# Patient Record
Sex: FEMALE | Race: WHITE | NOT HISPANIC OR LATINO
[De-identification: names, ages, dates, MRNs, and addresses within clinical notes are randomized per-mention and may not be internally consistent; named-entity substitution may affect disease eponyms.]

---

## 2018-01-24 ENCOUNTER — TRANSCRIPTION ENCOUNTER (OUTPATIENT)
Age: 63
End: 2018-01-24

## 2018-01-24 ENCOUNTER — APPOINTMENT (OUTPATIENT)
Dept: NEUROSURGERY | Facility: CLINIC | Age: 63
End: 2018-01-24

## 2018-01-24 ENCOUNTER — INPATIENT (INPATIENT)
Facility: HOSPITAL | Age: 63
LOS: 21 days | Discharge: HOME | DRG: 23 | End: 2018-02-15
Attending: NEUROLOGICAL SURGERY | Admitting: SURGERY
Payer: MEDICAID

## 2018-01-24 VITALS
DIASTOLIC BLOOD PRESSURE: 115 MMHG | HEART RATE: 103 BPM | SYSTOLIC BLOOD PRESSURE: 173 MMHG | OXYGEN SATURATION: 100 % | RESPIRATION RATE: 28 BRPM | TEMPERATURE: 99 F

## 2018-01-24 DIAGNOSIS — R41.82 ALTERED MENTAL STATUS, UNSPECIFIED: ICD-10-CM

## 2018-01-24 LAB
ALBUMIN SERPL ELPH-MCNC: 5.3 G/DL — HIGH (ref 3.3–5)
ALP SERPL-CCNC: 76 U/L — SIGNIFICANT CHANGE UP (ref 40–120)
ALT FLD-CCNC: 21 U/L RC — SIGNIFICANT CHANGE UP (ref 10–45)
AMPHET UR-MCNC: NEGATIVE — SIGNIFICANT CHANGE UP
AMYLASE P1 CFR SERPL: 138 U/L — HIGH (ref 25–125)
ANION GAP SERPL CALC-SCNC: 19 MMOL/L — HIGH (ref 5–17)
ANION GAP SERPL CALC-SCNC: 20 MMOL/L — HIGH (ref 5–17)
ANION GAP SERPL CALC-SCNC: 22 MMOL/L — HIGH (ref 5–17)
APPEARANCE CSF: ABNORMAL
APPEARANCE SPUN FLD: COLORLESS — SIGNIFICANT CHANGE UP
APPEARANCE UR: CLEAR — SIGNIFICANT CHANGE UP
APTT BLD: 25.8 SEC — LOW (ref 27.5–37.4)
AST SERPL-CCNC: 90 U/L — HIGH (ref 10–40)
BARBITURATES UR SCN-MCNC: NEGATIVE — SIGNIFICANT CHANGE UP
BASOPHILS # BLD AUTO: 0 K/UL — SIGNIFICANT CHANGE UP (ref 0–0.2)
BENZODIAZ UR-MCNC: NEGATIVE — SIGNIFICANT CHANGE UP
BILIRUB SERPL-MCNC: 1.2 MG/DL — SIGNIFICANT CHANGE UP (ref 0.2–1.2)
BILIRUB UR-MCNC: NEGATIVE — SIGNIFICANT CHANGE UP
BLD GP AB SCN SERPL QL: NEGATIVE — SIGNIFICANT CHANGE UP
BUN SERPL-MCNC: 17 MG/DL — SIGNIFICANT CHANGE UP (ref 7–23)
BUN SERPL-MCNC: 19 MG/DL — SIGNIFICANT CHANGE UP (ref 7–23)
BUN SERPL-MCNC: 20 MG/DL — SIGNIFICANT CHANGE UP (ref 7–23)
BUN SERPL-MCNC: 23 MG/DL — SIGNIFICANT CHANGE UP (ref 7–23)
CALCIUM SERPL-MCNC: 10.3 MG/DL — SIGNIFICANT CHANGE UP (ref 8.4–10.5)
CALCIUM SERPL-MCNC: 9 MG/DL — SIGNIFICANT CHANGE UP (ref 8.4–10.5)
CALCIUM SERPL-MCNC: 9.6 MG/DL — SIGNIFICANT CHANGE UP (ref 8.4–10.5)
CALCIUM SERPL-MCNC: 9.9 MG/DL — SIGNIFICANT CHANGE UP (ref 8.4–10.5)
CHLORIDE SERPL-SCNC: 102 MMOL/L — SIGNIFICANT CHANGE UP (ref 96–108)
CHLORIDE SERPL-SCNC: 87 MMOL/L — LOW (ref 96–108)
CHLORIDE SERPL-SCNC: 92 MMOL/L — LOW (ref 96–108)
CHLORIDE SERPL-SCNC: 95 MMOL/L — LOW (ref 96–108)
CK MB BLD-MCNC: 1.6 % — SIGNIFICANT CHANGE UP (ref 0–3.5)
CK MB CFR SERPL CALC: 16 NG/ML — HIGH (ref 0–3.8)
CK SERPL-CCNC: 1028 U/L — HIGH (ref 25–170)
CO2 SERPL-SCNC: 17 MMOL/L — LOW (ref 22–31)
CO2 SERPL-SCNC: 20 MMOL/L — LOW (ref 22–31)
CO2 SERPL-SCNC: 21 MMOL/L — LOW (ref 22–31)
CO2 SERPL-SCNC: 24 MMOL/L — SIGNIFICANT CHANGE UP (ref 22–31)
COCAINE METAB.OTHER UR-MCNC: NEGATIVE — SIGNIFICANT CHANGE UP
COLOR CSF: ABNORMAL
COLOR SPEC: SIGNIFICANT CHANGE UP
CREAT SERPL-MCNC: 0.75 MG/DL — SIGNIFICANT CHANGE UP (ref 0.5–1.3)
CREAT SERPL-MCNC: 0.86 MG/DL — SIGNIFICANT CHANGE UP (ref 0.5–1.3)
CREAT SERPL-MCNC: 0.87 MG/DL — SIGNIFICANT CHANGE UP (ref 0.5–1.3)
CREAT SERPL-MCNC: 1.05 MG/DL — SIGNIFICANT CHANGE UP (ref 0.5–1.3)
DIFF PNL FLD: ABNORMAL
EOSINOPHIL # BLD AUTO: 0 K/UL — SIGNIFICANT CHANGE UP (ref 0–0.5)
ERYTHROCYTE [SEDIMENTATION RATE] IN BLOOD: 12 MM/HR — SIGNIFICANT CHANGE UP (ref 0–20)
ETHANOL SERPL-MCNC: SIGNIFICANT CHANGE UP MG/DL (ref 0–10)
GAS PNL BLDA: SIGNIFICANT CHANGE UP
GAS PNL BLDA: SIGNIFICANT CHANGE UP
GLUCOSE CSF-MCNC: 99 MG/DL — HIGH (ref 40–70)
GLUCOSE SERPL-MCNC: 155 MG/DL — HIGH (ref 70–99)
GLUCOSE SERPL-MCNC: 185 MG/DL — HIGH (ref 70–99)
GLUCOSE SERPL-MCNC: 195 MG/DL — HIGH (ref 70–99)
GLUCOSE SERPL-MCNC: 210 MG/DL — HIGH (ref 70–99)
GLUCOSE UR QL: 150 MG/DL
GRAM STN FLD: SIGNIFICANT CHANGE UP
HBA1C BLD-MCNC: 5.4 % — SIGNIFICANT CHANGE UP (ref 4–5.6)
HCG SERPL-ACNC: 3.2 MIU/ML — LOW (ref 5–24)
HCT VFR BLD CALC: 44.8 % — SIGNIFICANT CHANGE UP (ref 34.5–45)
HCT VFR BLD CALC: 52.3 % — HIGH (ref 34.5–45)
HCT VFR BLD CALC: 56.2 % — HIGH (ref 34.5–45)
HGB BLD-MCNC: 15.8 G/DL — HIGH (ref 11.5–15.5)
HGB BLD-MCNC: 18.2 G/DL — HIGH (ref 11.5–15.5)
HGB BLD-MCNC: 19.8 G/DL — CRITICAL HIGH (ref 11.5–15.5)
INR BLD: 0.96 RATIO — SIGNIFICANT CHANGE UP (ref 0.88–1.16)
KETONES UR-MCNC: ABNORMAL
LACTATE CSF-MCNC: 3.4 MMOL/L — HIGH (ref 1.1–2.4)
LEUKOCYTE ESTERASE UR-ACNC: NEGATIVE — SIGNIFICANT CHANGE UP
LIDOCAIN IGE QN: 86 U/L — HIGH (ref 7–60)
LYMPHOCYTES # BLD AUTO: 1.2 K/UL — SIGNIFICANT CHANGE UP (ref 1–3.3)
LYMPHOCYTES # BLD AUTO: 9 % — LOW (ref 13–44)
LYMPHOCYTES # CSF: 1 % — LOW (ref 40–80)
MAGNESIUM SERPL-MCNC: 1.7 MG/DL — SIGNIFICANT CHANGE UP (ref 1.6–2.6)
MAGNESIUM SERPL-MCNC: 2 MG/DL — SIGNIFICANT CHANGE UP (ref 1.6–2.6)
MCHC RBC-ENTMCNC: 34.8 GM/DL — SIGNIFICANT CHANGE UP (ref 32–36)
MCHC RBC-ENTMCNC: 35.3 GM/DL — SIGNIFICANT CHANGE UP (ref 32–36)
MCHC RBC-ENTMCNC: 35.3 GM/DL — SIGNIFICANT CHANGE UP (ref 32–36)
MCHC RBC-ENTMCNC: 35.5 PG — HIGH (ref 27–34)
MCHC RBC-ENTMCNC: 36 PG — HIGH (ref 27–34)
MCHC RBC-ENTMCNC: 36.1 PG — HIGH (ref 27–34)
MCV RBC AUTO: 102 FL — HIGH (ref 80–100)
METHADONE UR-MCNC: NEGATIVE — SIGNIFICANT CHANGE UP
MONOCYTES # BLD AUTO: 1.3 K/UL — HIGH (ref 0–0.9)
MONOCYTES NFR BLD AUTO: 8 % — SIGNIFICANT CHANGE UP (ref 2–14)
MONOS+MACROS NFR CSF: 4 % — LOW (ref 15–45)
NEUTROPHILS # BLD AUTO: 17.4 K/UL — HIGH (ref 1.8–7.4)
NEUTROPHILS # CSF: 95 % — HIGH (ref 0–6)
NEUTROPHILS NFR BLD AUTO: 82 % — HIGH (ref 43–77)
NITRITE UR-MCNC: NEGATIVE — SIGNIFICANT CHANGE UP
NRBC NFR CSF: 3 /UL — SIGNIFICANT CHANGE UP (ref 0–5)
OPIATES UR-MCNC: NEGATIVE — SIGNIFICANT CHANGE UP
OXYCODONE UR-MCNC: NEGATIVE — SIGNIFICANT CHANGE UP
PCP SPEC-MCNC: SIGNIFICANT CHANGE UP
PCP UR-MCNC: NEGATIVE — SIGNIFICANT CHANGE UP
PH UR: 6.5 — SIGNIFICANT CHANGE UP (ref 5–8)
PHOSPHATE SERPL-MCNC: 3.4 MG/DL — SIGNIFICANT CHANGE UP (ref 2.5–4.5)
PHOSPHATE SERPL-MCNC: 4.6 MG/DL — HIGH (ref 2.5–4.5)
PLATELET # BLD AUTO: 206 K/UL — SIGNIFICANT CHANGE UP (ref 150–400)
PLATELET # BLD AUTO: 221 K/UL — SIGNIFICANT CHANGE UP (ref 150–400)
PLATELET # BLD AUTO: 224 K/UL — SIGNIFICANT CHANGE UP (ref 150–400)
POTASSIUM SERPL-MCNC: 3.2 MMOL/L — LOW (ref 3.5–5.3)
POTASSIUM SERPL-MCNC: 3.5 MMOL/L — SIGNIFICANT CHANGE UP (ref 3.5–5.3)
POTASSIUM SERPL-MCNC: 5 MMOL/L — SIGNIFICANT CHANGE UP (ref 3.5–5.3)
POTASSIUM SERPL-MCNC: >9 MMOL/L — CRITICAL HIGH (ref 3.5–5.3)
POTASSIUM SERPL-SCNC: 3.2 MMOL/L — LOW (ref 3.5–5.3)
POTASSIUM SERPL-SCNC: 3.5 MMOL/L — SIGNIFICANT CHANGE UP (ref 3.5–5.3)
POTASSIUM SERPL-SCNC: 5 MMOL/L — SIGNIFICANT CHANGE UP (ref 3.5–5.3)
POTASSIUM SERPL-SCNC: >9 MMOL/L — CRITICAL HIGH (ref 3.5–5.3)
PROT CSF-MCNC: 20 MG/DL — SIGNIFICANT CHANGE UP (ref 15–45)
PROT SERPL-MCNC: 10 G/DL — HIGH (ref 6–8.3)
PROT UR-MCNC: >600 MG/DL
PROTHROM AB SERPL-ACNC: 10.4 SEC — SIGNIFICANT CHANGE UP (ref 9.8–12.7)
RAPID RVP RESULT: SIGNIFICANT CHANGE UP
RBC # BLD: 4.39 M/UL — SIGNIFICANT CHANGE UP (ref 3.8–5.2)
RBC # BLD: 5.12 M/UL — SIGNIFICANT CHANGE UP (ref 3.8–5.2)
RBC # BLD: 5.5 M/UL — HIGH (ref 3.8–5.2)
RBC # CSF: 2333 /UL — HIGH (ref 0–0)
RBC # FLD: 12.6 % — SIGNIFICANT CHANGE UP (ref 10.3–14.5)
RBC # FLD: 12.7 % — SIGNIFICANT CHANGE UP (ref 10.3–14.5)
RBC # FLD: 12.7 % — SIGNIFICANT CHANGE UP (ref 10.3–14.5)
RH IG SCN BLD-IMP: POSITIVE — SIGNIFICANT CHANGE UP
RH IG SCN BLD-IMP: POSITIVE — SIGNIFICANT CHANGE UP
SODIUM SERPL-SCNC: 129 MMOL/L — LOW (ref 135–145)
SODIUM SERPL-SCNC: 132 MMOL/L — LOW (ref 135–145)
SODIUM SERPL-SCNC: 133 MMOL/L — LOW (ref 135–145)
SODIUM SERPL-SCNC: 136 MMOL/L — SIGNIFICANT CHANGE UP (ref 135–145)
SP GR SPEC: >1.03 — HIGH (ref 1.01–1.02)
SPECIMEN SOURCE: SIGNIFICANT CHANGE UP
THC UR QL: NEGATIVE — SIGNIFICANT CHANGE UP
TROPONIN T SERPL-MCNC: 0.05 NG/ML — SIGNIFICANT CHANGE UP (ref 0–0.06)
TUBE TYPE: SIGNIFICANT CHANGE UP
UROBILINOGEN FLD QL: NEGATIVE — SIGNIFICANT CHANGE UP
WBC # BLD: 19.9 K/UL — HIGH (ref 3.8–10.5)
WBC # BLD: 20.6 K/UL — HIGH (ref 3.8–10.5)
WBC # BLD: 23.6 K/UL — HIGH (ref 3.8–10.5)
WBC # FLD AUTO: 19.9 K/UL — HIGH (ref 3.8–10.5)
WBC # FLD AUTO: 20.6 K/UL — HIGH (ref 3.8–10.5)
WBC # FLD AUTO: 23.6 K/UL — HIGH (ref 3.8–10.5)

## 2018-01-24 PROCEDURE — 99291 CRITICAL CARE FIRST HOUR: CPT

## 2018-01-24 PROCEDURE — 70496 CT ANGIOGRAPHY HEAD: CPT | Mod: 26

## 2018-01-24 PROCEDURE — 61070 BRAIN CANAL SHUNT PROCEDURE: CPT

## 2018-01-24 PROCEDURE — 61107 TDH PNXR IMPLT VENTR CATH: CPT

## 2018-01-24 PROCEDURE — 93010 ELECTROCARDIOGRAM REPORT: CPT

## 2018-01-24 PROCEDURE — 70450 CT HEAD/BRAIN W/O DYE: CPT | Mod: 26,59

## 2018-01-24 PROCEDURE — 99285 EMERGENCY DEPT VISIT HI MDM: CPT | Mod: 25

## 2018-01-24 PROCEDURE — 36620 INSERTION CATHETER ARTERY: CPT

## 2018-01-24 PROCEDURE — 70450 CT HEAD/BRAIN W/O DYE: CPT | Mod: 26,77,59

## 2018-01-24 PROCEDURE — 74177 CT ABD & PELVIS W/CONTRAST: CPT | Mod: 26

## 2018-01-24 PROCEDURE — 71045 X-RAY EXAM CHEST 1 VIEW: CPT | Mod: 26

## 2018-01-24 PROCEDURE — 71260 CT THORAX DX C+: CPT | Mod: 26

## 2018-01-24 PROCEDURE — 70498 CT ANGIOGRAPHY NECK: CPT | Mod: 26

## 2018-01-24 PROCEDURE — 99292 CRITICAL CARE ADDL 30 MIN: CPT

## 2018-01-24 PROCEDURE — 72125 CT NECK SPINE W/O DYE: CPT | Mod: 26

## 2018-01-24 PROCEDURE — 71045 X-RAY EXAM CHEST 1 VIEW: CPT | Mod: 26,77

## 2018-01-24 PROCEDURE — 99223 1ST HOSP IP/OBS HIGH 75: CPT | Mod: 57

## 2018-01-24 RX ORDER — NICARDIPINE HYDROCHLORIDE 30 MG/1
3 CAPSULE, EXTENDED RELEASE ORAL
Qty: 40 | Refills: 0 | Status: DISCONTINUED | OUTPATIENT
Start: 2018-01-24 | End: 2018-01-25

## 2018-01-24 RX ORDER — ALBUTEROL 90 UG/1
1.25 AEROSOL, METERED ORAL EVERY 4 HOURS
Qty: 0 | Refills: 0 | Status: DISCONTINUED | OUTPATIENT
Start: 2018-01-24 | End: 2018-01-24

## 2018-01-24 RX ORDER — DEXMEDETOMIDINE HYDROCHLORIDE IN 0.9% SODIUM CHLORIDE 4 UG/ML
0.7 INJECTION INTRAVENOUS
Qty: 200 | Refills: 0 | Status: DISCONTINUED | OUTPATIENT
Start: 2018-01-24 | End: 2018-01-26

## 2018-01-24 RX ORDER — NICARDIPINE HYDROCHLORIDE 30 MG/1
3 CAPSULE, EXTENDED RELEASE ORAL
Qty: 40 | Refills: 0 | Status: DISCONTINUED | OUTPATIENT
Start: 2018-01-24 | End: 2018-01-24

## 2018-01-24 RX ORDER — SODIUM CHLORIDE 5 G/100ML
1000 INJECTION, SOLUTION INTRAVENOUS
Qty: 0 | Refills: 0 | Status: DISCONTINUED | OUTPATIENT
Start: 2018-01-24 | End: 2018-01-25

## 2018-01-24 RX ORDER — MAGNESIUM SULFATE 500 MG/ML
1 VIAL (ML) INJECTION ONCE
Qty: 0 | Refills: 0 | Status: COMPLETED | OUTPATIENT
Start: 2018-01-24 | End: 2018-01-24

## 2018-01-24 RX ORDER — SODIUM CHLORIDE 9 MG/ML
1000 INJECTION INTRAMUSCULAR; INTRAVENOUS; SUBCUTANEOUS
Qty: 0 | Refills: 0 | Status: DISCONTINUED | OUTPATIENT
Start: 2018-01-24 | End: 2018-01-24

## 2018-01-24 RX ORDER — PROPOFOL 10 MG/ML
5 INJECTION, EMULSION INTRAVENOUS
Qty: 500 | Refills: 0 | Status: DISCONTINUED | OUTPATIENT
Start: 2018-01-24 | End: 2018-01-24

## 2018-01-24 RX ORDER — PANTOPRAZOLE SODIUM 20 MG/1
40 TABLET, DELAYED RELEASE ORAL DAILY
Qty: 0 | Refills: 0 | Status: DISCONTINUED | OUTPATIENT
Start: 2018-01-24 | End: 2018-01-29

## 2018-01-24 RX ORDER — POTASSIUM CHLORIDE 20 MEQ
10 PACKET (EA) ORAL
Qty: 0 | Refills: 0 | Status: COMPLETED | OUTPATIENT
Start: 2018-01-24 | End: 2018-01-24

## 2018-01-24 RX ORDER — MAGNESIUM SULFATE 500 MG/ML
2 VIAL (ML) INJECTION ONCE
Qty: 0 | Refills: 0 | Status: DISCONTINUED | OUTPATIENT
Start: 2018-01-24 | End: 2018-01-24

## 2018-01-24 RX ORDER — ALBUTEROL 90 UG/1
1 AEROSOL, METERED ORAL EVERY 4 HOURS
Qty: 0 | Refills: 0 | Status: DISCONTINUED | OUTPATIENT
Start: 2018-01-24 | End: 2018-02-15

## 2018-01-24 RX ORDER — DEXMEDETOMIDINE HYDROCHLORIDE IN 0.9% SODIUM CHLORIDE 4 UG/ML
0.2 INJECTION INTRAVENOUS
Qty: 200 | Refills: 0 | Status: DISCONTINUED | OUTPATIENT
Start: 2018-01-24 | End: 2018-01-24

## 2018-01-24 RX ORDER — FENTANYL CITRATE 50 UG/ML
25 INJECTION INTRAVENOUS ONCE
Qty: 0 | Refills: 0 | Status: DISCONTINUED | OUTPATIENT
Start: 2018-01-24 | End: 2018-01-24

## 2018-01-24 RX ORDER — LABETALOL HCL 100 MG
20 TABLET ORAL ONCE
Qty: 0 | Refills: 0 | Status: COMPLETED | OUTPATIENT
Start: 2018-01-24 | End: 2018-01-24

## 2018-01-24 RX ORDER — HYDRALAZINE HCL 50 MG
10 TABLET ORAL ONCE
Qty: 0 | Refills: 0 | Status: COMPLETED | OUTPATIENT
Start: 2018-01-24 | End: 2018-01-24

## 2018-01-24 RX ORDER — SODIUM CHLORIDE 9 MG/ML
1000 INJECTION INTRAMUSCULAR; INTRAVENOUS; SUBCUTANEOUS ONCE
Qty: 0 | Refills: 0 | Status: COMPLETED | OUTPATIENT
Start: 2018-01-24 | End: 2018-01-24

## 2018-01-24 RX ORDER — ALBUTEROL 90 UG/1
2.5 AEROSOL, METERED ORAL EVERY 4 HOURS
Qty: 0 | Refills: 0 | Status: DISCONTINUED | OUTPATIENT
Start: 2018-01-24 | End: 2018-01-24

## 2018-01-24 RX ORDER — NICOTINE POLACRILEX 2 MG
1 GUM BUCCAL DAILY
Qty: 0 | Refills: 0 | Status: DISCONTINUED | OUTPATIENT
Start: 2018-01-24 | End: 2018-02-07

## 2018-01-24 RX ADMIN — Medication 100 GRAM(S): at 12:00

## 2018-01-24 RX ADMIN — NICARDIPINE HYDROCHLORIDE 15 MG/HR: 30 CAPSULE, EXTENDED RELEASE ORAL at 10:10

## 2018-01-24 RX ADMIN — SODIUM CHLORIDE 100 MILLILITER(S): 9 INJECTION INTRAMUSCULAR; INTRAVENOUS; SUBCUTANEOUS at 12:00

## 2018-01-24 RX ADMIN — Medication 100 MILLIEQUIVALENT(S): at 23:31

## 2018-01-24 RX ADMIN — FENTANYL CITRATE 25 MICROGRAM(S): 50 INJECTION INTRAVENOUS at 12:01

## 2018-01-24 RX ADMIN — Medication 20 MILLIGRAM(S): at 07:05

## 2018-01-24 RX ADMIN — SODIUM CHLORIDE 1000 MILLILITER(S): 9 INJECTION INTRAMUSCULAR; INTRAVENOUS; SUBCUTANEOUS at 04:35

## 2018-01-24 RX ADMIN — PANTOPRAZOLE SODIUM 40 MILLIGRAM(S): 20 TABLET, DELAYED RELEASE ORAL at 12:30

## 2018-01-24 RX ADMIN — DEXMEDETOMIDINE HYDROCHLORIDE IN 0.9% SODIUM CHLORIDE 8.73 MICROGRAM(S)/KG/HR: 4 INJECTION INTRAVENOUS at 23:31

## 2018-01-24 RX ADMIN — SODIUM CHLORIDE 100 MILLILITER(S): 5 INJECTION, SOLUTION INTRAVENOUS at 16:45

## 2018-01-24 RX ADMIN — Medication 100 MILLIEQUIVALENT(S): at 21:09

## 2018-01-24 RX ADMIN — NICARDIPINE HYDROCHLORIDE 15 MG/HR: 30 CAPSULE, EXTENDED RELEASE ORAL at 06:47

## 2018-01-24 RX ADMIN — Medication 100 MILLIEQUIVALENT(S): at 22:26

## 2018-01-24 RX ADMIN — Medication 10 MILLIGRAM(S): at 07:05

## 2018-01-24 RX ADMIN — FENTANYL CITRATE 25 MICROGRAM(S): 50 INJECTION INTRAVENOUS at 12:16

## 2018-01-24 RX ADMIN — PROPOFOL 1.5 MICROGRAM(S)/KG/MIN: 10 INJECTION, EMULSION INTRAVENOUS at 16:45

## 2018-01-24 RX ADMIN — DEXMEDETOMIDINE HYDROCHLORIDE IN 0.9% SODIUM CHLORIDE 2.5 MICROGRAM(S)/KG/HR: 4 INJECTION INTRAVENOUS at 07:24

## 2018-01-24 RX ADMIN — Medication 1 PATCH: at 17:07

## 2018-01-24 NOTE — PROGRESS NOTE ADULT - SUBJECTIVE AND OBJECTIVE BOX
SUMMARY:HPI:  61y/o female brought in to [facility] ED s/p fall at home in the bathroom. As per  patient has been somewhat altered for most if not all of the day. She then had a fall in the bathroom & hit the right side of her head. Level I trauma called as patient's altered mental status was initially concerning for potential need for intubation. In trauma bay primary survey significant for GCS of 11 (E=4 V =2 M=5). Secondary survey significant for Right parietal hematoma. 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       ADMISSION SCORES:   GCS: 11     Allergies    No Known Allergies    Intolerances        REVIEW OF SYSTEMS: [X ] Unable to Assess due to neurologic condition   Neuro: [ ] Headache [ ] Back pain [ ] Numbness [ ] Weakness [ ] Ataxia [ ] Dizziness [ ] Aphasia [ ] Dysarthria [ ] Visual disturbance  Resp: [ ] Shortness of breath/dyspnea, [ ] Orthopnea [ ] Cough  CV: [ ] Chest pain [ ] Palpitation [ ] Lightheadedness [ ] Syncope  Renal: [ ] Thirst [ ] Edema  GI: [ ] Nausea [ ] Emesis [ ] Abdominal pain [ ] Constipation [ ] Diarrhea  Hem: [ ] Hematemesis [ ] bright red blood per rectum  ID: [ ] Fever [ ] Chills [ ] Dysuria  ENT: [ ] Rhinorrhea    DEVICES:   [X ] Restraints [X ] ET tube [ ] central line [ X] arterial line [X ] calderon  [ X] EVD     VITALS: [ X] Reviewed  Vital Signs Last 24 Hrs  T(C): 36.7 (24 Jan 2018 08:00), Max: 37.1 (24 Jan 2018 06:00)  T(F): 98 (24 Jan 2018 08:00), Max: 98.8 (24 Jan 2018 06:00)  HR: 72 (24 Jan 2018 10:15) (61 - 103)  BP: 130/76 (24 Jan 2018 10:15) (130/76 - 253/135- placed EVD )  BP(mean): 91 (24 Jan 2018 10:15) (91 - 182)  RR: 12 (24 Jan 2018 10:15) (12 - 44)  SpO2: 100% (24 Jan 2018 10:15) (98% - 100%)  CAPILLARY BLOOD GLUCOSE      POCT Blood Glucose.: 179 mg/dL (24 Jan 2018 04:30)        LABS:  PT/INR - ( 24 Jan 2018 08:12 )   PT: 10.4 sec;   INR: 0.96 ratio         PTT - ( 24 Jan 2018 08:12 )  PTT:25.8 sec  01-24    132<L>  |  92<L>  |  20  ----------------------------<  210<H>  3.5   |  17<L>  |  0.86    Ca    9.9      24 Jan 2018 08:12  Phos  4.6     01-24  Mg     1.7     01-24    TPro  10.0<H>  /  Alb  5.3<H>  /  TBili  1.2  /  DBili  x   /  AST  90<H>  /  ALT  21  /  AlkPhos  76  01-24                          18.2   23.6  )-----------( 221      ( 24 Jan 2018 08:12 )             52.3       STROKE CORE MEASURES:       IMAGING/DATA:   IVF FLUIDS/MEDICATIONS: [ ] Reviewed  MEDICATIONS  (STANDING):  fentaNYL    Injectable 25 MICROGram(s) IV Push once  niCARdipine Infusion 3 mG/Hr (15 mL/Hr) IV Continuous <Continuous>  propofol Infusion 5 MICROgram(s)/kG/Min (1.497 mL/Hr) IV Continuous <Continuous>    MEDICATIONS  (PRN):    I&O's Summary    23 Jan 2018 07:01  -  24 Jan 2018 07:00  --------------------------------------------------------  IN: 0 mL / OUT: 300 mL / NET: -300 mL    24 Jan 2018 07:01  -  24 Jan 2018 10:39  --------------------------------------------------------  IN: 20 mL / OUT: 110 mL / NET: -90 mL        EXAMINATION:  PHYSICAL EXAM:    Constitutional: No Acute Distress     Neurological: Awake, alert oriented to person, place and time, Following Commands, PERRL, EOMI, No Gaze Preference, Face Symmetrical, Speech Fluent, No dysmetria, No ataxia, No nystagmus     Motor exam:          Upper extremity                         Delt     Bicep     Tricep    HG                                                 R         5/5 5/5 5/5 5/5                                               L          5/5 5/5 5/5 5/5          Lower extremity                        HF         KF        KE       DF         PF                                                  R        5/5 5/5 5/5 5/5 5/5                                               L         5/5 5/5 5/5 5/5 5/5                                                 Sensation: [ ] intact to light touch  [ ] decreased:     Reflexes: Deep Tendon Reflexes Intact     Pulmonary: Clear to Auscultation, No rales, No rhonchi, No wheezes     Cardiovascular: S1, S2, Regular rate and rhythm     Gastrointestinal: Soft, Non-tender, Non-distended     Extremities: No calf tenderness     Incision: SUMMARY:HPI:  61y/o female brought in to [facility] ED s/p fall at home in the bathroom. As per  patient has been somewhat altered for most if not all of the day. She then had a fall in the bathroom & hit the right side of her head. Level I trauma called as patient's altered mental status was initially concerning for potential need for intubation. In trauma bay primary survey significant for GCS of 11 (E=4 V =2 M=5). Secondary survey significant for Right parietal hematoma. 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       ADMISSION SCORES:   GCS: 11     Allergies    No Known Allergies    Intolerances        REVIEW OF SYSTEMS: [X ] Unable to Assess due to neurologic condition   Neuro: [ ] Headache [ ] Back pain [ ] Numbness [ ] Weakness [ ] Ataxia [ ] Dizziness [ ] Aphasia [ ] Dysarthria [ ] Visual disturbance  Resp: [ ] Shortness of breath/dyspnea, [ ] Orthopnea [ ] Cough  CV: [ ] Chest pain [ ] Palpitation [ ] Lightheadedness [ ] Syncope  Renal: [ ] Thirst [ ] Edema  GI: [ ] Nausea [ ] Emesis [ ] Abdominal pain [ ] Constipation [ ] Diarrhea  Hem: [ ] Hematemesis [ ] bright red blood per rectum  ID: [ ] Fever [ ] Chills [ ] Dysuria  ENT: [ ] Rhinorrhea    DEVICES:   [X ] Restraints [X ] ET tube [ ] central line [ X] arterial line [X ] calderon  [ X] EVD     VITALS: [ X] Reviewed  Vital Signs Last 24 Hrs  T(C): 36.7 (24 Jan 2018 08:00), Max: 37.1 (24 Jan 2018 06:00)  T(F): 98 (24 Jan 2018 08:00), Max: 98.8 (24 Jan 2018 06:00)  HR: 72 (24 Jan 2018 10:15) (61 - 103)  BP: 130/76 (24 Jan 2018 10:15) (130/76 - 253/135- placed EVD )  BP(mean): 91 (24 Jan 2018 10:15) (91 - 182)  RR: 12 (24 Jan 2018 10:15) (12 - 44)  SpO2: 100% (24 Jan 2018 10:15) (98% - 100%)  CAPILLARY BLOOD GLUCOSE      POCT Blood Glucose.: 179 mg/dL (24 Jan 2018 04:30)        LABS:  PT/INR - ( 24 Jan 2018 08:12 )   PT: 10.4 sec;   INR: 0.96 ratio         PTT - ( 24 Jan 2018 08:12 )  PTT:25.8 sec  01-24    132<L>  |  92<L>  |  20  ----------------------------<  210<H>  3.5   |  17<L>  |  0.86    Ca    9.9      24 Jan 2018 08:12  Phos  4.6     01-24  Mg     1.7     01-24    TPro  10.0<H>  /  Alb  5.3<H>  /  TBili  1.2  /  DBili  x   /  AST  90<H>  /  ALT  21  /  AlkPhos  76  01-24                          18.2   23.6  )-----------( 221      ( 24 Jan 2018 08:12 )             52.3       STROKE CORE MEASURES:       IMAGING/DATA:   IVF FLUIDS/MEDICATIONS: [ ] Reviewed  MEDICATIONS  (STANDING):  fentaNYL    Injectable 25 MICROGram(s) IV Push once  niCARdipine Infusion 3 mG/Hr (15 mL/Hr) IV Continuous <Continuous>  propofol Infusion 5 MICROgram(s)/kG/Min (1.497 mL/Hr) IV Continuous <Continuous>    MEDICATIONS  (PRN):    I&O's Summary    23 Jan 2018 07:01  -  24 Jan 2018 07:00  --------------------------------------------------------  IN: 0 mL / OUT: 300 mL / NET: -300 mL    24 Jan 2018 07:01  -  24 Jan 2018 10:39  --------------------------------------------------------  IN: 20 mL / OUT: 110 mL / NET: -90 mL        EXAMINATION:  PHYSICAL EXAM:    Constitutional: No Acute Distress     Neurological: Opens eyes spont, pupils 2mm and reactive;  not following commands; tracks B/L , intubated     Motor exam:          Upper extremity                         Delt     Bicep     Tricep    HG                                                 R         withdraws                                                L          localizes with  LUE           Lower extremity                        HF         KF        KE       DF         PF                                                  R          withdraws briskly                                                L         moves spont not antigravity                                                  Sensation: unable to acces      Pulmonary: Clear to Auscultation, No rales, No rhonchi, No wheezes     Cardiovascular: S1, S2, Regular rate and rhythm     Gastrointestinal: Soft, Non-tender, Non-distended     Extremities: No calf tenderness SUMMARY:HPI:  61y/o female brought in to [facility] ED s/p fall at home in the bathroom. As per  patient has been somewhat altered for most if not all of the day. She then had a fall in the bathroom & hit the right side of her head. Level I trauma called as patient's altered mental status was initially concerning for potential need for intubation. In trauma bay primary survey significant for GCS of 11 (E=4 V =2 M=5). Secondary survey significant for Right parietal hematoma. 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       ADMISSION SCORES:   GCS: 11     Allergies    No Known Allergies    Intolerances        REVIEW OF SYSTEMS: [X ] Unable to Assess due to neurologic condition   Neuro: [ ] Headache [ ] Back pain [ ] Numbness [ ] Weakness [ ] Ataxia [ ] Dizziness [ ] Aphasia [ ] Dysarthria [ ] Visual disturbance  Resp: [ ] Shortness of breath/dyspnea, [ ] Orthopnea [ ] Cough  CV: [ ] Chest pain [ ] Palpitation [ ] Lightheadedness [ ] Syncope  Renal: [ ] Thirst [ ] Edema  GI: [ ] Nausea [ ] Emesis [ ] Abdominal pain [ ] Constipation [ ] Diarrhea  Hem: [ ] Hematemesis [ ] bright red blood per rectum  ID: [ ] Fever [ ] Chills [ ] Dysuria  ENT: [ ] Rhinorrhea    DEVICES:   [X ] Restraints [X ] ET tube [ ] central line [ X] arterial line [X ] calderon  [ X] EVD     VITALS: [ X] Reviewed  Vital Signs Last 24 Hrs  T(C): 36.7 (24 Jan 2018 08:00), Max: 37.1 (24 Jan 2018 06:00)  T(F): 98 (24 Jan 2018 08:00), Max: 98.8 (24 Jan 2018 06:00)  HR: 72 (24 Jan 2018 10:15) (61 - 103)  BP: 130/76 (24 Jan 2018 10:15) (130/76 - 253/135- placed EVD )  BP(mean): 91 (24 Jan 2018 10:15) (91 - 182)  RR: 12 (24 Jan 2018 10:15) (12 - 44)  SpO2: 100% (24 Jan 2018 10:15) (98% - 100%)  CAPILLARY BLOOD GLUCOSE      POCT Blood Glucose.: 179 mg/dL (24 Jan 2018 04:30)        LABS:  PT/INR - ( 24 Jan 2018 08:12 )   PT: 10.4 sec;   INR: 0.96 ratio         PTT - ( 24 Jan 2018 08:12 )  PTT:25.8 sec  01-24    132<L>  |  92<L>  |  20  ----------------------------<  210<H> (corrected Na 134)  3.5   |  17<L>  |  0.86    Ca    9.9      24 Jan 2018 08:12  Phos  4.6     01-24  Mg     1.7     01-24    TPro  10.0<H>  /  Alb  5.3<H>  /  TBili  1.2  /  DBili  x   /  AST  90<H>  /  ALT  21  /  AlkPhos  76  01-24                          18.2   23.6  )-----------( 221      ( 24 Jan 2018 08:12 )             52.3       STROKE CORE MEASURES:       IMAGING/DATA: Indeterminant L adrenal nodule; CT of C/A/P - no vascular or visceral abnl; increased vessels around fourth vent with 4th vent blood.   IVF FLUIDS/MEDICATIONS: [X ] Reviewed  MEDICATIONS  (STANDING):  fentaNYL    Injectable 25 MICROGram(s) IV Push once  niCARdipine Infusion 3 mG/Hr (15 mL/Hr) IV Continuous <Continuous>  propofol Infusion 5 MICROgram(s)/kG/Min (1.497 mL/Hr) IV Continuous <Continuous>    MEDICATIONS  (PRN):    I&O's Summary    23 Jan 2018 07:01  -  24 Jan 2018 07:00  --------------------------------------------------------  IN: 0 mL / OUT: 300 mL / NET: -300 mL    24 Jan 2018 07:01  -  24 Jan 2018 10:39  --------------------------------------------------------  IN: 20 mL / OUT: 110 mL / NET: -90 mL        EXAMINATION:  PHYSICAL EXAM:    Constitutional: No Acute Distress     Neurological: Opens eyes spont, pupils 2mm and reactive;  not following commands; tracks B/L , intubated     Motor exam:          Upper extremity                         Delt     Bicep     Tricep    HG                                                 R         withdraws                                                L          localizes with  LUE           Lower extremity                        HF         KF        KE       DF         PF                                                  R          withdraws briskly                                                L         moves spont not antigravity                                                  Sensation: unable to acces      Pulmonary: Clear to Auscultation, No rales, No rhonchi, No wheezes     Cardiovascular: S1, S2, Regular rate and rhythm     Gastrointestinal: Soft, Non-tender, Non-distended     Extremities: No calf tenderness

## 2018-01-24 NOTE — DISCHARGE NOTE ADULT - PLAN OF CARE
Increase activity , physical and occupational therapy Follow up with Dr. Levin after rehab-Please call office to confirm appointment-Neurosurgeon   Follow up with Dr. Benton- Gastroenterologist if any questions regarding PEG tube   Follow up with Dr. Thacker Neurologist after rehab-Please call office to confirm appointment.   Follow up with PMD after rehab. No strenous activity. No heavy lifting. Do not return to work until cleared by physician. No driving until cleared by physician.  Continue Verapamil as treatment for RCVS. Continue ASA 81mg    Follow up with Dr. Thacker Stroke Neurologist after rehab-Please call office to confirm appointment.   Follow up with PMD after rehab. Follow up with Dr Levin after rehab Follow up with Dr Upton for management of Blood pressure Repeat MBS in rehab. restorative swallow therapy. Advance diet as tolerated Avoid hepatotoxic agents, Trend LFTs in rehab.  OFF STATIN and Keppra since 2/13/18 Continue Vimpat  Follow up with neurologist in 3-6 months regarding seizure meds Possible drug fever.  Fever workup negative

## 2018-01-24 NOTE — CONSULT NOTE ADULT - ASSESSMENT
62F s/p fall, found to have IVH.   - CTA head and neck  - q. 1 hour neuro checks (hydro watch)   - Repeat CTH in ~24 hours, or with change in exam

## 2018-01-24 NOTE — H&P ADULT - HISTORY OF PRESENT ILLNESS
61y/o female brought in to [facility] ED s/p fall at home in the bathroom. As per  patient has been somewhat altered for most if not all of the day. She then had a fall in the bathroom & hit the right side of her head. Level I trauma called as patient's altered mental status was initially concerning for potential need for intubation. In trauma bay primary survey significant for GCS of 11 (E=4 V =2 M=5). Secondary survey significant for Right parietal hematoma. 61y/o female brought in to [facility] ED s/p fall at home in the bathroom. Unable to obtain HPI, history, or ROS from patient due to her altered mental status. As per  patient woke up this morning feeling "unwell," vomited once with some dry heaves at 9am, then had slurred speech during the day starting at 10am. She spent most of the day in bed, and was able to have some tea and crackers. She then had a fall in the bathroom & hit the right side of her head. Level I trauma called as patient's altered mental status was initially concerning for potential need for intubation. In trauma bay primary survey significant for GCS of 11 (E=4 V =2 M=5). Secondary survey significant for Right frontal hematoma.

## 2018-01-24 NOTE — DISCHARGE NOTE ADULT - PROVIDER TOKENS
TOKEN:'[id number]' TOKEN:'[id number]'

## 2018-01-24 NOTE — H&P ADULT - NSHPREVIEWOFSYSTEMS_GEN_ALL_CORE
Unable to obtain due to patient's mental status. Patient's  notes that she has had unintentional weight loss for three months.

## 2018-01-24 NOTE — H&P ADULT - NSHPSOCIALHISTORY_GEN_ALL_CORE
, lives with  , lives with . Retired in 1990s. Smokes 3-4 cigarettes per day, for >30 years. Drinks 2 glasses of wine with dinner.

## 2018-01-24 NOTE — ED PROVIDER NOTE - MEDICAL DECISION MAKING DETAILS
Fall at home. Unclear if LOC prior to or after the event. Patient unable to contribute to history. Clear injury on arrival with AMS. Level 1 Trauma Activation called. Full investigation initiated. Admitted to Trauma Surgery.

## 2018-01-24 NOTE — CONSULT NOTE ADULT - SUBJECTIVE AND OBJECTIVE BOX
SICU Consultation Note  =====================================================  HPI: 62F with unknown PMH presented this evening as a level one trauma after a fall. Patient apparently fell at home and hit her head on tiles. Not currently on anticoagulation.  On arrival, primary survey was intact. GCS was 11. Secondary survey significant for hematoma to the forehead.  The patients vital signs were normal and she was taken immediately to the CT scanner. Labs significant for ____. Imaging showed___         Allergies:   PAST MEDICAL & SURGICAL HISTORY: unable to be obtained at this time     FAMILY HISTORY: unknown       SOCIAL HISTORY:  unknown    ADVANCE DIRECTIVES: Presumed Full Code      REVIEW OF SYSTEMS:    Unable to determine 2/2 current medical condition    HOME MEDICATIONS:  unknown     CURRENT MEDICATIONS:   --------------------------------------------------------------------------------------  Neurologic Medications    Respiratory Medications    Cardiovascular Medications    Gastrointestinal Medications  sodium chloride 0.9% Bolus 1000 milliLiter(s) IV Bolus once  sodium chloride 0.9% Bolus 1000 milliLiter(s) IV Bolus once    Genitourinary Medications    Hematologic/Oncologic Medications    Antimicrobial/Immunologic Medications    Endocrine/Metabolic Medications    Topical/Other Medications    --------------------------------------------------------------------------------------    VITAL SIGNS, INS/OUTS (last 24 hours):  --------------------------------------------------------------------------------------  ((Insert SICU Vitals / Is+Os here)) ***  --------------------------------------------------------------------------------------    EXAM  NEUROLOGY  RASS:   	GCS:    Exam: Normal, NAD, alert, oriented x 3, no focal deficits. ***    HEENT  Exam: Normocephalic, atraumatic.  EOMI ***    RESPIRATORY  Exam: Lungs clear to auscultation, Normal expansion/effort.  ***  Mechanical Ventilation:     CARDIOVASCULAR  Exam: S1, S2.  Regular rate and rhythm.  Peripheral edema  ***    GI/NUTRITION  Exam: Abdomen soft, Non-tender, Non-distended.  ***  Wound:   ***  Current Diet:  NPO ***    VASCULAR  Exam: Extremities warm, pink, well-perfused.  ***    MUSCULOSKELETAL  Exam: All extremities moving spontaneously without limitations.  ***    SKIN:  Exam: Good skin turgor, no skin breakdown.  ***    METABOLIC/FLUIDS/ELECTROLYTES  sodium chloride 0.9% Bolus 1000 milliLiter(s) IV Bolus once  sodium chloride 0.9% Bolus 1000 milliLiter(s) IV Bolus once      HEMATOLOGIC  [x] DVT Prophylaxis:   Transfusions:	[] PRBC	[] Platelets		[] FFP	[] Cryoprecipitate    INFECTIOUS DISEASE  Antimicrobials/Immunologic Medications:    Day #  	of    ***    Tubes/Lines/Drains  ***  [x] Peripheral IV  [] Central Venous Line     	[] R	[] L	[] IJ	[] Fem	[] SC	Date Placed:   [] Arterial Line		[] R	[] L	[] Fem	[] Rad	[] Ax	Date Placed:   [] PICC:         	[] Midline		[] Mediport  [] Urinary Catheter		Date Placed:     LABS  --------------------------------------------------------------------------------------  ((Insert SICU Labs here))***  --------------------------------------------------------------------------------------    OTHER LABS    IMAGING RESULTS  Echo:   CT:   Xray:     ASSESSMENT:  62F presents s/p fall as a level one trauma with AMS, found on imaging to have____    PLAN:   Neurologic: Q1 hour neuro checks for now, will follow up neurosurgery for possible intervention  Respiratory: monitor for any concern about protecting airway 2/2 AMS. For now saturating well on nasal cannula    Cardiovascular: Patient hemodynamically stable. Will attempt to obtain home medications   Gastrointestinal/Nutrition: NPO for now   Renal/Genitourinary: check creatinine, strict Is and Os, and replete electrolytes  Hematologic: No evidence of active bleeding as of now, will continue to monitor  Infectious Disease: Will follow up UA and full set of trauma labs  Tubes/Lines/Drains: PIV  Endocrine: No concerns at this time, initial finger stick 179  Disposition: SICU    --------------------------------------------------------------------------------------    Critical Care Diagnoses: SICU Consultation Note  =====================================================  HPI: 62F with unknown PMH presented this evening as a level one trauma after a fall. Patient apparently fell at home and hit her head on tiles. Not currently on anticoagulation. As per , patient has been feeling unwell for two days, not getting out of bed and being more lethargic. She vomited one time this am.   On arrival, primary survey was intact. GCS was 11. Secondary survey significant for hematoma to the forehead.  The patients vital signs were normal and she was taken immediately to the CT scanner. Labs significant for hyperkalemia, although hemolyzed. Imaging showed a large intracerebral hemorrhage.         Allergies:   PAST MEDICAL & SURGICAL HISTORY: As per , some unintentional weight loss. Smokes three cigarettes per day, last colonoscopy was three years go, saw PMD three years ago    FAMILY HISTORY: unknown       SOCIAL HISTORY:  unknown    ADVANCE DIRECTIVES: Presumed Full Code      REVIEW OF SYSTEMS:    Unable to determine 2/2 current medical condition    HOME MEDICATIONS:  unknown     CURRENT MEDICATIONS:   --------------------------------------------------------------------------------------  Neurologic Medications    Respiratory Medications    Cardiovascular Medications    Gastrointestinal Medications  sodium chloride 0.9% Bolus 1000 milliLiter(s) IV Bolus once  sodium chloride 0.9% Bolus 1000 milliLiter(s) IV Bolus once    Genitourinary Medications    Hematologic/Oncologic Medications    Antimicrobial/Immunologic Medications    Endocrine/Metabolic Medications    Topical/Other Medications    --------------------------------------------------------------------------------------    VITAL SIGNS, INS/OUTS (last 24 hours):  --------------------------------------------------------------------------------------  T(C): 37.1 (01-24-18 @ 06:00), Max: 37.1 (01-24-18 @ 06:00)  HR: 63 (01-24-18 @ 07:00) (61 - 103)  BP: 189/91 (01-24-18 @ 07:00) (173/115 - 253/135)  BP(mean): 130 (01-24-18 @ 07:00) (130 - 182)  ABP: --  ABP(mean): --  RR: 30 (01-24-18 @ 07:00) (21 - 44)  SpO2: 100% (01-24-18 @ 07:00) (98% - 100%)  Wt(kg): --  CVP(mm Hg): --  CI: --  CAPILLARY BLOOD GLUCOSE      POCT Blood Glucose.: 179 mg/dL (24 Jan 2018 04:30)   N/A      01-23 @ 07:01  -  01-24 @ 07:00  --------------------------------------------------------  IN:  Total IN: 0 mL    OUT:    Indwelling Catheter - Urethral: 300 mL  Total OUT: 300 mL    Total NET: -300 mL        --------------------------------------------------------------------------------------    EXAM  NEUROLOGY  RASS:   	GCS:  11  Exam: withdrawing to pain, non verbalizing, spontaneous eye opening  HEENT  Exam: Large hematoma right forehead, c collar in place  RESPIRATORY  Exam: Lungs clear to auscultation, Normal expansion/effort.   CARDIOVASCULAR  Exam: S1, S2.  Regular rate and rhythm.  Peripheral edema    GI/NUTRITION  Exam: Abdomen soft, Non-tender, Non-distended.    VASCULAR  Exam: Extremities warm, pink, well-perfused.    MUSCULOSKELETAL  Exam: All extremities moving spontaneously without limitations.    SKIN:  Exam: Good skin turgor, no skin breakdown.      METABOLIC/FLUIDS/ELECTROLYTES  sodium chloride 0.9% Bolus 1000 milliLiter(s) IV Bolus once  sodium chloride 0.9% Bolus 1000 milliLiter(s) IV Bolus once      HEMATOLOGIC  [x] DVT Prophylaxis:   Transfusions:	[] PRBC	[] Platelets		[] FFP	[] Cryoprecipitate    INFECTIOUS DISEASE  Antimicrobials/Immunologic Medications:    Day #  	of    ***    Tubes/Lines/Drains  ***  [x] Peripheral IV  [] Central Venous Line     	[] R	[] L	[] IJ	[] Fem	[] SC	Date Placed:   [] Arterial Line		[] R	[] L	[] Fem	[] Rad	[] Ax	Date Placed:   [] PICC:         	[] Midline		[] Mediport  [] Urinary Catheter		Date Placed:     LABS  --------------------------------------------------------------------------------------  CBC (01-24 @ 04:48)                              19.8<HH>                         19.9<H>  )----------------(  206        82.0<H>% Neutrophils, 9.0<L>% Lymphocytes, ANC: 17.4<H>                              56.2<H>                BMP (01-24 @ 07:09)             133<L>  |  102     |  17    		Ca++ --      Ca 9.6                ---------------------------------( 185<H>		Mg --                 5.0     |  --      |  0.87  			Ph --      BMP (01-24 @ 04:48)             129<L>  |  87<L>   |  19    		Ca++ --      Ca 10.3               ---------------------------------( 195<H>		Mg 1.7                >9.0<HH>  |  20<L>   |  0.75  			Ph 4.6<H>    LFTs (01-24 @ 04:48)      TPro 10.0<H> / Alb 5.3<H> / TBili 1.2 / DBili -- / AST 90<H> / ALT 21 / AlkPhos 76            --------------------------------------------------------------------------------------    OTHER LABS    IMAGING RESULTS  Echo:   CT:   Xray:     ASSESSMENT:  62F presents s/p fall as a level one trauma with AMS, found on imaging to have____    PLAN:   Neurologic: Q1 hour neuro checks for now, will follow up neurosurgery for possible intervention  Respiratory: monitor for any concern about protecting airway 2/2 AMS. For now saturating well on nasal cannula    Cardiovascular: Patient hemodynamically stable. Will attempt to obtain home medications   Gastrointestinal/Nutrition: NPO for now   Renal/Genitourinary: check creatinine, strict Is and Os, and replete electrolytes  Hematologic: No evidence of active bleeding as of now, will continue to monitor  Infectious Disease: Will follow up UA and full set of trauma labs  Tubes/Lines/Drains: PIV  Endocrine: No concerns at this time, initial finger stick 179  Disposition: SICU, possible NSICU    --------------------------------------------------------------------------------------    Critical Care Diagnoses: SICU Consultation Note  =====================================================  HPI: 62F with unknown PMH presented this evening as a level one trauma after a fall. Patient apparently fell at home and hit her head on tiles. Not currently on anticoagulation. As per , patient has been feeling unwell for two days, not getting out of bed and being more lethargic. She vomited one time this am.   On arrival, primary survey was intact. GCS was 11. Secondary survey significant for hematoma to the forehead.  The patients vital signs were normal and she was taken immediately to the CT scanner. Labs significant for hyperkalemia, although hemolyzed. Imaging showed a large intracerebral hemorrhage.         Allergies:   PAST MEDICAL & SURGICAL HISTORY: As per , some unintentional weight loss. Smokes three cigarettes per day, last colonoscopy was three years go, saw PMD three years ago    FAMILY HISTORY: unknown       SOCIAL HISTORY:  unknown    ADVANCE DIRECTIVES: Presumed Full Code      REVIEW OF SYSTEMS:    Unable to determine 2/2 current medical condition    HOME MEDICATIONS:  unknown     CURRENT MEDICATIONS:   --------------------------------------------------------------------------------------  Neurologic Medications    Respiratory Medications    Cardiovascular Medications    Gastrointestinal Medications  sodium chloride 0.9% Bolus 1000 milliLiter(s) IV Bolus once  sodium chloride 0.9% Bolus 1000 milliLiter(s) IV Bolus once    Genitourinary Medications    Hematologic/Oncologic Medications    Antimicrobial/Immunologic Medications    Endocrine/Metabolic Medications    Topical/Other Medications    --------------------------------------------------------------------------------------    VITAL SIGNS, INS/OUTS (last 24 hours):  --------------------------------------------------------------------------------------  T(C): 37.1 (01-24-18 @ 06:00), Max: 37.1 (01-24-18 @ 06:00)  HR: 63 (01-24-18 @ 07:00) (61 - 103)  BP: 189/91 (01-24-18 @ 07:00) (173/115 - 253/135)  BP(mean): 130 (01-24-18 @ 07:00) (130 - 182)  ABP: --  ABP(mean): --  RR: 30 (01-24-18 @ 07:00) (21 - 44)  SpO2: 100% (01-24-18 @ 07:00) (98% - 100%)  Wt(kg): --  CVP(mm Hg): --  CI: --  CAPILLARY BLOOD GLUCOSE      POCT Blood Glucose.: 179 mg/dL (24 Jan 2018 04:30)   N/A      01-23 @ 07:01  -  01-24 @ 07:00  --------------------------------------------------------  IN:  Total IN: 0 mL    OUT:    Indwelling Catheter - Urethral: 300 mL  Total OUT: 300 mL    Total NET: -300 mL        --------------------------------------------------------------------------------------    EXAM  NEUROLOGY  RASS:   	GCS:  11  Exam: withdrawing to pain, non verbalizing, spontaneous eye opening  HEENT  Exam: Large hematoma right forehead, c collar in place  RESPIRATORY  Exam: Lungs clear to auscultation, Normal expansion/effort.   CARDIOVASCULAR  Exam: S1, S2.  Regular rate and rhythm.  Peripheral edema    GI/NUTRITION  Exam: Abdomen soft, Non-tender, Non-distended.    VASCULAR  Exam: Extremities warm, pink, well-perfused.    MUSCULOSKELETAL  Exam: All extremities moving spontaneously without limitations.    SKIN:  Exam: Good skin turgor, no skin breakdown.      METABOLIC/FLUIDS/ELECTROLYTES  sodium chloride 0.9% Bolus 1000 milliLiter(s) IV Bolus once  sodium chloride 0.9% Bolus 1000 milliLiter(s) IV Bolus once      HEMATOLOGIC  [x] DVT Prophylaxis:   Transfusions:	[] PRBC	[] Platelets		[] FFP	[] Cryoprecipitate    INFECTIOUS DISEASE  Antimicrobials/Immunologic Medications:    Day #  	of    ***    Tubes/Lines/Drains  ***  [x] Peripheral IV  [] Central Venous Line     	[] R	[] L	[] IJ	[] Fem	[] SC	Date Placed:   [] Arterial Line		[] R	[] L	[] Fem	[] Rad	[] Ax	Date Placed:   [] PICC:         	[] Midline		[] Mediport  [] Urinary Catheter		Date Placed:     LABS  --------------------------------------------------------------------------------------  CBC (01-24 @ 04:48)                              19.8<HH>                         19.9<H>  )----------------(  206        82.0<H>% Neutrophils, 9.0<L>% Lymphocytes, ANC: 17.4<H>                              56.2<H>                BMP (01-24 @ 07:09)             133<L>  |  102     |  17    		Ca++ --      Ca 9.6                ---------------------------------( 185<H>		Mg --                 5.0     |  --      |  0.87  			Ph --      BMP (01-24 @ 04:48)             129<L>  |  87<L>   |  19    		Ca++ --      Ca 10.3               ---------------------------------( 195<H>		Mg 1.7                >9.0<HH>  |  20<L>   |  0.75  			Ph 4.6<H>    LFTs (01-24 @ 04:48)      TPro 10.0<H> / Alb 5.3<H> / TBili 1.2 / DBili -- / AST 90<H> / ALT 21 / AlkPhos 76            --------------------------------------------------------------------------------------    OTHER LABS    IMAGING RESULTS  Echo:   CT:   Xray:     ASSESSMENT:  62F presents s/p fall as a level one trauma with AMS, found on imaging to have intraventricular hemorrhage.    PLAN:   Neurologic: Q1 hour neuro checks for now, will follow up neurosurgery for possible intervention  Respiratory: monitor for any concern about protecting airway 2/2 AMS. For now saturating well on nasal cannula    Cardiovascular: Patient hemodynamically stable. Will attempt to obtain home medications   Gastrointestinal/Nutrition: NPO for now   Renal/Genitourinary: check creatinine, strict Is and Os, and replete electrolytes  Hematologic: No evidence of active bleeding as of now, will continue to monitor  Infectious Disease: Will follow up UA and full set of trauma labs  Tubes/Lines/Drains: PIV  Endocrine: No concerns at this time, initial finger stick 179  Disposition: SICU, possible NSICU    --------------------------------------------------------------------------------------    Critical Care Diagnoses:

## 2018-01-24 NOTE — DISCHARGE NOTE ADULT - CARE PROVIDER_API CALL
Isak Levin), Neurological Surgery  [address]  [address] [place] [address]  Phone: [phone]  Fax: [phone]    Boris Benton), Gastroenterology; Internal Medicine  [address]  [address]  [place], [place] [id number]  Phone: [phone]  Fax: [phone]    Clovis Thacker (PARISH), Neurology; Vascular Neurology  [address]  [address]  [place] [address]  Phone: [phone]  Fax: [phone] Isak Levin), Neurological Surgery  [address]  [address] [place] [address]  Phone: [phone]  Fax: [phone]    Boris Benton), Gastroenterology; Internal Medicine  [address]  [address]  [place], [place] [id number]  Phone: [phone]  Fax: [phone]    Clovis Thacker (PARISH), Neurology; Vascular Neurology  [address]  [address]  [place] [address]  Phone: [phone]  Fax: [phone]    Jagjit Wallace (DO), Nephrology  [address]  [place] [address]  Phone: [phone]  Fax: [phone] Isak Levin), Neurological Surgery  [address]  [address] [place] [address]  Phone: [phone]  Fax: [phone]    Boris Benton), Gastroenterology; Internal Medicine  [address]  [address]  [place], [place] [id number]  Phone: [phone]  Fax: [phone]    Clovis Thacker (PARISH), Neurology; Vascular Neurology  [address]  [address]  [place] [address]  Phone: [phone]  Fax: [phone]    Jagjit Wallace (DO), Nephrology  [address]  [place] [address]  Phone: [phone]  Fax: [phone]

## 2018-01-24 NOTE — DISCHARGE NOTE ADULT - SECONDARY DIAGNOSIS.
Intracranial hemorrhage Uncontrolled hypertension Dysphagia Transaminitis Seizure Fever, unspecified fever cause

## 2018-01-24 NOTE — DISCHARGE NOTE ADULT - REASON FOR ADMISSION
Level I trauma activation s/p fall Patient was admitted on 1/24 as a trauma. Patient had a fall as per  with altered mental status. Patient had an EVD drain in place for Inta-ventricular hemorrhage  and it was removed on 2/1 Patient was admitted on 1/24 as a trauma. Patient had a fall as per  with altered mental status.

## 2018-01-24 NOTE — CHART NOTE - NSCHARTNOTEFT_GEN_A_CORE
Interventional Neuro Radiology  Pre-Procedure Note PA-C    This is a 62 year old right hand dominant female brought in to [facility] ED s/p fall at home in the bathroom. As per  patient has been somewhat altered for most if not all of the day. She then had a fall in the bathroom & hit the right side of her head. Level I trauma called as patient's altered mental status was initially concerning for potential need for intubation. In trauma bay primary survey significant for GCS of 11,a right parietal hematoma. Patient is transported to Neuro IR for a selective cerebral angiography.      Allergies: No Known Allergies  PMHX: No pertinent past medical history  PSHX: No significant past surgical history  Social History:   FAMILY HISTORY:  Current Medications: niCARdipine Infusion 3 mG/Hr IV   CBC                        18.2   23.6  )-----------( 221                   52.3     BMP    132<L>  |  92<L>  |  20  ----------------------------<  210  3.5   |  17<L>  |  0.86    HCG Quantitative, Serum: 3.2   Blood Bank: AB positive     Assessment/Plan:   This is a 62 year old right hand dominant female with  Procedure, goals, risks, benefits and alternatives were discussed with patient and patient's family. All questions were answered. Risks include but are not limited to stroke, vessel injury, hemorrhage, and or right groin hematoma. Patient's  demonstrates understanding of all risks involved with this procedure and wishes to continue. Appropriate content was obtained from patient and consent is in the patient's chart. Interventional Neuro Radiology  Pre-Procedure Note PA-C    This is a 62 year old right hand dominant female brought in to [facility] ED s/p fall at home in the bathroom. As per  patient has been somewhat altered for most if not all of the day. She then had a fall in the bathroom & hit the right side of her head. Level I trauma called as patient's altered mental status was initially concerning for potential need for intubation. In trauma bay primary survey significant for GCS of 11,a right parietal hematoma. Patient is transported to Neuro IR for a selective cerebral angiography.    Allergies: No Known Allergies  PMHX: No pertinent past medical history  PSHX: No significant past surgical history  Social History:   FAMILY HISTORY:  Current Medications: niCARdipine Infusion 3 mG/Hr   CBC                        18.2   23.6  )-----------( 221                   52.3     BMP    132<L>  |  92<L>  |  20  ----------------------------<  210  3.5   |  17<L>  |  0.86    HCG Quantitative, Serum: 3.2   Blood Bank: AB positive     Assessment/Plan:   This is a 62 year old right hand dominant female with  Procedure, goals, risks, benefits and alternatives were discussed with patient's family. All questions were answered. Risks include but are not limited to stroke, vessel injury, hemorrhage, and or right groin hematoma. Patient's  demonstrates understanding of all risks involved with this procedure and wishes to continue. Appropriate content was obtained from patient's  and consent is in the patient's chart. Interventional Neuro Radiology  Pre-Procedure Note PA-C    This is a 62 year old right hand dominant female brought in to [facility] ED s/p fall at home in the bathroom. As per  patient has been somewhat altered for most if not all of the day. She then had a fall in the bathroom & hit the right side of her head. Level I trauma called as patient's altered mental status was initially concerning for potential need for intubation. In trauma bay primary survey significant for GCS of 11,a right parietal hematoma. Patient is transported to Neuro IR for a selective cerebral angiography.    Allergies: No Known Allergies  PMHX: No pertinent past medical history  PSHX: No significant past surgical history  Social History:      FAMILY HISTORY:  Current Medications: NiCARdipine Infusion 3 mG/Hr     CBC                        18.2   23.6  )-----------( 221                   52.3     BMP    132<L>  |  92<L>  |  20  ----------------------------<  210  3.5   |  17<L>  |  0.86    HCG Quantitative, Serum: 3.2   Blood Bank: AB positive     Assessment/Plan:   This is a 62 year old right hand dominant female with  Procedure, goals, risks, benefits and alternatives were discussed with patient's family. All questions were answered. Risks include but are not limited to stroke, vessel injury, hemorrhage, and or right groin hematoma. Patient's  demonstrates understanding of all risks involved with this procedure and wishes to continue. Appropriate content was obtained from patient's  and consent is in the patient's chart. Interventional Neuro Radiology  Pre-Procedure Note PA-C    This is a 62 year old right hand dominant female brought in to [facility] ED s/p fall at home in the bathroom. As per  patient has been somewhat altered for most if not all of the day. She then had a fall in the bathroom & hit the right side of her head. Level I trauma called as patient's altered mental status was initially concerning for potential need for intubation. In trauma bay primary survey significant for GCS of 11,a right parietal hematoma. Patient is transported to Neuro IR for a selective cerebral angiography.    Allergies: No Known Allergies  PMHX: No pertinent past medical history  PSHX: No significant past surgical history  Social History:  , tobacco    FAMILY HISTORY: non-contributory     CBC                        18.2   23.6  )-----------( 221                   52.3     BMP    132<L>  |  92<L>  |  20  ----------------------------<  210  3.5   |  17<L>  |  0.86    HCG Quantitative, Serum: 3.2   Blood Bank: AB positive available      Assessment/Plan:   This is a 62 year old right hand dominant female with a ventricular hemorrhage, transported to Neuro IR for a selective cerebral angiography to study vessels. Procedure, goals, risks, benefits and alternatives were discussed with patient's . All questions were answered. Risks include but are not limited to stroke, vessel injury, hemorrhage, and or right groin hematoma. Patient's  demonstrates understanding of all risks involved with this procedure and wishes to continue. Appropriate content was obtained from patient's  and consent is in the patient's chart. Interventional Neuro Radiology  Pre-Procedure Note PA-C    This is a 62 year old right hand dominant female brought in to [facility] ED s/p fall at home in the bathroom. As per  patient has been somewhat altered for most if not all of the day. She then had a fall in the bathroom & hit the right side of her head. Level I trauma called as patient's altered mental status was initially concerning for potential need for intubation. In trauma bay primary survey significant for GCS of 11,a right parietal hematoma. Patient is transported to Neuro IR for a selective cerebral angiography. Patient is intubated on sedation.    Allergies: No Known Allergies  PMHX: No pertinent past medical history  PSHX: No significant past surgical history  Social History:  , tobacco    FAMILY HISTORY: non-contributory   Current Medications: albuterol, Nicardipine, nicotine 21mg patch, pantoprazole 40mg, propofol infusion    CBC                        18.2   23.6  )-----------( 221                   52.3     BMP    132<L>  |  92<L>  |  20  ----------------------------<  210  3.5   |  17<L>  |  0.86    HCG Quantitative, Serum: 3.2   Blood Bank: AB positive available      Assessment/Plan:   This is a 62 year old right hand dominant female with a ventricular hemorrhage, transported to Neuro IR for a selective cerebral angiography to study vessels. Procedure, goals, risks, benefits and alternatives were discussed with patient's . All questions were answered. Risks include but are not limited to stroke, vessel injury, hemorrhage, and or right groin hematoma. Patient's  demonstrates understanding of all risks involved with this procedure and wishes to continue. Appropriate content was obtained from patient's  and consent is in the patient's chart.

## 2018-01-24 NOTE — DISCHARGE NOTE ADULT - CARE PLAN
Principal Discharge DX:	CVA (cerebral vascular accident)  Goal:	Increase activity , physical and occupational therapy  Assessment and plan of treatment:	Follow up with Dr. Levin after rehab-Please call office to confirm appointment-Neurosurgeon   Follow up with Dr. Benton- Gastroenterologist if any questions regarding PEG tube   Follow up with Dr. Thacker Neurologist after rehab-Please call office to confirm appointment.   Follow up with PMD after rehab.  Secondary Diagnosis:	Intracranial hemorrhage Principal Discharge DX:	CVA (cerebral vascular accident)  Goal:	Increase activity , physical and occupational therapy  Assessment and plan of treatment:	No strenous activity. No heavy lifting. Do not return to work until cleared by physician. No driving until cleared by physician.  Continue Verapamil as treatment for RCVS. Continue ASA 81mg    Follow up with Dr. Thacker Stroke Neurologist after rehab-Please call office to confirm appointment.   Follow up with PMD after rehab.  Secondary Diagnosis:	Intracranial hemorrhage  Assessment and plan of treatment:	Follow up with Dr Levin after rehab  Secondary Diagnosis:	Uncontrolled hypertension  Assessment and plan of treatment:	Follow up with Dr Upton for management of Blood pressure  Secondary Diagnosis:	Dysphagia  Assessment and plan of treatment:	Repeat MBS in rehab. restorative swallow therapy. Advance diet as tolerated  Secondary Diagnosis:	Transaminitis  Assessment and plan of treatment:	Avoid hepatotoxic agents, Trend LFTs in rehab.  OFF STATIN and Keppra since 2/13/18  Secondary Diagnosis:	Seizure  Assessment and plan of treatment:	Continue Vimpat  Follow up with neurologist in 3-6 months regarding seizure meds  Secondary Diagnosis:	Fever, unspecified fever cause  Assessment and plan of treatment:	Possible drug fever.  Fever workup negative

## 2018-01-24 NOTE — ED PROVIDER NOTE - OBJECTIVE STATEMENT
63 y/o female with no significant PMH as per her spouse who fell at home. It is not known how or why she fell as it was unwitnessed and the patient is incapable of contributing to her HPI due to AMS. Her spouse reports finding her unconscious which lasted < 1 minute. She has been altered ever since.

## 2018-01-24 NOTE — H&P ADULT - NSHPPHYSICALEXAM_GEN_ALL_CORE
General: Patient moaning incomprehensibly in pain. Not following command  Resp: CTA B/L  CVS: regular rate and rhythm  Abdomen: soft, nontender, nondistended  Extremities: no edema. Palp radial B/L. Gross motor & sensation intact in B/L upper & lower extremities  Skin: warm, dry, appropriate color General: Patient moaning incomprehensibly. Not following commands. Grabs at C-collar. Unable to assess tenderness in any system due to altered mental status.  HEENT: large hematoma right forehead. No laceration. No facial instability.  C-spine: no deformities or step-offs.  Resp: CTA B/L  CVS: regular rate and rhythm  Abdomen: soft, nondistended  Extremities: no edema. Palp radial B/L. Moving bilateral upper and lower extremities spontaneously, not to command.  Skin: warm, dry, appropriate color  Psych: unable to assess due to mental status

## 2018-01-24 NOTE — ED PROVIDER NOTE - CARE PLAN
Principal Discharge DX:	Altered mental state Principal Discharge DX:	Intracranial hemorrhage  Secondary Diagnosis:	Altered mental state  Secondary Diagnosis:	Scalp hematoma

## 2018-01-24 NOTE — H&P ADULT - ASSESSMENT
61y/o female with ICH s/p fall    - Questionable altered mental status prior to fall; ICH seen on pre-ann CT head  - Will f/u official reports of CT-Head, C-spine, CTA head/neck, &trauma CAP  - Will f/u NSx recs  - Patient brought to SICU  - Pt seen & examined with Dr. Horan in trauma bay      -Jacques Hernandez, PGY-4  p [id number]

## 2018-01-24 NOTE — PROGRESS NOTE ADULT - SUBJECTIVE AND OBJECTIVE BOX
O: got angiogram  showed Multifocal, multisegmental vessel irregularities along the left hemisphere (left PCA, left MCA, left SHANE, along proximal and distal trunks. Paucity of vessels along the left posterior frontal and anterior parietal territories but without clear vessel cut off. No venous occlusions. Calcified brachiocephalic extracranial vessel, mild narrowing at the origin of the left vertebral artery and moderate narrowing at the origin of the right vertebral  On exam she has new right focal weakness     T(C): 37.3 (01-24-18 @ 23:00), Max: 37.3 (01-24-18 @ 23:00)  HR: 72 (01-24-18 @ 23:00) (61 - 116)  BP: 164/85 (01-24-18 @ 23:00) (88/64 - 253/135)  RR: 18 (01-24-18 @ 23:00) (11 - 44)  SpO2: 100% (01-24-18 @ 23:00) (98% - 100%)  01-23-18 @ 07:01  -  01-24-18 @ 07:00  --------------------------------------------------------  IN: 0 mL / OUT: 300 mL / NET: -300 mL    01-24-18 @ 07:01 - 01-24-18 @ 23:30  --------------------------------------------------------  IN: 1524.4 mL / OUT: 804 mL / NET: 720.4 mL    ALBUTerol    90 MICROgram(s) HFA Inhaler 1 Puff(s) Inhalation every 4 hours PRN  dexmedetomidine Infusion 0.7 MICROgram(s)/kG/Hr IV Continuous <Continuous>  niCARdipine Infusion 3 mG/Hr IV Continuous <Continuous>  nicotine - 21 mG/24Hr(s) Patch 1 patch Transdermal daily  pantoprazole  Injectable 40 milliGRAM(s) IV Push daily  potassium chloride  10 mEq/100 mL IVPB 10 milliEquivalent(s) IV Intermittent every 1 hour  sodium chloride 2% . 1000 milliLiter(s) IV Continuous <Continuous>  Mode: AC/ CMV (Assist Control/ Continuous Mandatory Ventilation), RR (machine): 12, TV (machine): 400, FiO2: 40, PEEP: 5, ITime: 1, MAP: 6, PIP: 8    EXAMINATION:  PHYSICAL EXAM:    Constitutional:  intubated, ventilated    Neurological: Opens eyes spont, pupils 2mm and reactive;  forced left gaze preference, did not do dolls given cervical collar, localizing briskly with the left side, extensor posturing in the right UE and spastic in the right LE not following commands        Pulmonary: intubated,  Clear to Auscultation, No rales, No rhonchi, No wheezes     Cardiovascular: S1, S2, Regular rate and rhythm     Gastrointestinal: Soft, Non-tender, Non-distended     Extremities: No calf tenderness            Assessment and Plan:   · Assessment		  ASSESSMENT/PLAN:  PT S/P fall with worsening mental status - 4 th vent hemorrgae with hydrocephalus     NEURO: checks q 1 hr; placed EVD; Angio today; monitor Hydro and ICP   POst EVD CT   No need for AED   Stroke core measures     Activity:  [X] Bedrest     PULM: Likely COPD   Broncholdilators albuterol prn q 4 hrs   PRVC 350 TV ; rate 14 ; FIO2 100% b; PEEP- 5  Nicotine patch 21mg q day     CV:  SBP goal- -< 150     RENAL:  Fluids: NS at 100 cc/hr - hydrate for angio     GI:  Diet: NPO for now   GI prophylaxis  [X] PPI [] other:  Bowel regimen [] colace [X] senna [] other:    ENDO:   Goal euglycemia (-180)  Suppl Mg    HEME/ONC: Coags NL with Platelets   VTE prophylaxis: [X] SCDs [X] chemoprophylaxis- held secondary to acute hemorrhage   Patient at high risk for DVT due to immobility - yet with acute bleed will need to chemoprophylaxsis for now.     ID: afebrile    MISC: family to be informed         CODE STATUS:  X[] Full Code     DISPOSITION:  X[] ICU O: got angiogram  showed Multifocal, multisegmental vessel irregularities along the left hemisphere (left PCA, left MCA, left SHANE, along proximal and distal trunks. Paucity of vessels along the left posterior frontal and anterior parietal territories but without clear vessel cut off. No venous occlusions. Calcified brachiocephalic extracranial vessel, mild narrowing at the origin of the left vertebral artery and moderate narrowing at the origin of the right vertebral  On exam she has new right focal weakness     T(C): 37.3 (01-24-18 @ 23:00), Max: 37.3 (01-24-18 @ 23:00)  HR: 72 (01-24-18 @ 23:00) (61 - 116)  BP: 164/85 (01-24-18 @ 23:00) (88/64 - 253/135)  RR: 18 (01-24-18 @ 23:00) (11 - 44)  SpO2: 100% (01-24-18 @ 23:00) (98% - 100%)  01-23-18 @ 07:01  -  01-24-18 @ 07:00  --------------------------------------------------------  IN: 0 mL / OUT: 300 mL / NET: -300 mL    01-24-18 @ 07:01 - 01-24-18 @ 23:30  --------------------------------------------------------  IN: 1524.4 mL / OUT: 804 mL / NET: 720.4 mL    ALBUTerol    90 MICROgram(s) HFA Inhaler 1 Puff(s) Inhalation every 4 hours PRN  dexmedetomidine Infusion 0.7 MICROgram(s)/kG/Hr IV Continuous <Continuous>  niCARdipine Infusion 3 mG/Hr IV Continuous <Continuous>  nicotine - 21 mG/24Hr(s) Patch 1 patch Transdermal daily  pantoprazole  Injectable 40 milliGRAM(s) IV Push daily  potassium chloride  10 mEq/100 mL IVPB 10 milliEquivalent(s) IV Intermittent every 1 hour  sodium chloride 2% . 1000 milliLiter(s) IV Continuous <Continuous>  Mode: AC/ CMV (Assist Control/ Continuous Mandatory Ventilation), RR (machine): 12, TV (machine): 400, FiO2: 40, PEEP: 5, ITime: 1, MAP: 6, PIP: 8    EXAMINATION:  PHYSICAL EXAM:    Constitutional:  intubated, ventilated    Neurological: Opens eyes spont, pupils 2mm and reactive;  forced left gaze preference, did not do dolls given cervical collar, localizing briskly with the left side, extensor posturing in the right UE and spastic in the right LE not following commands        Pulmonary: intubated,  Clear to Auscultation, No rales, No rhonchi, No wheezes     Cardiovascular: S1, S2, Regular rate and rhythm     Gastrointestinal: Soft, Non-tender, Non-distended     Extremities: No calf tenderness, normal pulses in DP bilaterally no groin hematoma        Hemoglobin A1C, Whole Blood (01.24.18 @ 22:40)    Hemoglobin A1C, Whole Blood: 5.4: Method: Immunoassay       Reference Range                4.0-5.6%       High risk (prediabetic)        5.7-6.4%       Diabetic, diagnostic             >=6.5%       ADA diabetic treatment goal       <7.0%  The Hemoglobin A1c testing is NGSP-certified.Reference ranges are based  upon the 2010 recommendations of  the American Diabetes Association.  Interpretation may vary for children  and adolescents. %    Blood Gas Profile - Arterial (01.24.18 @ 22:18)    pH, Arterial: 7.44    pCO2, Arterial: 34 mmHg    pO2, Arterial: 151 mmHg    HCO3, Arterial: 23 mmoL/L    Base Excess, Arterial: .0 mmol/L    Oxygen Saturation, Arterial: 99 %    Total CO2, Arterial: 24 mmoL/L    FIO2, Arterial: 40    Cerebrospinal Fluid Cell Count-1 (01.24.18 @ 20:33)    Total Nucleated Cell Count, CSF: 3 /uL    RBC Count - Spinal Fluid: 2333 /uL    CSF Color: Pink    Tube Type: Sterile    CSF Appearance: Slightly Cloudy    CSF Lymphocytes: 1 %    CSF Monocytes/Macrophages: 4 %    CSF Segmented Neutrophils: 95 %    Appearance Spun: Colorless        Assessment and Plan:   · Assessment		  ASSESSMENT/PLAN:    PT S/P fall with worsening mental status - 4 th vent hemorrgae with hydrocephalus. Had angio with no signs of AVM but has  Multifocal, multisegmental vessel irregularities along the left hemisphere (left PCA, left MCA, left SHANE, along proximal and distal trunks. Paucity of vessels along the left posterior frontal and anterior parietal territories but without clear vessel cut off. No venous occlusions and mild narrowing at the origin of the left vertebral artery and moderate narrowing at the origin of the right vertebral  Her neuro exam is consistent with a Left MCA syndrome, CT head done STAT shows hypodensities in left MCA terriotry. Will need another head CT tomorrow morning to understand the cause of her weakness. Will change BP parameters o 140-180 mmhg to allow perfusion to the left MCA territory. Consider MRI brain and perfusion study if head CT does ot give us answers.  Will get Urine tox, and r/o vascultis although CSF protein is normal , so less likely.   checks q 1 hr; Has EVD at 10 cm water, monitor ICP  Intubated on AC, ABG are normal, ET tube in place, Broncholdilators albuterol prn q 4 hrs   Fluids: NS at 100 cc/hr - hydrate for angio   GI: place an NG tube, and can start TF once head of bed elevated  Diet: NPO for now   GI prophylaxis  [X] PPI [] other:  Bowel regimen [] colace [X] senna [] other:  euglycemia (-180)    CODE STATUS:  X[] Full Code     DISPOSITION:  X[] ICU   45 critical care time  Patient is at high risk for ICH, brain herniation, stroke. O: got angiogram  showed Multifocal, multisegmental vessel irregularities along the left hemisphere (left PCA, left MCA, left SHANE, along proximal and distal trunks. Paucity of vessels along the left posterior frontal and anterior parietal territories but without clear vessel cut off. No venous occlusions. Calcified brachiocephalic extracranial vessel, mild narrowing at the origin of the left vertebral artery and moderate narrowing at the origin of the right vertebral  On exam she has new right focal weakness     T(C): 37.3 (01-24-18 @ 23:00), Max: 37.3 (01-24-18 @ 23:00)  HR: 72 (01-24-18 @ 23:00) (61 - 116)  BP: 164/85 (01-24-18 @ 23:00) (88/64 - 253/135)  RR: 18 (01-24-18 @ 23:00) (11 - 44)  SpO2: 100% (01-24-18 @ 23:00) (98% - 100%)  01-23-18 @ 07:01  -  01-24-18 @ 07:00  --------------------------------------------------------  IN: 0 mL / OUT: 300 mL / NET: -300 mL    01-24-18 @ 07:01 - 01-24-18 @ 23:30  --------------------------------------------------------  IN: 1524.4 mL / OUT: 804 mL / NET: 720.4 mL    ALBUTerol    90 MICROgram(s) HFA Inhaler 1 Puff(s) Inhalation every 4 hours PRN  dexmedetomidine Infusion 0.7 MICROgram(s)/kG/Hr IV Continuous <Continuous>  niCARdipine Infusion 3 mG/Hr IV Continuous <Continuous>  nicotine - 21 mG/24Hr(s) Patch 1 patch Transdermal daily  pantoprazole  Injectable 40 milliGRAM(s) IV Push daily  potassium chloride  10 mEq/100 mL IVPB 10 milliEquivalent(s) IV Intermittent every 1 hour  sodium chloride 2% . 1000 milliLiter(s) IV Continuous <Continuous>  Mode: AC/ CMV (Assist Control/ Continuous Mandatory Ventilation), RR (machine): 12, TV (machine): 400, FiO2: 40, PEEP: 5, ITime: 1, MAP: 6, PIP: 8    EXAMINATION:  PHYSICAL EXAM:    Constitutional:  intubated, ventilated    Neurological: Opens eyes spont, pupils 2mm and reactive;  forced left gaze preference, did not do dolls given cervical collar, localizing briskly with the left side, extensor posturing in the right UE and spastic in the right LE not following commands        Pulmonary: intubated,  Clear to Auscultation, No rales, No rhonchi, No wheezes     Cardiovascular: S1, S2, Regular rate and rhythm     Gastrointestinal: Soft, Non-tender, Non-distended     Extremities: No calf tenderness, normal pulses in DP bilaterally no groin hematoma        Hemoglobin A1C, Whole Blood (01.24.18 @ 22:40)    Hemoglobin A1C, Whole Blood: 5.4: Method: Immunoassay       Reference Range                4.0-5.6%       High risk (prediabetic)        5.7-6.4%       Diabetic, diagnostic             >=6.5%       ADA diabetic treatment goal       <7.0%  The Hemoglobin A1c testing is NGSP-certified.Reference ranges are based  upon the 2010 recommendations of  the American Diabetes Association.  Interpretation may vary for children  and adolescents. %    Blood Gas Profile - Arterial (01.24.18 @ 22:18)    pH, Arterial: 7.44    pCO2, Arterial: 34 mmHg    pO2, Arterial: 151 mmHg    HCO3, Arterial: 23 mmoL/L    Base Excess, Arterial: .0 mmol/L    Oxygen Saturation, Arterial: 99 %    Total CO2, Arterial: 24 mmoL/L    FIO2, Arterial: 40    Cerebrospinal Fluid Cell Count-1 (01.24.18 @ 20:33)    Total Nucleated Cell Count, CSF: 3 /uL    RBC Count - Spinal Fluid: 2333 /uL    CSF Color: Pink    Tube Type: Sterile    CSF Appearance: Slightly Cloudy    CSF Lymphocytes: 1 %    CSF Monocytes/Macrophages: 4 %    CSF Segmented Neutrophils: 95 %    Appearance Spun: Colorless        Assessment and Plan:   · Assessment		  ASSESSMENT/PLAN:    PT S/P fall with worsening mental status - 4 th vent hemorrgae with hydrocephalus. Had angio with no signs of AVM but has  Multifocal, multisegmental vessel irregularities along the left hemisphere (left PCA, left MCA, left SHANE, along proximal and distal trunks. Paucity of vessels along the left posterior frontal and anterior parietal territories but without clear vessel cut off. No venous occlusions and mild narrowing at the origin of the left vertebral artery and moderate narrowing at the origin of the right vertebral  Her neuro exam is consistent with a Left MCA syndrome, CT head done STAT shows hypodensities in left MCA terriotry. Will need another head CT tomorrow morning to understand the cause of her weakness. Will change BP parameters o 140-180 mmhg to allow perfusion to the left MCA territory. Consider MRI brain and perfusion study if head CT does ot give us answers.  Will get Urine tox, and r/o vascultis although CSF protein is normal , so less likely.   checks q 1 hr; Has EVD at 10 cm water, monitor ICP  Increase sodium with 2% to 140 by tomorrow morning   Send CSF for syphilis   Intubated on AC, ABG are normal, ET tube in place, Broncholdilators albuterol prn q 4 hrs   Fluids: NS at 100 cc/hr - hydrate for angio   GI: place an NG tube, and can start TF once head of bed elevated  Diet: NPO for now   GI prophylaxis  [X] PPI [] other:  Bowel regimen [] colace [X] senna [] other:  euglycemia (-180)    CODE STATUS:  X[] Full Code     DISPOSITION:  X[] ICU   45 critical care time  Patient is at high risk for ICH, brain herniation, stroke.

## 2018-01-24 NOTE — CONSULT NOTE ADULT - ATTENDING COMMENTS
Patient seen and examined and agree with above. 62 year old female s/p fall with resulting IVH. Patient GCS 11 on arrival. Able to open eyes to voice but verbally answers periodically and appears agitated. Patient admitted to SICU initially for q1 neurochecks. Patient transferred to neurosurgical service shortly thereafter and will be managed by them.

## 2018-01-24 NOTE — CHART NOTE - NSCHARTNOTEFT_GEN_A_CORE
Interventional Neuro- Radiology   Procedure Note PA-C    Procedure: Selective Cerebral Angiography   Pre- Procedure Diagnosis:  Post- Procedure Diagnosis:    : Dr. Bj Louis    Physician Assistant: JUSTIN Arce PA-C  Nurse:                     Fannie Singleton   Radiologic Tech:       Jerry Manriquez LR     Anesthesia: (MAC)   (general anesthesia)    Sheath:    I/Os:  Estimated blood loss less than 10cc  IV fluids:     cc     Urine output     cc        Contrast Omnipaque 240      cc         Antibiotics:    Vitals: BP         HR      Spo2      Preliminary Report:    Using a 4 Fr short/long sheath to the right groin under MAC sedation via   left vertebral artery,  left common carotid artery, left external carotid artery, right vertebral artery,  right common carotid artery, right external carotid artery  a selective cerebral angiography was performed and  demonstrates ________. ( Official note to follow).  Patient tolerated procedure well, hemodynamically stable, no change in neurological status compared to baseline.  Results discussed with neurosurgery, patient and patient's  family.  Groin sheath was removed,  manual compression held to hemostasis  for  21 minutes, no active bleeding, no hematoma, Avitene applied,  quick clot and safeguard balloon dressing applied at _____h.  STAT labs:  CBC BMP  ____h.  Patient transferred to Recovery Room Interventional Neuro- Radiology   Procedure Note PA-C    Procedure: Selective Cerebral Angiography   Pre- Procedure Diagnosis:  hemorrhage   Post- Procedure Diagnosis:    : Dr. Bj Louis    Physician Assistant: JUSTIN Arce PA-C  Nurse:                     Fannie Singleton   Radiologic Tech:       Jerry Manriquez [facility]     Anesthesiologist:       Rashi Omer  Sheath:    I/Os:  Estimated blood loss less than 10cc  IV fluids:     cc    Urine output     cc     Contrast Omnipaque 240      cc         Antibiotics:    Vitals: BP         HR      Spo2        Preliminary Report:  Using a long sheath to the right groin under general anesthesia via left vertebral artery,  left common carotid artery, left external carotid artery, right vertebral artery,  right common carotid artery, right external carotid artery a selective cerebral angiography was performed and demonstrated                                                                                                  Official note to follow).  Patient tolerated procedure well, hemodynamically stable, no change in neurological status compared to baseline.  Results discussed with neurosurgery, patient and patient's . Groin sheath was removed,  manual compression held to hemostasis  for  21 minutes, no active bleeding, no hematoma, Avitene applied,  quick clot and safeguard balloon dressing applied at    STAT labs: CBC BMP  Patient transferred to Neuro ICU Interventional Neuro- Radiology   Procedure Note PA-C    Procedure: Selective Cerebral Angiography   Pre- Procedure Diagnosis:  hemorrhage   Post- Procedure Diagnosis:    : Dr. Bj Louis    Physician Assistant: JUSTIN Arce PA-C  Nurse:                      Fannie Singleton   Radiologic Tech:       Jerry Manriquez [facility]     Anesthesiologist:       Rashi Omer  Sheath:    I/Os:  Estimated blood loss less than 10cc  IV fluids:     cc    Urine output     cc     Contrast Omnipaque 240      cc         Antibiotics:    Vitals: BP         HR      Spo2        Preliminary Report:  Using a long sheath to the right groin under general anesthesia via left vertebral artery,  left common carotid artery, left external carotid artery, right vertebral artery,  right common carotid artery, right external carotid artery a selective cerebral angiography was performed and demonstrated                                                                                                  Official note to follow).  Patient tolerated procedure well, hemodynamically stable, no change in neurological status compared to baseline.  Results discussed with neurosurgery, patient and patient's . Groin sheath was removed,  manual compression held to hemostasis  for  21 minutes, no active bleeding, no hematoma, Avitene applied,  quick clot and safeguard balloon dressing applied at    STAT labs: CBC BMP  Patient transferred to Neuro ICU Interventional Neuro- Radiology   Procedure Note PA-C    Procedure: Selective Cerebral Angiography   Pre- Procedure Diagnosis:  hemorrhage   Post- Procedure Diagnosis:    : Dr. Bj Louis    Physician Assistant: JUSTIN Arce PA-C  Nurse:                      Fannie Singleton   Radiologic Tech:      Jerry Manriquez [facility]     Anesthesiologist:      Rashi Omer  Sheath:    I/Os:  Estimated blood loss less than 10cc  IV fluids:     cc    Urine output     cc     Contrast Omnipaque 240      cc         Antibiotics:    Vitals: BP         HR      Spo2        Preliminary Report:  Using a long sheath to the right groin under general anesthesia via left vertebral artery,  left common carotid artery, left external carotid artery, right vertebral artery,  right common carotid artery, right external carotid artery a selective cerebral angiography was performed and demonstrated                                                                                                  Official note to follow).  Patient tolerated procedure well, hemodynamically stable, no change in neurological status compared to baseline.  Results discussed with neurosurgery, patient and patient's . Groin sheath was removed,  manual compression held to hemostasis  for  21 minutes, no active bleeding, no hematoma, Avitene applied,  quick clot and safeguard balloon dressing applied at    STAT labs: CBC BMP  Patient transferred to Neuro ICU Interventional Neuro- Radiology   Procedure Note PA-C    Procedure: Selective Cerebral Angiography   Pre- Procedure Diagnosis:   ventricular hemorrhage   Post- Procedure Diagnosis:    : Dr. Bj Louis    Physician Assistant: JUSTIN Arce PA-C  Nurse:                      Fannie Singleton   Radiologic Tech:      Jerry Manriquez LRT     Anesthesiologist:      Rashi Omer  Sheath:    I/Os:  Estimated blood loss less than 10cc  IV fluids:     cc   Urine output     cc    Contrast Omnipaque 240      cc         Antibiotics:    Vitals: BP         HR      Spo2        Preliminary Report:  Using a long sheath to the right groin under general anesthesia via left vertebral artery,  left common carotid artery, left external carotid artery, right vertebral artery,  right common carotid artery, right external carotid artery a selective cerebral angiography was performed and demonstrated                                                                                                  Official note to follow).  Patient tolerated procedure well, hemodynamically stable, no change in neurological status compared to baseline.  Results discussed with neurosurgery, patient and patient's . Groin sheath was removed,  manual compression held to hemostasis  for  21 minutes, no active bleeding, no hematoma, Avitene applied,  quick clot and safeguard balloon dressing applied at    STAT labs: CBC BMP  Patient transferred to Neuro ICU Interventional Neuro- Radiology   Procedure Note PA-C    Procedure: Selective Cerebral Angiography   Pre- Procedure Diagnosis:   ventricular hemorrhage   Post- Procedure Diagnosis:    : Dr. Bj Louis    Physician Assistant: JUSTIN Arce PA-C  Nurse:                      Fannie Singleton   Radiologic Tech:      Jerry Manriquez [facility]     Anesthesiologist:      Rashi Omer  Sheath:    I/Os:  Estimated blood loss less than 10cc  IV fluids:     cc   Urine output     cc    Contrast Omnipaque 240      cc         Antibiotics:    Vitals: /70  HR  77  Spo2        Preliminary Report:  Using a long sheath to the right groin under general anesthesia via left vertebral artery,  left common carotid artery, left external carotid artery, right vertebral artery,  right common carotid artery, right external carotid artery a selective cerebral angiography was performed and demonstrated                                                                                                  Official note to follow  Patient tolerated procedure well, hemodynamically stable, no change in neurological status compared to baseline.  Results discussed with neurosurgery, patient and patient's . Groin sheath was removed,  manual compression held to hemostasis  for  21 minutes, no active bleeding, no hematoma, Avitene applied,  quick clot and safeguard balloon dressing applied at    STAT labs: CBC BMP  Patient transferred to Neuro ICU Interventional Neuro- Radiology   Procedure Note PA-C    Procedure: Selective Cerebral Angiography   Pre- Procedure Diagnosis:   ventricular hemorrhage   Post- Procedure Diagnosis:  No source for hemorrhage No aneurysm No AVM    : Dr. Bj Louis  Resident:   Dr Isak Crowell    Physician Assistant: JUSTIN Arce PA-C  Nurse:                      Fannie Singleton   Radiologic Tech:      Jerry Manriquez [facility]     Anesthesiologist:      Rashi Omer  Sheath:                     4 Pakistani long sheath     I/Os: Estimated blood loss less than 10cc  IV fluids:     cc Urine output 125cc  Contrast Omnipaque 240 132cc EVD 18cc     Vitals: BP 1116/57  HR 62  Spo2 100%       Preliminary Report:  Using a 4 Pakistani long sheath to the right groin under general anesthesia via left vertebral artery, Marie CT, left vertebral artery, left common carotid artery, left external carotid artery, right vertebral artery, right common carotid artery, right external carotid artery a selective cerebral angiography was performed and demonstrated                                                                                                                                                                                                                        Official note to follow  Patient tolerated procedure well, hemodynamically stable, no change in neurological status compared to baseline.  Results discussed with neurosurgery, patient and patient's . Groin sheath was removed,  manual compression held to hemostasis  for  21 minutes, no active bleeding, no hematoma, Avitene applied, quick clot and safeguard balloon dressing applied at 1445 hours. STAT labs: CBC BMP at 1830 hours. Patient transferred to Neuro ICU Interventional Neuro- Radiology   Procedure Note PA-C    Procedure: Selective Cerebral Angiography   Pre- Procedure Diagnosis:   ventricular hemorrhage   Post- Procedure Diagnosis:  No source for hemorrhage No aneurysm No AVM    : Dr. Bj Louis  Resident:  Dr Isak Crowell    Physician Assistant: JUSTIN Arce PA-C  Nurse:                      Fannie Singleton   Radiologic Tech:      Jerry Manriquez [facility]     Anesthesiologist:      Rashi Omer  Sheath:                    4 Dutch long sheath     I/Os: Estimated blood loss less than 10cc  IV fluids:     cc Urine output 125cc  Contrast Omnipaque 240 132cc EVD 18cc     Vitals: BP 1116/57  HR 62  Spo2 100%       Preliminary Report:  Using a 4 Dutch long sheath to the right groin under general anesthesia via left vertebral artery, Marie CT, left vertebral artery, left common carotid artery, left external carotid artery, right vertebral artery, right common carotid artery, right external carotid artery a selective cerebral angiography was performed and demonstrated                                                                                                                                                                                                                        Official note to follow  Patient tolerated procedure well, hemodynamically stable, no change in neurological status compared to baseline.  Results discussed with neurosurgery, patient and patient's . Groin sheath was removed,  manual compression held to hemostasis  for  21 minutes, no active bleeding, no hematoma, Avitene applied, quick clot and safeguard balloon dressing applied at 1445 hours. STAT labs: CBC BMP at 1830 hours. Patient transferred to Neuro ICU Interventional Neuro- Radiology   Procedure Note PA-C    Procedure: Selective Cerebral Angiography   Pre- Procedure Diagnosis:   ventricular hemorrhage   Post- Procedure Diagnosis:  No source for hemorrhage No aneurysm No AVM    : Dr. Bj Louis  Resident: Dr Isak Crowell    Physician Assistant: JUSTIN Arce PA-C  Nurse:                      Fannie Singleton   Radiologic Tech:      Jerry Manriquez [facility]     Anesthesiologist:      Rashi Omer  Sheath:                    4 Belgian long sheath     I/Os: Estimated blood loss less than 10cc  IV fluids:     cc Urine output 125cc  Contrast Omnipaque 240 132cc EVD 18cc     Vitals: BP 1116/57  HR 62  Spo2 100%       Preliminary Report:  Using a 4 Belgian long sheath to the right groin under general anesthesia via left vertebral artery, Marie CT, left vertebral artery, left common carotid artery, left external carotid artery, right vertebral artery, right common carotid artery, right external carotid artery a selective cerebral angiography was performed and demonstrated                                                                                                                                                                                                                        Official note to follow  Patient tolerated procedure well, hemodynamically stable, no change in neurological status compared to baseline.  Results discussed with neurosurgery, patient and patient's . Groin sheath was removed,  manual compression held to hemostasis  for  21 minutes, no active bleeding, no hematoma, Avitene applied, quick clot and safeguard balloon dressing applied at 1445 hours. STAT labs: CBC BMP at 1830 hours. Patient transferred to Neuro ICU Interventional Neuro- Radiology   Procedure Note PA-C    Procedure: Selective Cerebral Angiography   Pre- Procedure Diagnosis:   ventricular hemorrhage   Post- Procedure Diagnosis:  No source for hemorrhage No aneurysm No AVM    : Dr. Bj Louis  Resident: Dr Isak Crowell    Physician Assistant: JUSTIN Arce PA-C  Nurse:                      Fannie Singleton   Radiologic Tech:      Jerry Manriquez [facility]     Anesthesiologist:      Rashi Omer  Sheath:                     4 Wallisian long sheath     I/Os: Estimated blood loss less than 10cc  IV fluids:     cc Urine output 125cc  Contrast Omnipaque 240 132cc EVD 18cc     Vitals: BP 1116/57  HR 62  Spo2 100%       Preliminary Report:  Using a 4 Wallisian long sheath to the right groin under general anesthesia via left vertebral artery, Marie CT, left vertebral artery, left common carotid artery, left external carotid artery, right vertebral artery, right common carotid artery, right external carotid artery a selective cerebral angiography was performed and demonstrated                                                                                                                                                                                                                        Official note to follow  Patient tolerated procedure well, hemodynamically stable, no change in neurological status compared to baseline.  Results discussed with neurosurgery, patient and patient's . Groin sheath was removed,  manual compression held to hemostasis  for  21 minutes, no active bleeding, no hematoma, Avitene applied, quick clot and safeguard balloon dressing applied at 1445 hours. STAT labs: CBC BMP at 1830 hours. Patient transferred to Neuro ICU Interventional Neuro- Radiology   Procedure Note PA-C    Procedure: Selective Cerebral Angiography   Pre- Procedure Diagnosis:   ventricular hemorrhage   Post- Procedure Diagnosis:  No source for hemorrhage No aneurysm No AVM    : Dr. Bj Louis  Resident: Dr Isak Crowell    Physician Assistant: JUSTIN Arce PA-C  Nurse:                      Fannie Singleton   Radiologic Tech:      Jerry Manriquez [facility]     Anesthesiologist:      Rashi Omer  Sheath:                     4 Mauritanian long sheath     I/Os: Estimated blood loss less than 10cc  IV fluids:     cc Urine output 125cc  Contrast Omnipaque 240 132cc EVD 18cc     Vitals: BP 1116/57  HR 62  Spo2 100%       Preliminary Report:  Using a 4 Mauritanian long sheath to the right groin under general anesthesia via left vertebral artery, Marie CT, left vertebral artery, left common carotid artery, left external carotid artery, right vertebral artery, right common carotid artery, right external carotid artery a selective cerebral angiography was performed and demonstrated                                                                                                                                                                                                                                                                                   Official note to follow  Patient tolerated procedure well, hemodynamically stable, no change in neurological status compared to baseline.  Results discussed with neurosurgery, patient and patient's . Groin sheath was removed, manual compression held to hemostasis for 21 minutes,no active bleeding, no hematoma, Avitene applied, quick clot and safeguard balloon dressing applied at 1445 hours. STAT labs: CBC BMP at 1830 hours. Patient transferred to Neuro ICU Interventional Neuro- Radiology   Procedure Note PA-C    Procedure: Selective Cerebral Angiography   Pre- Procedure Diagnosis:   ventricular hemorrhage   Post- Procedure Diagnosis:  No source for hemorrhage No aneurysm No AVM    : Dr. Bj Louis  Resident: Dr Isak Crowell    Physician Assistant: JUSTIN Arce PA-C  Nurse:                      Fannie Singleton   Radiologic Tech:      Jerry Manriquez [facility]     Anesthesiologist:      Rashi Omer  Sheath:                     4 Macanese long sheath     I/Os: Estimated blood loss less than 10cc  IV fluids:     cc Urine output 125cc  Contrast Omnipaque 240 132cc EVD 18cc     Vitals: BP 1116/57  HR 62  Spo2 100%       Preliminary Report:  Using a 4 Macanese long sheath to the right groin under general anesthesia via left vertebral artery, Marie CT, left vertebral artery, left common carotid artery, left external carotid artery, right vertebral artery, right common carotid artery, right external carotid artery a selective cerebral angiography was performed and demonstrated No aneurysm, no AVM, no source for hemorrhage.                                                                                                                                                                                                                                                           Patient tolerated procedure well, hemodynamically stable, no change in neurological status compared to baseline.  Results discussed with neurosurgery, patient and patient's . Groin sheath was removed, manual compression held to hemostasis for 21 minutes, no active bleeding, no hematoma, Avitene applied, quick clot and safeguard balloon dressing applied at 1445 hours. STAT labs: CBC BMP at 1830 hours. Patient transferred to Neuro ICU Interventional Neuro- Radiology   Procedure Note PA-C    Procedure: Selective Cerebral Angiography   Pre- Procedure Diagnosis:   ventricular hemorrhage   Post- Procedure Diagnosis:  No source for hemorrhage No aneurysm No AVM    : Dr. Bj Louis  Resident: Dr Isak Crowell    Physician Assistant: JUSTIN Arce PA-C  Nurse:                      Fannie Singleton   Radiologic Tech:      Jerry Manriquez [facility]     Anesthesiologist:      Rashi Omer  Sheath:                     4 Georgian long sheath     I/Os: Estimated blood loss less than 10cc  IV fluids:300cc Urine output 125cc  Contrast Omnipaque 240 132cc EVD 18cc     Vitals: BP 1116/57  HR 62  Spo2 100%       Preliminary Report:  Using a 4 Georgian long sheath to the right groin under general anesthesia via left vertebral artery, Marie CT, left vertebral artery, left common carotid artery, left external carotid artery, right vertebral artery, right common carotid artery, right external carotid artery a selective cerebral angiography was performed and demonstrated no pial or dural vascular malformation. No aneurysm along anterior and posterior circulation. Normal spinal arteries along the cervical cord. Multifocal, multisegmental vessel irregularities along the left hemisphere (left PCA, left MCA, left SHANE, along proximal and distal trunks. Paucity of vessels along the left posterior frontal and anterior parietal territories but without clear vessel cut off. No venous occlusions. Calcified brachiocephalic extracranial vessel, mild narrowing at the origin of the left vertebral artery and moderate narrowing at the origin of the right vertebral.  Patient tolerated procedure no new neurological deficits. Results discussed with neurosurgery and patient's . Groin sheath was removed, manual compression held to hemostasis for 21 minutes, no active bleeding, no hematoma, Avitene applied, quick clot and safeguard balloon dressing applied at 1445 hours. STAT labs: CBC BMP at 1830 hours. Patient transferred to Neuro ICU

## 2018-01-24 NOTE — ED ADULT NURSE NOTE - OBJECTIVE STATEMENT
62 year old female patient BIBA s/p syncopal episode at home/fall in bathroom. Level 1 trauma activated at 0426. See trauma flowsheet

## 2018-01-24 NOTE — DISCHARGE NOTE ADULT - HOSPITAL COURSE
PT S/P fall with worsening mental status. imaging reveals  4 th ventricular  hemorrhage with hydrocephalus. admitted to neuro ICU. s/p External Ventricular Drain placement  s/p cerebral  angio 1/24/18 with findings significant for  diffuse hypodensity distal Left MCA/ Multifocal, multisegmental vessel narowing  along the left hemisphere (left PCA, left MCA, left SHANE, along proximal and distal trunks. Paucity of vessels along the left posterior frontal and anterior parietal territories but without clear vessel cut off. No venous occlusions and mild narrowing at the origin of the left vertebral artery and moderate narrowing at the origin of the right vertebral. TTE significant for dense posterior mitral annular clcifications/ hyperdynamic left ventricles/ mild diastolic dysfunction.  EKG changes with  elevated troponin likely due to demand ischemia,  EEG provides evidence of focal epileptogenicity in the left temporal region. Loaded with Keppra.  Patient remained intubated with poor mental exam until 1/28/18. Rheum consulted for vasculitis work up- Vasculitis work up essentailly negative. Patient with dense Right hemiparesis and global aphasia. EVD wened and discontinued 2/2/18. Dysphagia persists. Failed FEEST. s/p peg plcement 2/5/18. Mental sttus continues to improve. Patient minimally verbal/ stable Right hemiparesis RLE 3/5. RUE 0/5. Neurology consulted. Stroke work up completed. Cause likely  Reversible  Cerebral Vasoconstriction Syndrome (RCVS) Evaluated by PT/OT and discharge to rehab in stable condition PT S/P fall with worsening mental status. imaging reveals  4 th ventricular  hemorrhage with hydrocephalus. admitted to neuro ICU. s/p External Ventricular Drain placement  s/p cerebral  angio 1/24/18 with findings significant for  diffuse hypodensity distal Left MCA/ Multifocal, multisegmental vessel narowing  along the left hemisphere (left PCA, left MCA, left SHANE, along proximal and distal trunks. Paucity of vessels along the left posterior frontal and anterior parietal territories but without clear vessel cut off. No venous occlusions and mild narrowing at the origin of the left vertebral artery and moderate narrowing at the origin of the right vertebral. TTE significant for dense posterior mitral annular clcifications/ hyperdynamic left ventricles/ mild diastolic dysfunction.  EKG changes with  elevated troponin likely due to demand ischemia,  EEG provides evidence of focal epileptogenicity in the left temporal region. Loaded with Keppra.  Patient remained intubated with poor mental exam until 1/28/18. Rheum consulted for vasculitis work up- Vasculitis work up essentailly negative. Patient with dense Right hemiparesis and global aphasia. EVD wened and discontinued 2/2/18. Dysphagia persists. Failed FEEST. s/p peg plcement 2/5/18. Mental sttus continues to improve. Patient minimally verbal/ stable Right hemiparesis RLE 3/5. RUE 0/5. Neurology consulted. Stroke work up completed. Cause likely  Reversible  Cerebral Vasoconstriction Syndrome (RCVS) .Hospital stay prolonged due to uncontrolled hypertension. Renal consult called.  Evaluated by PT/OT and discharge to rehab in stable condition 62 F, active smoker, with no sig PMHx, recent URI symptoms admitted on 1/24/18 with AMS, fall with head trauma found to have large right frontal scalp hematoma, acute left MCA territory infarct, IVH, and hydrocephalus on CT head requiring intubation and EVD .  admitted to neuro ICU. s/p External Ventricular Drain placement  s/p cerebral  angio 1/24/18 with findings significant for  diffuse hypodensity distal Left MCA/ Multifocal, multisegmental vessel narowing  along the left hemisphere (left PCA, left MCA, left SHANE, along proximal and distal trunks. Paucity of vessels along the left posterior frontal and anterior parietal territories but without clear vessel cut off. No venous occlusions and mild narrowing at the origin of the left vertebral artery and moderate narrowing at the origin of the right vertebral. TTE significant for dense posterior mitral annular clcifications/ hyperdynamic left ventricles/ mild diastolic dysfunction.  EKG changes with  elevated troponin likely due to demand ischemia, EEG provides evidence of focal epileptogenicity in the left temporal region. Loaded with Keppra.  Patient remained intubated with poor mental exam until 1/28/18. Rheum consulted for vasculitis work up- Vasculitis work up essentailly negative. Patient with dense Right hemiparesis and global aphasia. EVD wened and discontinued 2/2/18. Dysphagia persists. Failed FEEST. s/p peg plcement 2/5/18. Mental sttus continues to improve. Patient minimally verbal/ stable Right hemiparesis RLE 3/5. RUE 0/5. Neurology consulted. Stroke work up completed. Cause likely  Reversible  Cerebral Vasoconstriction Syndrome (RCVS) .Hospital stay prolonged due to uncontrolled hypertension. Renal consult called. 2/12-2/13 patient had fevers 102-103F. Fever work up negative. Possible drug fever. Keppra discontinued. Found to have elevated LFTs. Abdominal sonogram without acute pathology. Off Keppra and statin.   LFTs Trending down.   Evaluated by PT/OT and discharge to rehab in stable condition. Patient remains alert oriented  with Right hemiparesis and Severe expressive aphasia 62 F, active smoker, with no sig PMHx, recent URI symptoms admitted on 1/24/18 with AMS, fall with head trauma found to have large right frontal scalp hematoma, acute left MCA territory infarct, IVH, and hydrocephalus on CT head requiring intubation and EVD .  admitted to neuro ICU. s/p External Ventricular Drain placement  s/p cerebral  angio 1/24/18 with findings significant for  diffuse hypodensity distal Left MCA/ Multifocal, multisegmental vessel narowing  along the left hemisphere (left PCA, left MCA, left SHANE, along proximal and distal trunks. Paucity of vessels along the left posterior frontal and anterior parietal territories but without clear vessel cut off. No venous occlusions and mild narrowing at the origin of the left vertebral artery and moderate narrowing at the origin of the right vertebral. TTE significant for dense posterior mitral annular clcifications/ hyperdynamic left ventricles/ mild diastolic dysfunction.  EKG changes with  elevated troponin likely due to demand ischemia, EEG provides evidence of focal epileptogenicity in the left temporal region. Loaded with Keppra.  Patient remained intubated with poor mental exam until 1/28/18. Rheum consulted for vasculitis work up- Vasculitis work up essentailly negative. Patient with dense Right hemiparesis and global aphasia. EVD wened and discontinued 2/2/18. Dysphagia persists. Failed FEEST. s/p peg plcement 2/5/18. Mental sttus continues to improve. Patient minimally verbal/ stable Right hemiparesis RLE 3/5. RUE 0/5. Neurology consulted. Stroke work up completed. Cause likely  Reversible  Cerebral Vasoconstriction Syndrome (RCVS) .Hospital stay prolonged due to uncontrolled hypertension. Renal consult called. 2/12-2/13 patient had fevers 102-103F. Fever work up negative. Possible drug fever. Keppra discontinued. Found to have elevated LFTs. Abdominal sonogram without acute pathology. Off Keppra and statin.   LFTs Trending down.   Evaluated by PT/OT and discharge to rehab in stable condition. Patient remains alert oriented  with Right hemiparesis and Severe expressive aphasia. Patient was found to have an.incidental 2.6 x 2.2 cm left adrenal nodule noted on CT of abdomen and pelvis, she  will need outpatient follow up and interval monitoring.

## 2018-01-24 NOTE — ED PROVIDER NOTE - PHYSICAL EXAMINATION
General: Awake, alert, eyes open. Occasional incomprehensible verbalization.   Head: Swelling and ecchymosis at forehead c/ tenderness s/ palpable crack, crepitus or depression.  ENT: No preauricular ecchymosis or otorrhea. No epistaxis or rhinorrhea.  EYES: PsERRL  Neck: In c-collar  CVS: Normal S1, S2 s/ M/R/G  RESP: CTA B/L s/ W/R/R  Chest Wall: No ecchymosis, edema or crepitus  GI: No ecchymosis. Soft. NT/ND.  MSK: FROM X 4  Neuro: As noted above. Altered. GCS 11.  Skin: No laceration

## 2018-01-24 NOTE — PROCEDURE NOTE - ADDITIONAL PROCEDURE DETAILS
Aspirated 4cc of CSF, observed sterile precautions. Sent for CSF glucose, lactate, protein, csf cell count and gram stain. To follow results

## 2018-01-24 NOTE — PROGRESS NOTE ADULT - ASSESSMENT
ASSESSMENT/PLAN:  PT S/P fall with worsening mental status - 4 th vent hemorrgae with hydrocephalus     NEURO: checks q 1 hr; placed EVD; Angio today; monitor Hydro and ICP   POst EVD CT   No need for AED   Stroke core measures     Activity:  [X] Bedrest     PULM: Likely COPD   Broncholdilators albuterol prn q 4 hrs   PRVC 350 TV ; rate 14 ; FIO2 100% b; PEEP- 5  Nicotine patch 21mg q day     CV:  SBP goal- -< 150     RENAL:  Fluids: NS at 100 cc/hr - hydrate for angio     GI:  Diet: NPO for now   GI prophylaxis  [X] PPI [] other:  Bowel regimen [] colace [X] senna [] other:    ENDO:   Goal euglycemia (-180)    HEME/ONC: Coags NL with Platelets   VTE prophylaxis: [X] SCDs [X] chemoprophylaxis- held secondary to acute hemorrhage   Patient at high risk for DVT due to immobility - yet with acute bleed will need to chemoprophylaxsis for now.     ID: afebrile    MISC: family to be informed         CODE STATUS:  X[] Full Code     DISPOSITION:  X[] ICU     [X] Patient is at high risk of neurologic deterioration/death due to: worsening heme and herniation      Time seen:  1030  Time spent: _40  critical care minutes ASSESSMENT/PLAN:  PT S/P fall with worsening mental status - 4 th vent hemorrgae with hydrocephalus     NEURO: checks q 1 hr; placed EVD; Angio today; monitor Hydro and ICP   POst EVD CT   No need for AED   Stroke core measures     Activity:  [X] Bedrest     PULM: Likely COPD   Broncholdilators albuterol prn q 4 hrs   PRVC 350 TV ; rate 14 ; FIO2 100% b; PEEP- 5  Nicotine patch 21mg q day     CV:  SBP goal- -< 150     RENAL:  Fluids: NS at 100 cc/hr - hydrate for angio     GI:  Diet: NPO for now   GI prophylaxis  [X] PPI [] other:  Bowel regimen [] colace [X] senna [] other:    ENDO:   Goal euglycemia (-180)  Suppl Mg    HEME/ONC: Coags NL with Platelets   VTE prophylaxis: [X] SCDs [X] chemoprophylaxis- held secondary to acute hemorrhage   Patient at high risk for DVT due to immobility - yet with acute bleed will need to chemoprophylaxsis for now.     ID: afebrile    MISC: family to be informed         CODE STATUS:  X[] Full Code     DISPOSITION:  X[] ICU     [X] Patient is at high risk of neurologic deterioration/death due to: worsening heme and herniation      Time seen:  1030  Time spent: _40  critical care minutes

## 2018-01-24 NOTE — DISCHARGE NOTE ADULT - MEDICATION SUMMARY - MEDICATIONS TO TAKE
I will START or STAY ON the medications listed below when I get home from the hospital:    acetaminophen 160 mg/5 mL oral suspension  -- 20.31 milliliter(s) by mouth every 6 hours, As needed, Mild Pain (1 - 3)  -- Indication: For Pain    aspirin 81 mg oral tablet, chewable  -- 1 tab(s) by mouth once a day  -- Indication: For CVA (cerebral vascular accident)    enalapril 10 mg oral tablet  -- 1 tab(s) by mouth   -- Indication: For Essential hypertension    cloNIDine 0.1 mg oral tablet  -- 1 tab(s) by mouth   -- Indication: For Essential hypertension    verapamil 80 mg oral tablet  -- 1 tab(s) by mouth 3 times a day  -- Indication: For Essential hypertension    enoxaparin  -- 30 milligram(s) subcutaneous once a day (at bedtime)  -- Indication: For DVT prophylaxis    lacosamide 200 mg oral tablet  -- 1 tab(s) by mouth 2 times a day  -- Indication: For Seizure prophylaxis    albuterol 90 mcg/inh inhalation aerosol  -- 1 puff(s) inhaled every 4 hours, As needed, Wheezing  -- Indication: For Bronchodilator    ipratropium CFC free 17 mcg/inh inhalation aerosol  -- 1 puff(s) inhaled every 6 hours, As needed, wheez  -- Indication: For Bronchodilator    ocular lubricant ophthalmic solution  -- 1 drop(s) to each affected eye every 4 hours, As needed, dryness  -- Indication: For Ophthalmic gtt

## 2018-01-24 NOTE — DISCHARGE NOTE ADULT - CARE PROVIDERS DIRECT ADDRESSES
,[email or web address]_Unknown

## 2018-01-24 NOTE — PROCEDURE NOTE - NSPROCDETAILS_GEN_ALL_CORE
positive blood return obtained via catheter/sutured in place/connected to a pressurized flush line/all materials/supplies accounted for at end of procedure/Seldinger technique/location identified, draped/prepped, sterile technique used, needle inserted/introduced

## 2018-01-24 NOTE — CONSULT NOTE ADULT - ATTENDING COMMENTS
Agree with resident note. Patient personally seen and examined. All current imaging studies reviewed.  EVD placed this am, angio pending, d/w

## 2018-01-24 NOTE — H&P ADULT - ATTENDING COMMENTS
Level One trauma called for 63y/o woman who fell in her bathroom and struck her head. I saw and examined patient during level one activation, 1/24 at 4am. Pt had altered mental status, with GCS 11 (E4V2M5). Primary survey otherwise normal (PERRL, moving all extremities, localizing to C-collar with hands). Secondary significant for large right frontal hematoma. CXR normal. Pt taken to CT for CT head, C-spine, C/A/P. Neurosurgery resident present during activation and CT. Initial review of CT head with focus of hemorrhage around fourth ventricle. This prompted CT-A of head and neck, and CT C/A/P done with residual contrast as low-energy mechanism with no anticoagulation per pt's . On my review of CT c/a/p, no pneumo/hemothorax, no intra-abdominal fluid, +LVH. Pt brought to SICU.    I had discussion with pt's , who stated that pt has lost significant weight unintentionally in the past few months, but has not been to see her primary physician in several years. She has a family history of colon ca (father) and sarcoma (mother, in her 80's), but had her last colonoscopy 3 years ago (normal). She is an active smoker and drinks 2 glasses of wine with dinner often. She woke up today not feeling well, vomited at 9am, then started slurring her speech at 10am, spent the rest of the day in bed, was able to drink some tea and have some crackers. She then got up to use the bathroom, fell, and hit her head. Pt's  noted that he believes she is dehydrated.    Injuries/diagnoses:    1. Intracranial hemorrhage at level of 4th ventricle: unusual site for a traumatic hemorrhage, CT-A without evidence of aneurysm, but with increased venous phase enhancement in the right cerebellopontine angle, of unclear significance.  - Pt was transferred to Neurosurgery service for further management.  2. Incidental L adrenal nodule: will need outpatient followup with PMD.  3. Possible R 5/6 rib fractures: motion artifact on CT makes this unclear, will need tertiary survey when mental status clears.  4. Hyponatremia and hypovolemia: IVF resuscitation, contact PMD regarding chronicity of hyponatremia.    CC time 45 minutes

## 2018-01-24 NOTE — CONSULT NOTE ADULT - SUBJECTIVE AND OBJECTIVE BOX
Pager: [phone]  CHIEF COMPLAINT/ REASON FOR CONSULTATION:    level 1 trauma, s/p fall confusion   HPI:  62F no PMH, recent flu like symptoms, felt dizzy and then fell. S/p fall she was confused, not FC. She was brought in as level 1 trauma. CTH demonstrated 4th ventricular IVH. She is not on any AC. GCS 11.     PAST MEDICAL HISTORY   None    PAST SURGICAL HISTORY         MEDICATIONS:  Antibiotics:    Neuro:    Anticoagulation: None    Other:  sodium chloride 0.9% Bolus 1000 milliLiter(s) IV Bolus once  sodium chloride 0.9% Bolus 1000 milliLiter(s) IV Bolus once      SOCIAL HISTORY:   . Lives with .     FAMILY HISTORY:      REVIEW OF SYSTEMS:  Check here if all are normal other than Neurological []  Unable to obtain.    PHYSICAL EXAMINATION:   T(C): --  HR: --  BP: --  RR: --  SpO2: --  Wt(kg): --    General Examination:   Awake, alert, tracks, not FC, aphasic  PERRL, EOMI, face equal, tongue m/l  Moving all extremities eqaully     LABS:                RADIOLOGY & ADDITIONAL STUDIES:    CTH: 4th ventricular IVH

## 2018-01-24 NOTE — CHART NOTE - NSCHARTNOTEFT_GEN_A_CORE
Called to patient's bedside for intubation.  Therapy initiated by primary medical team.    Patient Assessment:   Altered mental status in C-collar.  Intracranial hemorrhage preparing for bedside EVD. K - 3.5, unknown NPO status.  Level 1 trauma secondary to malaise and fall at home. Nicardipine gtt on at 2.    Baseline Vital Signs:  BP:  168/97  HR: 91  RR: 14  SpO2: 100%    Medications Administered:    Intubation:      [ ] Direct Laryngoscopy    [X] Video-Assisted Laryngoscopy   [ ] Fiberoptic Intubation    Blade: Glidescope #3  Cormack-Lehane View: [x] 1     [ ] 2A     [ ] 2B     [ ] 3     [ ]  4  ETT Size: 7.0  Marking at Teeth: 21cm    Post-Intubation Vital Signs:  BP: 213/100  HR: 95  RR: 14  SpO2: 100%    MD notified of elevated BP, increased nicardipine gtt and recommended sedation with propofol gtt.    Positive end-tidal carbon dioxide via EasyCap, positive bilateral breath sounds.  CXR to be reviewed by primary team.  Atraumatic intubation with no complications.    Lobito Hein MD  Anesthesiology Attending  [phone] Called to patient's bedside for intubation.  Therapy initiated by primary medical team.    Patient Assessment:   Altered mental status in C-collar.  Intracranial hemorrhage preparing for bedside EVD. K - 3.5, unknown NPO status.  Level 1 trauma secondary to malaise and fall at home. Nicardipine gtt on at 2.    Baseline Vital Signs:  BP:  168/97  HR: 91  RR: 14  SpO2: 100%    Medications Administered:  12mg Etomidate  120mg Succinylcholine    Intubation:      [ ] Direct Laryngoscopy    [X] Video-Assisted Laryngoscopy   [ ] Fiberoptic Intubation    Blade: Glidescope #3  Cormack-Lehane View: [x] 1     [ ] 2A     [ ] 2B     [ ] 3     [ ]  4  ETT Size: 7.0  Marking at Teeth: 21cm    Post-Intubation Vital Signs:  BP: 213/100  HR: 95  RR: 14  SpO2: 100%    MD notified of elevated BP, increased nicardipine gtt and recommended sedation with propofol gtt.    Positive end-tidal carbon dioxide via EasyCap, positive bilateral breath sounds.  CXR to be reviewed by primary team.  Atraumatic intubation with no complications.    Lobito Hein MD  Anesthesiology Attending  [phone]

## 2018-01-25 DIAGNOSIS — E27.9 DISORDER OF ADRENAL GLAND, UNSPECIFIED: ICD-10-CM

## 2018-01-25 DIAGNOSIS — I62.9 NONTRAUMATIC INTRACRANIAL HEMORRHAGE, UNSPECIFIED: ICD-10-CM

## 2018-01-25 DIAGNOSIS — Z98.890 OTHER SPECIFIED POSTPROCEDURAL STATES: Chronic | ICD-10-CM

## 2018-01-25 LAB
ANION GAP SERPL CALC-SCNC: -24 MMOL/L — LOW (ref 5–17)
ANION GAP SERPL CALC-SCNC: 12 MMOL/L — SIGNIFICANT CHANGE UP (ref 5–17)
ANION GAP SERPL CALC-SCNC: 14 MMOL/L — SIGNIFICANT CHANGE UP (ref 5–17)
ANION GAP SERPL CALC-SCNC: 15 MMOL/L — SIGNIFICANT CHANGE UP (ref 5–17)
ANION GAP SERPL CALC-SCNC: 15 MMOL/L — SIGNIFICANT CHANGE UP (ref 5–17)
APPEARANCE UR: CLEAR — SIGNIFICANT CHANGE UP
AUTO DIFF PNL BLD: NEGATIVE — SIGNIFICANT CHANGE UP
BILIRUB UR-MCNC: NEGATIVE — SIGNIFICANT CHANGE UP
BUN SERPL-MCNC: 16 MG/DL — SIGNIFICANT CHANGE UP (ref 7–23)
BUN SERPL-MCNC: 23 MG/DL — SIGNIFICANT CHANGE UP (ref 7–23)
BUN SERPL-MCNC: 23 MG/DL — SIGNIFICANT CHANGE UP (ref 7–23)
BUN SERPL-MCNC: 24 MG/DL — HIGH (ref 7–23)
BUN SERPL-MCNC: 26 MG/DL — HIGH (ref 7–23)
C-ANCA SER-ACNC: NEGATIVE — SIGNIFICANT CHANGE UP
CALCIUM SERPL-MCNC: 5.8 MG/DL — CRITICAL LOW (ref 8.4–10.5)
CALCIUM SERPL-MCNC: 8.7 MG/DL — SIGNIFICANT CHANGE UP (ref 8.4–10.5)
CALCIUM SERPL-MCNC: 8.9 MG/DL — SIGNIFICANT CHANGE UP (ref 8.4–10.5)
CALCIUM SERPL-MCNC: 8.9 MG/DL — SIGNIFICANT CHANGE UP (ref 8.4–10.5)
CALCIUM SERPL-MCNC: 9.3 MG/DL — SIGNIFICANT CHANGE UP (ref 8.4–10.5)
CHLORIDE SERPL-SCNC: 103 MMOL/L — SIGNIFICANT CHANGE UP (ref 96–108)
CHLORIDE SERPL-SCNC: 104 MMOL/L — SIGNIFICANT CHANGE UP (ref 96–108)
CHLORIDE SERPL-SCNC: 109 MMOL/L — HIGH (ref 96–108)
CHLORIDE SERPL-SCNC: 111 MMOL/L — HIGH (ref 96–108)
CHLORIDE SERPL-SCNC: 98 MMOL/L — SIGNIFICANT CHANGE UP (ref 96–108)
CHOLEST SERPL-MCNC: 256 MG/DL — HIGH (ref 10–199)
CK MB BLD-MCNC: 1.5 % — SIGNIFICANT CHANGE UP (ref 0–3.5)
CK MB CFR SERPL CALC: 15.2 NG/ML — HIGH (ref 0–3.8)
CK MB CFR SERPL CALC: 7 NG/ML — HIGH (ref 0–3.8)
CK MB CFR SERPL CALC: 9.8 NG/ML — HIGH (ref 0–3.8)
CK SERPL-CCNC: 1042 U/L — HIGH (ref 25–170)
CK SERPL-CCNC: 479 U/L — HIGH (ref 25–170)
CK SERPL-CCNC: 654 U/L — HIGH (ref 25–170)
CO2 SERPL-SCNC: 17 MMOL/L — LOW (ref 22–31)
CO2 SERPL-SCNC: 22 MMOL/L — SIGNIFICANT CHANGE UP (ref 22–31)
CO2 SERPL-SCNC: 22 MMOL/L — SIGNIFICANT CHANGE UP (ref 22–31)
CO2 SERPL-SCNC: 24 MMOL/L — SIGNIFICANT CHANGE UP (ref 22–31)
CO2 SERPL-SCNC: 25 MMOL/L — SIGNIFICANT CHANGE UP (ref 22–31)
COLOR SPEC: YELLOW — SIGNIFICANT CHANGE UP
CREAT SERPL-MCNC: 0.68 MG/DL — SIGNIFICANT CHANGE UP (ref 0.5–1.3)
CREAT SERPL-MCNC: 0.81 MG/DL — SIGNIFICANT CHANGE UP (ref 0.5–1.3)
CREAT SERPL-MCNC: 1.01 MG/DL — SIGNIFICANT CHANGE UP (ref 0.5–1.3)
CREAT SERPL-MCNC: 1.03 MG/DL — SIGNIFICANT CHANGE UP (ref 0.5–1.3)
CREAT SERPL-MCNC: 1.06 MG/DL — SIGNIFICANT CHANGE UP (ref 0.5–1.3)
CRP SERPL-MCNC: 1.1 MG/DL — HIGH (ref 0–0.4)
DIFF PNL FLD: ABNORMAL
GLUCOSE SERPL-MCNC: 134 MG/DL — HIGH (ref 70–99)
GLUCOSE SERPL-MCNC: 137 MG/DL — HIGH (ref 70–99)
GLUCOSE SERPL-MCNC: 137 MG/DL — HIGH (ref 70–99)
GLUCOSE SERPL-MCNC: 141 MG/DL — HIGH (ref 70–99)
GLUCOSE SERPL-MCNC: 95 MG/DL — SIGNIFICANT CHANGE UP (ref 70–99)
GLUCOSE UR QL: NEGATIVE — SIGNIFICANT CHANGE UP
GRAM STN FLD: SIGNIFICANT CHANGE UP
HCT VFR BLD CALC: 38.7 % — SIGNIFICANT CHANGE UP (ref 34.5–45)
HCT VFR BLD CALC: 42.7 % — SIGNIFICANT CHANGE UP (ref 34.5–45)
HDLC SERPL-MCNC: 98 MG/DL — SIGNIFICANT CHANGE UP (ref 40–125)
HGB BLD-MCNC: 13.1 G/DL — SIGNIFICANT CHANGE UP (ref 11.5–15.5)
HGB BLD-MCNC: 14.8 G/DL — SIGNIFICANT CHANGE UP (ref 11.5–15.5)
HIV 1+2 AB+HIV1 P24 AG SERPL QL IA: SIGNIFICANT CHANGE UP
KETONES UR-MCNC: ABNORMAL
LEUKOCYTE ESTERASE UR-ACNC: NEGATIVE — SIGNIFICANT CHANGE UP
LIPID PNL WITH DIRECT LDL SERPL: 122 MG/DL — SIGNIFICANT CHANGE UP
MAGNESIUM SERPL-MCNC: 2.2 MG/DL — SIGNIFICANT CHANGE UP (ref 1.6–2.6)
MAGNESIUM SERPL-MCNC: 2.2 MG/DL — SIGNIFICANT CHANGE UP (ref 1.6–2.6)
MCHC RBC-ENTMCNC: 33.9 GM/DL — SIGNIFICANT CHANGE UP (ref 32–36)
MCHC RBC-ENTMCNC: 34.7 GM/DL — SIGNIFICANT CHANGE UP (ref 32–36)
MCHC RBC-ENTMCNC: 35.8 PG — HIGH (ref 27–34)
MCHC RBC-ENTMCNC: 36.4 PG — HIGH (ref 27–34)
MCV RBC AUTO: 105 FL — HIGH (ref 80–100)
MCV RBC AUTO: 106 FL — HIGH (ref 80–100)
MYELOPEROXIDASE AB SER-ACNC: <5 UNITS — SIGNIFICANT CHANGE UP
MYELOPEROXIDASE CELLS FLD QL: NEGATIVE — SIGNIFICANT CHANGE UP
NITRITE UR-MCNC: NEGATIVE — SIGNIFICANT CHANGE UP
P-ANCA SER-ACNC: NEGATIVE — SIGNIFICANT CHANGE UP
PH UR: 6 — SIGNIFICANT CHANGE UP (ref 5–8)
PHOSPHATE SERPL-MCNC: 3.4 MG/DL — SIGNIFICANT CHANGE UP (ref 2.5–4.5)
PHOSPHATE SERPL-MCNC: 4.6 MG/DL — HIGH (ref 2.5–4.5)
PLATELET # BLD AUTO: 162 K/UL — SIGNIFICANT CHANGE UP (ref 150–400)
PLATELET # BLD AUTO: 174 K/UL — SIGNIFICANT CHANGE UP (ref 150–400)
POTASSIUM SERPL-MCNC: 4.2 MMOL/L — SIGNIFICANT CHANGE UP (ref 3.5–5.3)
POTASSIUM SERPL-MCNC: 4.3 MMOL/L — SIGNIFICANT CHANGE UP (ref 3.5–5.3)
POTASSIUM SERPL-MCNC: 4.4 MMOL/L — SIGNIFICANT CHANGE UP (ref 3.5–5.3)
POTASSIUM SERPL-MCNC: 4.4 MMOL/L — SIGNIFICANT CHANGE UP (ref 3.5–5.3)
POTASSIUM SERPL-MCNC: >9 MMOL/L — CRITICAL HIGH (ref 3.5–5.3)
POTASSIUM SERPL-SCNC: 4.2 MMOL/L — SIGNIFICANT CHANGE UP (ref 3.5–5.3)
POTASSIUM SERPL-SCNC: 4.3 MMOL/L — SIGNIFICANT CHANGE UP (ref 3.5–5.3)
POTASSIUM SERPL-SCNC: 4.4 MMOL/L — SIGNIFICANT CHANGE UP (ref 3.5–5.3)
POTASSIUM SERPL-SCNC: 4.4 MMOL/L — SIGNIFICANT CHANGE UP (ref 3.5–5.3)
POTASSIUM SERPL-SCNC: >9 MMOL/L — CRITICAL HIGH (ref 3.5–5.3)
PROT C ACT/NOR PPP: 150 % — SIGNIFICANT CHANGE UP (ref 74–150)
PROT S FREE AG PPP IA-ACNC: 181 % — HIGH (ref 61–131)
PROT UR-MCNC: 30 MG/DL
RBC # BLD: 3.67 M/UL — LOW (ref 3.8–5.2)
RBC # BLD: 4.07 M/UL — SIGNIFICANT CHANGE UP (ref 3.8–5.2)
RBC # FLD: 12.5 % — SIGNIFICANT CHANGE UP (ref 10.3–14.5)
RBC # FLD: 12.7 % — SIGNIFICANT CHANGE UP (ref 10.3–14.5)
RHEUMATOID FACT SERPL-ACNC: 8 IU/ML — SIGNIFICANT CHANGE UP (ref 0–13.9)
SODIUM SERPL-SCNC: 141 MMOL/L — SIGNIFICANT CHANGE UP (ref 135–145)
SODIUM SERPL-SCNC: 142 MMOL/L — SIGNIFICANT CHANGE UP (ref 135–145)
SODIUM SERPL-SCNC: 146 MMOL/L — HIGH (ref 135–145)
SODIUM SERPL-SCNC: 147 MMOL/L — HIGH (ref 135–145)
SODIUM SERPL-SCNC: 91 MMOL/L — CRITICAL LOW (ref 135–145)
SP GR SPEC: >1.03 — HIGH (ref 1.01–1.02)
SPECIMEN SOURCE: SIGNIFICANT CHANGE UP
T3 SERPL-MCNC: 72 NG/DL — LOW (ref 80–200)
T4 AB SER-ACNC: 8.5 UG/DL — SIGNIFICANT CHANGE UP (ref 4.6–12)
TOTAL CHOLESTEROL/HDL RATIO MEASUREMENT: 2.6 RATIO — LOW (ref 3.3–7.1)
TRIGL SERPL-MCNC: 178 MG/DL — HIGH (ref 10–149)
TROPONIN T SERPL-MCNC: 0.07 NG/ML — HIGH (ref 0–0.06)
TROPONIN T SERPL-MCNC: 0.08 NG/ML — HIGH (ref 0–0.06)
TROPONIN T SERPL-MCNC: 0.08 NG/ML — HIGH (ref 0–0.06)
TSH SERPL-MCNC: 0.59 UIU/ML — SIGNIFICANT CHANGE UP (ref 0.27–4.2)
UROBILINOGEN FLD QL: NEGATIVE — SIGNIFICANT CHANGE UP
WBC # BLD: 18.5 K/UL — HIGH (ref 3.8–10.5)
WBC # BLD: 19.1 K/UL — HIGH (ref 3.8–10.5)
WBC # FLD AUTO: 18.5 K/UL — HIGH (ref 3.8–10.5)
WBC # FLD AUTO: 19.1 K/UL — HIGH (ref 3.8–10.5)

## 2018-01-25 PROCEDURE — 93306 TTE W/DOPPLER COMPLETE: CPT | Mod: 26

## 2018-01-25 PROCEDURE — 72141 MRI NECK SPINE W/O DYE: CPT | Mod: 26

## 2018-01-25 PROCEDURE — 70551 MRI BRAIN STEM W/O DYE: CPT | Mod: 26

## 2018-01-25 PROCEDURE — 95819 EEG AWAKE AND ASLEEP: CPT | Mod: 26

## 2018-01-25 PROCEDURE — 70450 CT HEAD/BRAIN W/O DYE: CPT | Mod: 26

## 2018-01-25 PROCEDURE — 99291 CRITICAL CARE FIRST HOUR: CPT

## 2018-01-25 PROCEDURE — 70544 MR ANGIOGRAPHY HEAD W/O DYE: CPT | Mod: 26,59

## 2018-01-25 PROCEDURE — 99232 SBSQ HOSP IP/OBS MODERATE 35: CPT

## 2018-01-25 PROCEDURE — 71045 X-RAY EXAM CHEST 1 VIEW: CPT | Mod: 26

## 2018-01-25 PROCEDURE — 93888 INTRACRANIAL LIMITED STUDY: CPT | Mod: 26

## 2018-01-25 PROCEDURE — 93010 ELECTROCARDIOGRAM REPORT: CPT

## 2018-01-25 RX ORDER — SODIUM CHLORIDE 9 MG/ML
1000 INJECTION INTRAMUSCULAR; INTRAVENOUS; SUBCUTANEOUS
Qty: 0 | Refills: 0 | Status: DISCONTINUED | OUTPATIENT
Start: 2018-01-25 | End: 2018-01-26

## 2018-01-25 RX ORDER — ENOXAPARIN SODIUM 100 MG/ML
30 INJECTION SUBCUTANEOUS DAILY
Qty: 0 | Refills: 0 | Status: DISCONTINUED | OUTPATIENT
Start: 2018-01-25 | End: 2018-01-26

## 2018-01-25 RX ORDER — CHLORHEXIDINE GLUCONATE 213 G/1000ML
15 SOLUTION TOPICAL THREE TIMES A DAY
Qty: 0 | Refills: 0 | Status: DISCONTINUED | OUTPATIENT
Start: 2018-01-25 | End: 2018-01-29

## 2018-01-25 RX ORDER — LEVETIRACETAM 250 MG/1
1000 TABLET, FILM COATED ORAL EVERY 12 HOURS
Qty: 0 | Refills: 0 | Status: DISCONTINUED | OUTPATIENT
Start: 2018-01-25 | End: 2018-01-31

## 2018-01-25 RX ORDER — ACETAMINOPHEN 500 MG
750 TABLET ORAL ONCE
Qty: 0 | Refills: 0 | Status: COMPLETED | OUTPATIENT
Start: 2018-01-25 | End: 2018-01-25

## 2018-01-25 RX ORDER — NIMODIPINE 60 MG/10ML
30 SOLUTION ORAL
Qty: 0 | Refills: 0 | Status: DISCONTINUED | OUTPATIENT
Start: 2018-01-25 | End: 2018-01-28

## 2018-01-25 RX ADMIN — NIMODIPINE 30 MILLIGRAM(S): 60 SOLUTION ORAL at 13:39

## 2018-01-25 RX ADMIN — NIMODIPINE 30 MILLIGRAM(S): 60 SOLUTION ORAL at 18:11

## 2018-01-25 RX ADMIN — Medication 2 MILLIGRAM(S): at 22:05

## 2018-01-25 RX ADMIN — DEXMEDETOMIDINE HYDROCHLORIDE IN 0.9% SODIUM CHLORIDE 8.73 MICROGRAM(S)/KG/HR: 4 INJECTION INTRAVENOUS at 19:15

## 2018-01-25 RX ADMIN — NIMODIPINE 30 MILLIGRAM(S): 60 SOLUTION ORAL at 22:00

## 2018-01-25 RX ADMIN — CHLORHEXIDINE GLUCONATE 15 MILLILITER(S): 213 SOLUTION TOPICAL at 13:39

## 2018-01-25 RX ADMIN — SODIUM CHLORIDE 50 MILLILITER(S): 5 INJECTION, SOLUTION INTRAVENOUS at 10:30

## 2018-01-25 RX ADMIN — DEXMEDETOMIDINE HYDROCHLORIDE IN 0.9% SODIUM CHLORIDE 8.73 MICROGRAM(S)/KG/HR: 4 INJECTION INTRAVENOUS at 10:30

## 2018-01-25 RX ADMIN — SODIUM CHLORIDE 80 MILLILITER(S): 9 INJECTION INTRAMUSCULAR; INTRAVENOUS; SUBCUTANEOUS at 12:09

## 2018-01-25 RX ADMIN — Medication 1 PATCH: at 17:08

## 2018-01-25 RX ADMIN — NIMODIPINE 30 MILLIGRAM(S): 60 SOLUTION ORAL at 17:09

## 2018-01-25 RX ADMIN — CHLORHEXIDINE GLUCONATE 15 MILLILITER(S): 213 SOLUTION TOPICAL at 05:07

## 2018-01-25 RX ADMIN — Medication 1 PATCH: at 16:59

## 2018-01-25 RX ADMIN — Medication 300 MILLIGRAM(S): at 03:29

## 2018-01-25 RX ADMIN — PANTOPRAZOLE SODIUM 40 MILLIGRAM(S): 20 TABLET, DELAYED RELEASE ORAL at 12:09

## 2018-01-25 RX ADMIN — LEVETIRACETAM 400 MILLIGRAM(S): 250 TABLET, FILM COATED ORAL at 22:29

## 2018-01-25 RX ADMIN — CHLORHEXIDINE GLUCONATE 15 MILLILITER(S): 213 SOLUTION TOPICAL at 22:00

## 2018-01-25 RX ADMIN — ENOXAPARIN SODIUM 30 MILLIGRAM(S): 100 INJECTION SUBCUTANEOUS at 17:09

## 2018-01-25 RX ADMIN — NIMODIPINE 30 MILLIGRAM(S): 60 SOLUTION ORAL at 19:15

## 2018-01-25 NOTE — CHART NOTE - NSCHARTNOTEFT_GEN_A_CORE
Transcranial doppler exam # 1 (1/25/18) 12pm  mean velocity  cm/sec                              Left         Right  SHANE                   55            48  MCA                  59            45  PCA                   24,88       28,29   Full report to follow.  Makenna

## 2018-01-25 NOTE — DIETITIAN INITIAL EVALUATION ADULT. - NS AS NUTRI INTERV ENTERAL NUTRITION
initiate enteral feeds as medically feasible/within goals of care. recommend Jevity 1.2 goal of 70 ml/hr x 18 hours/day to provide 1512 calories, 70 g protein (30 calories/kg, 1.4 g protein/kg) of dosing weight 50 kg

## 2018-01-25 NOTE — DIETITIAN INITIAL EVALUATION ADULT. - ENERGY NEEDS
height: 5'0" weight: 110 pounds BMI: 21 IBW: 100 pounds (+/-10%)  pertinent info: patient admitted with fourth ventricular hemorrhage, hydrocephalus, + EVD in place, angio negative for vascular malformation, plan for further neurological work up/imaging  skin: no edema, no pressure injuries per documentation  fluid needs deferred to primary team as patient on hypertonic solution

## 2018-01-25 NOTE — EEG REPORT - NS EEG TEXT BOX
[facility]  Report of Routine EEG with Video    [facility]: [address], [address] [place] [address], Phone: [phone]  [facility]: [address] [place] [address], Phone: [phone]  Office: 1 [facility], [facility] [address], [place] [address], Phone: [phone]    Patient Name: Katherine Resendez    Age: 62 y  : 1955  Patient ID: -, MRN #: MR# [id number], Location: [facility], [facility]  Referring Physician: -    EEG #: [id number]  Study Date: 2018		    Technical Information:					  On Instrument: [id number]  Placement and Labeling of Electrodes:  The EEG was performed utilizing 20 channels referential EEG connections (coronal over temporal over parasagittal montage) using all standard 10-20 electrode placements with EKG.  Recording was at a sampling rate of 256 samples per second per channel.  Time synchronized digital video recording was done simultaneously with EEG recording.  A low light infrared camera was used for low light recording.  Hong and seizure detection algorithms were utilized.    History:  -concern for seizures    Medication	  No Data.	    Study Interpretation:    FINDINGS:  The background was continuous, mixed delta and theta activity, with a predominance of delta activity over the left hemisphere, maximum in the left temporal region.    Generalized slowing:   -Continuous Slowing, Generalized    Focal slowing:   -Continuous Slowing, Lateralized, Left hemisphere, maximum left temporal    Sleep Background:  Stage II sleep transients were not recorded.    Other Paroxysmal Activity:  None     Interictal Epileptiform Activity:   PLEDs, Lateralized, Left hemisphere, Maximum Left Temporal (T7/F7/P7)    Ictal Epileptiform Activity or Events:  Clinical seizures: No clinical signs  EEG seizures: Lateralized, Left hemisphere, maximum Left Temporal (2 recorded)  Description of EEG seizures: PLEDs over the left hemisphere occur in runs at a rate of 2 hz, then evolve into rhythmic delta and theta activity over the left hemisphere, maximum in the left temporal region, duration 1-2 minutes    Activation Procedures:   Hyperventilation was not performed.    Photic stimulation was not performed.    Artifacts:  Intermittent myogenic and movement artifacts were noted.    ECG:  The heart rate on single channel ECG was predominantly between 60-80 BPM.    EEG Classification / Summary:  Abnormal EEG in the lethargic state.    -Clinical seizures: No clinical signs  -EEG seizures: Lateralized, Left hemisphere, maximum Left Temporal (2 recorded)    -PLEDs, Lateralized, Left hemisphere, Maximum Left Temporal    -Continuous Slowing, Generalized, and Lateralized, Left hemisphere    Clinical Impression:  This EEG provides evidence of focal epileptogenicity in the left temporal region.  Two electrographic seizures were recorded from the left hemisphere, maximum in the left temporal region, with no associated clinical signs (duration 1-2 minutes).  There is also evidence of a severe diffuse encephalopathy, and a structural lesion in the left hemisphere.        Ti Sheehan MD  Attending Physician, [facility] [place] [facility]  Report of Routine EEG with Video    [facility]: [address], [address] [place] [address], Phone: [phone]  [facility]: [phone] [place] [address], Phone: [phone]  Office: 1 [facility], [facility] [address], [place] [address], Phone: [phone]    Patient Name: Katherine Resendez    Age: 62 y  : 1955  Patient ID: -, MRN #: MR# [id number], [facility]  Referring Physician: -    EEG #: [id number]  Study Date: 2018		    Technical Information:					  On Instrument: [id number]  Placement and Labeling of Electrodes:  The EEG was performed utilizing 20 channels referential EEG connections (coronal over temporal over parasagittal montage) using all standard 10-20 electrode placements with EKG.  Recording was at a sampling rate of 256 samples per second per channel.  Time synchronized digital video recording was done simultaneously with EEG recording.  A low light infrared camera was used for low light recording.  Hong and seizure detection algorithms were utilized.    History:  -concern for seizures    Medication	  No Data.	    Study Interpretation:    FINDINGS:  The background was continuous, mixed delta and theta activity, with a predominance of delta activity over the left hemisphere, maximum in the left temporal region.    Generalized slowing:   -Continuous Slowing, Generalized    Focal slowing:   -Continuous Slowing, Lateralized, Left hemisphere, maximum left temporal    Sleep Background:  Stage II sleep transients were not recorded.    Other Paroxysmal Activity:  None     Interictal Epileptiform Activity:   PLEDs, Lateralized, Left hemisphere, Maximum Left Temporal and Posterior Temporal (T7/F7/P7)    Ictal Epileptiform Activity or Events:  Clinical seizures: No clinical signs  EEG seizures: Lateralized, Left hemisphere, maximum Left Temporal (2 recorded)  Description of EEG seizures: PLEDs over the left hemisphere occur in runs at a rate of 2 hz, then evolve into rhythmic delta and theta activity over the left hemisphere, maximum in the left temporal region, duration 1-2 minutes.  SZ#1 continues to evolve in the left occipital region, prior to termination.    Activation Procedures:   Hyperventilation was not performed.    Photic stimulation was not performed.    Artifacts:  Intermittent myogenic and movement artifacts were noted.    ECG:  The heart rate on single channel ECG was predominantly between 60-80 BPM.    EEG Classification / Summary:  Abnormal EEG in the lethargic state.    -Clinical seizures: No clinical signs  -EEG seizures: Lateralized, Left hemisphere, maximum Left Temporal (2 recorded)    -PLEDs, Lateralized, Left hemisphere, Maximum Left Temporal    -Continuous Slowing, Generalized, and Lateralized, Left hemisphere    Clinical Impression:  This EEG provides evidence of focal epileptogenicity in the left temporal region.  Two electrographic seizures were recorded from the left hemisphere, maximum in the left temporal region, with no associated clinical signs (duration 1-2 minutes).  There is also evidence of a severe diffuse encephalopathy, and a structural lesion in the left hemisphere.  Case discussed with NSICU team.        Ti Sheehan MD  Attending Physician, [facility] [place]

## 2018-01-25 NOTE — PROGRESS NOTE ADULT - ASSESSMENT
62F s/p fall, found to have 4th vent IVH, now s/p EVD, angio negative for vascular malformation, questionable vasculitis.    F/u Vasculitis work up / HIV panel  F/u CSF VDRL / VZV  MRI Brain w/wo / Poss MRP in am  TTE - P  VEEG - P  CSF studies for malignancy-p  Consider CT C/A/P  1/25 Pancx (TMax 38.7)- P  Continue EVD @10

## 2018-01-25 NOTE — PROGRESS NOTE ADULT - SUBJECTIVE AND OBJECTIVE BOX
Patient Seen and Examined.     Overnight Events:   None    T(C): 37.3 (01-24-18 @ 23:00), Max: 37.3 (01-24-18 @ 23:00)  HR: 66 (01-25-18 @ 00:00) (61 - 116)  BP: 151/82 (01-25-18 @ 00:00) (88/64 - 253/135)  RR: 18 (01-25-18 @ 00:00) (11 - 44)  SpO2: 100% (01-25-18 @ 00:00) (98% - 100%)    Exam:   Intubated, no EO, pupils 2mm react b/l,   left gaze preference, +corneals, +cough/gag, overbreathing vent, no FC,   LUE LOC, LLE spont,   RUE extends, RLE TF    ALBUTerol    90 MICROgram(s) HFA Inhaler 1 Puff(s) Inhalation every 4 hours PRN  dexmedetomidine Infusion 0.7 MICROgram(s)/kG/Hr IV Continuous <Continuous>  niCARdipine Infusion 3 mG/Hr IV Continuous <Continuous>  nicotine - 21 mG/24Hr(s) Patch 1 patch Transdermal daily  pantoprazole  Injectable 40 milliGRAM(s) IV Push daily  sodium chloride 2% . 1000 milliLiter(s) IV Continuous <Continuous>                            15.8   20.6  )-----------( 224      ( 24 Jan 2018 18:27 )             44.8     01-25    141  |  104  |  23  ----------------------------<  137<H>  4.2   |  22  |  1.06    Ca    8.7      25 Jan 2018 01:29  Phos  3.4     01-24  Mg     2.0     01-24    TPro  10.0<H>  /  Alb  5.3<H>  /  TBili  1.2  /  DBili  x   /  AST  90<H>  /  ALT  21  /  AlkPhos  76  01-24    PT/INR - ( 24 Jan 2018 08:12 )   PT: 10.4 sec;   INR: 0.96 ratio         PTT - ( 24 Jan 2018 08:12 )  PTT:25.8 sec    Imaging:

## 2018-01-25 NOTE — DIETITIAN INITIAL EVALUATION ADULT. - PERTINENT LABORATORY DATA
(1/25) glucose 141, phosphorus 4.6 (1/24) cholesterol 256, triglycerides 178, AST 90, amylase 138, lipase 86

## 2018-01-25 NOTE — DIETITIAN INITIAL EVALUATION ADULT. - OTHER INFO
Patient seen for initial nutrition assessment. Per discussion during rounds, no contraindication to initiating enteral feeds. Limited weight and nutrition history at this time as patient unable to provide, no family present at bedside. Allergy to strawberry noted per documentation.

## 2018-01-25 NOTE — PROGRESS NOTE ADULT - ASSESSMENT
62 year old female s/p fall with 4th vent hemorrhage with hydrocephalus, worsening mental status  - no new injuries identified  - care per primary team  - reconsult as needed     C Miguelangel R4  ACS Trauma Surgery [id number]

## 2018-01-25 NOTE — PROGRESS NOTE ADULT - ASSESSMENT
ASSESSMENT/PLAN:  PT S/P fall with worsening mental status - 4 th vent hemorrhage with hydrocephalus     NEURO: checks q 1 hr; maintain EVD; Angio shows no AVM; monitor Hydro and ICP    No need for AED   Stroke core measures     Activity:  [X] Bedrest     PULM: Likely COPD   Broncholdilators albuterol prn q 4 hrs   PRVC 350 TV ; rate 14 ; FIO2 100% b; PEEP- 5  Nicotine patch 21mg q day     CV:  SBP goal- -< 150     RENAL:  Fluids: 2% at 50 cc/hr, monitor electrolytes and replete as necessary, monitor sodium while on 2%    GI:  Diet: NPO for now   GI prophylaxis  [X] PPI [] other:  Bowel regimen [] colace [X] senna [] other:    ENDO:   Goal euglycemia (-180)    HEME/ONC: Coags NL with Platelets   VTE prophylaxis: [X] SCDs [X] chemoprophylaxis- held secondary to acute hemorrhage   Patient at high risk for DVT due to immobility - yet with acute bleed will need to hold chemoprophylaxsis for now.     ID: afebrile    MISC: family to be informed         CODE STATUS:  X[] Full Code ASSESSMENT/PLAN:  PT S/P fall with worsening mental status - 4 th vent hemorrhage with hydrocephalus; L Ant circulatio vasular spasm MCA ; SHANE / Call Betty vs PCNS      NEURO: checks q 1 hr; maintain EVD; Angio shows no AVM; monitor Hydro and ICP; MRI/MRA/MRP/ MR neck to clear C spine - trial of nimodipine 30mg  q2;   Vasculitis W/U  ESR -nl;     No need for AED   Stroke core measures     Activity:  [X] Bedrest     PULM: Likely COPD   Broncholdilators albuterol prn q 4 hrs   PRVC 350 TV ; rate 14 ; FIO2 100% b; PEEP- 5   Smoker Nicotine patch 21mg q day     CV:  SBP goal- -< 180     RENAL:  Fluids: Ns at 80 cc/hr ,   GI:  Diet: NPO for now   GI prophylaxis  [X] PPI [] other:  Bowel regimen [] colace [X] senna [] other:    ENDO:   Goal euglycemia (-180)    HEME/ONC: Coags NL with Platelets   VTE prophylaxis: [X] SCDs [X] chemoprophylaxis- lovenox 30 mg qhs    Patient at high risk for DVT due to immobility - yet with acute bleed will need to hold chemoprophylaxsis for now.     ID: afebrile    MISC: family to be informed     CODE STATUS:  X[] Full Code     Time spent 40 min. ASSESSMENT/PLAN:  PT S/P fall with worsening mental status - 4 th vent hemorrhage with hydrocephalus; L Ant circulatio vasular spasm MCA ; SHANE / Call Betty vs PCNS      NEURO: checks q 1 hr; maintain EVD; Angio shows no AVM; monitor Hydro and ICP; MRI/MRA/MRP/ MR neck to clear C spine - trial of nimodipine 30mg  q2;   Vasculitis W/U  ESR -nl;     No need for AED   Stroke core measures     Activity:  [X] Bedrest     PULM: Likely COPD   Broncholdilators albuterol prn q 4 hrs   PRVC 350 TV ; rate 14 ; FIO2 100% b; PEEP- 5   Smoker Nicotine patch 21mg q day     CV:  SBP goal- -< 180  EKG - T wave inv infero l;ateral leads ; increaesed P wave   Check troponin  Check ECHO     RENAL:  Fluids: Ns at 80 cc/hr ,   GI:  Diet: NPO for now   GI prophylaxis  [X] PPI [] other:  Bowel regimen [] colace [X] senna [] other:    ENDO:   Goal euglycemia (-180)    HEME/ONC: Coags NL with Platelets   VTE prophylaxis: [X] SCDs [X] chemoprophylaxis- lovenox 30 mg qhs    Patient at high risk for DVT due to immobility - yet with acute bleed will need to hold chemoprophylaxsis for now.     ID: afebrile    MISC: family to be informed     CODE STATUS:  X[] Full Code     Time spent 40 min.

## 2018-01-25 NOTE — PROGRESS NOTE ADULT - SUBJECTIVE AND OBJECTIVE BOX
TERTIARY TRAUMA SURVEY  -------------------------------------    Date of TTS: 1/25/2018  Time: 1030  Admit Date: 1/24/2018  Trauma Activation: Level 1  Admit GCS:  11	E-  4	V- 2  	M- 5     HPI:  63y/o female brought in to [facility] ED s/p fall at home in the bathroom. As per  patient has been somewhat altered for most if not all of the day. She then had a fall in the bathroom & hit the right side of her head. Level I trauma called as patient's altered mental status was initially concerning for potential need for intubation. In trauma bay primary survey significant for GCS of 11 (E=4 V =2 M=5). Secondary survey significant for Right parietal hematoma. (24 Jan 2018 05:31)      INTERVAL EVENTS: s/p EVD, angio negative for vascular malformation, questionable vasculitis.      PAST MEDICAL & SURGICAL HISTORY:  No pertinent past medical history  No significant past surgical history    [] No significant past history as reviewed with the patient and family    FAMILY HISTORY:    [] Family history not pertinent as reviewed with the patient and family    SOCIAL HISTORY: UTO  ALLERGIES: No Known Drug Allergies  strawberry (Rash)      HOME MEDICATIONS:   unknown    CURRENT MEDICATIONS:   MEDICATIONS (STANDING): dexmedetomidine Infusion 0.7 MICROgram(s)/kG/Hr IV Continuous <Continuous>  niCARdipine Infusion 3 mG/Hr IV Continuous <Continuous>  nicotine - 21 mG/24Hr(s) Patch 1 patch Transdermal daily  pantoprazole  Injectable 40 milliGRAM(s) IV Push daily  sodium chloride 2% . 1000 milliLiter(s) IV Continuous <Continuous>    MEDICATIONS (PRN):ALBUTerol    90 MICROgram(s) HFA Inhaler 1 Puff(s) Inhalation every 4 hours PRN Wheezing    ------------------------------------------------------------------------------------------    VITAL SIGNS  T(C): 37.7 (01-25-18 @ 07:00), Max: 38.7 (01-25-18 @ 02:00)  HR: 53 (01-25-18 @ 10:00) (51 - 116)  BP: 143/80 (01-25-18 @ 10:00) (115/74 - 164/85)  BP(mean): 98 (01-25-18 @ 10:00) (86 - 109)  RR: 16 (01-25-18 @ 10:00) (11 - 21)  SpO2: 100% (01-25-18 @ 10:00) (100% - 100%)  Wt(kg): --  CAPILLARY BLOOD GLUCOSE          INS/OUTS:    01-24 @ 07:01  -  01-25 @ 07:00  --------------------------------------------------------  IN:    dexmedetomidine Infusion: 132.4 mL    IV PiggyBack: 400 mL    niCARdipine Infusion: 325 mL    propofol Infusion: 62 mL    sodium chloride 0.9%: 300 mL    sodium chloride 2% .: 900 mL  Total IN: 2119.4 mL    OUT:    External Ventricular Device: 156 mL    Indwelling Catheter - Urethral: 1185 mL  Total OUT: 1341 mL    Total NET: 778.4 mL      01-25 @ 07:01  -  01-25 @ 11:04  --------------------------------------------------------  IN:    dexmedetomidine Infusion: 21.2 mL    sodium chloride 2% .: 200 mL  Total IN: 221.2 mL    OUT:    External Ventricular Device: 20 mL    Indwelling Catheter - Urethral: 135 mL  Total OUT: 155 mL    Total NET: 66.2 mL        Drug Dosing Weight  Height (cm): 152.4 (24 Jan 2018 08:09)  Weight (kg): 49.9 (24 Jan 2018 13:15)  BMI (kg/m2): 21.5 (24 Jan 2018 13:15)  BSA (m2): 1.45 (24 Jan 2018 13:15)    PHYSICAL EXAM:   General: NAD, intubated, sedated  HEENT: NC/AT   Neck: Soft, supple  Cardio: RRR, nml S1/S2  Resp: CTAB, no respiratory distress, intubated  Thorax: No chest wall tenderness  Breast: No lesions/masses, no drainage  GI/Abd: Soft, NT/ND, no rebound/guarding, no masses palpated  Vascular: Extremities warm, brisk cap refill, B/l radial pulses palpable, b/l DP palpable, no palpable abdominal pulsatile mass  Skin: Intact, no breakdown  Musculoskeletal: No gross deformities; RUE withdraws to pain, LUE 0/5 RLE 0/5 LLE 0/5  ------------------------------------------------------------------------------------------    LABS  CBC (01-25 @ 05:34)                              14.8                           18.5<H>  )----------------(  174        --    % Neutrophils, --    % Lymphocytes, ANC: --                                  42.7    CBC (01-24 @ 18:27)                              15.8<H>                         20.6<H>  )----------------(  224        --    % Neutrophils, --    % Lymphocytes, ANC: --                                  44.8      BMP (01-25 @ 05:34)             142     |  103     |  23    		Ca++ --      Ca 9.3                ---------------------------------( 141<H>		Mg 2.2                4.4     |  24      |  1.01  			Ph 4.6<H>  BMP (01-25 @ 01:29)             141     |  104     |  23    		Ca++ --      Ca 8.7                ---------------------------------( 137<H>		Mg --                 4.2     |  22      |  1.06  			Ph --        LFTs (01-24 @ 04:48)      TPro 10.0<H> / Alb 5.3<H> / TBili 1.2 / DBili -- / AST 90<H> / ALT 21 / AlkPhos 76    Coags (01-24 @ 08:12)  aPTT 25.8<L> / INR 0.96 / PT 10.4    Cardiac Markers (01-25 @ 05:34)     Trop: 0.08 -- / CKMB: -- / CK: 1042  Cardiac Markers (01-24 @ 21:12)     Trop: 0.05 -- / CKMB: -- / CK: 1028    ABG (01-24 @ 22:18)     7.44 / 34 / 151<H> / 23 / .0 / 99<H>%     Lactate:    ABG (01-24 @ 08:10)     7.49<H> / 31<L> / 131<H> / 23 / 1.7 / 99<H>%     Lactate:          MICROBIOLOGY  Urinalysis (01-25 @ 03:05):     Color: Yellow / Appearance: Clear / SG: >1.030<H> / pH: 6.0 / Gluc: Negative / Ketones: Trace<!> / Bili: Negative / Urobili: Negative / Protein :30<!> / Nitrites: Negative / Leuk.Est: Negative / RBC: 0-2 / WBC: 6-10 / Sq Epi:  / Non Sq Epi: OCC / Bacteria        -> .CSF CSF Culture (01-24 @ 22:46)       polymorphonuclear leukocytes seen  No organisms seen  by cytocentrifuge    NG  NG    -> .Blood Blood-Peripheral Culture (01-24 @ 07:31)     NG    NG    No growth to date.      ------------------------------------------------------------------------------------------    RADIOLOGICAL FINDINGS REVIEW:   CXR: < from: Xray Chest 1 View AP -PORTABLE-Routine (01.25.18 @ 05:24) >  Impression:    The heart is normal in size. The lungs are clear. Endotracheal tube is in   good position. NG tube is in the stomach however its tip is not seen on   the current study.    < end of copied text >    Pelvis Films:   C-Spine Films:   T/L/S Spine Films:   Extremity Films:     Head CT: < from: CT Head No Cont (01.25.18 @ 08:30) >  Slight decreased size of the third and lateral ventricles. Left temporal   parietal infarction. Leftoccipital infarction, increased in conspicuity.   No hemorrhagic transformation.      < end of copied text >      CTA Head and Neck: < from: CT Angio Neck w/ IV Cont (01.24.18 @ 05:33) >  CTA Head:  Internal carotid arteries: No flow-limiting stenosis or aneurysm.  Anterior cerebral arteries: No flow-limiting stenosis or aneurysm.   Anterior communicating artery complex is normal.  Middle cerebral arteries: No flow-limiting stenosis or aneurysm.  Posterior cerebral arteries: No flow-limiting stenosis or aneurysm.   Bilateral posterior communicating arteries are identified.  Vertebral arteries and basilar artery: No stenosis or aneurysm. The   vertebral arteries are codominant.    Major intracranial venous structures are patent allowing for the phase of   enhancement. There is asymmetric increased venous phase enhancement seen   in the right posterior cranial fossa on series 2 image 280, which is   favored to be likely related to asymmetric venous drainage in this area.   There are additionally some prominent small vessels seen coursing through   the fourth ventricle to the region of the intraventricular hemorrhage.    CTA Neck:  Carotid arteries: No flow limiting stenosis or dissection.   Atherosclerotic disease with mixed plaque at both carotid artery   bifurcations.  Vertebral arteries: Moderate stenosis at the origin of the right   vertebral artery from the subclavian. Left vertebral artery is patent.   Vertebral arteries are codominant.  Aorta: Normal three vessel arch.  Soft tissues: No lymphadenopathy. Unremarkable thyroid.  Lung apices: Clear.  Spine: No suspicious lesions.    IMPRESSION:    Somewhat asymmetric increased venous phase enhancement seen in the region   of the right cerebellopontine angle, with an increased number of small   arterial vessels seen in the region of the fourth ventricle,   approximately in the region of the previously identified intraventricular   hemorrhage. The significance of this finding is uncertain. The   possibility of a small extra-axial AVM is not excluded. Catheter   angiography may be performed as clinically indicated.    No occlusion or hemodynamically significant stenosis in the neck.    < end of copied text >      Chest CT: < from: CT Chest w/ IV Cont (01.24.18 @ 05:33) >  LUNGS AND LARGE AIRWAYS: Patent central airways. Emphysema. Tiny left   lower lobe calcified granuloma.  PLEURA: No pleural effusion. No pneumothorax.  VESSELS: Atherosclerotic calcification of the thoracic aorta.  HEART: Cardiomegaly. No pericardial effusion. Coronary artery and mitral   annular calcification.  MEDIASTINUM AND AADN: No lymphadenopathy.  CHEST WALL AND LOWER NECK: Within normal limits.    < end of copied text >    ABD/Pelvis CT:     < from: CT Abdomen and Pelvis w/ IV Cont (01.24.18 @ 05:32) >  LIVER: Within normal limits.  BILE DUCTS: Normal caliber.  GALLBLADDER: Possible cholelithiasis.  SPLEEN: Within normal limits.  PANCREAS: Within normal limits.  ADRENALS: 2.6 x 2.2 cm left adrenal nodule.  KIDNEYS/URETERS: Within normal limits.    BLADDER: Opacified with contrast.  REPRODUCTIVE ORGANS: The uterus and adnexa are withinnormal limits.    BOWEL: Colonic diverticulosis without diverticulitis. No bowel   obstruction. Appendix is not visualized.  PERITONEUM: No ascites. No free air.  VESSELS:  Within normal limits.  RETROPERITONEUM: No lymphadenopathy.    ABDOMINAL WALL: Atherosclerotic calcification of the abdominal aorta and   its branches.  BONES: Suggestion of cortical irregularities of the right 5th and 6th   lateral ribs, however there is some respiratory motion artifact in this   region limiting its evaluation. Degenerative changes of the spine.    < end of copied text >    Other:     List Injuries Identified to Date:    Intracranial hemorrhage-4th ventricular hemorrhage        List Operative and Interventional Radiological Procedures:   selective cerebral angiography 1/24/2018  EVD 1/24/2018        Consults (Date):    [x] Neurosurgery 1/24/2018  [] Orthopedic Surgery  [] Spine Surgery  [] Plastic Surgery  [] ENT  [] Urology  [] PM&R  [] Social Work

## 2018-01-25 NOTE — PROGRESS NOTE ADULT - SUBJECTIVE AND OBJECTIVE BOX
HPI:  63y/o female brought in to [facility] ED s/p fall at home in the bathroom. As per  patient has been somewhat altered for most if not all of the day. She then had a fall in the bathroom & hit the right side of her head. Level I trauma called as patient's altered mental status was initially concerning for potential need for intubation. In trauma bay primary survey significant for GCS of 11 (E=4 V =2 M=5). Secondary survey significant for Right parietal hematoma. (24 Jan 2018 05:31)    OVERNIGHT EVENTS:   No acute events overnight.    VITALS:  T(C): , Max: 38.7 (01-25-18 @ 02:00)  HR:  (54 - 116)  BP:  (88/64 - 180/87)  ABP:  (95/84 - 187/81)  RR:  (11 - 21)  SpO2:  (100% - 100%)  Wt(kg): --  Device: Avea, Mode: AC/ CMV (Assist Control/ Continuous Mandatory Ventilation), RR (machine): 12, TV (machine): 400, FiO2: 40, PEEP: 5, ITime: 1, MAP: 6, PIP: 9    01-24-18 @ 07:01  -  01-25-18 @ 07:00  --------------------------------------------------------  IN: 2124.4 mL / OUT: 1341 mL / NET: 783.4 mL      LABS:  Na: 142 (01-25 @ 05:34), 141 (01-25 @ 01:29), 91 (01-25 @ 00:06), 136 (01-24 @ 18:27), 132 (01-24 @ 08:12), 133 (01-24 @ 07:09), 129 (01-24 @ 04:48)  K: 4.4 (01-25 @ 05:34), 4.2 (01-25 @ 01:29), >9.0 (01-25 @ 00:06), 3.2 (01-24 @ 18:27), 3.5 (01-24 @ 08:12), 5.0 (01-24 @ 07:09), >9.0 (01-24 @ 04:48)  Cl: 103 (01-25 @ 05:34), 104 (01-25 @ 01:29), 98 (01-25 @ 00:06), 95 (01-24 @ 18:27), 92 (01-24 @ 08:12), 102 (01-24 @ 07:09), 87 (01-24 @ 04:48)  CO2: 24 (01-25 @ 05:34), 22 (01-25 @ 01:29), 17 (01-25 @ 00:06), 21 (01-24 @ 18:27), 17 (01-24 @ 08:12), 24 (01-24 @ 07:09), 20 (01-24 @ 04:48)  BUN: 23 (01-25 @ 05:34), 23 (01-25 @ 01:29), 16 (01-25 @ 00:06), 23 (01-24 @ 18:27), 20 (01-24 @ 08:12), 17 (01-24 @ 07:09), 19 (01-24 @ 04:48)  Cr: 1.01 (01-25 @ 05:34), 1.06 (01-25 @ 01:29), 0.68 (01-25 @ 00:06), 1.05 (01-24 @ 18:27), 0.86 (01-24 @ 08:12), 0.87 (01-24 @ 07:09), 0.75 (01-24 @ 04:48)  Glu: 141(01-25 @ 05:34), 137(01-25 @ 01:29), 95(01-25 @ 00:06), 155(01-24 @ 18:27), 210(01-24 @ 08:12), 185(01-24 @ 07:09), 195(01-24 @ 04:48)    Hgb: 14.8 (01-25 @ 05:34), 15.8 (01-24 @ 18:27), 18.2 (01-24 @ 08:12), 19.8 (01-24 @ 04:48)  Hct: 42.7 (01-25 @ 05:34), 44.8 (01-24 @ 18:27), 52.3 (01-24 @ 08:12), 56.2 (01-24 @ 04:48)  WBC: 18.5 (01-25 @ 05:34), 20.6 (01-24 @ 18:27), 23.6 (01-24 @ 08:12), 19.9 (01-24 @ 04:48)  Plt: 174 (01-25 @ 05:34), 224 (01-24 @ 18:27), 221 (01-24 @ 08:12), 206 (01-24 @ 04:48)    INR: 0.96 01-24-18 @ 08:12  PTT: 25.8 01-24-18 @ 08:12    IMAGING:   Recent imaging studies were reviewed.    MEDICATIONS:  ALBUTerol    90 MICROgram(s) HFA Inhaler 1 Puff(s) Inhalation every 4 hours PRN  chlorhexidine 0.12% Liquid 15 milliLiter(s) Swish and Spit three times a day  dexmedetomidine Infusion 0.7 MICROgram(s)/kG/Hr IV Continuous <Continuous>  niCARdipine Infusion 3 mG/Hr IV Continuous <Continuous>  nicotine - 21 mG/24Hr(s) Patch 1 patch Transdermal daily  pantoprazole  Injectable 40 milliGRAM(s) IV Push daily  sodium chloride 2% . 1000 milliLiter(s) IV Continuous <Continuous>    EXAMINATION: HPI:  63y/o female brought in to [facility] ED s/p fall at home in the bathroom. As per  patient has been somewhat altered for most if not all of the day. She then had a fall in the bathroom & hit the right side of her head. Level I trauma called as patient's altered mental status was initially concerning for potential need for intubation. In trauma bay primary survey significant for GCS of 11 (E=4 V =2 M=5). Secondary survey significant for Right parietal hematoma. (24 Jan 2018 05:31)    OVERNIGHT EVENTS:   No acute events overnight.    VITALS:  T(C): , Max: 38.7 (01-25-18 @ 02:00)  HR:  (54 - 116)  BP:  (88/64 - 180/87)  ABP:  (95/84 - 187/81)  RR:  (11 - 21)  SpO2:  (100% - 100%)  Wt(kg): --  Device: Avea, Mode: AC/ CMV (Assist Control/ Continuous Mandatory Ventilation), RR (machine): 12, TV (machine): 400, FiO2: 40, PEEP: 5, ITime: 1, MAP: 6, PIP: 9    01-24-18 @ 07:01  -  01-25-18 @ 07:00  --------------------------------------------------------  IN: 2124.4 mL / OUT: 1341 mL / NET: 783.4 mL      LABS:  Na: 142 (01-25 @ 05:34), 141 (01-25 @ 01:29), 91 (01-25 @ 00:06), 136 (01-24 @ 18:27), 132 (01-24 @ 08:12), 133 (01-24 @ 07:09), 129 (01-24 @ 04:48)  K: 4.4 (01-25 @ 05:34), 4.2 (01-25 @ 01:29), >9.0 (01-25 @ 00:06), 3.2 (01-24 @ 18:27), 3.5 (01-24 @ 08:12), 5.0 (01-24 @ 07:09), >9.0 (01-24 @ 04:48)  Cl: 103 (01-25 @ 05:34), 104 (01-25 @ 01:29), 98 (01-25 @ 00:06), 95 (01-24 @ 18:27), 92 (01-24 @ 08:12), 102 (01-24 @ 07:09), 87 (01-24 @ 04:48)  CO2: 24 (01-25 @ 05:34), 22 (01-25 @ 01:29), 17 (01-25 @ 00:06), 21 (01-24 @ 18:27), 17 (01-24 @ 08:12), 24 (01-24 @ 07:09), 20 (01-24 @ 04:48)  BUN: 23 (01-25 @ 05:34), 23 (01-25 @ 01:29), 16 (01-25 @ 00:06), 23 (01-24 @ 18:27), 20 (01-24 @ 08:12), 17 (01-24 @ 07:09), 19 (01-24 @ 04:48)  Cr: 1.01 (01-25 @ 05:34), 1.06 (01-25 @ 01:29), 0.68 (01-25 @ 00:06), 1.05 (01-24 @ 18:27), 0.86 (01-24 @ 08:12), 0.87 (01-24 @ 07:09), 0.75 (01-24 @ 04:48)  Glu: 141(01-25 @ 05:34), 137(01-25 @ 01:29), 95(01-25 @ 00:06), 155(01-24 @ 18:27), 210(01-24 @ 08:12), 185(01-24 @ 07:09), 195(01-24 @ 04:48)    Hgb: 14.8 (01-25 @ 05:34), 15.8 (01-24 @ 18:27), 18.2 (01-24 @ 08:12), 19.8 (01-24 @ 04:48)  Hct: 42.7 (01-25 @ 05:34), 44.8 (01-24 @ 18:27), 52.3 (01-24 @ 08:12), 56.2 (01-24 @ 04:48)  WBC: 18.5 (01-25 @ 05:34), 20.6 (01-24 @ 18:27), 23.6 (01-24 @ 08:12), 19.9 (01-24 @ 04:48)  Plt: 174 (01-25 @ 05:34), 224 (01-24 @ 18:27), 221 (01-24 @ 08:12), 206 (01-24 @ 04:48)    INR: 0.96 01-24-18 @ 08:12  PTT: 25.8 01-24-18 @ 08:12    IMAGING:   Recent imaging studies were reviewed.    MEDICATIONS:  ALBUTerol    90 MICROgram(s) HFA Inhaler 1 Puff(s) Inhalation every 4 hours PRN  chlorhexidine 0.12% Liquid 15 milliLiter(s) Swish and Spit three times a day  dexmedetomidine Infusion 0.7 MICROgram(s)/kG/Hr IV Continuous <Continuous>  niCARdipine Infusion 3 mG/Hr IV Continuous <Continuous>  nicotine - 21 mG/24Hr(s) Patch 1 patch Transdermal daily  pantoprazole  Injectable 40 milliGRAM(s) IV Push daily  sodium chloride 2% . 1000 milliLiter(s) IV Continuous <Continuous>    EXAMINATION:  Gen: NAD, AAOx0, intubated, sedated  Head: NCAT  HEENT: 2mm and reactive, oral mucosa moist, normal conjunctiva  Lung: CTAB, no respiratory distress, intubated  CV: rrr, no murmurs, Normal perfusion  Abd: soft, NTND, no CVA tenderness  MSK: No edema, no visible deformities  Neuro: pupils 2mm and reactive, does not open eyes to noxious stimuli, not following commands, RUE withdraws to pain, LUE 0/5 RLE 0/5 LLE 0/5 HPI:  61y/o female brought in to [facility] ED s/p fall at home in the bathroom. As per  patient has been somewhat altered for most if not all of the day. She then had a fall in the bathroom & hit the right side of her head. Level I trauma called as patient's altered mental status was initially concerning for potential need for intubation. In trauma bay primary survey significant for GCS of 11 (E=4 V =2 M=5). Secondary survey significant for Right parietal hematoma. Hydrocephalus -  EVD placed 1/24/18; Angio - tapering of distal vessels ant circulation    OVERNIGHT EVENTS:   No acute events overnight.    VITALS:  T(C): , Max: 38.7 (01-25-18 @ 02:00)  HR:  (54 - 116)  BP:  (88/64 - 180/87)  ABP:  (95/84 - 187/81)  RR:  (11 - 21)  SpO2:  (100% - 100%)  Wt(kg): --  Device: Avea, Mode: AC/ CMV (Assist Control/ Continuous Mandatory Ventilation), RR (machine): 12, TV (machine): 400, FiO2: 40, PEEP: 5, ITime: 1, MAP: 6, PIP: 9    01-24-18 @ 07:01  -  01-25-18 @ 07:00  --------------------------------------------------------  IN: 2124.4 mL / OUT: 1341 mL / NET: 783.4 mL      LABS:  Na: 142 (01-25 @ 05:34), 141 (01-25 @ 01:29), 91 (01-25 @ 00:06), 136 (01-24 @ 18:27), 132 (01-24 @ 08:12), 133 (01-24 @ 07:09), 129 (01-24 @ 04:48)  K: 4.4 (01-25 @ 05:34), 4.2 (01-25 @ 01:29), >9.0 (01-25 @ 00:06), 3.2 (01-24 @ 18:27), 3.5 (01-24 @ 08:12), 5.0 (01-24 @ 07:09), >9.0 (01-24 @ 04:48)  Cl: 103 (01-25 @ 05:34), 104 (01-25 @ 01:29), 98 (01-25 @ 00:06), 95 (01-24 @ 18:27), 92 (01-24 @ 08:12), 102 (01-24 @ 07:09), 87 (01-24 @ 04:48)  CO2: 24 (01-25 @ 05:34), 22 (01-25 @ 01:29), 17 (01-25 @ 00:06), 21 (01-24 @ 18:27), 17 (01-24 @ 08:12), 24 (01-24 @ 07:09), 20 (01-24 @ 04:48)  BUN: 23 (01-25 @ 05:34), 23 (01-25 @ 01:29), 16 (01-25 @ 00:06), 23 (01-24 @ 18:27), 20 (01-24 @ 08:12), 17 (01-24 @ 07:09), 19 (01-24 @ 04:48)  Cr: 1.01 (01-25 @ 05:34), 1.06 (01-25 @ 01:29), 0.68 (01-25 @ 00:06), 1.05 (01-24 @ 18:27), 0.86 (01-24 @ 08:12), 0.87 (01-24 @ 07:09), 0.75 (01-24 @ 04:48)  Glu: 141(01-25 @ 05:34), 137(01-25 @ 01:29), 95(01-25 @ 00:06), 155(01-24 @ 18:27), 210(01-24 @ 08:12), 185(01-24 @ 07:09), 195(01-24 @ 04:48)    Hgb: 14.8 (01-25 @ 05:34), 15.8 (01-24 @ 18:27), 18.2 (01-24 @ 08:12), 19.8 (01-24 @ 04:48)  Hct: 42.7 (01-25 @ 05:34), 44.8 (01-24 @ 18:27), 52.3 (01-24 @ 08:12), 56.2 (01-24 @ 04:48)  WBC: 18.5 (01-25 @ 05:34), 20.6 (01-24 @ 18:27), 23.6 (01-24 @ 08:12), 19.9 (01-24 @ 04:48)  Plt: 174 (01-25 @ 05:34), 224 (01-24 @ 18:27), 221 (01-24 @ 08:12), 206 (01-24 @ 04:48)    INR: 0.96 01-24-18 @ 08:12  PTT: 25.8 01-24-18 @ 08:12    Troponin 0.8 #1    7.44/34/151- Fio2- 40%    CSF- R- 2333; W- 1 lymphocyte    IMAGING:   Recent   MEDICATIONS:  ALBUTerol    90 MICROgram(s) HFA Inhaler 1 Puff(s) Inhalation every 4 hours PRN  chlorhexidine 0.12% Liquid 15 milliLiter(s) Swish and Spit three times a day  dexmedetomidine Infusion 0.7 MICROgram(s)/kG/Hr IV Continuous <Continuous>  niCARdipine Infusion 3 mG/Hr IV Continuous <Continuous>  nicotine - 21 mG/24Hr(s) Patch 1 patch Transdermal daily  pantoprazole  Injectable 40 milliGRAM(s) IV Push daily  sodium chloride 2% . 1000 milliLiter(s) IV Continuous <Continuous>    EXAMINATION:  Gen: NAD, AAOx0, intubated, sedated  Head: NCAT  HEENT: 2mm and reactive, oral mucosa moist, normal conjunctiva  Lung: CTAB, no respiratory distress, intubated  CV: rrr, no murmurs, Normal perfusion  Abd: soft, NTND, no CVA tenderness  MSK: No edema, no visible deformities  Neuro: pupils 2mm and reactive, does not open eyes to noxious stimuli, not following commands, corneals B/L ; RUE extends pain, LUE 3/5 RLE TF / LLE 3/5

## 2018-01-26 LAB
ANA PAT FLD IF-IMP: ABNORMAL
ANA TITR SER: ABNORMAL
ANION GAP SERPL CALC-SCNC: 10 MMOL/L — SIGNIFICANT CHANGE UP (ref 5–17)
ANION GAP SERPL CALC-SCNC: 7 MMOL/L — SIGNIFICANT CHANGE UP (ref 5–17)
BUN SERPL-MCNC: 17 MG/DL — SIGNIFICANT CHANGE UP (ref 7–23)
BUN SERPL-MCNC: 27 MG/DL — HIGH (ref 7–23)
CALCIUM SERPL-MCNC: 7.9 MG/DL — LOW (ref 8.4–10.5)
CALCIUM SERPL-MCNC: 8.1 MG/DL — LOW (ref 8.4–10.5)
CHLORIDE SERPL-SCNC: 113 MMOL/L — HIGH (ref 96–108)
CHLORIDE SERPL-SCNC: 115 MMOL/L — HIGH (ref 96–108)
CK MB BLD-MCNC: 1.8 % — SIGNIFICANT CHANGE UP (ref 0–3.5)
CK MB CFR SERPL CALC: 5.1 NG/ML — HIGH (ref 0–3.8)
CK SERPL-CCNC: 288 U/L — HIGH (ref 25–170)
CO2 SERPL-SCNC: 21 MMOL/L — LOW (ref 22–31)
CO2 SERPL-SCNC: 24 MMOL/L — SIGNIFICANT CHANGE UP (ref 22–31)
CREAT SERPL-MCNC: 0.62 MG/DL — SIGNIFICANT CHANGE UP (ref 0.5–1.3)
CREAT SERPL-MCNC: 0.76 MG/DL — SIGNIFICANT CHANGE UP (ref 0.5–1.3)
GLUCOSE SERPL-MCNC: 140 MG/DL — HIGH (ref 70–99)
GLUCOSE SERPL-MCNC: 201 MG/DL — HIGH (ref 70–99)
MAGNESIUM SERPL-MCNC: 1.7 MG/DL — SIGNIFICANT CHANGE UP (ref 1.6–2.6)
PHOSPHATE SERPL-MCNC: 1.2 MG/DL — LOW (ref 2.5–4.5)
POTASSIUM SERPL-MCNC: 3.1 MMOL/L — LOW (ref 3.5–5.3)
POTASSIUM SERPL-MCNC: 3.7 MMOL/L — SIGNIFICANT CHANGE UP (ref 3.5–5.3)
POTASSIUM SERPL-SCNC: 3.1 MMOL/L — LOW (ref 3.5–5.3)
POTASSIUM SERPL-SCNC: 3.7 MMOL/L — SIGNIFICANT CHANGE UP (ref 3.5–5.3)
SODIUM SERPL-SCNC: 144 MMOL/L — SIGNIFICANT CHANGE UP (ref 135–145)
SODIUM SERPL-SCNC: 146 MMOL/L — HIGH (ref 135–145)
TROPONIN T SERPL-MCNC: 0.07 NG/ML — HIGH (ref 0–0.06)

## 2018-01-26 PROCEDURE — 71045 X-RAY EXAM CHEST 1 VIEW: CPT | Mod: 26

## 2018-01-26 PROCEDURE — 61070 BRAIN CANAL SHUNT PROCEDURE: CPT

## 2018-01-26 PROCEDURE — 95951: CPT | Mod: 26

## 2018-01-26 PROCEDURE — 99232 SBSQ HOSP IP/OBS MODERATE 35: CPT

## 2018-01-26 PROCEDURE — 99291 CRITICAL CARE FIRST HOUR: CPT

## 2018-01-26 RX ORDER — FENTANYL CITRATE 50 UG/ML
12.5 INJECTION INTRAVENOUS ONCE
Qty: 0 | Refills: 0 | Status: DISCONTINUED | OUTPATIENT
Start: 2018-01-26 | End: 2018-01-26

## 2018-01-26 RX ORDER — ENOXAPARIN SODIUM 100 MG/ML
30 INJECTION SUBCUTANEOUS
Qty: 0 | Refills: 0 | Status: DISCONTINUED | OUTPATIENT
Start: 2018-01-26 | End: 2018-02-01

## 2018-01-26 RX ORDER — DEXMEDETOMIDINE HYDROCHLORIDE IN 0.9% SODIUM CHLORIDE 4 UG/ML
0.2 INJECTION INTRAVENOUS
Qty: 200 | Refills: 0 | Status: DISCONTINUED | OUTPATIENT
Start: 2018-01-26 | End: 2018-01-28

## 2018-01-26 RX ORDER — HYDRALAZINE HCL 50 MG
25 TABLET ORAL THREE TIMES A DAY
Qty: 0 | Refills: 0 | Status: DISCONTINUED | OUTPATIENT
Start: 2018-01-26 | End: 2018-01-27

## 2018-01-26 RX ORDER — LABETALOL HCL 100 MG
10 TABLET ORAL ONCE
Qty: 0 | Refills: 0 | Status: COMPLETED | OUTPATIENT
Start: 2018-01-26 | End: 2018-01-27

## 2018-01-26 RX ORDER — MAGNESIUM SULFATE 500 MG/ML
2 VIAL (ML) INJECTION ONCE
Qty: 0 | Refills: 0 | Status: COMPLETED | OUTPATIENT
Start: 2018-01-26 | End: 2018-01-26

## 2018-01-26 RX ORDER — HYDRALAZINE HCL 50 MG
5 TABLET ORAL ONCE
Qty: 0 | Refills: 0 | Status: COMPLETED | OUTPATIENT
Start: 2018-01-26 | End: 2018-01-26

## 2018-01-26 RX ORDER — SODIUM CHLORIDE 9 MG/ML
1000 INJECTION INTRAMUSCULAR; INTRAVENOUS; SUBCUTANEOUS
Qty: 0 | Refills: 0 | Status: DISCONTINUED | OUTPATIENT
Start: 2018-01-26 | End: 2018-01-28

## 2018-01-26 RX ORDER — POTASSIUM CHLORIDE 20 MEQ
20 PACKET (EA) ORAL ONCE
Qty: 0 | Refills: 0 | Status: COMPLETED | OUTPATIENT
Start: 2018-01-26 | End: 2018-01-26

## 2018-01-26 RX ORDER — HYDRALAZINE HCL 50 MG
10 TABLET ORAL ONCE
Qty: 0 | Refills: 0 | Status: COMPLETED | OUTPATIENT
Start: 2018-01-26 | End: 2018-01-26

## 2018-01-26 RX ORDER — POTASSIUM PHOSPHATE, MONOBASIC POTASSIUM PHOSPHATE, DIBASIC 236; 224 MG/ML; MG/ML
30 INJECTION, SOLUTION INTRAVENOUS ONCE
Qty: 0 | Refills: 0 | Status: COMPLETED | OUTPATIENT
Start: 2018-01-26 | End: 2018-01-26

## 2018-01-26 RX ADMIN — Medication 1 PATCH: at 17:33

## 2018-01-26 RX ADMIN — NIMODIPINE 30 MILLIGRAM(S): 60 SOLUTION ORAL at 08:18

## 2018-01-26 RX ADMIN — Medication 5 MILLIGRAM(S): at 11:00

## 2018-01-26 RX ADMIN — NIMODIPINE 30 MILLIGRAM(S): 60 SOLUTION ORAL at 00:16

## 2018-01-26 RX ADMIN — Medication 10 MILLIGRAM(S): at 16:00

## 2018-01-26 RX ADMIN — CHLORHEXIDINE GLUCONATE 15 MILLILITER(S): 213 SOLUTION TOPICAL at 14:43

## 2018-01-26 RX ADMIN — CHLORHEXIDINE GLUCONATE 15 MILLILITER(S): 213 SOLUTION TOPICAL at 21:06

## 2018-01-26 RX ADMIN — ENOXAPARIN SODIUM 30 MILLIGRAM(S): 100 INJECTION SUBCUTANEOUS at 18:16

## 2018-01-26 RX ADMIN — DEXMEDETOMIDINE HYDROCHLORIDE IN 0.9% SODIUM CHLORIDE 2.5 MICROGRAM(S)/KG/HR: 4 INJECTION INTRAVENOUS at 12:06

## 2018-01-26 RX ADMIN — NIMODIPINE 30 MILLIGRAM(S): 60 SOLUTION ORAL at 19:31

## 2018-01-26 RX ADMIN — NIMODIPINE 30 MILLIGRAM(S): 60 SOLUTION ORAL at 12:07

## 2018-01-26 RX ADMIN — FENTANYL CITRATE 12.5 MICROGRAM(S): 50 INJECTION INTRAVENOUS at 21:10

## 2018-01-26 RX ADMIN — LEVETIRACETAM 400 MILLIGRAM(S): 250 TABLET, FILM COATED ORAL at 05:27

## 2018-01-26 RX ADMIN — FENTANYL CITRATE 12.5 MICROGRAM(S): 50 INJECTION INTRAVENOUS at 17:46

## 2018-01-26 RX ADMIN — SODIUM CHLORIDE 20 MILLILITER(S): 9 INJECTION INTRAMUSCULAR; INTRAVENOUS; SUBCUTANEOUS at 14:44

## 2018-01-26 RX ADMIN — FENTANYL CITRATE 12.5 MICROGRAM(S): 50 INJECTION INTRAVENOUS at 21:25

## 2018-01-26 RX ADMIN — CHLORHEXIDINE GLUCONATE 15 MILLILITER(S): 213 SOLUTION TOPICAL at 05:27

## 2018-01-26 RX ADMIN — NIMODIPINE 30 MILLIGRAM(S): 60 SOLUTION ORAL at 23:04

## 2018-01-26 RX ADMIN — FENTANYL CITRATE 12.5 MICROGRAM(S): 50 INJECTION INTRAVENOUS at 17:31

## 2018-01-26 RX ADMIN — FENTANYL CITRATE 12.5 MICROGRAM(S): 50 INJECTION INTRAVENOUS at 14:58

## 2018-01-26 RX ADMIN — Medication 20 MILLIEQUIVALENT(S): at 22:46

## 2018-01-26 RX ADMIN — NIMODIPINE 30 MILLIGRAM(S): 60 SOLUTION ORAL at 21:06

## 2018-01-26 RX ADMIN — LEVETIRACETAM 400 MILLIGRAM(S): 250 TABLET, FILM COATED ORAL at 17:30

## 2018-01-26 RX ADMIN — NIMODIPINE 30 MILLIGRAM(S): 60 SOLUTION ORAL at 14:42

## 2018-01-26 RX ADMIN — FENTANYL CITRATE 12.5 MICROGRAM(S): 50 INJECTION INTRAVENOUS at 14:43

## 2018-01-26 RX ADMIN — NIMODIPINE 30 MILLIGRAM(S): 60 SOLUTION ORAL at 16:02

## 2018-01-26 RX ADMIN — POTASSIUM PHOSPHATE, MONOBASIC POTASSIUM PHOSPHATE, DIBASIC 83.33 MILLIMOLE(S): 236; 224 INJECTION, SOLUTION INTRAVENOUS at 22:46

## 2018-01-26 RX ADMIN — NIMODIPINE 30 MILLIGRAM(S): 60 SOLUTION ORAL at 10:13

## 2018-01-26 RX ADMIN — DEXMEDETOMIDINE HYDROCHLORIDE IN 0.9% SODIUM CHLORIDE 8.73 MICROGRAM(S)/KG/HR: 4 INJECTION INTRAVENOUS at 08:21

## 2018-01-26 RX ADMIN — Medication 25 MILLIGRAM(S): at 19:31

## 2018-01-26 RX ADMIN — NIMODIPINE 30 MILLIGRAM(S): 60 SOLUTION ORAL at 18:17

## 2018-01-26 RX ADMIN — Medication 1 PATCH: at 17:32

## 2018-01-26 RX ADMIN — PANTOPRAZOLE SODIUM 40 MILLIGRAM(S): 20 TABLET, DELAYED RELEASE ORAL at 12:07

## 2018-01-26 RX ADMIN — Medication 50 GRAM(S): at 22:46

## 2018-01-26 NOTE — PROGRESS NOTE ADULT - ASSESSMENT
Pt is a 63 yo WF who initially didn't feel well associated with vomitting/dry heaves and then had slurred speech. Pt worked up as trauma pt; soft tissue swelling on Rt cranium with GCS of 11 (E=4 V =2 M=5). Head imagining showing  4 th vent hemorrhage with hydrocephalus. L MCA infarct with distal narrowing of vessel on angio on L SHANE , MCA and PCA territory. 1/25 pm: EEG provides evidence of focal epileptogenicity in the left temporal region.  Two electrographic seizures were recorded from the left hemisphere, maximum in the left temporal region, with no associated clinical signs (duration 1-2 minutes).  There is also evidence of a severe diffuse encephalopathy, and a structural lesion in the left hemisphere. ddx: RCVS, primary CNS vasculitis. Conventional Angio less suspicious for RCVS. Primary CNS vasculitis serology non contributory. CSF study with normal protein(20) and ?glucose(90). Pending serology: HIV, VZV pcr and VZV ab.     NEURO: checks q 1 hr; maintain EVD; Angio shows no AVM; monitor Hydro and ICP; f/u MR results - trial of nimodipine 30mg  q2;   Vasculitis W/U  HIV negative, f/u syphilis titer/ Check HSV titer   ESR -nl;     seizures o/n, cont keppra  f/u EEG  Stroke core measures     Activity:  [X] Bedrest     PULM: Likely COPD   Broncholdilators albuterol prn q 4 hrs   PRVC 350 TV ; rate 14 ; FIO2 100% b; PEEP- 5   Smoker Nicotine patch 21mg q day     CV:  SBP goal- -< 180  EKG - T wave inv infero l;ateral leads ; increaesed P wave   Check troponin  Check ECHO     RENAL:  Fluids: IVL - total fluids at 60 cc/hr   GI:  Diet: NPO for now   GI prophylaxis  [X] PPI [] other:- glycerna 60 cc/hr   Bowel regimen [] colace [X] senna [] other:    ENDO:   Goal euglycemia (-180)    HEME/ONC: Coags NL with Platelets   VTE prophylaxis: [X] SCDs [X] chemoprophylaxis- lovenox 30 mg qhs    Patient at high risk for DVT due to immobility - yet with acute bleed will need to hold chemoprophylaxsis for now.     ID: afebrile    MISC: family to be informed     CODE STATUS:  X[] Full Code    Time spent 40 min     Pt remains criticall ill and at risk of swelling and herniation Pt is a 61 yo WF who initially didn't feel well associated with vomitting/dry heaves, then had slurred speech and then had a fall in the bathroom and hit the R side of her head. Pt worked up as trauma pt; soft tissue swelling on R cranium with GCS of 11 (E=4 V =2 M=5). Head imagining showing  4 th vent hemorrhage with hydrocephalus. L MCA infarct with distal narrowing of vessel on angio on L SHANE , MCA and PCA territory. 1/25 pm: EEG provides evidence of focal epileptogenicity in the left temporal region.  Two electrographic seizures were recorded from the left hemisphere, maximum in the left temporal region, with no associated clinical signs (duration 1-2 minutes).  There is also evidence of a severe diffuse encephalopathy, and a structural lesion in the left hemisphere.     Resp: Cont current vent settings/ Cont Nebs and Nicotine patch  ID: F/u infectious work-up  CVS: SBP goal- - 180/ cont Hydralazine  HEME: DVT prophylaxis with Lovenox  Endo: Avoid hyperglycemia; follow FS  FEN: Cont NS + enteral feeds/ Replace K and Phos for hypokalemia/hypoposphatemia  GI: Cont PPI  Neuro: Checks q 1 hr/ Maintain EVD/ Monitor Hydro and ICP/ Trial of nimodipine 30mg  q2/ Cont Keppra and cEEG/ Wean off Precedex      CCT: 40 min

## 2018-01-26 NOTE — PROGRESS NOTE ADULT - ASSESSMENT
62F s/p fall with 4th vent IVH, angio shows abnormal vessels without clear vascular malformation, s/p EVD. Course complicated by L hemisphereic sz.     F/u CSF VDRL / VZV  TTE - P  VEEG - P  CSF studies for malignancy-p  1/25 Pancx (TMax 38.7)- P  TCDs

## 2018-01-26 NOTE — CHART NOTE - NSCHARTNOTEFT_GEN_A_CORE
4 ml of CSF was drawn from pre-existing EVD site under sterile precaution for herpes PCR CSF studies. Pt tolerated procedure well. No complications noted.

## 2018-01-26 NOTE — PROGRESS NOTE ADULT - SUBJECTIVE AND OBJECTIVE BOX
HPI:  Pt is a 61 yo WF who initially didn't feel well associated with vomitting/dry heaves and then had slurred speech. Pt worked up as trauma pt; soft tissue swelling on Rt cranium with GCS of 11 (E=4 V =2 M=5). Head imagining showing  4 th vent hemorrhage with hydrocephalus. L MCA infarct with distal narrowing of vessel on angio on L SHANE , MCA and PCA territory. 1/25 pm: EEG provides evidence of focal epileptogenicity in the left temporal region.  Two electrographic seizures were recorded from the left hemisphere, maximum in the left temporal region, with no associated clinical signs (duration 1-2 minutes).  There is also evidence of a severe diffuse encephalopathy, and a structural lesion in the left hemisphere. ddx: RCVS, primary CNS vasculitis. Conventional Angio less suspicious for RCVS. Primary CNS vasculitis serology non contributory. CSF study with normal protein(20) and ?glucose(90). Pending serology: HIV, VZV pcr and VZV ab.     ## Labs:  CBC:                        13.1   19.1  )-----------( 162      ( 25 Jan 2018 21:47 )             38.7     Chem:  01-26    146<H>  |  115<H>  |  27<H>  ----------------------------<  201<H>  3.7   |  21<L>  |  0.76    Ca    8.1<L>      26 Jan 2018 05:42  Phos  3.4     01-25  Mg     2.2     01-25      Coags:    culture blood:  --  01-25 @ 05:12            culture sputum:     No growth to date.           culture urine:  -- 01-25 @ 05:12  culture blood:  --  01-24 @ 22:46            culture sputum:     No growth to date.           culture urine:  -- 01-24 @ 22:46        ## Imaging:    ## Medications:    hydrALAZINE 25 milliGRAM(s) Oral three times a day  niMODipine Suspension 30 milliGRAM(s) Enteral Tube every 2 hours    ALBUTerol    90 MICROgram(s) HFA Inhaler 1 Puff(s) Inhalation every 4 hours PRN      enoxaparin Injectable 30 milliGRAM(s) SubCutaneous daily    pantoprazole  Injectable 40 milliGRAM(s) IV Push daily    dexmedetomidine Infusion 0.2 MICROgram(s)/kG/Hr IV Continuous <Continuous>  levETIRAcetam  IVPB 1000 milliGRAM(s) IV Intermittent every 12 hours      ## Vitals:  T(C): 37.3 (01-26-18 @ 19:00), Max: 37.5 (01-26-18 @ 15:00)  HR: 92 (01-26-18 @ 20:00) (52 - 115)  BP: 165/78 (01-26-18 @ 20:00) (136/78 - 194/91)  BP(mean): 99 (01-26-18 @ 20:00) (92 - 120)  RR: 20 (01-26-18 @ 20:00) (17 - 25)  SpO2: 98% (01-26-18 @ 20:00) (98% - 100%)  Wt(kg): --  Vent: Mode: AC/ CMV (Assist Control/ Continuous Mandatory Ventilation), RR (machine): 12, RR (patient): 28, TV (machine): 400, FiO2: 30, PEEP: 5, PIP: 27  ABG: ABG - ( 24 Jan 2018 22:18 )  pH: 7.44  /  pCO2: 34    /  pO2: 151   / HCO3: 23    / Base Excess: .0    /  SaO2: 99                    01-25 @ 07:01 - 01-26 @ 07:00  --------------------------------------------------------  IN: 1909.7 mL / OUT: 1124 mL / NET: 785.7 mL    01-26 @ 07:01 - 01-26 @ 20:22  --------------------------------------------------------  IN: 1354.7 mL / OUT: 494 mL / NET: 860.7 mL          ## P/E:  Gen: lying comfortably in bed in no apparent distress  Mouth:   Neck:  Lungs:   Heart:   Abd:  Ext:  Neuro:    CENTRAL LINE: [ ] YES [ ] NO  LOCATION:   DATE INSERTED:  REMOVE: [ ] YES [ ] NO      DUTTA: [ ] YES [ ] NO    DATE INSERTED:  REMOVE:  [ ] YES [ ] NO      A-LINE:  [ ] YES [ ] NO  LOCATION:   DATE INSERTED:  REMOVE:  [ ] YES [ ] NO  EXPLAIN:    GLOBAL ISSUE/BEST PRACTICE:  Analgesia:  Sedation:  HOB elevation: yes  Stress ulcer prophylaxis:  VTE prophylaxis:  Oral Care:  Glycemic control:  Nutrition:    CODE STATUS: [ ] full code  [ ] DNR  [ ] DNI  [ ] MOLST  Goals of care discussion: [ ] yes HPI:  Pt is a 61 yo WF who initially didn't feel well associated with vomitting/dry heaves, then had slurred speech and then had a fall in the bathroom and hit the R side of her head. Pt worked up as trauma pt; soft tissue swelling on R cranium with GCS of 11 (E=4 V =2 M=5). Head imagining showing  4 th vent hemorrhage with hydrocephalus. L MCA infarct with distal narrowing of vessel on angio on L SHANE , MCA and PCA territory. 1/25 pm: EEG provides evidence of focal epileptogenicity in the left temporal region.  Two electrographic seizures were recorded from the left hemisphere, maximum in the left temporal region, with no associated clinical signs (duration 1-2 minutes).  There is also evidence of a severe diffuse encephalopathy, and a structural lesion in the left hemisphere.     ## Labs:  CBC:                        13.1   19.1  )-----------( 162      ( 25 Jan 2018 21:47 )             38.7     Chem:  01-26    146<H>  |  115<H>  |  27<H>  ----------------------------<  201<H>  3.7   |  21<L>  |  0.76    Ca    8.1<L>      26 Jan 2018 05:42  Phos  3.4     01-25  Mg     2.2     01-25    Coags:    culture blood:  --  01-25 @ 05:12            culture sputum:     No growth to date.           culture urine:  -- 01-25 @ 05:12  culture blood:  --  01-24 @ 22:46            culture sputum:     No growth to date.           culture urine:  -- 01-24 @ 22:46    ## Imaging:    ## Medications:    hydrALAZINE 25 milliGRAM(s) Oral three times a day  niMODipine Suspension 30 milliGRAM(s) Enteral Tube every 2 hours    ALBUTerol    90 MICROgram(s) HFA Inhaler 1 Puff(s) Inhalation every 4 hours PRN      enoxaparin Injectable 30 milliGRAM(s) SubCutaneous daily    pantoprazole  Injectable 40 milliGRAM(s) IV Push daily    dexmedetomidine Infusion 0.2 MICROgram(s)/kG/Hr IV Continuous <Continuous>  levETIRAcetam  IVPB 1000 milliGRAM(s) IV Intermittent every 12 hours      ## Vitals:  T(C): 37.3 (01-26-18 @ 19:00), Max: 37.5 (01-26-18 @ 15:00)  HR: 92 (01-26-18 @ 20:00) (52 - 115)  BP: 165/78 (01-26-18 @ 20:00) (136/78 - 194/91)  BP(mean): 99 (01-26-18 @ 20:00) (92 - 120)  RR: 20 (01-26-18 @ 20:00) (17 - 25)  SpO2: 98% (01-26-18 @ 20:00) (98% - 100%)  Wt(kg): --  Vent: Mode: AC/ CMV (Assist Control/ Continuous Mandatory Ventilation), RR (machine): 12, RR (patient): 28, TV (machine): 400, FiO2: 30, PEEP: 5, PIP: 27  ABG: ABG - ( 24 Jan 2018 22:18 )  pH: 7.44  /  pCO2: 34    /  pO2: 151   / HCO3: 23    / Base Excess: .0    /  SaO2: 99          01-25 @ 07:01 - 01-26 @ 07:00  --------------------------------------------------------  IN: 1909.7 mL / OUT: 1124 mL / NET: 785.7 mL    01-26 @ 07:01 - 01-26 @ 20:22  --------------------------------------------------------  IN: 1354.7 mL / OUT: 494 mL / NET: 860.7 mL      ## P/E:  Gen: lying comfortably in bed in no apparent distress  Mouth: (+) ETT  Lungs: CTA  Heart: RRR  Abd: Soft/+BS  Ext: No edema  Neuro: Pupils reactive/ L gaze/ (+) Corneal and Gag/ Not following commands/  R UE extends     CENTRAL LINE: [ ] YES [ ] NO  LOCATION:   DATE INSERTED:  REMOVE: [ ] YES [ ] NO      DUTTA: [ ] YES [ x] NO    DATE INSERTED:  REMOVE:  [ ] YES [ ] NO      A-LINE:  [ ] YES [x ] NO  LOCATION:   DATE INSERTED:  REMOVE:  [ ] YES [ ] NO  EXPLAIN:    CODE STATUS: [ x] full code  [ ] DNR  [ ] DNI  [ ] MOLST  Goals of care discussion: [ ] yes

## 2018-01-26 NOTE — CONSULT NOTE ADULT - ASSESSMENT
Ms Algabbymichelle is a 61 y/o woman Ms Reynolds is a 61 y/o woman, denies pmhx, recently suffering from a respiratory infection p/w AMS and trauma (soft tissue swelling on Rt cranium). Initial GCS of 11 (E=4 V =2 M=5). Head imagining showing  4 th vent hemorrhage with hydrocephalus. L MCA infarct with distal narrowing of vessel on angio on L SHANE , MCA and PCA territory with ddx: RCVS, primary CNS vasculitis. Conventional Angio less suspicious for RCVS. Primary CNS vasculitis serology non contributory. CSF study with normal protein(20) and ?glucose(90). Pending serology: HIV, VZV pcr and VZV ab    ICH score 3. NIHSS 27. MRI b w/wo contrast on a 3T magnet scheduled with Dr. Ferrera with analysis of ICA and MCA to determine contrast enhancement of vessel walls to assess for vasculitis (Requested by Dr. Thacker)    Last EEG notes focal epileptogenicity in the left temporal region for which she is on Keppra with EEG at bedside. Currently intubated and on low dose Precedex. Maintain seizure precautions.

## 2018-01-26 NOTE — PROGRESS NOTE ADULT - SUBJECTIVE AND OBJECTIVE BOX
Patient Seen and Examined.     Overnight Events:   None    T(C): 36.7 (01-26-18 @ 03:00), Max: 37.7 (01-25-18 @ 07:00)  HR: 52 (01-26-18 @ 03:00) (51 - 89)  BP: 152/83 (01-26-18 @ 03:00) (136/78 - 186/111)  RR: 19 (01-26-18 @ 03:00) (16 - 25)  SpO2: 100% (01-26-18 @ 03:00) (100% - 100%)    Exam:   Intubated, no EO,  pupils 2mm react b/l, left gaze preference, +corneals, +cough/gag, overbreathing vent, no FC,   LUE LOC, LLE spont,   RUE extends, RLE TF    ALBUTerol    90 MICROgram(s) HFA Inhaler 1 Puff(s) Inhalation every 4 hours PRN  chlorhexidine 0.12% Liquid 15 milliLiter(s) Swish and Spit three times a day  dexmedetomidine Infusion 0.7 MICROgram(s)/kG/Hr IV Continuous <Continuous>  enoxaparin Injectable 30 milliGRAM(s) SubCutaneous daily  levETIRAcetam  IVPB 1000 milliGRAM(s) IV Intermittent every 12 hours  nicotine - 21 mG/24Hr(s) Patch 1 patch Transdermal daily  niMODipine Suspension 30 milliGRAM(s) Enteral Tube every 2 hours  pantoprazole  Injectable 40 milliGRAM(s) IV Push daily                            13.1   19.1  )-----------( 162      ( 25 Jan 2018 21:47 )             38.7     01-25    147<H>  |  111<H>  |  24<H>  ----------------------------<  137<H>  4.3   |  22  |  0.81    Ca    8.9      25 Jan 2018 21:47  Phos  3.4     01-25  Mg     2.2     01-25    TPro  10.0<H>  /  Alb  5.3<H>  /  TBili  1.2  /  DBili  x   /  AST  90<H>  /  ALT  21  /  AlkPhos  76  01-24    PT/INR - ( 24 Jan 2018 08:12 )   PT: 10.4 sec;   INR: 0.96 ratio         PTT - ( 24 Jan 2018 08:12 )  PTT:25.8 sec    Imaging:

## 2018-01-26 NOTE — PROGRESS NOTE ADULT - ASSESSMENT
ASSESSMENT/PLAN:  PT S/P fall with worsening mental status - 4 th vent hemorrhage with hydrocephalus; L Ant circulatio vasular spasm MCA ; SHANE / Call Betty vs PCNS      NEURO: checks q 1 hr; maintain EVD; Angio shows no AVM; monitor Hydro and ICP; MRI/MRA/MRP/ MR neck to clear C spine - trial of nimodipine 30mg  q2;   Vasculitis W/U  ESR -nl;     No need for AED   Stroke core measures     Activity:  [X] Bedrest     PULM: Likely COPD   Broncholdilators albuterol prn q 4 hrs   PRVC 350 TV ; rate 14 ; FIO2 100% b; PEEP- 5   Smoker Nicotine patch 21mg q day     CV:  SBP goal- -< 180  EKG - T wave inv infero l;ateral leads ; increaesed P wave   Check troponin  Check ECHO     RENAL:  Fluids: Ns at 80 cc/hr ,   GI:  Diet: NPO for now   GI prophylaxis  [X] PPI [] other:  Bowel regimen [] colace [X] senna [] other:    ENDO:   Goal euglycemia (-180)    HEME/ONC: Coags NL with Platelets   VTE prophylaxis: [X] SCDs [X] chemoprophylaxis- lovenox 30 mg qhs    Patient at high risk for DVT due to immobility - yet with acute bleed will need to hold chemoprophylaxsis for now.     ID: afebrile    MISC: family to be informed     CODE STATUS:  X[] Full Code ASSESSMENT/PLAN:  PT S/P fall with worsening mental status - 4 th vent hemorrhage with hydrocephalus; L Ant circulatio vasular spasm MCA ; SHANE / Call Betty vs PCNS      NEURO: checks q 1 hr; maintain EVD; Angio shows no AVM; monitor Hydro and ICP; f/u MR results - trial of nimodipine 30mg  q2;   Vasculitis W/U  HIV negative, f/u syphilis titer  ESR -nl;     seizures o/n, cont keppra  f/u EEG  Stroke core measures     Activity:  [X] Bedrest     PULM: Likely COPD   Broncholdilators albuterol prn q 4 hrs   PRVC 350 TV ; rate 14 ; FIO2 100% b; PEEP- 5   Smoker Nicotine patch 21mg q day     CV:  SBP goal- -< 180  EKG - T wave inv infero l;ateral leads ; increaesed P wave   Check troponin  Check ECHO     RENAL:  Fluids: Ns at 80 cc/hr ,   GI:  Diet: NPO for now   GI prophylaxis  [X] PPI [] other:  Bowel regimen [] colace [X] senna [] other:    ENDO:   Goal euglycemia (-180)    HEME/ONC: Coags NL with Platelets   VTE prophylaxis: [X] SCDs [X] chemoprophylaxis- lovenox 30 mg qhs    Patient at high risk for DVT due to immobility - yet with acute bleed will need to hold chemoprophylaxsis for now.     ID: afebrile    MISC: family to be informed     CODE STATUS:  X[] Full Code ASSESSMENT/PLAN:  PT S/P fall with worsening mental status - 4 th vent hemorrhage with hydrocephalus; L Ant circulatio vasular spasm MCA ; SHANE / Call Betty vs PCNS      NEURO: checks q 1 hr; maintain EVD; Angio shows no AVM; monitor Hydro and ICP; f/u MR results - trial of nimodipine 30mg  q2;   Vasculitis W/U  HIV negative, f/u syphilis titer/ Check HSV titer   ESR -nl;     seizures o/n, cont keppra  f/u EEG  Stroke core measures     Activity:  [X] Bedrest     PULM: Likely COPD   Broncholdilators albuterol prn q 4 hrs   PRVC 350 TV ; rate 14 ; FIO2 100% b; PEEP- 5   Smoker Nicotine patch 21mg q day     CV:  SBP goal- -< 180  EKG - T wave inv infero l;ateral leads ; increaesed P wave   Check troponin  Check ECHO     RENAL:  Fluids: IVL - total fluids at 60 cc/hr   GI:  Diet: NPO for now   GI prophylaxis  [X] PPI [] other:- glycerna 60 cc/hr   Bowel regimen [] colace [X] senna [] other:    ENDO:   Goal euglycemia (-180)    HEME/ONC: Coags NL with Platelets   VTE prophylaxis: [X] SCDs [X] chemoprophylaxis- lovenox 30 mg qhs    Patient at high risk for DVT due to immobility - yet with acute bleed will need to hold chemoprophylaxsis for now.     ID: afebrile    MISC: family to be informed     CODE STATUS:  X[] Full Code    Time spent 40 min     Pt remains criticall ill and at risk of swelling and herniation

## 2018-01-26 NOTE — PROGRESS NOTE ADULT - SUBJECTIVE AND OBJECTIVE BOX
HPI:  61y/o female brought in to [facility] ED s/p fall at home in the bathroom. Unable to obtain HPI, history, or ROS from patient due to her altered mental status. As per  patient woke up this morning feeling "unwell," vomited once with some dry heaves at 9am, then had slurred speech during the day starting at 10am. She spent most of the day in bed, and was able to have some tea and crackers. She then had a fall in the bathroom & hit the right side of her head. Level I trauma called as patient's altered mental status was initially concerning for potential need for intubation. In trauma bay primary survey significant for GCS of 11 (E=4 V =2 M=5). Secondary survey significant for Right frontal hematoma. (24 Jan 2018 05:31)    1/25: C-collar removed after negative MRI    OVERNIGHT EVENTS:   5-6 seizures, started on keppra    VITALS:  T(C): , Max: 37.5 (01-25-18 @ 11:00)  HR:  (51 - 89)  BP:  (136/78 - 186/111)  ABP: --  RR:  (16 - 25)  SpO2:  (99% - 100%)  Wt(kg): --  Device: Avea, Mode: AC/ CMV (Assist Control/ Continuous Mandatory Ventilation), RR (machine): 12, TV (machine): 400, FiO2: 30, PEEP: 5, ITime: 1, MAP: 6, PIP: 7    01-25-18 @ 07:01  -  01-26-18 @ 07:00  --------------------------------------------------------  IN: 1909.7 mL / OUT: 1124 mL / NET: 785.7 mL      LABS:  Na: 146 (01-26 @ 05:42), 147 (01-25 @ 21:47), 146 (01-25 @ 11:13), 142 (01-25 @ 05:34), 141 (01-25 @ 01:29), 91 (01-25 @ 00:06), 136 (01-24 @ 18:27), 132 (01-24 @ 08:12), 133 (01-24 @ 07:09), 129 (01-24 @ 04:48)  K: 3.7 (01-26 @ 05:42), 4.3 (01-25 @ 21:47), 4.4 (01-25 @ 11:13), 4.4 (01-25 @ 05:34), 4.2 (01-25 @ 01:29), >9.0 (01-25 @ 00:06), 3.2 (01-24 @ 18:27), 3.5 (01-24 @ 08:12), 5.0 (01-24 @ 07:09), >9.0 (01-24 @ 04:48)  Cl: 115 (01-26 @ 05:42), 111 (01-25 @ 21:47), 109 (01-25 @ 11:13), 103 (01-25 @ 05:34), 104 (01-25 @ 01:29), 98 (01-25 @ 00:06), 95 (01-24 @ 18:27), 92 (01-24 @ 08:12), 102 (01-24 @ 07:09), 87 (01-24 @ 04:48)  CO2: 21 (01-26 @ 05:42), 22 (01-25 @ 21:47), 25 (01-25 @ 11:13), 24 (01-25 @ 05:34), 22 (01-25 @ 01:29), 17 (01-25 @ 00:06), 21 (01-24 @ 18:27), 17 (01-24 @ 08:12), 24 (01-24 @ 07:09), 20 (01-24 @ 04:48)  BUN: 27 (01-26 @ 05:42), 24 (01-25 @ 21:47), 26 (01-25 @ 11:13), 23 (01-25 @ 05:34), 23 (01-25 @ 01:29), 16 (01-25 @ 00:06), 23 (01-24 @ 18:27), 20 (01-24 @ 08:12), 17 (01-24 @ 07:09), 19 (01-24 @ 04:48)  Cr: 0.76 (01-26 @ 05:42), 0.81 (01-25 @ 21:47), 1.03 (01-25 @ 11:13), 1.01 (01-25 @ 05:34), 1.06 (01-25 @ 01:29), 0.68 (01-25 @ 00:06), 1.05 (01-24 @ 18:27), 0.86 (01-24 @ 08:12), 0.87 (01-24 @ 07:09), 0.75 (01-24 @ 04:48)  Glu: 201(01-26 @ 05:42), 137(01-25 @ 21:47), 134(01-25 @ 11:13), 141(01-25 @ 05:34), 137(01-25 @ 01:29), 95(01-25 @ 00:06), 155(01-24 @ 18:27), 210(01-24 @ 08:12), 185(01-24 @ 07:09), 195(01-24 @ 04:48)    Hgb: 13.1 (01-25 @ 21:47), 14.8 (01-25 @ 05:34), 15.8 (01-24 @ 18:27), 18.2 (01-24 @ 08:12), 19.8 (01-24 @ 04:48)  Hct: 38.7 (01-25 @ 21:47), 42.7 (01-25 @ 05:34), 44.8 (01-24 @ 18:27), 52.3 (01-24 @ 08:12), 56.2 (01-24 @ 04:48)  WBC: 19.1 (01-25 @ 21:47), 18.5 (01-25 @ 05:34), 20.6 (01-24 @ 18:27), 23.6 (01-24 @ 08:12), 19.9 (01-24 @ 04:48)  Plt: 162 (01-25 @ 21:47), 174 (01-25 @ 05:34), 224 (01-24 @ 18:27), 221 (01-24 @ 08:12), 206 (01-24 @ 04:48)    INR: 0.96 01-24-18 @ 08:12  PTT: 25.8 01-24-18 @ 08:12    IMAGING:   Recent imaging studies were reviewed.    MEDICATIONS:  ALBUTerol    90 MICROgram(s) HFA Inhaler 1 Puff(s) Inhalation every 4 hours PRN  chlorhexidine 0.12% Liquid 15 milliLiter(s) Swish and Spit three times a day  dexmedetomidine Infusion 0.7 MICROgram(s)/kG/Hr IV Continuous <Continuous>  enoxaparin Injectable 30 milliGRAM(s) SubCutaneous daily  levETIRAcetam  IVPB 1000 milliGRAM(s) IV Intermittent every 12 hours  nicotine - 21 mG/24Hr(s) Patch 1 patch Transdermal daily  niMODipine Suspension 30 milliGRAM(s) Enteral Tube every 2 hours  pantoprazole  Injectable 40 milliGRAM(s) IV Push daily    EXAMINATION:  Gen: NAD, AAOx0, intubated, sedated  Head: NCAT  HEENT: 2mm and reactive, oral mucosa moist, normal conjunctiva  Lung: CTAB, no respiratory distress, intubated  CV: rrr, no murmurs, Normal perfusion  Abd: soft, NTND, no CVA tenderness  MSK: No edema, no visible deformities  Neuro: pupils 2mm and reactive, does not open eyes to noxious stimuli, not following commands, corneals B/L ; RUE extends pain, LUE 3/5 RLE TF / LLE 3/5 HPI:  61y/o female brought in to [facility] ED s/p fall at home in the bathroom. Unable to obtain HPI, history, or ROS from patient due to her altered mental status. As per  patient woke up this morning feeling "unwell," vomited once with some dry heaves at 9am, then had slurred speech during the day starting at 10am. She spent most of the day in bed, and was able to have some tea and crackers. She then had a fall in the bathroom & hit the right side of her head. Level I trauma called as patient's altered mental status was initially concerning for potential need for intubation. In trauma bay primary survey significant for GCS of 11 (E=4 V =2 M=5). Secondary survey significant for Right frontal hematoma. (24 Jan 2018 05:31)    1/25: C-collar removed after negative MRIx     OVERNIGHT EVENTS:   5-6 seizures, started on keppra    VITALS:  T(C): , Max: 37.5 (01-25-18 @ 11:00)  HR:  (51 - 89)  BP:  (136/78 - 186/111)  ABP: --  RR:  (16 - 25)  SpO2:  (99% - 100%)  Wt(kg): --  Device: Avea, Mode: AC/ CMV (Assist Control/ Continuous Mandatory Ventilation), RR (machine): 12, TV (machine): 400, FiO2: 30, PEEP: 5, ITime: 1, MAP: 6, PIP: 7    01-25-18 @ 07:01  -  01-26-18 @ 07:00  --------------------------------------------------------  IN: 1909.7 mL / OUT: 1124 mL / NET: 785.7 mL      LABS:  Na: 146 (01-26 @ 05:42), 147 (01-25 @ 21:47), 146 (01-25 @ 11:13), 142 (01-25 @ 05:34), 141 (01-25 @ 01:29), 91 (01-25 @ 00:06), 136 (01-24 @ 18:27), 132 (01-24 @ 08:12), 133 (01-24 @ 07:09), 129 (01-24 @ 04:48)  K: 3.7 (01-26 @ 05:42), 4.3 (01-25 @ 21:47), 4.4 (01-25 @ 11:13), 4.4 (01-25 @ 05:34), 4.2 (01-25 @ 01:29), >9.0 (01-25 @ 00:06), 3.2 (01-24 @ 18:27), 3.5 (01-24 @ 08:12), 5.0 (01-24 @ 07:09), >9.0 (01-24 @ 04:48)  Cl: 115 (01-26 @ 05:42), 111 (01-25 @ 21:47), 109 (01-25 @ 11:13), 103 (01-25 @ 05:34), 104 (01-25 @ 01:29), 98 (01-25 @ 00:06), 95 (01-24 @ 18:27), 92 (01-24 @ 08:12), 102 (01-24 @ 07:09), 87 (01-24 @ 04:48)  CO2: 21 (01-26 @ 05:42), 22 (01-25 @ 21:47), 25 (01-25 @ 11:13), 24 (01-25 @ 05:34), 22 (01-25 @ 01:29), 17 (01-25 @ 00:06), 21 (01-24 @ 18:27), 17 (01-24 @ 08:12), 24 (01-24 @ 07:09), 20 (01-24 @ 04:48)  BUN: 27 (01-26 @ 05:42), 24 (01-25 @ 21:47), 26 (01-25 @ 11:13), 23 (01-25 @ 05:34), 23 (01-25 @ 01:29), 16 (01-25 @ 00:06), 23 (01-24 @ 18:27), 20 (01-24 @ 08:12), 17 (01-24 @ 07:09), 19 (01-24 @ 04:48)  Cr: 0.76 (01-26 @ 05:42), 0.81 (01-25 @ 21:47), 1.03 (01-25 @ 11:13), 1.01 (01-25 @ 05:34), 1.06 (01-25 @ 01:29), 0.68 (01-25 @ 00:06), 1.05 (01-24 @ 18:27), 0.86 (01-24 @ 08:12), 0.87 (01-24 @ 07:09), 0.75 (01-24 @ 04:48)  Glu: 201(01-26 @ 05:42), 137(01-25 @ 21:47), 134(01-25 @ 11:13), 141(01-25 @ 05:34), 137(01-25 @ 01:29), 95(01-25 @ 00:06), 155(01-24 @ 18:27), 210(01-24 @ 08:12), 185(01-24 @ 07:09), 195(01-24 @ 04:48)    Hgb: 13.1 (01-25 @ 21:47), 14.8 (01-25 @ 05:34), 15.8 (01-24 @ 18:27), 18.2 (01-24 @ 08:12), 19.8 (01-24 @ 04:48)  Hct: 38.7 (01-25 @ 21:47), 42.7 (01-25 @ 05:34), 44.8 (01-24 @ 18:27), 52.3 (01-24 @ 08:12), 56.2 (01-24 @ 04:48)  WBC: 19.1 (01-25 @ 21:47), 18.5 (01-25 @ 05:34), 20.6 (01-24 @ 18:27), 23.6 (01-24 @ 08:12), 19.9 (01-24 @ 04:48)  Plt: 162 (01-25 @ 21:47), 174 (01-25 @ 05:34), 224 (01-24 @ 18:27), 221 (01-24 @ 08:12), 206 (01-24 @ 04:48)    INR: 0.96 01-24-18 @ 08:12  PTT: 25.8 01-24-18 @ 08:12    IMAGING:   Recent imaging studies were reviewed.    MEDICATIONS:  ALBUTerol    90 MICROgram(s) HFA Inhaler 1 Puff(s) Inhalation every 4 hours PRN  chlorhexidine 0.12% Liquid 15 milliLiter(s) Swish and Spit three times a day  dexmedetomidine Infusion 0.7 MICROgram(s)/kG/Hr IV Continuous <Continuous>  enoxaparin Injectable 30 milliGRAM(s) SubCutaneous daily  levETIRAcetam  IVPB 1000 milliGRAM(s) IV Intermittent every 12 hours  nicotine - 21 mG/24Hr(s) Patch 1 patch Transdermal daily  niMODipine Suspension 30 milliGRAM(s) Enteral Tube every 2 hours  pantoprazole  Injectable 40 milliGRAM(s) IV Push daily    EXAMINATION:  Gen: NAD, AAOx0, intubated, sedated  Head: NCAT  HEENT: 2mm and reactive, oral mucosa moist, normal conjunctiva  Lung: CTAB, no respiratory distress, intubated  CV: rrr, no murmurs, Normal perfusion  Abd: soft, NTND, no CVA tenderness  MSK: No edema, no visible deformities  Neuro: pupils 2mm and reactive, does not open eyes to noxious stimuli, not following commands, corneals B/L ; RUE extends pain, LUE 3/5 RLE TF / LLE 3/5 HPI:  61y/o female brought in to [facility] ED s/p fall at home in the bathroom. Unable to obtain HPI, history, or ROS from patient due to her altered mental status. As per  patient woke up this morning feeling "unwell," vomited once with some dry heaves at 9am, then had slurred speech during the day starting at 10am. She spent most of the day in bed, and was able to have some tea and crackers. She then had a fall in the bathroom & hit the right side of her head. Level I trauma called as patient's altered mental status was initially concerning for potential need for intubation. In trauma bay primary survey significant for GCS of 11 (E=4 V =2 M=5). Secondary survey significant for Right frontal hematoma.   L MCA infarct with distal narrowing of vessel on angio on L SHANE , MCA and PCA territory---> etilogy unclear   1/25: C-collar removed after negative MRIx     OVERNIGHT EVENTS:   5-6 seizures, started on keppra and received 2mg ativan on cont EEG     VITALS:  T(C): , Max: 37.5 (01-25-18 @ 11:00)  HR:  (51 - 89)  BP:  (136/78 - 186/111)  ABP: --  RR:  (16 - 25)  SpO2:  (99% - 100%)  Wt(kg): --  Device: Avea, Mode: AC/ CMV (Assist Control/ Continuous Mandatory Ventilation), RR (machine): 12, TV (machine): 400, FiO2: 30, PEEP: 5, ITime: 1, MAP: 6, PIP: 7    01-25-18 @ 07:01  -  01-26-18 @ 07:00  --------------------------------------------------------  IN: 1909.7 mL / OUT: 1124 mL / NET: 785.7 mL      LABS:  Na: 146 (01-26 @ 05:42), 147 (01-25 @ 21:47), 146 (01-25 @ 11:13), 142 (01-25 @ 05:34), 141 (01-25 @ 01:29), 91 (01-25 @ 00:06), 136 (01-24 @ 18:27), 132 (01-24 @ 08:12), 133 (01-24 @ 07:09), 129 (01-24 @ 04:48)  K: 3.7 (01-26 @ 05:42), 4.3 (01-25 @ 21:47), 4.4 (01-25 @ 11:13), 4.4 (01-25 @ 05:34), 4.2 (01-25 @ 01:29), >9.0 (01-25 @ 00:06), 3.2 (01-24 @ 18:27), 3.5 (01-24 @ 08:12), 5.0 (01-24 @ 07:09), >9.0 (01-24 @ 04:48)  Cl: 115 (01-26 @ 05:42), 111 (01-25 @ 21:47), 109 (01-25 @ 11:13), 103 (01-25 @ 05:34), 104 (01-25 @ 01:29), 98 (01-25 @ 00:06), 95 (01-24 @ 18:27), 92 (01-24 @ 08:12), 102 (01-24 @ 07:09), 87 (01-24 @ 04:48)  CO2: 21 (01-26 @ 05:42), 22 (01-25 @ 21:47), 25 (01-25 @ 11:13), 24 (01-25 @ 05:34), 22 (01-25 @ 01:29), 17 (01-25 @ 00:06), 21 (01-24 @ 18:27), 17 (01-24 @ 08:12), 24 (01-24 @ 07:09), 20 (01-24 @ 04:48)  BUN: 27 (01-26 @ 05:42), 24 (01-25 @ 21:47), 26 (01-25 @ 11:13), 23 (01-25 @ 05:34), 23 (01-25 @ 01:29), 16 (01-25 @ 00:06), 23 (01-24 @ 18:27), 20 (01-24 @ 08:12), 17 (01-24 @ 07:09), 19 (01-24 @ 04:48)  Cr: 0.76 (01-26 @ 05:42), 0.81 (01-25 @ 21:47), 1.03 (01-25 @ 11:13), 1.01 (01-25 @ 05:34), 1.06 (01-25 @ 01:29), 0.68 (01-25 @ 00:06), 1.05 (01-24 @ 18:27), 0.86 (01-24 @ 08:12), 0.87 (01-24 @ 07:09), 0.75 (01-24 @ 04:48)  Glu: 201(01-26 @ 05:42), 137(01-25 @ 21:47), 134(01-25 @ 11:13), 141(01-25 @ 05:34), 137(01-25 @ 01:29), 95(01-25 @ 00:06), 155(01-24 @ 18:27), 210(01-24 @ 08:12), 185(01-24 @ 07:09), 195(01-24 @ 04:48)    Hgb: 13.1 (01-25 @ 21:47), 14.8 (01-25 @ 05:34), 15.8 (01-24 @ 18:27), 18.2 (01-24 @ 08:12), 19.8 (01-24 @ 04:48)  Hct: 38.7 (01-25 @ 21:47), 42.7 (01-25 @ 05:34), 44.8 (01-24 @ 18:27), 52.3 (01-24 @ 08:12), 56.2 (01-24 @ 04:48)  WBC: 19.1 (01-25 @ 21:47), 18.5 (01-25 @ 05:34), 20.6 (01-24 @ 18:27), 23.6 (01-24 @ 08:12), 19.9 (01-24 @ 04:48)  Plt: 162 (01-25 @ 21:47), 174 (01-25 @ 05:34), 224 (01-24 @ 18:27), 221 (01-24 @ 08:12), 206 (01-24 @ 04:48)    REZA - 1 : 160   TCD -  MCA < 100     INR: 0.96 01-24-18 @ 08:12  PTT: 25.8 01-24-18 @ 08:12    IMAGING:   TTE - no thrombus ;     MEDICATIONS:  ALBUTerol    90 MICROgram(s) HFA Inhaler 1 Puff(s) Inhalation every 4 hours PRN  chlorhexidine 0.12% Liquid 15 milliLiter(s) Swish and Spit three times a day  dexmedetomidine Infusion 0.7 MICROgram(s)/kG/Hr IV Continuous <Continuous> RASS - -1   enoxaparin Injectable 30 milliGRAM(s) SubCutaneous daily  levETIRAcetam  IVPB 1000 milliGRAM(s) NGT  Intermittent every 12 hours  nicotine - 21 mG/24Hr(s) Patch 1 patch Transdermal daily  niMODipine Suspension 30 milliGRAM(s) Enteral Tube every 2 hours  pantoprazole  Injectable 40 milliGRAM(s) IV Push daily    EXAMINATION:  Gen: NAD, AAOx0, intubated, sedated  Head: NCAT  HEENT: 2mm and reactive, oral mucosa moist, normal conjunctiva  Lung: CTAB, no respiratory distress, intubated  CV: rrr, no murmurs, Normal perfusion  Abd: soft, NTND, no CVA tenderness  MSK: No edema, no visible deformities  Neuro: pupils 2mm and reactive, does not open eyes to noxious stimuli, not following commands, corneals B/L ; RUE extends pain, LUE 3/5 RLE TF / LLE 3/5

## 2018-01-26 NOTE — EEG REPORT - NS EEG TEXT BOX
Study Date: 1/25/2018	- 1/26/2018	      History:  -concern for seizures    Medication	  No Data.	    Study Interpretation:    FINDINGS:  The background was continuous, mixed delta and theta activity, with a predominance of delta activity over the left hemisphere, maximum in the left temporal region.    Generalized slowing:   -Continuous Slowing, Generalized  -Intermittent Rhythmic Slowing, Generalized    Focal slowing:   -Continuous Slowing, Lateralized, Left hemisphere, maximum left temporal    Sleep Background:  Stage II sleep transients were not recorded.    Other Paroxysmal Activity:  None     Interictal Epileptiform Activity:   PLEDs, Lateralized, Left hemisphere, Maximum Left Temporal and Posterior Temporal (T7/F7/P7)  PLEDs, Regional, Left Occipital, Maximum O1  Polyspikes, Regional, Left Occipital, Maximum O1/P7    Ictal Epileptiform Activity or Events:  Clinical seizures: No clinical signs  EEG seizures: Lateralized, Left hemisphere, maximum Left Temporal (5-6 per hour recorded, last seizure at 9:57pm)  Description of EEG seizures: PLEDs over the left hemisphere occur in runs at a rate of 2 hz, then evolve into rhythmic delta and theta activity over the left hemisphere, maximum in the left temporal region, duration 1-2 minutes.  SZ#1 continues to evolve in the left occipital region, prior to termination.    Activation Procedures:   Hyperventilation was not performed.    Photic stimulation was not performed.    Artifacts:  Intermittent myogenic and movement artifacts were noted.    ECG:  The heart rate on single channel ECG was predominantly between 60-80 BPM.    EEG Classification / Summary:  Abnormal EEG in the lethargic state.    -Clinical seizures: No clinical signs  -EEG seizures: Lateralized, Left hemisphere, maximum Left Temporal (5-6 per hour recorded, last seizure at 9:57pm)    -PLEDs, Lateralized, Left hemisphere, Maximum Left Temporal  -PLEDs, Regional, Left Occipital, Maximum O1  -Polyspikes, Regional, Left Occipital, Maximum O1/P7    -Continuous Slowing, Generalized, and Lateralized, Left hemisphere  -Intermittent Rhythmic Slowing, Generalized    Clinical Impression:  This EEG provides evidence of focal epileptogenicity in the left temporal region.  5-6 electrographic seizures per hour (until 9:57pm) were recorded from the left hemisphere, maximum in the left temporal region, with no associated clinical signs (duration 1-2 minutes).  The last recorded seizure occurred at 9:57pm.  There is also evidence of a severe diffuse encephalopathy, and a structural lesion in the left hemisphere.         Ti Sheehan MD  Attending Physician, [facility] [place]

## 2018-01-26 NOTE — CONSULT NOTE ADULT - SUBJECTIVE AND OBJECTIVE BOX
NEUROLOGY CONSULT NOTE    Patient is a 62y old  Female who presents with a chief complaint of Level I trauma activation s/p fall (24 Jan 2018 13:50)      HPI:  63y/o female brought in to [facility] ED s/p fall at home in the bathroom. Unable to obtain HPI, history, or ROS from patient due to her altered mental status. As per  patient woke up this morning feeling "unwell," vomited once with some dry heaves at 9am, then had slurred speech during the day starting at 10am. She spent most of the day in bed, and was able to have some tea and crackers. She then had a fall in the bathroom & hit the right side of her head. Level I trauma called as patient's altered mental status was initially concerning for potential need for intubation. In trauma bay primary survey significant for GCS of 11 (E=4 V =2 M=5). Secondary survey significant for Right frontal hematoma. (24 Jan 2018 05:31)         Neurological Review of Systems:  per family, yes difficulty with language.  No vision loss or double vision.  No dizziness, vertigo or new hearing loss.  yes, difficulty with speech or swallowing as pt is intubated. yes, focal weakness.  unknown focal sensory changes.         MEDICATIONS  (STANDING):  chlorhexidine 0.12% Liquid 15 milliLiter(s) Swish and Spit three times a day  dexmedetomidine Infusion 0.2 MICROgram(s)/kG/Hr (2.495 mL/Hr) IV Continuous <Continuous>  enoxaparin Injectable 30 milliGRAM(s) SubCutaneous daily  hydrALAZINE 25 milliGRAM(s) Oral three times a day  levETIRAcetam  IVPB 1000 milliGRAM(s) IV Intermittent every 12 hours  nicotine - 21 mG/24Hr(s) Patch 1 patch Transdermal daily  niMODipine Suspension 30 milliGRAM(s) Enteral Tube every 2 hours  pantoprazole  Injectable 40 milliGRAM(s) IV Push daily  sodium chloride 0.9%. 1000 milliLiter(s) (20 mL/Hr) IV Continuous <Continuous>    MEDICATIONS  (PRN):  ALBUTerol    90 MICROgram(s) HFA Inhaler 1 Puff(s) Inhalation every 4 hours PRN Wheezing    Allergies    No Known Drug Allergies  strawberry (Rash)    Intolerances      PAST MEDICAL & SURGICAL HISTORY:  No pertinent past medical history  H/O colonoscopy: 2015, normal    FAMILY HISTORY:  Family history of colon cancer in father (Father)    SOCIAL HISTORY: non smoker/ former smoker/ active smoker    Review of Systems:  Constitutional: No generalized weakness. No fevers or chills.                    Eyes, Ears, Mouth, Throat: No vision loss   Respiratory: No shortness of breath or cough.                                Cardiovascular: No chest pain or palpitations  Gastrointestinal: No nausea or vomiting.                                         Genitourinary: No urinary incontinence or burning on urination.  Musculoskeletal: No joint pain.                                                           Dermatologic: No rash.  Neurological: as per HPI                                                                      Psychiatric: No behavioral problems.  Endocrine: No known hypoglycemia.               Hematologic/Lymphatic: No easy bleeding.    O:  Vital Signs Last 24 Hrs  T(C): 37.5 (26 Jan 2018 15:00), Max: 37.5 (26 Jan 2018 15:00)  T(F): 99.5 (26 Jan 2018 15:00), Max: 99.5 (26 Jan 2018 15:00)  HR: 86 (26 Jan 2018 18:00) (52 - 115)  BP: 169/80 (26 Jan 2018 18:00) (136/78 - 194/91)  BP(mean): 107 (26 Jan 2018 18:00) (92 - 120)  RR: 20 (26 Jan 2018 18:00) (17 - 25)  SpO2: 100% (26 Jan 2018 18:00) (98% - 100%)    General Exam:   General appearance: (+)intubated, low dose Precedex present     MENTAL STATUS:  (+)intubated, does not follow commands. eyes does not open spontaneously    CRANIAL NERVES:  (+)pupillary b/l  (+)corneal b/l  (+)gag    MOTOR: no purposeful movmements  Tone: normal.  Unable to assess Strength as not able to follow comands    SENSORY: responds to noxious stimuli symmetrically        LABS:                        13.1   19.1  )-----------( 162      ( 25 Jan 2018 21:47 )             38.7     01-26    146<H>  |  115<H>  |  27<H>  ----------------------------<  201<H>  3.7   |  21<L>  |  0.76    Ca    8.1<L>      26 Jan 2018 05:42  Phos  3.4     01-25  Mg     2.2     01-25        Urinalysis Basic - ( 25 Jan 2018 03:05 )    Color: Yellow / Appearance: Clear / SG: >1.030 / pH: x  Gluc: x / Ketone: Trace  / Bili: Negative / Urobili: Negative   Blood: x / Protein: 30 mg/dL / Nitrite: Negative   Leuk Esterase: Negative / RBC: 0-2 /HPF / WBC 6-10 /HPF   Sq Epi: x / Non Sq Epi: OCC /HPF / Bacteria: x        CARDIAC MARKERS ( 26 Jan 2018 05:42 )  x     / 0.07 ng/mL / 288 U/L / x     / 5.1 ng/mL  CARDIAC MARKERS ( 25 Jan 2018 21:47 )  x     / 0.08 ng/mL / 479 U/L / x     / 7.0 ng/mL          RADIOLOGY & ADDITIONAL STUDIES:    [x] MRI b: Acute L MCA infarct with a small amount of hemorrhage w/ scattered foci of microhemorrhages and extensive white matter changes.     [x] MRA of the head demonstrates mild narrowing of the left middle and PCA which is less prominent when compared with the conventional angiogram of 1/24/2018.     [x] MRI c-spine: No acute cervical spine pathology or evidence for ligamentous injury. No antoine cervical cord compression.   -- C4-C5: Moderate degenerative changes with broad-based osteophyte drainage, contacting and minimally indenting the left ventral cervical cord.   -- Upper thoracic dorsal interfascial 3.0 x 0.5 cm hematoma.   -- Redemonstration of hemorrhage filling the 4th ventricle, as visualized on head CT of 1/24/18.     [x] CTA H/N: Somewhat asymmetric increased venous phase enhancement seen in the region of the right cerebellopontine angle, with an increased number of small arterial vessels seen in the region of the fourth ventricle, approximately in the region of the previously identified intraventricular hemorrhage. The significance of this finding is uncertain. The possibility of a small extra-axial AVM is not excluded. Catheter angiography may be performed as clinically indicated.     No occlusion or hemodynamically significant stenosis in the neck.     Thank you for the courtesy of this consult.

## 2018-01-27 LAB
ANION GAP SERPL CALC-SCNC: 11 MMOL/L — SIGNIFICANT CHANGE UP (ref 5–17)
BUN SERPL-MCNC: 12 MG/DL — SIGNIFICANT CHANGE UP (ref 7–23)
CA-I BLD-SCNC: 1.24 MMOL/L — SIGNIFICANT CHANGE UP (ref 1.12–1.3)
CALCIUM SERPL-MCNC: 6.7 MG/DL — LOW (ref 8.4–10.5)
CHLORIDE SERPL-SCNC: 115 MMOL/L — HIGH (ref 96–108)
CO2 SERPL-SCNC: 21 MMOL/L — LOW (ref 22–31)
CREAT SERPL-MCNC: 0.42 MG/DL — LOW (ref 0.5–1.3)
GLUCOSE SERPL-MCNC: 102 MG/DL — HIGH (ref 70–99)
HCT VFR BLD CALC: 31.9 % — LOW (ref 34.5–45)
HGB BLD-MCNC: 10.8 G/DL — LOW (ref 11.5–15.5)
LABORATORY COMMENT REPORT: SIGNIFICANT CHANGE UP
MAGNESIUM SERPL-MCNC: 1.5 MG/DL — LOW (ref 1.6–2.6)
MCHC RBC-ENTMCNC: 33.7 GM/DL — SIGNIFICANT CHANGE UP (ref 32–36)
MCHC RBC-ENTMCNC: 36.4 PG — HIGH (ref 27–34)
MCV RBC AUTO: 108 FL — HIGH (ref 80–100)
PHOSPHATE SERPL-MCNC: 2.2 MG/DL — LOW (ref 2.5–4.5)
PLATELET # BLD AUTO: 158 K/UL — SIGNIFICANT CHANGE UP (ref 150–400)
POTASSIUM SERPL-MCNC: 3.2 MMOL/L — LOW (ref 3.5–5.3)
POTASSIUM SERPL-SCNC: 3.2 MMOL/L — LOW (ref 3.5–5.3)
RBC # BLD: 2.95 M/UL — LOW (ref 3.8–5.2)
RBC # FLD: 12.5 % — SIGNIFICANT CHANGE UP (ref 10.3–14.5)
SODIUM SERPL-SCNC: 147 MMOL/L — HIGH (ref 135–145)
SOURCE HSV 1/2: SIGNIFICANT CHANGE UP
VDRL CSF-TITR: NEGATIVE — SIGNIFICANT CHANGE UP
WBC # BLD: 15.1 K/UL — HIGH (ref 3.8–10.5)
WBC # FLD AUTO: 15.1 K/UL — HIGH (ref 3.8–10.5)

## 2018-01-27 PROCEDURE — 99291 CRITICAL CARE FIRST HOUR: CPT

## 2018-01-27 PROCEDURE — 93886 INTRACRANIAL COMPLETE STUDY: CPT | Mod: 26

## 2018-01-27 PROCEDURE — 95951: CPT | Mod: 26

## 2018-01-27 PROCEDURE — 99292 CRITICAL CARE ADDL 30 MIN: CPT

## 2018-01-27 PROCEDURE — 71045 X-RAY EXAM CHEST 1 VIEW: CPT | Mod: 26

## 2018-01-27 RX ORDER — FENTANYL CITRATE 50 UG/ML
25 INJECTION INTRAVENOUS ONCE
Qty: 0 | Refills: 0 | Status: DISCONTINUED | OUTPATIENT
Start: 2018-01-27 | End: 2018-01-27

## 2018-01-27 RX ORDER — DOCUSATE SODIUM 100 MG
100 CAPSULE ORAL THREE TIMES A DAY
Qty: 0 | Refills: 0 | Status: DISCONTINUED | OUTPATIENT
Start: 2018-01-27 | End: 2018-01-28

## 2018-01-27 RX ORDER — POLYETHYLENE GLYCOL 3350 17 G/17G
17 POWDER, FOR SOLUTION ORAL
Qty: 0 | Refills: 0 | Status: DISCONTINUED | OUTPATIENT
Start: 2018-01-27 | End: 2018-02-02

## 2018-01-27 RX ORDER — SENNA PLUS 8.6 MG/1
10 TABLET ORAL AT BEDTIME
Qty: 0 | Refills: 0 | Status: DISCONTINUED | OUTPATIENT
Start: 2018-01-27 | End: 2018-02-02

## 2018-01-27 RX ORDER — HYDRALAZINE HCL 50 MG
50 TABLET ORAL ONCE
Qty: 0 | Refills: 0 | Status: COMPLETED | OUTPATIENT
Start: 2018-01-27 | End: 2018-01-28

## 2018-01-27 RX ORDER — HYDRALAZINE HCL 50 MG
50 TABLET ORAL THREE TIMES A DAY
Qty: 0 | Refills: 0 | Status: DISCONTINUED | OUTPATIENT
Start: 2018-01-27 | End: 2018-01-27

## 2018-01-27 RX ORDER — HYDRALAZINE HCL 50 MG
100 TABLET ORAL THREE TIMES A DAY
Qty: 0 | Refills: 0 | Status: DISCONTINUED | OUTPATIENT
Start: 2018-01-27 | End: 2018-02-03

## 2018-01-27 RX ORDER — ATORVASTATIN CALCIUM 80 MG/1
40 TABLET, FILM COATED ORAL AT BEDTIME
Qty: 0 | Refills: 0 | Status: DISCONTINUED | OUTPATIENT
Start: 2018-01-27 | End: 2018-02-13

## 2018-01-27 RX ORDER — FENTANYL CITRATE 50 UG/ML
25 INJECTION INTRAVENOUS
Qty: 0 | Refills: 0 | Status: DISCONTINUED | OUTPATIENT
Start: 2018-01-27 | End: 2018-01-28

## 2018-01-27 RX ADMIN — ATORVASTATIN CALCIUM 40 MILLIGRAM(S): 80 TABLET, FILM COATED ORAL at 22:58

## 2018-01-27 RX ADMIN — NIMODIPINE 30 MILLIGRAM(S): 60 SOLUTION ORAL at 22:58

## 2018-01-27 RX ADMIN — NIMODIPINE 30 MILLIGRAM(S): 60 SOLUTION ORAL at 20:08

## 2018-01-27 RX ADMIN — LEVETIRACETAM 400 MILLIGRAM(S): 250 TABLET, FILM COATED ORAL at 18:14

## 2018-01-27 RX ADMIN — NIMODIPINE 30 MILLIGRAM(S): 60 SOLUTION ORAL at 12:30

## 2018-01-27 RX ADMIN — Medication 25 MILLIGRAM(S): at 05:07

## 2018-01-27 RX ADMIN — PANTOPRAZOLE SODIUM 40 MILLIGRAM(S): 20 TABLET, DELAYED RELEASE ORAL at 12:30

## 2018-01-27 RX ADMIN — DEXMEDETOMIDINE HYDROCHLORIDE IN 0.9% SODIUM CHLORIDE 2.5 MICROGRAM(S)/KG/HR: 4 INJECTION INTRAVENOUS at 07:00

## 2018-01-27 RX ADMIN — NIMODIPINE 30 MILLIGRAM(S): 60 SOLUTION ORAL at 05:08

## 2018-01-27 RX ADMIN — NIMODIPINE 30 MILLIGRAM(S): 60 SOLUTION ORAL at 18:15

## 2018-01-27 RX ADMIN — CHLORHEXIDINE GLUCONATE 15 MILLILITER(S): 213 SOLUTION TOPICAL at 22:57

## 2018-01-27 RX ADMIN — NIMODIPINE 30 MILLIGRAM(S): 60 SOLUTION ORAL at 01:07

## 2018-01-27 RX ADMIN — Medication 100 MILLIGRAM(S): at 22:57

## 2018-01-27 RX ADMIN — LEVETIRACETAM 400 MILLIGRAM(S): 250 TABLET, FILM COATED ORAL at 05:07

## 2018-01-27 RX ADMIN — NIMODIPINE 30 MILLIGRAM(S): 60 SOLUTION ORAL at 03:40

## 2018-01-27 RX ADMIN — CHLORHEXIDINE GLUCONATE 15 MILLILITER(S): 213 SOLUTION TOPICAL at 14:28

## 2018-01-27 RX ADMIN — SODIUM CHLORIDE 20 MILLILITER(S): 9 INJECTION INTRAMUSCULAR; INTRAVENOUS; SUBCUTANEOUS at 07:00

## 2018-01-27 RX ADMIN — FENTANYL CITRATE 25 MICROGRAM(S): 50 INJECTION INTRAVENOUS at 08:15

## 2018-01-27 RX ADMIN — POLYETHYLENE GLYCOL 3350 17 GRAM(S): 17 POWDER, FOR SOLUTION ORAL at 18:14

## 2018-01-27 RX ADMIN — Medication 100 MILLIGRAM(S): at 14:28

## 2018-01-27 RX ADMIN — ENOXAPARIN SODIUM 30 MILLIGRAM(S): 100 INJECTION SUBCUTANEOUS at 18:28

## 2018-01-27 RX ADMIN — Medication 10 MILLIGRAM(S): at 00:05

## 2018-01-27 RX ADMIN — CHLORHEXIDINE GLUCONATE 15 MILLILITER(S): 213 SOLUTION TOPICAL at 05:08

## 2018-01-27 RX ADMIN — NIMODIPINE 30 MILLIGRAM(S): 60 SOLUTION ORAL at 16:52

## 2018-01-27 RX ADMIN — SENNA PLUS 10 MILLILITER(S): 8.6 TABLET ORAL at 22:57

## 2018-01-27 RX ADMIN — Medication 1 PATCH: at 12:30

## 2018-01-27 RX ADMIN — NIMODIPINE 30 MILLIGRAM(S): 60 SOLUTION ORAL at 08:39

## 2018-01-27 RX ADMIN — FENTANYL CITRATE 25 MICROGRAM(S): 50 INJECTION INTRAVENOUS at 08:00

## 2018-01-27 RX ADMIN — NIMODIPINE 30 MILLIGRAM(S): 60 SOLUTION ORAL at 10:02

## 2018-01-27 RX ADMIN — FENTANYL CITRATE 25 MICROGRAM(S): 50 INJECTION INTRAVENOUS at 11:00

## 2018-01-27 RX ADMIN — NIMODIPINE 30 MILLIGRAM(S): 60 SOLUTION ORAL at 16:48

## 2018-01-27 RX ADMIN — Medication 50 MILLIGRAM(S): at 14:28

## 2018-01-27 NOTE — PROGRESS NOTE ADULT - SUBJECTIVE AND OBJECTIVE BOX
HPI:  63y/o female brought in to [facility] ED s/p fall at home in the bathroom. Unable to obtain HPI, history, or ROS from patient due to her altered mental status. As per  patient woke up this morning feeling "unwell," vomited once with some dry heaves at 9am, then had slurred speech during the day starting at 10am. She spent most of the day in bed, and was able to have some tea and crackers. She then had a fall in the bathroom & hit the right side of her head. Level I trauma called as patient's altered mental status was initially concerning for potential need for intubation. In trauma bay primary survey significant for GCS of 11 (E=4 V =2 M=5). Secondary survey significant for Right frontal hematoma.   L MCA infarct with distal narrowing of vessel on angio on L SHANE , MCA and PCA territory---> etilogy unclear   1/25: C-collar removed after negative MRIx     OVERNIGHT EVENTS:   1/25: 5-6 seizures, started on keppra and received 2mg ativan on cont EEG   1/26: No further seizures as per VEEG      VITALS:  T(C): , Max: 38 (01-26-18 @ 23:00)  HR:  (55 - 115)  BP:  (138/82 - 194/91)  ABP: --  RR:  (13 - 25)  SpO2:  (98% - 100%)  Wt(kg): --  Device: Avea, Mode: AC/ CMV (Assist Control/ Continuous Mandatory Ventilation), RR (machine): 12, TV (machine): 400, FiO2: 30, PEEP: 5, ITime: 1, MAP: 6, PIP: 10    01-26-18 @ 07:01  -  01-27-18 @ 07:00  --------------------------------------------------------  IN: 2295.4 mL / OUT: 590 mL / NET: 1705.4 mL      LABS:  Na: 144 (01-26 @ 21:26), 146 (01-26 @ 05:42), 147 (01-25 @ 21:47), 146 (01-25 @ 11:13), 142 (01-25 @ 05:34), 141 (01-25 @ 01:29), 91 (01-25 @ 00:06), 136 (01-24 @ 18:27), 132 (01-24 @ 08:12), 133 (01-24 @ 07:09)  K: 3.1 (01-26 @ 21:26), 3.7 (01-26 @ 05:42), 4.3 (01-25 @ 21:47), 4.4 (01-25 @ 11:13), 4.4 (01-25 @ 05:34), 4.2 (01-25 @ 01:29), >9.0 (01-25 @ 00:06), 3.2 (01-24 @ 18:27), 3.5 (01-24 @ 08:12), 5.0 (01-24 @ 07:09)  Cl: 113 (01-26 @ 21:26), 115 (01-26 @ 05:42), 111 (01-25 @ 21:47), 109 (01-25 @ 11:13), 103 (01-25 @ 05:34), 104 (01-25 @ 01:29), 98 (01-25 @ 00:06), 95 (01-24 @ 18:27), 92 (01-24 @ 08:12), 102 (01-24 @ 07:09)  CO2: 24 (01-26 @ 21:26), 21 (01-26 @ 05:42), 22 (01-25 @ 21:47), 25 (01-25 @ 11:13), 24 (01-25 @ 05:34), 22 (01-25 @ 01:29), 17 (01-25 @ 00:06), 21 (01-24 @ 18:27), 17 (01-24 @ 08:12), 24 (01-24 @ 07:09)  BUN: 17 (01-26 @ 21:26), 27 (01-26 @ 05:42), 24 (01-25 @ 21:47), 26 (01-25 @ 11:13), 23 (01-25 @ 05:34), 23 (01-25 @ 01:29), 16 (01-25 @ 00:06), 23 (01-24 @ 18:27), 20 (01-24 @ 08:12), 17 (01-24 @ 07:09)  Cr: 0.62 (01-26 @ 21:26), 0.76 (01-26 @ 05:42), 0.81 (01-25 @ 21:47), 1.03 (01-25 @ 11:13), 1.01 (01-25 @ 05:34), 1.06 (01-25 @ 01:29), 0.68 (01-25 @ 00:06), 1.05 (01-24 @ 18:27), 0.86 (01-24 @ 08:12), 0.87 (01-24 @ 07:09)  Glu: 140(01-26 @ 21:26), 201(01-26 @ 05:42), 137(01-25 @ 21:47), 134(01-25 @ 11:13), 141(01-25 @ 05:34), 137(01-25 @ 01:29), 95(01-25 @ 00:06), 155(01-24 @ 18:27), 210(01-24 @ 08:12), 185(01-24 @ 07:09)    Hgb: 13.1 (01-25 @ 21:47), 14.8 (01-25 @ 05:34), 15.8 (01-24 @ 18:27), 18.2 (01-24 @ 08:12)  Hct: 38.7 (01-25 @ 21:47), 42.7 (01-25 @ 05:34), 44.8 (01-24 @ 18:27), 52.3 (01-24 @ 08:12)  WBC: 19.1 (01-25 @ 21:47), 18.5 (01-25 @ 05:34), 20.6 (01-24 @ 18:27), 23.6 (01-24 @ 08:12)  Plt: 162 (01-25 @ 21:47), 174 (01-25 @ 05:34), 224 (01-24 @ 18:27), 221 (01-24 @ 08:12)    INR: 0.96 01-24-18 @ 08:12  PTT: 25.8 01-24-18 @ 08:12    IMAGING:   Recent imaging studies were reviewed.    MEDICATIONS:  ALBUTerol    90 MICROgram(s) HFA Inhaler 1 Puff(s) Inhalation every 4 hours PRN  chlorhexidine 0.12% Liquid 15 milliLiter(s) Swish and Spit three times a day  dexmedetomidine Infusion 0.2 MICROgram(s)/kG/Hr IV Continuous <Continuous>  enoxaparin Injectable 30 milliGRAM(s) SubCutaneous <User Schedule>  hydrALAZINE 25 milliGRAM(s) Oral three times a day  levETIRAcetam  IVPB 1000 milliGRAM(s) IV Intermittent every 12 hours  nicotine - 21 mG/24Hr(s) Patch 1 patch Transdermal daily  niMODipine Suspension 30 milliGRAM(s) Enteral Tube every 2 hours  pantoprazole  Injectable 40 milliGRAM(s) IV Push daily  sodium chloride 0.9%. 1000 milliLiter(s) IV Continuous <Continuous>      EXAMINATION:  Gen: NAD, AAOx0, intubated, sedated  Head: NCAT  HEENT: 2mm and reactive, oral mucosa moist, normal conjunctiva  Lung: CTAB, no respiratory distress, intubated  CV: rrr, no murmurs, Normal perfusion  Abd: soft, NTND, no CVA tenderness  MSK: No edema, no visible deformities  Neuro: pupils 2mm and reactive, does not open eyes to noxious stimuli, not following commands, corneals B/L ; RUE extends pain, LUE 3/5 RLE TF / LLE 3/5 HPI:  63y/o female brought in to [facility] ED s/p fall at home in the bathroom. Unable to obtain HPI, history, or ROS from patient due to her altered mental status. As per  patient woke up this morning feeling "unwell," vomited once with some dry heaves at 9am, then had slurred speech during the day starting at 10am. She spent most of the day in bed, and was able to have some tea and crackers. She then had a fall in the bathroom & hit the right side of her head. Level I trauma called as patient's altered mental status was initially concerning for potential need for intubation. In trauma bay primary survey significant for GCS of 11 (E=4 V =2 M=5). Secondary survey significant for Right frontal hematoma.   L MCA infarct with distal narrowing of vessel on angio on L SHANE , MCA and PCA territory---> etilogy unclear   1/25: C-collar removed after negative MRIx     OVERNIGHT EVENTS:   1/25: 5-6 seizures, started on keppra and received 2mg ativan on cont EEG   1/27: No further seizures as per VEEG      VITALS:  T(C): , Max: 38 (01-26-18 @ 23:00)  HR:  (55 - 115)  BP:  (138/82 - 194/91)  ABP: --  RR:  (13 - 25)  SpO2:  (98% - 100%)  Wt(kg): --  Device: Avea, Mode: AC/ CMV (Assist Control/ Continuous Mandatory Ventilation), RR (machine): 12, TV (machine): 400, FiO2: 30, PEEP: 5, ITime: 1, MAP: 6, PIP: 10    01-26-18 @ 07:01  -  01-27-18 @ 07:00  --------------------------------------------------------  IN: 2295.4 mL / OUT: 590 mL / NET: 1705.4 mL      LABS:  Na: 144 (01-26 @ 21:26), 146 (01-26 @ 05:42), 147 (01-25 @ 21:47), 146 (01-25 @ 11:13), 142 (01-25 @ 05:34), 141 (01-25 @ 01:29), 91 (01-25 @ 00:06), 136 (01-24 @ 18:27), 132 (01-24 @ 08:12), 133 (01-24 @ 07:09)  K: 3.1 (01-26 @ 21:26), 3.7 (01-26 @ 05:42), 4.3 (01-25 @ 21:47), 4.4 (01-25 @ 11:13), 4.4 (01-25 @ 05:34), 4.2 (01-25 @ 01:29), >9.0 (01-25 @ 00:06), 3.2 (01-24 @ 18:27), 3.5 (01-24 @ 08:12), 5.0 (01-24 @ 07:09)  Cl: 113 (01-26 @ 21:26), 115 (01-26 @ 05:42), 111 (01-25 @ 21:47), 109 (01-25 @ 11:13), 103 (01-25 @ 05:34), 104 (01-25 @ 01:29), 98 (01-25 @ 00:06), 95 (01-24 @ 18:27), 92 (01-24 @ 08:12), 102 (01-24 @ 07:09)  CO2: 24 (01-26 @ 21:26), 21 (01-26 @ 05:42), 22 (01-25 @ 21:47), 25 (01-25 @ 11:13), 24 (01-25 @ 05:34), 22 (01-25 @ 01:29), 17 (01-25 @ 00:06), 21 (01-24 @ 18:27), 17 (01-24 @ 08:12), 24 (01-24 @ 07:09)  BUN: 17 (01-26 @ 21:26), 27 (01-26 @ 05:42), 24 (01-25 @ 21:47), 26 (01-25 @ 11:13), 23 (01-25 @ 05:34), 23 (01-25 @ 01:29), 16 (01-25 @ 00:06), 23 (01-24 @ 18:27), 20 (01-24 @ 08:12), 17 (01-24 @ 07:09)  Cr: 0.62 (01-26 @ 21:26), 0.76 (01-26 @ 05:42), 0.81 (01-25 @ 21:47), 1.03 (01-25 @ 11:13), 1.01 (01-25 @ 05:34), 1.06 (01-25 @ 01:29), 0.68 (01-25 @ 00:06), 1.05 (01-24 @ 18:27), 0.86 (01-24 @ 08:12), 0.87 (01-24 @ 07:09)  Glu: 140(01-26 @ 21:26), 201(01-26 @ 05:42), 137(01-25 @ 21:47), 134(01-25 @ 11:13), 141(01-25 @ 05:34), 137(01-25 @ 01:29), 95(01-25 @ 00:06), 155(01-24 @ 18:27), 210(01-24 @ 08:12), 185(01-24 @ 07:09)    Hgb: 13.1 (01-25 @ 21:47), 14.8 (01-25 @ 05:34), 15.8 (01-24 @ 18:27), 18.2 (01-24 @ 08:12)  Hct: 38.7 (01-25 @ 21:47), 42.7 (01-25 @ 05:34), 44.8 (01-24 @ 18:27), 52.3 (01-24 @ 08:12)  WBC: 19.1 (01-25 @ 21:47), 18.5 (01-25 @ 05:34), 20.6 (01-24 @ 18:27), 23.6 (01-24 @ 08:12)  Plt: 162 (01-25 @ 21:47), 174 (01-25 @ 05:34), 224 (01-24 @ 18:27), 221 (01-24 @ 08:12)    INR: 0.96 01-24-18 @ 08:12  PTT: 25.8 01-24-18 @ 08:12    IMAGING:   Recent imaging studies were reviewed.    MEDICATIONS:  ALBUTerol    90 MICROgram(s) HFA Inhaler 1 Puff(s) Inhalation every 4 hours PRN  chlorhexidine 0.12% Liquid 15 milliLiter(s) Swish and Spit three times a day  dexmedetomidine Infusion 0.2 MICROgram(s)/kG/Hr IV Continuous <Continuous>  enoxaparin Injectable 30 milliGRAM(s) SubCutaneous <User Schedule>  hydrALAZINE 25 milliGRAM(s) Oral three times a day  levETIRAcetam  IVPB 1000 milliGRAM(s) IV Intermittent every 12 hours  nicotine - 21 mG/24Hr(s) Patch 1 patch Transdermal daily  niMODipine Suspension 30 milliGRAM(s) Enteral Tube every 2 hours  pantoprazole  Injectable 40 milliGRAM(s) IV Push daily  sodium chloride 0.9%. 1000 milliLiter(s) IV Continuous <Continuous>      EXAMINATION:  Gen: NAD, AAOx0, intubated, sedated  Head: NCAT  HEENT: 2mm and reactive, oral mucosa moist, normal conjunctiva  Lung: CTAB, no respiratory distress, intubated  CV: rrr, no murmurs, Normal perfusion  Abd: soft, NTND, no CVA tenderness  MSK: No edema, no visible deformities  Neuro: pupils 2mm and reactive, does not open eyes to noxious stimuli, not following commands, corneals B/L ; RUE extends pain, LUE 3/5 RLE TF / LLE 3/5 HPI:  61y/o female brought in to [facility] ED s/p fall at home in the bathroom. Unable to obtain HPI, history, or ROS from patient due to her altered mental status. As per  patient woke up this morning feeling "unwell," vomited once with some dry heaves at 9am, then had slurred speech during the day starting at 10am. She spent most of the day in bed, and was able to have some tea and crackers. She then had a fall in the bathroom & hit the right side of her head. Level I trauma called as patient's altered mental status was initially concerning for potential need for intubation. In trauma bay primary survey significant for GCS of 11 (E=4 V =2 M=5). Secondary survey significant for Right frontal hematoma.   L MCA infarct with distal narrowing of vessel on angio on L SHANE , MCA and PCA territory---> etilogy unclear   1/25: C-collar removed after negative MRIx   1/25: 5-6 seizures, started on keppra and received 2mg ativan on cont EEG   1/27: No further seizures as per VEEG    VITALS:  T(C): , Max: 38 (01-26-18 @ 23:00)  HR:  (55 - 115)  BP:  (138/82 - 194/91)  RR:  (13 - 25)  SpO2:  (98% - 100%)  Wt(kg): --  Device: Avea, Mode: AC/ CMV (Assist Control/ Continuous Mandatory Ventilation), RR (machine): 12, TV (machine): 400, FiO2: 30, PEEP: 5, ITime: 1, MAP: 6, PIP: 10    01-26-18 @ 07:01  -  01-27-18 @ 07:00  --------------------------------------------------------  IN: 2295.4 mL / OUT: 590 mL / NET: 1705.4 mL      LABS:  Na: 144 (01-26 @ 21:26), 146 (01-26 @ 05:42), 147 (01-25 @ 21:47), 146 (01-25 @ 11:13), 142 (01-25 @ 05:34), 141 (01-25 @ 01:29), 91 (01-25 @ 00:06), 136 (01-24 @ 18:27), 132 (01-24 @ 08:12), 133 (01-24 @ 07:09)  K: 3.1 (01-26 @ 21:26), 3.7 (01-26 @ 05:42), 4.3 (01-25 @ 21:47), 4.4 (01-25 @ 11:13), 4.4 (01-25 @ 05:34), 4.2 (01-25 @ 01:29), >9.0 (01-25 @ 00:06), 3.2 (01-24 @ 18:27), 3.5 (01-24 @ 08:12), 5.0 (01-24 @ 07:09)  Cl: 113 (01-26 @ 21:26), 115 (01-26 @ 05:42), 111 (01-25 @ 21:47), 109 (01-25 @ 11:13), 103 (01-25 @ 05:34), 104 (01-25 @ 01:29), 98 (01-25 @ 00:06), 95 (01-24 @ 18:27), 92 (01-24 @ 08:12), 102 (01-24 @ 07:09)  CO2: 24 (01-26 @ 21:26), 21 (01-26 @ 05:42), 22 (01-25 @ 21:47), 25 (01-25 @ 11:13), 24 (01-25 @ 05:34), 22 (01-25 @ 01:29), 17 (01-25 @ 00:06), 21 (01-24 @ 18:27), 17 (01-24 @ 08:12), 24 (01-24 @ 07:09)  BUN: 17 (01-26 @ 21:26), 27 (01-26 @ 05:42), 24 (01-25 @ 21:47), 26 (01-25 @ 11:13), 23 (01-25 @ 05:34), 23 (01-25 @ 01:29), 16 (01-25 @ 00:06), 23 (01-24 @ 18:27), 20 (01-24 @ 08:12), 17 (01-24 @ 07:09)  Cr: 0.62 (01-26 @ 21:26), 0.76 (01-26 @ 05:42), 0.81 (01-25 @ 21:47), 1.03 (01-25 @ 11:13), 1.01 (01-25 @ 05:34), 1.06 (01-25 @ 01:29), 0.68 (01-25 @ 00:06), 1.05 (01-24 @ 18:27), 0.86 (01-24 @ 08:12), 0.87 (01-24 @ 07:09)  Glu: 140(01-26 @ 21:26), 201(01-26 @ 05:42), 137(01-25 @ 21:47), 134(01-25 @ 11:13), 141(01-25 @ 05:34), 137(01-25 @ 01:29), 95(01-25 @ 00:06), 155(01-24 @ 18:27), 210(01-24 @ 08:12), 185(01-24 @ 07:09)    Hgb: 13.1 (01-25 @ 21:47), 14.8 (01-25 @ 05:34), 15.8 (01-24 @ 18:27), 18.2 (01-24 @ 08:12)  Hct: 38.7 (01-25 @ 21:47), 42.7 (01-25 @ 05:34), 44.8 (01-24 @ 18:27), 52.3 (01-24 @ 08:12)  WBC: 19.1 (01-25 @ 21:47), 18.5 (01-25 @ 05:34), 20.6 (01-24 @ 18:27), 23.6 (01-24 @ 08:12)  Plt: 162 (01-25 @ 21:47), 174 (01-25 @ 05:34), 224 (01-24 @ 18:27), 221 (01-24 @ 08:12)    INR: 0.96 01-24-18 @ 08:12  PTT: 25.8 01-24-18 @ 08:12    CSF - W- 3; RBC- 2333; HSV - neg ;  VZV - P HIV - neg ; REZA - 1:160; PANCA and ANACA- neg ; RF - nl ; ESR - 12; CRP - 1.1       IMAGING: CXR - clear hyperexpanded lungs   .    MEDICATIONS:  ALBUTerol    90 MICROgram(s) HFA Inhaler 1 Puff(s) Inhalation every 4 hours PRN  chlorhexidine 0.12% Liquid 15 milliLiter(s) Swish and Spit three times a day  dexmedetomidine Infusion 0.2 MICROgram(s)/kG/Hr IV Continuous <Continuous> RASS 0 to -1   enoxaparin Injectable 30 milliGRAM(s) SubCutaneous <User Schedule>  hydrALAZINE 25 milliGRAM(s) Oral three times a day  levETIRAcetam  IVPB 1000 milliGRAM(s) IV Intermittent every 12 hours  nicotine - 21 mG/24Hr(s) Patch 1 patch Transdermal daily  niMODipine Suspension 30 milliGRAM(s) Enteral Tube every 2 hours  pantoprazole  Injectable 40 milliGRAM(s) IV Push daily  sodium chloride 0.9%. 1000 milliLiter(s) IV Continuous <Continuous>      EXAMINATION:  Gen: NAD, AAOx0, intubated, RASS- -1  Head:  Pupils 2mm and equal B/L ; L gaze preference unable to cross midline; spont opens eyes  Neuro: following  simple commands in LUE , corneals B/L ; RUE flexure movement to noxious , LUE 3/5 RLE TF / LLE spontaneous antigravity   Oral mucosa moist, normal conjunctiva  Lung: CTAB, no respiratory distress, intubated  CV: rrr, no murmurs, Normal perfusion  Abd: soft, NTND, no CVA tenderness  MSK: No edema, no visible deformities  Neuro: pupils 2mm and reactive, does not open eyes to noxious stimuli, not following commands, corneals B/L ; RUE extends pain, LUE 3/5 RLE TF / LLE 3/5

## 2018-01-27 NOTE — EEG REPORT - NS EEG TEXT BOX
Study Date: 1/26/2018	- 1/27/2018	      History:  -concern for seizures    Study Interpretation:    FINDINGS:  The background was continuous, mixed delta and theta activity.  Some diffuse fast activity present.  Rudimentary PDR to 8-9Hz, but more attenuated over the left.  Predominance of irregular delta activity over the left hemisphere, maximum in the left frontotemporal region.  Continuous Slowing, Generalized also present.    Sleep Background:  Stage II sleep transients were not recorded.    Interictal Epileptiform Activity:   Frequent, broad left temporal sharp waves intermittently, max T7 P7 > F7   Low amplitude PLEDs, Regional, Left Occipital, Maximum O1 near 1hz    Ictal Epileptiform Activity or Events:  Clinical seizures: No clinical signs  EEG seizures: none    Activation Procedures:   Hyperventilation was not performed.    Photic stimulation was not performed.    Artifacts:  Intermittent myogenic and movement artifacts were noted.    ECG:  The heart rate on single channel ECG was predominantly between 60-70 BPM.    EEG Classification / Summary:  Abnormal EEG in the lethargic state:  -left occipital PLEDs  -frequent left temporal sharp waves  -multifocal slowing, persistently worse in the left hemisphere with associated attenuation.    Clinical Impression:  Findings indicate moderate multifocal cerebral dysfunction, though persistently worse in the left hemisphere with associated risk of seizures from the left temporal and occipital regions.  No further seizures.     Alex Izaguirre MD  Attending Physician, [facility]

## 2018-01-27 NOTE — PROGRESS NOTE ADULT - SUBJECTIVE AND OBJECTIVE BOX
SUMMARY:    Daytime Events: No significant events from today    T(C): 37.3 (01-27-18 @ 19:00), Max: 38 (01-26-18 @ 23:00)  HR: 79 (01-27-18 @ 20:45) (67 - 97)  BP: 162/77 (01-27-18 @ 19:00) (140/74 - 254/100)  RR: 16 (01-27-18 @ 19:00) (12 - 19)  SpO2: 98% (01-27-18 @ 20:45) (96% - 100%)  Wt(kg): --    Physical Exam:  EXAMINATION:  intubated; Eyes ope; pupils=; left gaze pref  will follow simnple one step commands on left hand    IMAGING/DATA: [] Reviewed    ALBUTerol    90 MICROgram(s) HFA Inhaler 1 Puff(s) Inhalation every 4 hours PRN  atorvastatin 40 milliGRAM(s) Oral at bedtime  chlorhexidine 0.12% Liquid 15 milliLiter(s) Swish and Spit three times a day  dexmedetomidine Infusion 0.2 MICROgram(s)/kG/Hr IV Continuous <Continuous>  docusate sodium Liquid 100 milliGRAM(s) Oral three times a day  enoxaparin Injectable 30 milliGRAM(s) SubCutaneous <User Schedule>  fentaNYL    Injectable 25 MICROGram(s) IV Push every 3 hours PRN  hydrALAZINE 50 milliGRAM(s) Oral three times a day  levETIRAcetam  IVPB 1000 milliGRAM(s) IV Intermittent every 12 hours  nicotine - 21 mG/24Hr(s) Patch 1 patch Transdermal daily  niMODipine Suspension 30 milliGRAM(s) Enteral Tube every 2 hours  pantoprazole  Injectable 40 milliGRAM(s) IV Push daily  polyethylene glycol 3350 17 Gram(s) Oral two times a day  senna Syrup 10 milliLiter(s) Oral at bedtime  sodium chloride 0.9%. 1000 milliLiter(s) IV Continuous <Continuous>                            13.1   19.1  )-----------( 162      ( 25 Jan 2018 21:47 )             38.7   CARDIAC MARKERS ( 26 Jan 2018 05:42 )  x     / 0.07 ng/mL / 288 U/L / x     / 5.1 ng/mL  CARDIAC MARKERS ( 25 Jan 2018 21:47 )  x     / 0.08 ng/mL / 479 U/L / x     / 7.0 ng/mL    01-26    144  |  113<H>  |  17  ----------------------------<  140<H>  3.1<L>   |  24  |  0.62    Ca    7.9<L>      26 Jan 2018 21:26  Phos  1.2     01-26  Mg     1.7     01-26        DEVICES:   [] Restraints [] ET tube [] central line [] arterial line [] calderon [] NGT/OGT [] EVD [] LD [] LEOLA/HMV [] Trach [] PEG [] Chest Tube [] other:

## 2018-01-27 NOTE — PROGRESS NOTE ADULT - ASSESSMENT
ASSESSMENT/PLAN:  TCDs in am; f/u csf studies  cpap today-tolerated for 3hrs  wean vent as tolerated  will increase hydralzine to 100 q8    xPatient is at high risk of neurologic deterioration/death due to: brain compression, IVH, hydrocephalus      Time spent: _40__ x[] critical care minutes

## 2018-01-27 NOTE — PROGRESS NOTE ADULT - SUBJECTIVE AND OBJECTIVE BOX
Patient Seen and Examined.     Overnight Events:   None    T(C): 37.4 (01-27-18 @ 03:00), Max: 38 (01-26-18 @ 23:00)  HR: 73 (01-27-18 @ 04:44) (53 - 115)  BP: 145/68 (01-27-18 @ 04:00) (138/82 - 194/91)  RR: 15 (01-27-18 @ 04:00) (13 - 25)  SpO2: 100% (01-27-18 @ 04:44) (98% - 100%)    Exam:   Intubated, no EO, pupils 2mm react b/l, left gaze preference,   +corneals, +cough/gag, overbreathing vent, no FC,   LUE LOC, LLE spont,   RUE extends, RLE TF    ALBUTerol    90 MICROgram(s) HFA Inhaler 1 Puff(s) Inhalation every 4 hours PRN  chlorhexidine 0.12% Liquid 15 milliLiter(s) Swish and Spit three times a day  dexmedetomidine Infusion 0.2 MICROgram(s)/kG/Hr IV Continuous <Continuous>  enoxaparin Injectable 30 milliGRAM(s) SubCutaneous <User Schedule>  hydrALAZINE 25 milliGRAM(s) Oral three times a day  levETIRAcetam  IVPB 1000 milliGRAM(s) IV Intermittent every 12 hours  nicotine - 21 mG/24Hr(s) Patch 1 patch Transdermal daily  niMODipine Suspension 30 milliGRAM(s) Enteral Tube every 2 hours  pantoprazole  Injectable 40 milliGRAM(s) IV Push daily  sodium chloride 0.9%. 1000 milliLiter(s) IV Continuous <Continuous>                            13.1   19.1  )-----------( 162      ( 25 Jan 2018 21:47 )             38.7     01-26    144  |  113<H>  |  17  ----------------------------<  140<H>  3.1<L>   |  24  |  0.62    Ca    7.9<L>      26 Jan 2018 21:26  Phos  1.2     01-26  Mg     1.7     01-26          Imaging:

## 2018-01-27 NOTE — CHART NOTE - NSCHARTNOTEFT_GEN_A_CORE
Transcranial doppler exam # 1 (1/25/18) 12pm  mean velocity  cm/sec                              Left         Right  SHANE                   55            48  MCA                  59            45  PCA                   24,88       28,29   Full report to follow.  Makenna    Transcranial doppler exam # 2 (1/27/18) 1045am  mean velocity  cm/sec                              Left         Right  SHNAE                   59           63  MCA                  62           59   PCA                   P2-49       P2-41  VERT                  43            52  BA                              53  Full report to follow.  Makenna

## 2018-01-27 NOTE — PROGRESS NOTE ADULT - ASSESSMENT
ASSESSMENT/PLAN:  PT S/P fall with worsening mental status - 4 th vent hemorrhage with hydrocephalus; L Ant circulatio vasular spasm MCA ; SHANE / Call Betty vs PCNS      NEURO: checks q 1 hr; maintain EVD; Angio shows no AVM; monitor Hydro and ICP; f/u MR results - trial of nimodipine 30mg  q2;   Vasculitis W/U  Sent CSF for HSV  HIV negative, f/u syphilis titer/ Check HSV titer   ESR -nl;     seizures o/n, cont keppra  f/u EEG  Stroke core measures     Activity:  [X] Bedrest     PULM: Likely COPD   Broncholdilators albuterol prn q 4 hrs   PRVC 350 TV ; rate 14 ; FIO2 100% b; PEEP- 5   Smoker Nicotine patch 21mg q day     CV:  SBP goal- -< 180  EKG - T wave inv infero l;ateral leads ; increaesed P wave   Check troponin  Check ECHO     RENAL:  Fluids: IVL - total fluids at 60 cc/hr   GI:  Diet: NPO for now   GI prophylaxis  [X] PPI [] other:- glycerna 60 cc/hr   Bowel regimen [] colace [X] senna [] other:    ENDO:   Goal euglycemia (-180)    HEME/ONC: Coags NL with Platelets   VTE prophylaxis: [X] SCDs [X] chemoprophylaxis- lovenox 30 mg qhs    Patient at high risk for DVT due to immobility - yet with acute bleed will need to hold chemoprophylaxsis for now.     ID: afebrile    MISC: family to be informed     CODE STATUS:  X[] Full Code    Time spent 40 min     Pt remains criticall ill and at risk of swelling and herniation ASSESSMENT/PLAN:  PT S/P fall with worsening mental status - 4 th vent hemorrhage with hydrocephalus; L Ant circulatio vasular spasm MCA ; SHANE / Call Betty vs PCNS      NEURO: checks q 1 hr; maintain EVD; Angio shows no AVM; monitor Hydro and ICP; - nimodipine 30mg  q2;  EEG  further no seizures   Vasculitis W/U  Sent CSF for HSV  HIV negative, f/u syphilis titer  ESR -nl;     seizures o/n, cont keppra    Stroke core measures     Activity:  [X] Bedrest     PULM: Likely COPD   Broncholdilators albuterol prn q 4 hrs   PRVC 350 TV ; rate 14 ; FIO2 100% b; PEEP- 5   Smoker Nicotine patch 21mg q day     CV:  SBP goal- -< 180  EKG - T wave inv infero l;ateral leads ; increaesed P wave   Check troponin  Check ECHO     RENAL:  Fluids: IVL - total fluids at 60 cc/hr   GI:  Diet: NPO for now   GI prophylaxis  [X] PPI [] other:- glycerna 60 cc/hr   Bowel regimen [] colace [X] senna [] other:    ENDO:   Goal euglycemia (-180)    HEME/ONC: Coags NL with Platelets   VTE prophylaxis: [X] SCDs [X] chemoprophylaxis- lovenox 30 mg qhs    Patient at high risk for DVT due to immobility - yet with acute bleed will need to hold chemoprophylaxsis for now.     ID: afebrile    MISC: family to be informed     CODE STATUS:  X[] Full Code    Time spent 40 min     Pt remains criticall ill and at risk of swelling and herniation ASSESSMENT/PLAN:  PT S/P fall with worsening mental status - 4 th vent hemorrhage with hydrocephalus; L Ant circulatio vasular spasm MCA ; SHAEN / Call Betty vs PCNS      NEURO: checks q 1 hr; maintain EVD; Angio shows no AVM; monitor Hydro and ICP; - nimodipine 30mg  q2;  EEG  further no seizures   Vasculitis W/U  Sent CSF for HSV  HIV negative, f/u syphilis titer  ESR -nl;     seizures o/n, cont keppra    Stroke core measures     Activity:  [X] Bedrest     PULM: Likely COPD   Broncholdilators albuterol prn q 4 hrs   PRVC 350 TV ; rate 14 ; FIO2 100% b; PEEP- 5   Smoker Nicotine patch 21mg q day     CV:  SBP goal- -< 180  EKG - T wave inv infero l;ateral leads ; increaesed P wave   Check troponin  Check ECHO     RENAL:  Fluids: IVL - total fluids at 60 cc/hr   GI:  Diet: TF glycerna  GI prophylaxis  [X] PPI [] other:- glycerna 60 cc/hr   Bowel regimen [] colace [X] senna [] other:    ENDO:   Goal euglycemia (-180)    HEME/ONC: Coags NL with Platelets   VTE prophylaxis: [X] SCDs [X] chemoprophylaxis- lovenox 30 mg qhs    Patient at high risk for DVT due to immobility - yet with acute bleed will need to hold chemoprophylaxsis for now.     ID: afebrile    MISC: family to be informed     CODE STATUS:  X[] Full Code    Time spent 40 min     Pt remains criticall ill and at risk of swelling and herniation ASSESSMENT/PLAN:  PT S/P fall with worsening mental status - 4 th vent hemorrhage with hydrocephalus; L Ant circulatio vasular spasm MCA ; SHANE / Etilogy of L vasular narrowing unclear ; Subclinical seizures;  Not likely CNS vasculitis on imaging and hematologic W/U and CSF counts     NEURO: checks q 2 hr; maintain EVD 10; EEG  further no seizures d/c VEEG - Keppra 1g q 12; Vasculitis W/U negative; Sent CSF for VZV  HIV negative, f/u syphilis titer  monitor ICP; - nimodipine 30mg  q2 for VSP   TCD today    ESR -nl;     Stroke core measures - HMGA1C - 5.4/ LDL- 122  Activity:  [X] Bedrest     PULM: Likely COPD   Broncholdilators albuterol prn q 4 hrs   PRVC 350 TV ; rate 14 ; FIO2 100% b; PEEP- 5  Smoker Nicotine patch 21mg q day - need taper one week     CV: increase Hydralazine to 50 mg TID  SBP goal- -< 180  EKG - T wave inv infero l;ateral leads ; increaesed P wave    troponin- improving 0.07   ECHO - LVH with EF - 75 %     RENAL:  Fluids: IVL - total fluids at 20 cc/hr TKO   GI:  no BM start Miralax   Diet: TF glycerna- 60 cc/hr   GI prophylaxis  [X] PPI [] other:-   Bowel regimen [] colace [X] senna X[] other: miralax     ENDO:   Goal euglycemia (-180)    HEME/ONC: Coags NL with Platelets   VTE prophylaxis: [X] SCDs [X] chemoprophylaxis- lovenox 30 mg qhs    Patient at high risk for DVT due to immobility - yet with acute bleed will need to hold chemoprophylaxsis for now.     ID: afebrile    MISC: family  informed     CODE STATUS:  X[] Full Code    Time spent 40 min     Pt remains criticall ill and at risk of swelling and herniation

## 2018-01-27 NOTE — PROGRESS NOTE ADULT - ASSESSMENT
62F s/p fall with 4th vent IVH, angio shows abnormal vessels without clear vascular malformation, s/p EVD. Course complicated by L hemisphereic sz.     F/u CSF VDRL / VZV / Herpes PCR  VEEG in progress  CSF studies for malignancy-p  1/25 Pancx (TMax 38.7)- P

## 2018-01-28 LAB
ANION GAP SERPL CALC-SCNC: 13 MMOL/L — SIGNIFICANT CHANGE UP (ref 5–17)
ANION GAP SERPL CALC-SCNC: 15 MMOL/L — SIGNIFICANT CHANGE UP (ref 5–17)
ANION GAP SERPL CALC-SCNC: 16 MMOL/L — SIGNIFICANT CHANGE UP (ref 5–17)
APTT BLD: 30.7 SEC — SIGNIFICANT CHANGE UP (ref 27.5–37.4)
BASE EXCESS BLDA CALC-SCNC: 3.2 MMOL/L — HIGH (ref -2–2)
BLD GP AB SCN SERPL QL: NEGATIVE — SIGNIFICANT CHANGE UP
BUN SERPL-MCNC: 14 MG/DL — SIGNIFICANT CHANGE UP (ref 7–23)
BUN SERPL-MCNC: 15 MG/DL — SIGNIFICANT CHANGE UP (ref 7–23)
BUN SERPL-MCNC: 19 MG/DL — SIGNIFICANT CHANGE UP (ref 7–23)
CALCIUM SERPL-MCNC: 9.5 MG/DL — SIGNIFICANT CHANGE UP (ref 8.4–10.5)
CALCIUM SERPL-MCNC: 9.5 MG/DL — SIGNIFICANT CHANGE UP (ref 8.4–10.5)
CALCIUM SERPL-MCNC: 9.6 MG/DL — SIGNIFICANT CHANGE UP (ref 8.4–10.5)
CHLORIDE SERPL-SCNC: 101 MMOL/L — SIGNIFICANT CHANGE UP (ref 96–108)
CHLORIDE SERPL-SCNC: 102 MMOL/L — SIGNIFICANT CHANGE UP (ref 96–108)
CHLORIDE SERPL-SCNC: 103 MMOL/L — SIGNIFICANT CHANGE UP (ref 96–108)
CO2 BLDA-SCNC: 27 MMOL/L — SIGNIFICANT CHANGE UP (ref 22–30)
CO2 SERPL-SCNC: 25 MMOL/L — SIGNIFICANT CHANGE UP (ref 22–31)
CO2 SERPL-SCNC: 26 MMOL/L — SIGNIFICANT CHANGE UP (ref 22–31)
CO2 SERPL-SCNC: 27 MMOL/L — SIGNIFICANT CHANGE UP (ref 22–31)
CREAT SERPL-MCNC: 0.54 MG/DL — SIGNIFICANT CHANGE UP (ref 0.5–1.3)
CREAT SERPL-MCNC: 0.63 MG/DL — SIGNIFICANT CHANGE UP (ref 0.5–1.3)
CREAT SERPL-MCNC: 0.65 MG/DL — SIGNIFICANT CHANGE UP (ref 0.5–1.3)
GAS PNL BLDA: SIGNIFICANT CHANGE UP
GAS PNL BLDA: SIGNIFICANT CHANGE UP
GLUCOSE SERPL-MCNC: 122 MG/DL — HIGH (ref 70–99)
GLUCOSE SERPL-MCNC: 143 MG/DL — HIGH (ref 70–99)
GLUCOSE SERPL-MCNC: 184 MG/DL — HIGH (ref 70–99)
HCO3 BLDA-SCNC: 26 MMOL/L — SIGNIFICANT CHANGE UP (ref 21–29)
HCT VFR BLD CALC: 44.6 % — SIGNIFICANT CHANGE UP (ref 34.5–45)
HGB BLD-MCNC: 15.2 G/DL — SIGNIFICANT CHANGE UP (ref 11.5–15.5)
HOROWITZ INDEX BLDA+IHG-RTO: 30 — SIGNIFICANT CHANGE UP
INR BLD: 1.11 RATIO — SIGNIFICANT CHANGE UP (ref 0.88–1.16)
MAGNESIUM SERPL-MCNC: 2.1 MG/DL — SIGNIFICANT CHANGE UP (ref 1.6–2.6)
MAGNESIUM SERPL-MCNC: 2.6 MG/DL — SIGNIFICANT CHANGE UP (ref 1.6–2.6)
MAGNESIUM SERPL-MCNC: 2.7 MG/DL — HIGH (ref 1.6–2.6)
MCHC RBC-ENTMCNC: 34.1 GM/DL — SIGNIFICANT CHANGE UP (ref 32–36)
MCHC RBC-ENTMCNC: 36.2 PG — HIGH (ref 27–34)
MCV RBC AUTO: 106 FL — HIGH (ref 80–100)
PCO2 BLDA: 35 MMHG — SIGNIFICANT CHANGE UP (ref 32–46)
PH BLDA: 7.48 — HIGH (ref 7.35–7.45)
PHOSPHATE SERPL-MCNC: 3 MG/DL — SIGNIFICANT CHANGE UP (ref 2.5–4.5)
PHOSPHATE SERPL-MCNC: 3.5 MG/DL — SIGNIFICANT CHANGE UP (ref 2.5–4.5)
PHOSPHATE SERPL-MCNC: 4.1 MG/DL — SIGNIFICANT CHANGE UP (ref 2.5–4.5)
PLATELET # BLD AUTO: 226 K/UL — SIGNIFICANT CHANGE UP (ref 150–400)
PO2 BLDA: 103 MMHG — SIGNIFICANT CHANGE UP (ref 74–108)
POTASSIUM SERPL-MCNC: 3.7 MMOL/L — SIGNIFICANT CHANGE UP (ref 3.5–5.3)
POTASSIUM SERPL-MCNC: 4.7 MMOL/L — SIGNIFICANT CHANGE UP (ref 3.5–5.3)
POTASSIUM SERPL-MCNC: 6.6 MMOL/L — CRITICAL HIGH (ref 3.5–5.3)
POTASSIUM SERPL-SCNC: 3.7 MMOL/L — SIGNIFICANT CHANGE UP (ref 3.5–5.3)
POTASSIUM SERPL-SCNC: 4.7 MMOL/L — SIGNIFICANT CHANGE UP (ref 3.5–5.3)
POTASSIUM SERPL-SCNC: 6.6 MMOL/L — CRITICAL HIGH (ref 3.5–5.3)
PROTHROM AB SERPL-ACNC: 12.1 SEC — SIGNIFICANT CHANGE UP (ref 9.8–12.7)
RBC # BLD: 4.2 M/UL — SIGNIFICANT CHANGE UP (ref 3.8–5.2)
RBC # FLD: 12.7 % — SIGNIFICANT CHANGE UP (ref 10.3–14.5)
RH IG SCN BLD-IMP: POSITIVE — SIGNIFICANT CHANGE UP
SAO2 % BLDA: 98 % — HIGH (ref 92–96)
SODIUM SERPL-SCNC: 142 MMOL/L — SIGNIFICANT CHANGE UP (ref 135–145)
SODIUM SERPL-SCNC: 143 MMOL/L — SIGNIFICANT CHANGE UP (ref 135–145)
SODIUM SERPL-SCNC: 143 MMOL/L — SIGNIFICANT CHANGE UP (ref 135–145)
TROPONIN T SERPL-MCNC: 0.12 NG/ML — HIGH (ref 0–0.06)
WBC # BLD: 18.9 K/UL — HIGH (ref 3.8–10.5)
WBC # FLD AUTO: 18.9 K/UL — HIGH (ref 3.8–10.5)

## 2018-01-28 PROCEDURE — 99292 CRITICAL CARE ADDL 30 MIN: CPT

## 2018-01-28 PROCEDURE — 99291 CRITICAL CARE FIRST HOUR: CPT

## 2018-01-28 PROCEDURE — 71045 X-RAY EXAM CHEST 1 VIEW: CPT | Mod: 26,77

## 2018-01-28 PROCEDURE — 71045 X-RAY EXAM CHEST 1 VIEW: CPT | Mod: 26

## 2018-01-28 PROCEDURE — 95951: CPT | Mod: 26,52

## 2018-01-28 RX ORDER — POTASSIUM CHLORIDE 20 MEQ
10 PACKET (EA) ORAL
Qty: 0 | Refills: 0 | Status: COMPLETED | OUTPATIENT
Start: 2018-01-28 | End: 2018-01-28

## 2018-01-28 RX ORDER — NIMODIPINE 60 MG/10ML
60 SOLUTION ORAL EVERY 4 HOURS
Qty: 0 | Refills: 0 | Status: DISCONTINUED | OUTPATIENT
Start: 2018-01-28 | End: 2018-01-29

## 2018-01-28 RX ORDER — IPRATROPIUM BROMIDE 0.2 MG/ML
500 SOLUTION, NON-ORAL INHALATION EVERY 6 HOURS
Qty: 0 | Refills: 0 | Status: DISCONTINUED | OUTPATIENT
Start: 2018-01-28 | End: 2018-02-01

## 2018-01-28 RX ORDER — OXYCODONE HYDROCHLORIDE 5 MG/1
10 TABLET ORAL EVERY 6 HOURS
Qty: 0 | Refills: 0 | Status: DISCONTINUED | OUTPATIENT
Start: 2018-01-28 | End: 2018-02-03

## 2018-01-28 RX ORDER — POTASSIUM CHLORIDE 20 MEQ
40 PACKET (EA) ORAL ONCE
Qty: 0 | Refills: 0 | Status: COMPLETED | OUTPATIENT
Start: 2018-01-28 | End: 2018-01-28

## 2018-01-28 RX ORDER — POTASSIUM PHOSPHATE, MONOBASIC POTASSIUM PHOSPHATE, DIBASIC 236; 224 MG/ML; MG/ML
15 INJECTION, SOLUTION INTRAVENOUS ONCE
Qty: 0 | Refills: 0 | Status: COMPLETED | OUTPATIENT
Start: 2018-01-28 | End: 2018-01-28

## 2018-01-28 RX ORDER — OXYCODONE HYDROCHLORIDE 5 MG/1
5 TABLET ORAL EVERY 4 HOURS
Qty: 0 | Refills: 0 | Status: DISCONTINUED | OUTPATIENT
Start: 2018-01-28 | End: 2018-02-03

## 2018-01-28 RX ORDER — IPRATROPIUM BROMIDE 0.2 MG/ML
1 SOLUTION, NON-ORAL INHALATION EVERY 6 HOURS
Qty: 0 | Refills: 0 | Status: DISCONTINUED | OUTPATIENT
Start: 2018-01-28 | End: 2018-01-28

## 2018-01-28 RX ORDER — MAGNESIUM SULFATE 500 MG/ML
2 VIAL (ML) INJECTION ONCE
Qty: 0 | Refills: 0 | Status: COMPLETED | OUTPATIENT
Start: 2018-01-28 | End: 2018-01-28

## 2018-01-28 RX ADMIN — NIMODIPINE 30 MILLIGRAM(S): 60 SOLUTION ORAL at 19:52

## 2018-01-28 RX ADMIN — NIMODIPINE 30 MILLIGRAM(S): 60 SOLUTION ORAL at 05:26

## 2018-01-28 RX ADMIN — LEVETIRACETAM 400 MILLIGRAM(S): 250 TABLET, FILM COATED ORAL at 17:43

## 2018-01-28 RX ADMIN — NIMODIPINE 30 MILLIGRAM(S): 60 SOLUTION ORAL at 08:34

## 2018-01-28 RX ADMIN — ENOXAPARIN SODIUM 30 MILLIGRAM(S): 100 INJECTION SUBCUTANEOUS at 17:43

## 2018-01-28 RX ADMIN — POTASSIUM PHOSPHATE, MONOBASIC POTASSIUM PHOSPHATE, DIBASIC 62.5 MILLIMOLE(S): 236; 224 INJECTION, SOLUTION INTRAVENOUS at 00:39

## 2018-01-28 RX ADMIN — NIMODIPINE 30 MILLIGRAM(S): 60 SOLUTION ORAL at 21:07

## 2018-01-28 RX ADMIN — ATORVASTATIN CALCIUM 40 MILLIGRAM(S): 80 TABLET, FILM COATED ORAL at 21:07

## 2018-01-28 RX ADMIN — Medication 100 MILLIEQUIVALENT(S): at 04:00

## 2018-01-28 RX ADMIN — POLYETHYLENE GLYCOL 3350 17 GRAM(S): 17 POWDER, FOR SOLUTION ORAL at 17:43

## 2018-01-28 RX ADMIN — Medication 50 MILLIGRAM(S): at 01:05

## 2018-01-28 RX ADMIN — LEVETIRACETAM 400 MILLIGRAM(S): 250 TABLET, FILM COATED ORAL at 05:26

## 2018-01-28 RX ADMIN — CHLORHEXIDINE GLUCONATE 15 MILLILITER(S): 213 SOLUTION TOPICAL at 21:07

## 2018-01-28 RX ADMIN — NIMODIPINE 30 MILLIGRAM(S): 60 SOLUTION ORAL at 10:46

## 2018-01-28 RX ADMIN — NIMODIPINE 30 MILLIGRAM(S): 60 SOLUTION ORAL at 13:42

## 2018-01-28 RX ADMIN — NIMODIPINE 30 MILLIGRAM(S): 60 SOLUTION ORAL at 15:25

## 2018-01-28 RX ADMIN — NIMODIPINE 30 MILLIGRAM(S): 60 SOLUTION ORAL at 17:43

## 2018-01-28 RX ADMIN — SENNA PLUS 10 MILLILITER(S): 8.6 TABLET ORAL at 21:07

## 2018-01-28 RX ADMIN — FENTANYL CITRATE 25 MICROGRAM(S): 50 INJECTION INTRAVENOUS at 09:00

## 2018-01-28 RX ADMIN — NIMODIPINE 60 MILLIGRAM(S): 60 SOLUTION ORAL at 23:13

## 2018-01-28 RX ADMIN — Medication 100 MILLIGRAM(S): at 05:27

## 2018-01-28 RX ADMIN — CHLORHEXIDINE GLUCONATE 15 MILLILITER(S): 213 SOLUTION TOPICAL at 05:27

## 2018-01-28 RX ADMIN — POLYETHYLENE GLYCOL 3350 17 GRAM(S): 17 POWDER, FOR SOLUTION ORAL at 05:27

## 2018-01-28 RX ADMIN — Medication 50 GRAM(S): at 00:39

## 2018-01-28 RX ADMIN — NIMODIPINE 30 MILLIGRAM(S): 60 SOLUTION ORAL at 04:03

## 2018-01-28 RX ADMIN — Medication 100 MILLIGRAM(S): at 21:07

## 2018-01-28 RX ADMIN — Medication 1 PATCH: at 12:00

## 2018-01-28 RX ADMIN — Medication 40 MILLIEQUIVALENT(S): at 00:39

## 2018-01-28 RX ADMIN — NIMODIPINE 30 MILLIGRAM(S): 60 SOLUTION ORAL at 02:58

## 2018-01-28 RX ADMIN — Medication 100 MILLIGRAM(S): at 13:42

## 2018-01-28 RX ADMIN — NIMODIPINE 30 MILLIGRAM(S): 60 SOLUTION ORAL at 01:06

## 2018-01-28 RX ADMIN — CHLORHEXIDINE GLUCONATE 15 MILLILITER(S): 213 SOLUTION TOPICAL at 13:42

## 2018-01-28 RX ADMIN — Medication 1 PATCH: at 12:31

## 2018-01-28 RX ADMIN — Medication 500 MICROGRAM(S): at 23:44

## 2018-01-28 RX ADMIN — NIMODIPINE 30 MILLIGRAM(S): 60 SOLUTION ORAL at 12:31

## 2018-01-28 RX ADMIN — PANTOPRAZOLE SODIUM 40 MILLIGRAM(S): 20 TABLET, DELAYED RELEASE ORAL at 12:31

## 2018-01-28 RX ADMIN — Medication 100 MILLIEQUIVALENT(S): at 00:39

## 2018-01-28 RX ADMIN — FENTANYL CITRATE 25 MICROGRAM(S): 50 INJECTION INTRAVENOUS at 09:15

## 2018-01-28 RX ADMIN — Medication 100 MILLIEQUIVALENT(S): at 04:01

## 2018-01-28 NOTE — PROGRESS NOTE ADULT - SUBJECTIVE AND OBJECTIVE BOX
SUBJECTIVE:   no complaints   remains intubated     OVERNIGHT EVENTS:     Vital Signs Last 24 Hrs  T(C): 37.3 (27 Jan 2018 23:00), Max: 37.4 (27 Jan 2018 03:00)  T(F): 99.1 (27 Jan 2018 23:00), Max: 99.4 (27 Jan 2018 03:00)  HR: 74 (28 Jan 2018 00:34) (67 - 97)  BP: 168/85 (27 Jan 2018 23:00) (140/74 - 254/100)  BP(mean): 109 (27 Jan 2018 23:00) (89 - 144)  RR: 19 (27 Jan 2018 23:00) (12 - 19)  SpO2: 100% (28 Jan 2018 00:34) (96% - 100%)    PHYSICAL EXAM:    General: No Acute Distress   Intubated     Neurological:   Awake, appears alert   eyes open   pupils 2 mm and reactive   + left gaze preference   spontaneous and antigravity on the left side  plegic on R     EVD @ 10     LABS:                        10.8   15.1  )-----------( 158      ( 27 Jan 2018 21:37 )             31.9    01-27    147<H>  |  115<H>  |  12  ----------------------------<  102<H>  3.2<L>   |  21<L>  |  0.42<L>    Ca    6.7<L>      27 Jan 2018 21:37  Phos  2.2     01-27  Mg     1.5     01-27      Hemoglobin A1C, Whole Blood: 5.4 % (01-24 @ 22:40)      01-26 @ 07:01 - 01-27 @ 07:00  --------------------------------------------------------  IN: 2295.4 mL / OUT: 590 mL / NET: 1705.4 mL    01-27 @ 07:01  -  01-28 @ 01:50  --------------------------------------------------------  IN: 1517.9 mL / OUT: 185 mL / NET: 1332.9 mL      DRAINS:     MEDICATIONS:  Antibiotics:    Neuro:  dexmedetomidine Infusion 0.2 MICROgram(s)/kG/Hr IV Continuous <Continuous>  fentaNYL    Injectable 25 MICROGram(s) IV Push every 3 hours PRN Severe Pain (7 - 10)  levETIRAcetam  IVPB 1000 milliGRAM(s) IV Intermittent every 12 hours    Cardiac:  hydrALAZINE 100 milliGRAM(s) Oral three times a day  niMODipine Suspension 30 milliGRAM(s) Enteral Tube every 2 hours    Pulm:  ALBUTerol    90 MICROgram(s) HFA Inhaler 1 Puff(s) Inhalation every 4 hours PRN Wheezing    GI/:  docusate sodium Liquid 100 milliGRAM(s) Oral three times a day  pantoprazole  Injectable 40 milliGRAM(s) IV Push daily  polyethylene glycol 3350 17 Gram(s) Oral two times a day  senna Syrup 10 milliLiter(s) Oral at bedtime    Other:   atorvastatin 40 milliGRAM(s) Oral at bedtime  chlorhexidine 0.12% Liquid 15 milliLiter(s) Swish and Spit three times a day  enoxaparin Injectable 30 milliGRAM(s) SubCutaneous <User Schedule>  nicotine - 21 mG/24Hr(s) Patch 1 patch Transdermal daily  potassium chloride  10 mEq/100 mL IVPB 10 milliEquivalent(s) IV Intermittent every 1 hour  sodium chloride 0.9%. 1000 milliLiter(s) IV Continuous <Continuous>    DIET: [] Regular [] CCD [] Renal [] Puree [] Dysphagia [] Tube Feeds:     IMAGING:

## 2018-01-28 NOTE — PROGRESS NOTE ADULT - ASSESSMENT
ASSESSMENT/PLAN:  IVH-PBD4; extubated today  pt osorio COPD changes, smoker and occasional desaturations after extubation-will trial NiPPV  pt with scant secretions, cont duonebs  cont EVD; NPO for PEG in am    [] Patient is at high risk of neurologic deterioration/death due to: IVH, hydrocephalus    Time seen:  Time spent: _35__ [x] critical care minutes

## 2018-01-28 NOTE — PROGRESS NOTE ADULT - ASSESSMENT
ASSESSMENT/PLAN:  PT S/P fall with worsening mental status - 4 th vent hemorrhage with hydrocephalus; L Ant circulatio vasular spasm MCA ; SHANE / Etilogy of L vasular narrowing unclear ; Subclinical seizures;  Not likely CNS vasculitis on imaging and hematologic W/U and CSF counts     NEURO: checks q 2 hr; maintain EVD 10; EEG  further no seizures d/c VEEG - Keppra 1g q 12; Vasculitis W/U negative; Sent CSF for VZV  HIV negative, f/u syphilis titer  monitor ICP; - nimodipine 30mg  q2 for VSP   TCD today    ESR -nl;     Stroke core measures - HMGA1C - 5.4/ LDL- 122  Activity:  [X] Bedrest     PULM: Likely COPD   Broncholdilators albuterol prn q 4 hrs   PRVC 350 TV ; rate 14 ; FIO2 100% b; PEEP- 5  Smoker Nicotine patch 21mg q day - need taper one week     CV: increase Hydralazine to 50 mg TID  SBP goal- -< 180  EKG - T wave inv infero l;ateral leads ; increaesed P wave    troponin- improving 0.07   ECHO - LVH with EF - 75 %     RENAL:  Fluids: IVL - total fluids at 20 cc/hr TKO   GI:  no BM start Miralax   Diet: TF glycerna- 60 cc/hr   GI prophylaxis  [X] PPI [] other:-   Bowel regimen [] colace [X] senna X[] other: miralax     ENDO:   Goal euglycemia (-180)    HEME/ONC: Coags NL with Platelets   VTE prophylaxis: [X] SCDs [X] chemoprophylaxis- lovenox 30 mg qhs    Patient at high risk for DVT due to immobility - yet with acute bleed will need to hold chemoprophylaxsis for now.     ID: afebrile    MISC: family  informed     CODE STATUS:  X[] Full Code    Time spent 40 min     Pt remains criticall ill and at risk of swelling and herniation ASSESSMENT/PLAN:  PT S/P fall with worsening mental status - 4 th vent hemorrhage with hydrocephalus; L Ant circulatio vasular spasm MCA ; SHANE / Etilogy of L vasular narrowing unclear ; Subclinical seizures;  Not likely CNS vasculitis on imaging and hematologic W/U and CSF counts     NEURO: checks q 2 hr; maintain EVD 10; EEG  further no seizures d/c VEEG - Keppra 1g q 12; Vasculitis W/U negative; Sent CSF for VZV  HIV negative, VDRL CSF - neg ;  monitor ICP; - nimodipine 30mg  q2 for VSP   TCD today    ESR -nl; Rheum W/U neg     Stroke core measures - HMGA1C - 5.4/ LDL- 122  Activity:  [X] Bedrest     PULM: Likely COPD   Broncholdilators albuterol prn q 4 hrs   PRVC 350 TV ; rate 14 ; FIO2 100% b; PEEP- 5  Smoker Nicotine patch 21mg q day - need taper one week     CV: increase Hydralazine to 50 mg TID  SBP goal- -< 180  EKG - T wave inv infero l;ateral leads ; increaesed P wave    troponin- improving 0.07   ECHO - LVH with EF - 75 %     RENAL:  Fluids: IVL - total fluids at 20 cc/hr TKO   GI:  no BM start Miralax   Diet: TF glycerna- 60 cc/hr   GI prophylaxis  [X] PPI [] other:-   Bowel regimen [] colace [X] senna X[] other: miralax     ENDO:   Goal euglycemia (-180)    HEME/ONC: Coags NL with Platelets   VTE prophylaxis: [X] SCDs [X] chemoprophylaxis- lovenox 30 mg qhs    Patient at high risk for DVT due to immobility - yet with acute bleed will need to hold chemoprophylaxsis for now.     ID: afebrile    MISC: family  informed     CODE STATUS:  X[] Full Code    Time spent 40 min     Pt remains criticall ill and at risk of swelling and herniation

## 2018-01-28 NOTE — PROGRESS NOTE ADULT - SUBJECTIVE AND OBJECTIVE BOX
HPI:  63y/o female brought in to [facility] ED s/p fall at home in the bathroom. Unable to obtain HPI, history, or ROS from patient due to her altered mental status. As per  patient woke up this morning feeling "unwell," vomited once with some dry heaves at 9am, then had slurred speech during the day starting at 10am. She spent most of the day in bed, and was able to have some tea and crackers. She then had a fall in the bathroom & hit the right side of her head. Level I trauma called as patient's altered mental status was initially concerning for potential need for intubation. In trauma bay primary survey significant for GCS of 11 (E=4 V =2 M=5). Secondary survey significant for Right frontal hematoma.   L MCA infarct with distal narrowing of vessel on angio on L SHANE , MCA and PCA territory---> etilogy unclear   1/25: C-collar removed after negative MRIx   1/25: 5-6 seizures, started on keppra and received 2mg ativan on cont EEG   1/27: No further seizures as per VEEG    Overnight Events: Tolerating CPAP early am    ROS: negative [x unable to obtain as patient is comatose/intubated/aphasic []   VITALS:   T(C): 38 (01-28-18 @ 07:00), Max: 38 (01-28-18 @ 07:00)  HR: 89 (01-28-18 @ 07:00) (67 - 104)  BP: 147/100 (01-28-18 @ 07:00) (137/68 - 254/100)  RR: 18 (01-28-18 @ 07:00) (12 - 31)  SpO2: 98% (01-28-18 @ 07:00) (96% - 100%)    01-27-18 @ 07:01  -  01-28-18 @ 07:00  --------------------------------------------------------  IN: 2485.1 mL / OUT: 279 mL / NET: 2206.1 mL    01-28-18 @ 07:01  -  01-28-18 @ 08:28  --------------------------------------------------------  IN: 95 mL / OUT: 1 mL / NET: 94 mL               15.2   18.9  )-----------( 226      ( 28 Jan 2018 05:39 )             44.6     143  |  102  |  15  ----------------------------<  143<H>  4.7   |  26  |  0.65    Ca    9.5      28 Jan 2018 07:19  Phos  3.5     01-28  Mg     2.6     01-28    MEDICATIONS  (STANDING):  atorvastatin 40 milliGRAM(s) Oral at bedtime  chlorhexidine 0.12% Liquid 15 milliLiter(s) Swish and Spit three times a day  dexmedetomidine Infusion 0.2 MICROgram(s)/kG/Hr (2.495 mL/Hr) IV Continuous <Continuous>  enoxaparin Injectable 30 milliGRAM(s) SubCutaneous <User Schedule>  hydrALAZINE 100 milliGRAM(s) Oral three times a day  levETIRAcetam  IVPB 1000 milliGRAM(s) IV Intermittent every 12 hours  nicotine - 21 mG/24Hr(s) Patch 1 patch Transdermal daily  niMODipine Suspension 30 milliGRAM(s) Enteral Tube every 2 hours  pantoprazole  Injectable 40 milliGRAM(s) IV Push daily  polyethylene glycol 3350 17 Gram(s) Oral two times a day  senna Syrup 10 milliLiter(s) Oral at bedtime  sodium chloride 0.9%. 1000 milliLiter(s) (20 mL/Hr) IV Continuous <Continuous>    [All pertinent recent Imaging/Reports reviewed]    EXAMINATION:  Gen: NAD, AAOx0, intubated, RASS- -1  Head:  Pupils 2mm and equal B/L ; L gaze preference unable to cross midline; spont opens eyes  Neuro: following  simple commands in LUE , corneals B/L ; RUE flexure movement to noxious , LUE 3/5 RLE TF / LLE spontaneous antigravity   Oral mucosa moist, normal conjunctiva  Lung: CTAB, no respiratory distress, intubated  CV: rrr, no murmurs, Normal perfusion  Abd: soft, NTND, no CVA tenderness  MSK: No edema, no visible deformities  Neuro: pupils 2mm and reactive, does not open eyes to noxious stimuli, not following commands, corneals B/L ; RUE extends pain, LUE 3/5 RLE TF / LLE 3/5 HPI:  61y/o female brought in to [facility] ED s/p fall at home in the bathroom. Unable to obtain HPI, history, or ROS from patient due to her altered mental status. As per  patient woke up this morning feeling "unwell," vomited once with some dry heaves at 9am, then had slurred speech during the day starting at 10am. She spent most of the day in bed, and was able to have some tea and crackers. She then had a fall in the bathroom & hit the right side of her head. Level I trauma called as patient's altered mental status was initially concerning for potential need for intubation. In trauma bay primary survey significant for GCS of 11 (E=4 V =2 M=5). Secondary survey significant for Right frontal hematoma.   L MCA infarct with distal narrowing of vessel on angio on L SHANE , MCA and PCA territory---> etilogy unclear   1/25: C-collar removed after negative MRIx   1/25: 5-6 seizures, started on keppra and received 2mg ativan on cont EEG   1/27: No further seizures as per VEEG    Overnight Events: Tolerating CPAP early am    ROS: negative [x unable to obtain as patient is comatose/intubated/aphasic []   VITALS:   T(C): 38 (01-28-18 @ 07:00), Max: 38 (01-28-18 @ 07:00)  HR: 89 (01-28-18 @ 07:00) (67 - 104)  BP: 147/100 (01-28-18 @ 07:00) (137/68 - 254/100)  RR: 18 (01-28-18 @ 07:00) (12 - 31)  SpO2: 98% (01-28-18 @ 07:00) (96% - 100%)    01-27-18 @ 07:01  -  01-28-18 @ 07:00  --------------------------------------------------------  IN: 2485.1 mL / OUT: 279 mL / NET: 2206.1 mL    01-28-18 @ 07:01  -  01-28-18 @ 08:28  --------------------------------------------------------  IN: 95 mL / OUT: 1 mL / NET: 94 mL               15.2   18.9  )-----------( 226      ( 28 Jan 2018 05:39 )             44.6     143  |  102  |  15  ----------------------------<  143<H>  4.7   |  26  |  0.65    Ca    9.5      28 Jan 2018 07:19  Phos  3.5     01-28  Mg     2.6     01-28    MEDICATIONS  (STANDING):  atorvastatin 40 milliGRAM(s) Oral at bedtime  chlorhexidine 0.12% Liquid 15 milliLiter(s) Swish and Spit three times a day  dexmedetomidine Infusion 0.2 MICROgram(s)/kG/Hr (2.495 mL/Hr) IV Continuous <Continuous>  enoxaparin Injectable 30 milliGRAM(s) SubCutaneous <User Schedule>  hydrALAZINE 100 milliGRAM(s) Oral three times a day  levETIRAcetam  IVPB 1000 milliGRAM(s) IV Intermittent every 12 hours  nicotine - 21 mG/24Hr(s) Patch 1 patch Transdermal daily  niMODipine Suspension 30 milliGRAM(s) Enteral Tube every 2 hours  pantoprazole  Injectable 40 milliGRAM(s) IV Push daily  polyethylene glycol 3350 17 Gram(s) Oral two times a day  senna Syrup 10 milliLiter(s) Oral at bedtime  sodium chloride 0.9%. 1000 milliLiter(s) (20 mL/Hr) IV Continuous <Continuous>        EXAMINATION:  Gen: NAD, AAOx0, intubated, RASS- -1  Head:  Pupils 2mm and equal B/L ; L gaze preference unable to cross midline; spont opens eyes  Neuro: intermittently following  simple commands in LUE , corneals B/L ; RUE flexure movement to noxious , LUE 4-/5 RLE TF / LLE spontaneous antigravity   Oral mucosa moist, normal conjunctiva  Lung: CTAB, no respiratory distress, intubated  CV: rrr, no murmurs, Normal perfusion  Abd: soft, NTND, no CVA tenderness  MSK: No edema, no visible deformities

## 2018-01-28 NOTE — AIRWAY REMOVAL NOTE  ADULT & PEDS - ARTIFICAL AIRWAY REMOVAL COMMENTS
Written order for extubation verified. The patient was identified by full name and birth date compared to the identification band. Present during the procedure was   Jonathan GONSALEZ

## 2018-01-28 NOTE — PROGRESS NOTE ADULT - ASSESSMENT
62-year old woman with 4th ventricular IVH, unclear etiology on CTA and conventional angiogram, multiple lucencies on CT   -TCDs  -EVD drainage   -CSF studies

## 2018-01-28 NOTE — PROGRESS NOTE ADULT - SUBJECTIVE AND OBJECTIVE BOX
SUMMARY:    Daytime Events: extubated; episode desat this evening - pt given duoneb;     T(C): 36.8 (01-28-18 @ 19:00), Max: 38 (01-28-18 @ 07:00)  HR: 121 (01-28-18 @ 20:00) (74 - 121)  BP: 165/75 (01-28-18 @ 20:00) (137/68 - 173/99)  RR: 28 (01-28-18 @ 20:00) (17 - 31)  SpO2: 97% (01-28-18 @ 20:00) (97% - 100%)  Wt(kg): --    Physical Exam:  EXAMINATION:  awake, alert; left gaze preference; mute; will follow one step commands with left hand; right arm extends; right leg triple flexion    IMAGING/DATA: [x] Reviewed; cxr-COPD changes but no infiltrate    ALBUTerol    90 MICROgram(s) HFA Inhaler 1 Puff(s) Inhalation every 4 hours PRN  atorvastatin 40 milliGRAM(s) Oral at bedtime  chlorhexidine 0.12% Liquid 15 milliLiter(s) Swish and Spit three times a day  dexmedetomidine Infusion 0.2 MICROgram(s)/kG/Hr IV Continuous <Continuous>  enoxaparin Injectable 30 milliGRAM(s) SubCutaneous <User Schedule>  fentaNYL    Injectable 25 MICROGram(s) IV Push every 3 hours PRN  hydrALAZINE 100 milliGRAM(s) Oral three times a day  levETIRAcetam  IVPB 1000 milliGRAM(s) IV Intermittent every 12 hours  nicotine - 21 mG/24Hr(s) Patch 1 patch Transdermal daily  niMODipine Suspension 30 milliGRAM(s) Enteral Tube every 2 hours  pantoprazole  Injectable 40 milliGRAM(s) IV Push daily  polyethylene glycol 3350 17 Gram(s) Oral two times a day  senna Syrup 10 milliLiter(s) Oral at bedtime                            15.2   18.9  )-----------( 226      ( 28 Jan 2018 05:39 )             44.6   CARDIAC MARKERS ( 28 Jan 2018 07:19 )  x     / 0.12 ng/mL / x     / x     / x        01-28    143  |  102  |  15  ----------------------------<  143<H>  4.7   |  26  |  0.65    Ca    9.5      28 Jan 2018 07:19  Phos  3.5     01-28  Mg     2.6     01-28        DEVICES:   [] Restraints [] ET tube [] central line [] arterial line [] calderon [] NGT/OGT [x] EVD [] LD [] LEOLA/HMV [] Trach [] PEG [] Chest Tube [] other:

## 2018-01-28 NOTE — EEG REPORT - NS EEG TEXT BOX
Study Date: 1/27/2018	x 4.5hrs duration only      History:  -concern for seizures    Study Interpretation:    FINDINGS:  The background was continuous, mixed delta and theta activity.  Some diffuse fast activity present.  Rudimentary PDR to 8-9Hz, but more attenuated over the left.  Predominance of irregular delta activity over the left hemisphere, maximum in the left frontotemporal region.  Continuous Slowing, Generalized also present.    Sleep Background:  Stage II sleep transients were not recorded.    Interictal Epileptiform Activity:   Frequent, broad left temporal sharp waves intermittently, max T7 P7 > F7   Low amplitude PLEDs, Regional, Left Occipital, Maximum O1 near 1hz    Ictal Epileptiform Activity or Events:  Clinical seizures: No clinical signs  EEG seizures: none    Activation Procedures:   Hyperventilation was not performed.    Photic stimulation was not performed.    Artifacts:  Intermittent myogenic and movement artifacts were noted.    ECG:  The heart rate on single channel ECG was predominantly between 60-70 BPM.    EEG Classification / Summary:  Abnormal EEG in the lethargic state:  -left occipital PLEDs  -frequent left temporal sharp waves  -multifocal slowing, persistently worse in the left hemisphere with associated attenuation.    Clinical Impression:  Findings indicate moderate multifocal cerebral dysfunction, though persistently worse in the left hemisphere with associated risk of seizures from the left temporal and occipital regions.  No further seizures.     Alex Izaguirre MD  Attending Physician, [facility]

## 2018-01-29 LAB
ANION GAP SERPL CALC-SCNC: 10 MMOL/L — SIGNIFICANT CHANGE UP (ref 5–17)
BUN SERPL-MCNC: 26 MG/DL — HIGH (ref 7–23)
CALCIUM SERPL-MCNC: 9.7 MG/DL — SIGNIFICANT CHANGE UP (ref 8.4–10.5)
CHLORIDE SERPL-SCNC: 102 MMOL/L — SIGNIFICANT CHANGE UP (ref 96–108)
CK MB BLD-MCNC: 1.6 % — SIGNIFICANT CHANGE UP (ref 0–3.5)
CK MB CFR SERPL CALC: 2.3 NG/ML — SIGNIFICANT CHANGE UP (ref 0–3.8)
CK SERPL-CCNC: 142 U/L — SIGNIFICANT CHANGE UP (ref 25–170)
CO2 SERPL-SCNC: 29 MMOL/L — SIGNIFICANT CHANGE UP (ref 22–31)
CREAT SERPL-MCNC: 0.72 MG/DL — SIGNIFICANT CHANGE UP (ref 0.5–1.3)
CULTURE RESULTS: SIGNIFICANT CHANGE UP
CULTURE RESULTS: SIGNIFICANT CHANGE UP
GAS PNL BLDA: SIGNIFICANT CHANGE UP
GLUCOSE BLDC GLUCOMTR-MCNC: 165 MG/DL — HIGH (ref 70–99)
GLUCOSE SERPL-MCNC: 209 MG/DL — HIGH (ref 70–99)
HCT VFR BLD CALC: 38.8 % — SIGNIFICANT CHANGE UP (ref 34.5–45)
HGB BLD-MCNC: 13.4 G/DL — SIGNIFICANT CHANGE UP (ref 11.5–15.5)
MAGNESIUM SERPL-MCNC: 2.1 MG/DL — SIGNIFICANT CHANGE UP (ref 1.6–2.6)
MCHC RBC-ENTMCNC: 34.7 GM/DL — SIGNIFICANT CHANGE UP (ref 32–36)
MCHC RBC-ENTMCNC: 37 PG — HIGH (ref 27–34)
MCV RBC AUTO: 107 FL — HIGH (ref 80–100)
PHOSPHATE SERPL-MCNC: 2.9 MG/DL — SIGNIFICANT CHANGE UP (ref 2.5–4.5)
PLATELET # BLD AUTO: 306 K/UL — SIGNIFICANT CHANGE UP (ref 150–400)
POTASSIUM SERPL-MCNC: 3.8 MMOL/L — SIGNIFICANT CHANGE UP (ref 3.5–5.3)
POTASSIUM SERPL-SCNC: 3.8 MMOL/L — SIGNIFICANT CHANGE UP (ref 3.5–5.3)
RBC # BLD: 3.63 M/UL — LOW (ref 3.8–5.2)
RBC # FLD: 12.1 % — SIGNIFICANT CHANGE UP (ref 10.3–14.5)
SODIUM SERPL-SCNC: 141 MMOL/L — SIGNIFICANT CHANGE UP (ref 135–145)
SOURCE VZV: SIGNIFICANT CHANGE UP
SPECIMEN SOURCE: SIGNIFICANT CHANGE UP
TROPONIN T SERPL-MCNC: 0.11 NG/ML — HIGH (ref 0–0.06)
VZV DNA CSF NAA+PROBE-LOG#: SIGNIFICANT CHANGE UP
VZV PCR: SIGNIFICANT CHANGE UP
WBC # BLD: 20 K/UL — HIGH (ref 3.8–10.5)
WBC # FLD AUTO: 20 K/UL — HIGH (ref 3.8–10.5)

## 2018-01-29 PROCEDURE — 99223 1ST HOSP IP/OBS HIGH 75: CPT | Mod: GC

## 2018-01-29 PROCEDURE — 99232 SBSQ HOSP IP/OBS MODERATE 35: CPT

## 2018-01-29 PROCEDURE — 99222 1ST HOSP IP/OBS MODERATE 55: CPT

## 2018-01-29 PROCEDURE — 99291 CRITICAL CARE FIRST HOUR: CPT

## 2018-01-29 PROCEDURE — 71045 X-RAY EXAM CHEST 1 VIEW: CPT | Mod: 26

## 2018-01-29 PROCEDURE — 99292 CRITICAL CARE ADDL 30 MIN: CPT

## 2018-01-29 PROCEDURE — 93010 ELECTROCARDIOGRAM REPORT: CPT

## 2018-01-29 RX ORDER — POTASSIUM PHOSPHATE, MONOBASIC POTASSIUM PHOSPHATE, DIBASIC 236; 224 MG/ML; MG/ML
15 INJECTION, SOLUTION INTRAVENOUS ONCE
Qty: 0 | Refills: 0 | Status: COMPLETED | OUTPATIENT
Start: 2018-01-29 | End: 2018-01-30

## 2018-01-29 RX ORDER — LABETALOL HCL 100 MG
100 TABLET ORAL EVERY 8 HOURS
Qty: 0 | Refills: 0 | Status: DISCONTINUED | OUTPATIENT
Start: 2018-01-29 | End: 2018-01-29

## 2018-01-29 RX ORDER — LABETALOL HCL 100 MG
200 TABLET ORAL EVERY 8 HOURS
Qty: 0 | Refills: 0 | Status: DISCONTINUED | OUTPATIENT
Start: 2018-01-29 | End: 2018-01-30

## 2018-01-29 RX ORDER — LABETALOL HCL 100 MG
10 TABLET ORAL ONCE
Qty: 0 | Refills: 0 | Status: COMPLETED | OUTPATIENT
Start: 2018-01-29 | End: 2018-01-29

## 2018-01-29 RX ORDER — INSULIN LISPRO 100/ML
VIAL (ML) SUBCUTANEOUS EVERY 6 HOURS
Qty: 0 | Refills: 0 | Status: DISCONTINUED | OUTPATIENT
Start: 2018-01-29 | End: 2018-01-30

## 2018-01-29 RX ADMIN — Medication 100 MILLIGRAM(S): at 10:24

## 2018-01-29 RX ADMIN — POLYETHYLENE GLYCOL 3350 17 GRAM(S): 17 POWDER, FOR SOLUTION ORAL at 05:19

## 2018-01-29 RX ADMIN — Medication 1: at 23:32

## 2018-01-29 RX ADMIN — Medication 100 MILLIGRAM(S): at 21:40

## 2018-01-29 RX ADMIN — LEVETIRACETAM 400 MILLIGRAM(S): 250 TABLET, FILM COATED ORAL at 18:17

## 2018-01-29 RX ADMIN — Medication 10 MILLIGRAM(S): at 13:15

## 2018-01-29 RX ADMIN — Medication 100 MILLIGRAM(S): at 13:55

## 2018-01-29 RX ADMIN — CHLORHEXIDINE GLUCONATE 15 MILLILITER(S): 213 SOLUTION TOPICAL at 05:18

## 2018-01-29 RX ADMIN — ENOXAPARIN SODIUM 30 MILLIGRAM(S): 100 INJECTION SUBCUTANEOUS at 18:17

## 2018-01-29 RX ADMIN — Medication 1 PATCH: at 13:54

## 2018-01-29 RX ADMIN — ATORVASTATIN CALCIUM 40 MILLIGRAM(S): 80 TABLET, FILM COATED ORAL at 21:40

## 2018-01-29 RX ADMIN — LEVETIRACETAM 400 MILLIGRAM(S): 250 TABLET, FILM COATED ORAL at 05:18

## 2018-01-29 RX ADMIN — OXYCODONE HYDROCHLORIDE 5 MILLIGRAM(S): 5 TABLET ORAL at 09:55

## 2018-01-29 RX ADMIN — Medication 500 MICROGRAM(S): at 17:00

## 2018-01-29 RX ADMIN — NIMODIPINE 60 MILLIGRAM(S): 60 SOLUTION ORAL at 09:25

## 2018-01-29 RX ADMIN — Medication 500 MICROGRAM(S): at 05:06

## 2018-01-29 RX ADMIN — OXYCODONE HYDROCHLORIDE 5 MILLIGRAM(S): 5 TABLET ORAL at 09:25

## 2018-01-29 RX ADMIN — Medication 100 MILLIGRAM(S): at 18:18

## 2018-01-29 RX ADMIN — Medication 500 MICROGRAM(S): at 12:19

## 2018-01-29 RX ADMIN — NIMODIPINE 60 MILLIGRAM(S): 60 SOLUTION ORAL at 06:03

## 2018-01-29 RX ADMIN — SENNA PLUS 10 MILLILITER(S): 8.6 TABLET ORAL at 21:53

## 2018-01-29 RX ADMIN — NIMODIPINE 60 MILLIGRAM(S): 60 SOLUTION ORAL at 02:08

## 2018-01-29 RX ADMIN — Medication 100 MILLIGRAM(S): at 05:19

## 2018-01-29 NOTE — PROGRESS NOTE ADULT - SUBJECTIVE AND OBJECTIVE BOX
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, intubated, EVD placed, angio showing vascular irregularities, now extubated with stable right sided hemiparesis    OVERNIGHT EVENTS:   Episode of desaturation overnight, placed on hiflow nasal cannula. Secretions tolerable, tube feeds held for ?PEG placement. EVD at 10    VITALS:  T(C): , Max: 38 (01-28-18 @ 07:00)  HR:  (81 - 125)  BP:  (123/89 - 220/103)  ABP: --  RR:  (15 - 28)  SpO2:  (92% - 100%)  Wt(kg): --      01-27-18 @ 07:01  -  01-28-18 @ 07:00  --------------------------------------------------------  IN: 2485.1 mL / OUT: 279 mL / NET: 2206.1 mL    01-28-18 @ 07:01  -  01-29-18 @ 06:47  --------------------------------------------------------  IN: 1003 mL / OUT: 179 mL / NET: 824 mL      LABS:  Na: 143 (01-28 @ 21:26), 143 (01-28 @ 07:19), 142 (01-28 @ 05:39), 147 (01-27 @ 21:37), 144 (01-26 @ 21:26)  K: 3.7 (01-28 @ 21:26), 4.7 (01-28 @ 07:19), 6.6 (01-28 @ 05:39), 3.2 (01-27 @ 21:37), 3.1 (01-26 @ 21:26)  Cl: 103 (01-28 @ 21:26), 102 (01-28 @ 07:19), 101 (01-28 @ 05:39), 115 (01-27 @ 21:37), 113 (01-26 @ 21:26)  CO2: 27 (01-28 @ 21:26), 26 (01-28 @ 07:19), 25 (01-28 @ 05:39), 21 (01-27 @ 21:37), 24 (01-26 @ 21:26)  BUN: 19 (01-28 @ 21:26), 15 (01-28 @ 07:19), 14 (01-28 @ 05:39), 12 (01-27 @ 21:37), 17 (01-26 @ 21:26)  Cr: 0.63 (01-28 @ 21:26), 0.65 (01-28 @ 07:19), 0.54 (01-28 @ 05:39), 0.42 (01-27 @ 21:37), 0.62 (01-26 @ 21:26)  Glu: 184(01-28 @ 21:26), 143(01-28 @ 07:19), 122(01-28 @ 05:39), 102(01-27 @ 21:37), 140(01-26 @ 21:26)    Hgb: 15.2 (01-28 @ 05:39), 10.8 (01-27 @ 21:37)  Hct: 44.6 (01-28 @ 05:39), 31.9 (01-27 @ 21:37)  WBC: 18.9 (01-28 @ 05:39), 15.1 (01-27 @ 21:37)  Plt: 226 (01-28 @ 05:39), 158 (01-27 @ 21:37)    INR: 1.11 01-28-18 @ 21:26  PTT: 30.7 01-28-18 @ 21:26    IMAGING:   Recent imaging studies were reviewed.    MEDICATIONS:  ALBUTerol    90 MICROgram(s) HFA Inhaler 1 Puff(s) Inhalation every 4 hours PRN  atorvastatin 40 milliGRAM(s) Oral at bedtime  chlorhexidine 0.12% Liquid 15 milliLiter(s) Swish and Spit three times a day  enoxaparin Injectable 30 milliGRAM(s) SubCutaneous <User Schedule>  hydrALAZINE 100 milliGRAM(s) Oral three times a day  ipratropium    for Nebulization 500 MICROGram(s) Nebulizer every 6 hours  levETIRAcetam  IVPB 1000 milliGRAM(s) IV Intermittent every 12 hours  nicotine - 21 mG/24Hr(s) Patch 1 patch Transdermal daily  niMODipine Suspension 60 milliGRAM(s) Enteral Tube every 4 hours  oxyCODONE    IR 5 milliGRAM(s) Oral every 4 hours PRN  oxyCODONE    IR 10 milliGRAM(s) Oral every 6 hours PRN  pantoprazole  Injectable 40 milliGRAM(s) IV Push daily  polyethylene glycol 3350 17 Gram(s) Oral two times a day  senna Syrup 10 milliLiter(s) Oral at bedtime    EXAMINATION:  General:  calm  HEENT:  MMM  Neuro: responsive to voice. left gaze preference, no verbal. LUE and LLE spontaneous movement, intermittently following commands. RUE and RLE plegia  Cards:  RRR  Respiratory:  on high flow nasal cannula  Abdomen:  soft  Extremities:  no edema  Skin:  warm/dry

## 2018-01-29 NOTE — CONSULT NOTE ADULT - SUBJECTIVE AND OBJECTIVE BOX
Patient is a 62y old  Female who presented with a chief complaint of Level I trauma activation s/p fall (24 Jan 2018 13:50)      HPI:  63y/o female brought in to [facility] ED s/p fall at home in the bathroom. Unable to obtain HPI, history, or ROS from patient due to her altered mental status. As per  patient woke up this morning feeling "unwell," vomited once with some dry heaves at 9am, then had slurred speech during the day starting at 10am. She spent most of the day in bed, and was able to have some tea and crackers. She then had a fall in the bathroom & hit the right side of her head. Level I trauma called as patient's altered mental status was initially concerning for potential need for intubation. In trauma bay primary survey significant for GCS of 11 (E=4 V =2 M=5). Secondary survey significant for Right frontal hematoma. (24 Jan 2018 05:31)      Pt is a known smoker 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, intubated, EVD placed, angio showing vascular irregularities, now extubated with stable right sided hemiparesis. She had an Episode of desaturation overnight, placed on hiflow nasal cannula. Secretions tolerable, and she has been on NGTF    We are asked to evaluate for possible PEG placement    PAST MEDICAL & SURGICAL HISTORY:  No pertinent past medical history  H/O colonoscopy: 2015, normal      Allergies    No Known Drug Allergies  strawberry (Rash)    Intolerances        MEDICATIONS  (STANDING):  atorvastatin 40 milliGRAM(s) Oral at bedtime  enoxaparin Injectable 30 milliGRAM(s) SubCutaneous <User Schedule>  hydrALAZINE 100 milliGRAM(s) Oral three times a day  ipratropium    for Nebulization 500 MICROGram(s) Nebulizer every 6 hours  labetalol 100 milliGRAM(s) Oral every 8 hours  levETIRAcetam  IVPB 1000 milliGRAM(s) IV Intermittent every 12 hours  nicotine - 21 mG/24Hr(s) Patch 1 patch Transdermal daily  niMODipine Suspension 60 milliGRAM(s) Enteral Tube every 4 hours  polyethylene glycol 3350 17 Gram(s) Oral two times a day  senna Syrup 10 milliLiter(s) Oral at bedtime    MEDICATIONS  (PRN):  ALBUTerol    90 MICROgram(s) HFA Inhaler 1 Puff(s) Inhalation every 4 hours PRN Wheezing  oxyCODONE    IR 5 milliGRAM(s) Oral every 4 hours PRN Moderate Pain (4 - 6)  oxyCODONE    IR 10 milliGRAM(s) Oral every 6 hours PRN Severe Pain (7 - 10)      Social History:    Marital Status:  (X   )    (   ) Single    (   )    (  )   Lives with: (  ) alone  (  ) children   (X  ) spouse   (  ) parents  (  ) other    Substance Use (street drugs): (  ) never used  (  ) other:  Tobacco Usage:  (   ) never smoked   (   ) former smoker   (X   ) current smoker  (     ) pack year  (        ) last cigarette date  Alcohol Usage:    Family History   no known GI FH    Health Management  Last Colonoscopy - 2015      Advanced Directives: (    X ) None    (      ) DNR    (     ) DNI    (     ) Health Care Proxy:     Review of Systems:    General:  No fever, chills  CV:  No pain, palpitatioins  Resp:  extubated, hi flow oxygen, +secretions  GI:  see HPI +dysphagia on NGTF  :  No pain, bleeding, incontinence, nocturia  Muscle:  Rt hemiparesis  Neuro:  +Rt hemiparesis  Endocrine:  No polyuria, polydypsia, cold/heat intolerance  Heme:  No petechiae, ecchymosis, easy bruisability  Skin:  No rash, tattoos, scars, edema      Vital Signs Last 24 Hrs  T(C): 36.9 (29 Jan 2018 07:00), Max: 37.3 (28 Jan 2018 15:00)  T(F): 98.5 (29 Jan 2018 07:00), Max: 99.1 (28 Jan 2018 15:00)  HR: 95 (29 Jan 2018 09:32) (81 - 125)  BP: 150/71 (29 Jan 2018 09:30) (123/89 - 220/103)  BP(mean): 91 (29 Jan 2018 09:30) (88 - 130)  RR: 20 (29 Jan 2018 09:32) (15 - 28)  SpO2: 100% (29 Jan 2018 09:32) (92% - 100%)    PHYSICAL EXAM:    Constitutional: +EVD (Left) draining +on oxygen awake  Neck: No LAD, supple  Respiratory: +rhonchi  Cardiovascular: S1 and S2, RRR,  Gastrointestinal: BS+, soft, NT/ND, neg HSM,  Extremities: No peripheral edema, neg clubbing, cyanosis  Vascular: 2+ peripheral pulses  Neurological: +rt hemiparesis  Skin: No rashes        LABS:                        15.2   18.9  )-----------( 226      ( 28 Jan 2018 05:39 )             44.6     01-28    143  |  103  |  19  ----------------------------<  184<H>  3.7   |  27  |  0.63    Ca    9.5      28 Jan 2018 21:26  Phos  3.0     01-28  Mg     2.1     01-28      PT/INR - ( 28 Jan 2018 21:26 )   PT: 12.1 sec;   INR: 1.11 ratio    PTT - ( 28 Jan 2018 21:26 )  PTT:30.7 sec        RADIOLOGY & ADDITIONAL TESTS:   CT Head No Cont (01.25.18 @ 08:30)   IMPRESSION:  Slight decreased size of the third and lateral ventricles. Left temporal   parietal infarction. Leftoccipital infarction, increased in conspicuity.   No hemorrhagic transformation.        MR Angio Head No Cont (01.25.18 @ 15:29)   IMPRESSION: Acute left middle cerebral artery infarct with a small amount   of hemorrhage. Additional scattered foci of microhemorrhages. Fairly   extensive white matter changes.     MRA of the head demonstrates mild narrowing of the left middle and   posterior cerebral arteries which is less prominent when compared with   the conventional angiogram of 1/24/2018.

## 2018-01-29 NOTE — SWALLOW BEDSIDE ASSESSMENT ADULT - PHARYNGEAL PHASE
Decreased laryngeal elevation/suspect delay in trigger/Wet vocal quality post oral intake/Throat clear post oral intake

## 2018-01-29 NOTE — PHYSICAL THERAPY INITIAL EVALUATION ADULT - GENERAL OBSERVATIONS, REHAB EVAL
pt received lying in bed in NAD +IV lock, +NG tube, +nasal cannula, +EVD, +R venodyne, +R wrist restraint,  at bedside pt received lying in bed in NAD +IV lock, +NG tube, +nasal cannula, +EVD, +R venodyne, +L wrist restraint,  at bedside

## 2018-01-29 NOTE — PHYSICAL THERAPY INITIAL EVALUATION ADULT - PASSIVE RANGE OF MOTION EXAMINATION, REHAB EVAL
Right LE Passive ROM was WNL (within normal limits)/Right UE Passive ROM was WNL (within normal limits)

## 2018-01-29 NOTE — PHYSICAL THERAPY INITIAL EVALUATION ADULT - PRECAUTIONS/LIMITATIONS, REHAB EVAL
fall precautions fall precautions/continued from above. Head CT: Motion degraded study. Focal hemorrhage in the fourth ventricle. Large right frontal scalp hematoma. Cervical spine CT: No evidence for acute displaced fracture or traumatic malalignment. CT chest, CT abdomen/pelvis No visceral or vascular traumatic injury to the chest, abdomen or pelvis. Indeterminant L adrenal nodule. MRI head Acute left middle cerebral artery infarct with a small amount of hemorrhage./swallowing precautions

## 2018-01-29 NOTE — PHYSICAL THERAPY INITIAL EVALUATION ADULT - LEVEL OF INDEPENDENCE: SCOOT/BRIDGE, REHAB EVAL
functional mobility held at this time 2/2 pt elevated blood pressure throughout session (196/109)./unable to perform functional mobility held at this time 2/2 pt elevated blood pressure throughout session (196/109).  Functional mobility completed on 1/30/18. pt able to bridge on L side of body/moderate assist (50% patients effort)/maximum assist (25% patients effort)

## 2018-01-29 NOTE — PHYSICAL THERAPY INITIAL EVALUATION ADULT - IMPAIRMENTS FOUND, PT EVAL
arousal, attention, and cognition/aerobic capacity/endurance/muscle strength/sensory integrity cognitive impairment/poor safety awareness/sensory integrity/arousal, attention, and cognition/aerobic capacity/endurance/muscle strength aerobic capacity/endurance/arousal, attention, and cognition/sensory integrity/poor safety awareness/cognitive impairment/gait, locomotion, and balance/joint integrity and mobility/muscle strength

## 2018-01-29 NOTE — CONSULT NOTE ADULT - ATTENDING COMMENTS
Pt. Seen. Agree with above assessment and plan    Elieser Bolaños MD, FACP, FACG, [facility]  [phone]

## 2018-01-29 NOTE — PHYSICAL THERAPY INITIAL EVALUATION ADULT - LEVEL OF INDEPENDENCE: GAIT, REHAB EVAL
functional mobility held at this time 2/2 pt elevated blood pressure throughout session (196/109)./unable to perform

## 2018-01-29 NOTE — PROGRESS NOTE ADULT - SUBJECTIVE AND OBJECTIVE BOX
O: EVD increased to 20 cm water    T(C): 36.7 (01-29-18 @ 15:00), Max: 36.9 (01-29-18 @ 07:00)  HR: 79 (01-29-18 @ 17:00) (69 - 125)  BP: 163/82 (01-29-18 @ 17:00) (123/89 - 220/103)  RR: 21 (01-29-18 @ 17:00) (15 - 28)  SpO2: 100% (01-29-18 @ 17:00) (92% - 100%)  01-28-18 @ 07:01  -  01-29-18 @ 07:00  --------------------------------------------------------  IN: 1003 mL / OUT: 192 mL / NET: 811 mL    01-29-18 @ 07:01  -  01-29-18 @ 19:05  --------------------------------------------------------  IN: 560 mL / OUT: 32 mL / NET: 528 mL    ALBUTerol    90 MICROgram(s) HFA Inhaler 1 Puff(s) Inhalation every 4 hours PRN  atorvastatin 40 milliGRAM(s) Oral at bedtime  enoxaparin Injectable 30 milliGRAM(s) SubCutaneous <User Schedule>  hydrALAZINE 100 milliGRAM(s) Oral three times a day  ipratropium    for Nebulization 500 MICROGram(s) Nebulizer every 6 hours  labetalol 100 milliGRAM(s) Oral every 8 hours  levETIRAcetam  IVPB 1000 milliGRAM(s) IV Intermittent every 12 hours  nicotine - 21 mG/24Hr(s) Patch 1 patch Transdermal daily  oxyCODONE    IR 5 milliGRAM(s) Oral every 4 hours PRN  oxyCODONE    IR 10 milliGRAM(s) Oral every 6 hours PRN  polyethylene glycol 3350 17 Gram(s) Oral two times a day  senna Syrup 10 milliLiter(s) Oral at bedtime    EXAMINATION:  General:  calm  HEENT:  MMM  Neuro: responsive to voice. left gaze preference, no verbal. LUE and LLE spontaneous movement, intermittently following commands. RUE and RLE plegia  Cards:  RRR  Respiratory:  on high flow nasal cannula  Abdomen:  soft  Extremities:  no edema  Skin:  warm/dry    Blood Gas Profile - Arterial (01.29.18 @ 00:33)    pH, Arterial: 7.49    pCO2, Arterial: 37 mmHg    pO2, Arterial: 89 mmHg    HCO3, Arterial: 28 mmol/L    Base Excess, Arterial: 5.2 mmol/L    Oxygen Saturation, Arterial: 98 %    Total CO2, Arterial: 29 mmoL/L    FIO2, Arterial: 60    Blood Gas Source Arterial: Arterial    Basic Metabolic Panel - STAT (01.28.18 @ 21:26)    Sodium, Serum: 143 mmol/L    Potassium, Serum: 3.7 mmol/L    Chloride, Serum: 103 mmol/L    Carbon Dioxide, Serum: 27 mmol/L    Anion Gap, Serum: 13 mmol/L    Blood Urea Nitrogen, Serum: 19 mg/dL    Creatinine, Serum: 0.63 mg/dL    Glucose, Serum: 184 mg/dL    Calcium, Total Serum: 9.5 mg/dL      Assessment and Plan:   · Assessment		  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, intubated, EVD placed, angio showing vascular irregularities, now extubated with stable right sided hemiparesis. Her EVD was increased from 10 to 20 cm water.  Will moniotr neuro exam for hydrocephalus. SHe remains on high flow NC, will wean it to face mask, chest PT, pulmonary hygiene, bronchodilators targeting sat of 90-92   NPO for PEG tomorrow     Code status:  Full code  Disposition:  ICU    This patient was at high risk of neurologic deterioration and/or death due to IVH, seizure    Time spent:  35 minutes O: EVD increased to 20 cm water    T(C): 36.7 (01-29-18 @ 15:00), Max: 36.9 (01-29-18 @ 07:00)  HR: 79 (01-29-18 @ 17:00) (69 - 125)  BP: 163/82 (01-29-18 @ 17:00) (123/89 - 220/103)  RR: 21 (01-29-18 @ 17:00) (15 - 28)  SpO2: 100% (01-29-18 @ 17:00) (92% - 100%)  01-28-18 @ 07:01  -  01-29-18 @ 07:00  --------------------------------------------------------  IN: 1003 mL / OUT: 192 mL / NET: 811 mL    01-29-18 @ 07:01  -  01-29-18 @ 19:05  --------------------------------------------------------  IN: 560 mL / OUT: 32 mL / NET: 528 mL    ALBUTerol    90 MICROgram(s) HFA Inhaler 1 Puff(s) Inhalation every 4 hours PRN  atorvastatin 40 milliGRAM(s) Oral at bedtime  enoxaparin Injectable 30 milliGRAM(s) SubCutaneous <User Schedule>  hydrALAZINE 100 milliGRAM(s) Oral three times a day  ipratropium    for Nebulization 500 MICROGram(s) Nebulizer every 6 hours  labetalol 100 milliGRAM(s) Oral every 8 hours  levETIRAcetam  IVPB 1000 milliGRAM(s) IV Intermittent every 12 hours  nicotine - 21 mG/24Hr(s) Patch 1 patch Transdermal daily  oxyCODONE    IR 5 milliGRAM(s) Oral every 4 hours PRN  oxyCODONE    IR 10 milliGRAM(s) Oral every 6 hours PRN  polyethylene glycol 3350 17 Gram(s) Oral two times a day  senna Syrup 10 milliLiter(s) Oral at bedtime    EXAMINATION:  General:  calm  HEENT:  MMM  Neuro: responsive to voice. left gaze preference, aphasic. no verbal. LUE and LLE spontaneous movement, intermittently following commands. RUE and RLE plegia  Cards:  RRR  Respiratory:  on high flow nasal cannula  Abdomen:  soft  Extremities:  no edema  Skin:  warm/dry    Blood Gas Profile - Arterial (01.29.18 @ 00:33)    pH, Arterial: 7.49    pCO2, Arterial: 37 mmHg    pO2, Arterial: 89 mmHg    HCO3, Arterial: 28 mmol/L    Base Excess, Arterial: 5.2 mmol/L    Oxygen Saturation, Arterial: 98 %    Total CO2, Arterial: 29 mmoL/L    FIO2, Arterial: 60    Blood Gas Source Arterial: Arterial    Basic Metabolic Panel - STAT (01.28.18 @ 21:26)    Sodium, Serum: 143 mmol/L    Potassium, Serum: 3.7 mmol/L    Chloride, Serum: 103 mmol/L    Carbon Dioxide, Serum: 27 mmol/L    Anion Gap, Serum: 13 mmol/L    Blood Urea Nitrogen, Serum: 19 mg/dL    Creatinine, Serum: 0.63 mg/dL    Glucose, Serum: 184 mg/dL    Calcium, Total Serum: 9.5 mg/dL      Assessment and Plan:   · Assessment		  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, intubated, EVD placed, angio showing vascular irregularities, now extubated with stable right sided hemiparesis. Her EVD was increased from 10 to 20 cm water.  Will moniotr neuro exam for hydrocephalus. SHe remains on high flow NC, will wean it to face mask, chest PT, pulmonary hygiene, bronchodilators targeting sat of 90-92   NPO for PEG tomorrow   She has T wave inversion in 2,3 avf which was present on previous ECG  with new T inversion in v5,v6, will trend troponin and ECG  Her SBP is 190 mmhg, increase labetalol to 200 mg q 8 hs with decrease in BOP goal to 120-160 mmhg      Code status:  Full code  Disposition:  ICU    This patient was at high risk of neurologic deterioration and/or death due to IVH, seizure    Time spent:  35 minutes O: EVD increased to 20 cm water    T(C): 36.7 (01-29-18 @ 15:00), Max: 36.9 (01-29-18 @ 07:00)  HR: 79 (01-29-18 @ 17:00) (69 - 125)  BP: 163/82 (01-29-18 @ 17:00) (123/89 - 220/103)  RR: 21 (01-29-18 @ 17:00) (15 - 28)  SpO2: 100% (01-29-18 @ 17:00) (92% - 100%)  01-28-18 @ 07:01  -  01-29-18 @ 07:00  --------------------------------------------------------  IN: 1003 mL / OUT: 192 mL / NET: 811 mL    01-29-18 @ 07:01  -  01-29-18 @ 19:05  --------------------------------------------------------  IN: 560 mL / OUT: 32 mL / NET: 528 mL    ALBUTerol    90 MICROgram(s) HFA Inhaler 1 Puff(s) Inhalation every 4 hours PRN  atorvastatin 40 milliGRAM(s) Oral at bedtime  enoxaparin Injectable 30 milliGRAM(s) SubCutaneous <User Schedule>  hydrALAZINE 100 milliGRAM(s) Oral three times a day  ipratropium    for Nebulization 500 MICROGram(s) Nebulizer every 6 hours  labetalol 100 milliGRAM(s) Oral every 8 hours  levETIRAcetam  IVPB 1000 milliGRAM(s) IV Intermittent every 12 hours  nicotine - 21 mG/24Hr(s) Patch 1 patch Transdermal daily  oxyCODONE    IR 5 milliGRAM(s) Oral every 4 hours PRN  oxyCODONE    IR 10 milliGRAM(s) Oral every 6 hours PRN  polyethylene glycol 3350 17 Gram(s) Oral two times a day  senna Syrup 10 milliLiter(s) Oral at bedtime    EXAMINATION:  General:  calm  HEENT:  MMM  Neuro: responsive to voice. left gaze preference, aphasic. no verbal. LUE and LLE spontaneous movement, intermittently following commands. RUE and RLE plegia  Cards:  RRR  Respiratory:  on high flow nasal cannula  Abdomen:  soft  Extremities:  no edema  Skin:  warm/dry    Blood Gas Profile - Arterial (01.29.18 @ 00:33)    pH, Arterial: 7.49    pCO2, Arterial: 37 mmHg    pO2, Arterial: 89 mmHg    HCO3, Arterial: 28 mmol/L    Base Excess, Arterial: 5.2 mmol/L    Oxygen Saturation, Arterial: 98 %    Total CO2, Arterial: 29 mmoL/L    FIO2, Arterial: 60    Blood Gas Source Arterial: Arterial    Basic Metabolic Panel - STAT (01.28.18 @ 21:26)    Sodium, Serum: 143 mmol/L    Potassium, Serum: 3.7 mmol/L    Chloride, Serum: 103 mmol/L    Carbon Dioxide, Serum: 27 mmol/L    Anion Gap, Serum: 13 mmol/L    Blood Urea Nitrogen, Serum: 19 mg/dL    Creatinine, Serum: 0.63 mg/dL    Glucose, Serum: 184 mg/dL    Calcium, Total Serum: 9.5 mg/dL      Assessment and Plan:   · Assessment		  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, intubated, EVD placed, angio showing vascular irregularities, now extubated with stable right sided hemiparesis. Her EVD was increased from 10 to 20 cm water.  Will moniotr neuro exam for hydrocephalus. SHe remains on high flow NC, will wean it to face mask, chest PT, pulmonary hygiene, bronchodilators targeting sat of 90-92   NPO for PEG tomorrow   She has T wave inversion in 2,3 avf which was present on previous ECG  with new T inversion in v5,v6, will trend troponin and ECG  Her SBP is 190 mmhg, increase labetalol to 200 mg q 8 hs with decrease in BOP goal to 120-160 mmhg      Code status:  Full code  Disposition:  ICU    This patient was at high risk of neurologic deterioration and/or death due to IVH, seizure    Time spent:  35 critical care  minutes

## 2018-01-29 NOTE — CONSULT NOTE ADULT - SUBJECTIVE AND OBJECTIVE BOX
INA GODINEZ  [id number]    HISTORY OF PRESENT ILLNESS:  * pt unable to provide hx - no family at bedside- hx obtained from chart.   As per HPI:  "63y/o female brought in to [facility] ED s/p fall at home in the bathroom. Unable to obtain HPI, history, or ROS from patient due to her altered mental status. As per  patient woke up this morning feeling "unwell," vomited once with some dry heaves at 9am, then had slurred speech during the day starting at 10am. She spent most of the day in bed, and was able to have some tea and crackers. She then had a fall in the bathroom & hit the right side of her head. Level I trauma called as patient's altered mental status was initially concerning for potential need for intubation. In trauma bay primary survey significant for GCS of 11 (E=4 V =2 M=5). Secondary survey significant for Right frontal hematoma."    Pt is s/p ventriculostomy and EVD and is currently under care of neurosurgery team. rheum called for evaluation       PAST MEDICAL & SURGICAL HISTORY:  No pertinent past medical history  H/O colonoscopy: 2015, normal      Review of Systems: N/A- pt not able to provide accurate history    MEDICATIONS  (STANDING):  atorvastatin 40 milliGRAM(s) Oral at bedtime  enoxaparin Injectable 30 milliGRAM(s) SubCutaneous <User Schedule>  hydrALAZINE 100 milliGRAM(s) Oral three times a day  ipratropium    for Nebulization 500 MICROGram(s) Nebulizer every 6 hours  labetalol 100 milliGRAM(s) Oral every 8 hours  levETIRAcetam  IVPB 1000 milliGRAM(s) IV Intermittent every 12 hours  nicotine - 21 mG/24Hr(s) Patch 1 patch Transdermal daily  polyethylene glycol 3350 17 Gram(s) Oral two times a day  senna Syrup 10 milliLiter(s) Oral at bedtime    MEDICATIONS  (PRN):  ALBUTerol    90 MICROgram(s) HFA Inhaler 1 Puff(s) Inhalation every 4 hours PRN Wheezing  oxyCODONE    IR 5 milliGRAM(s) Oral every 4 hours PRN Moderate Pain (4 - 6)  oxyCODONE    IR 10 milliGRAM(s) Oral every 6 hours PRN Severe Pain (7 - 10)      Allergies    No Known Drug Allergies  strawberry (Rash)    Intolerances        PERTINENT MEDICATION HISTORY:    SOCIAL HISTORY: N/A    FAMILY HISTORY:  Family history of colon cancer in father (Father)      Vital Signs Last 24 Hrs  T(C): 36.7 (29 Jan 2018 15:00), Max: 36.9 (29 Jan 2018 07:00)  T(F): 98.1 (29 Jan 2018 15:00), Max: 98.5 (29 Jan 2018 07:00)  HR: 77 (29 Jan 2018 16:00) (69 - 125)  BP: 175/86 (29 Jan 2018 16:00) (123/89 - 220/103)  BP(mean): 112 (29 Jan 2018 16:00) (88 - 130)  RR: 21 (29 Jan 2018 16:00) (15 - 28)  SpO2: 100% (29 Jan 2018 16:00) (92% - 100%)    Physical Exam:  General: pt in NAD  HEENT: unable to accurately exam. NG tube in place  CVS: +S1/S2, RRR, no murmurs/rubs/gallops  Resp: CTA b/l. No crackles/wheezing  GI: Soft, NT/ND +BS  MSK: no major joint abnormalities noted. Unable to do proper MSK exam as pt cannot follow commands  Neuro: AAOx0  Skin: no visible rashes    LABS:                        15.2   18.9  )-----------( 226      ( 28 Jan 2018 05:39 )             44.6     01-28    143  |  103  |  19  ----------------------------<  184<H>  3.7   |  27  |  0.63    Ca    9.5      28 Jan 2018 21:26  Phos  3.0     01-28  Mg     2.1     01-28      PT/INR - ( 28 Jan 2018 21:26 )   PT: 12.1 sec;   INR: 1.11 ratio         PTT - ( 28 Jan 2018 21:26 )  PTT:30.7 sec    Anti-Nuclear Antibody (01.24.18 @ 22:40)    REZA Pattern: Speckled    Neutrophil Cytoplasmic Antibody (01.24.18 @ 22:40)    Cytoplasmic (c-ANCA) Antibody: Negative    Perinuclear (p-ANCA) Antibody: Negative    Atypical ANCA: Negative      RADIOLOGY & ADDITIONAL STUDIES: INA GODINEZ  [id number]    HISTORY OF PRESENT ILLNESS:  * pt unable to provide hx - hx obtained from chart and  at bedside  As per HPI:  "61y/o female brought in to [facility] ED s/p fall at home in the bathroom. Unable to obtain HPI, history, or ROS from patient due to her altered mental status. As per  patient woke up this morning feeling "unwell," vomited once with some dry heaves at 9am, then had slurred speech during the day starting at 10am. She spent most of the day in bed, and was able to have some tea and crackers. She then had a fall in the bathroom & hit the right side of her head. Level I trauma called as patient's altered mental status was initially concerning for potential need for intubation. In trauma bay primary survey significant for GCS of 11 (E=4 V =2 M=5). Secondary survey significant for Right frontal hematoma."     states that pt has been in overall good health. Has no dx medical problems and takes no medications. had flu like sx for last several days which was being treated with OTC remedies. Pt had been becoming more confused and weak as well. She then had a fall in the bathroom and hit her head. That evening, her breath became more shallow and  called 911 and brought her to hospital. Has no hx of autoimmune disease      Pt is s/p ventriculostomy and EVD and is currently under care of neurosurgery team. rheum called for evaluation       PAST MEDICAL & SURGICAL HISTORY:  No pertinent past medical history  H/O colonoscopy: 2015, normal      Review of Systems: N/A- pt not able to provide accurate history    MEDICATIONS  (STANDING):  atorvastatin 40 milliGRAM(s) Oral at bedtime  enoxaparin Injectable 30 milliGRAM(s) SubCutaneous <User Schedule>  hydrALAZINE 100 milliGRAM(s) Oral three times a day  ipratropium    for Nebulization 500 MICROGram(s) Nebulizer every 6 hours  labetalol 100 milliGRAM(s) Oral every 8 hours  levETIRAcetam  IVPB 1000 milliGRAM(s) IV Intermittent every 12 hours  nicotine - 21 mG/24Hr(s) Patch 1 patch Transdermal daily  polyethylene glycol 3350 17 Gram(s) Oral two times a day  senna Syrup 10 milliLiter(s) Oral at bedtime    MEDICATIONS  (PRN):  ALBUTerol    90 MICROgram(s) HFA Inhaler 1 Puff(s) Inhalation every 4 hours PRN Wheezing  oxyCODONE    IR 5 milliGRAM(s) Oral every 4 hours PRN Moderate Pain (4 - 6)  oxyCODONE    IR 10 milliGRAM(s) Oral every 6 hours PRN Severe Pain (7 - 10)      Allergies    No Known Drug Allergies  strawberry (Rash)    Intolerances        PERTINENT MEDICATION HISTORY:    SOCIAL HISTORY: no toxic habits    FAMILY HISTORY:  Family history of colon cancer in father (Father)      Vital Signs Last 24 Hrs  T(C): 36.7 (29 Jan 2018 15:00), Max: 36.9 (29 Jan 2018 07:00)  T(F): 98.1 (29 Jan 2018 15:00), Max: 98.5 (29 Jan 2018 07:00)  HR: 77 (29 Jan 2018 16:00) (69 - 125)  BP: 175/86 (29 Jan 2018 16:00) (123/89 - 220/103)  BP(mean): 112 (29 Jan 2018 16:00) (88 - 130)  RR: 21 (29 Jan 2018 16:00) (15 - 28)  SpO2: 100% (29 Jan 2018 16:00) (92% - 100%)    Physical Exam:  General: pt in NAD  HEENT: unable to accurately exam. NG tube in place. large scalp hematoma on R side noted  CVS: +S1/S2, RRR, no murmurs/rubs/gallops  Resp: CTA b/l. No crackles/wheezing  GI: Soft, NT/ND +BS  MSK: no major joint abnormalities noted. Unable to do proper MSK exam as pt cannot follow commands fully   Neuro: AAOx0  Skin: no visible rashes    LABS:                        15.2   18.9  )-----------( 226      ( 28 Jan 2018 05:39 )             44.6     01-28    143  |  103  |  19  ----------------------------<  184<H>  3.7   |  27  |  0.63    Ca    9.5      28 Jan 2018 21:26  Phos  3.0     01-28  Mg     2.1     01-28      PT/INR - ( 28 Jan 2018 21:26 )   PT: 12.1 sec;   INR: 1.11 ratio         PTT - ( 28 Jan 2018 21:26 )  PTT:30.7 sec    Anti-Nuclear Antibody (01.24.18 @ 22:40)    REZA Pattern: Speckled    Neutrophil Cytoplasmic Antibody (01.24.18 @ 22:40)    Cytoplasmic (c-ANCA) Antibody: Negative    Perinuclear (p-ANCA) Antibody: Negative    Atypical ANCA: Negative      RADIOLOGY & ADDITIONAL STUDIES:

## 2018-01-29 NOTE — CONSULT NOTE ADULT - SUBJECTIVE AND OBJECTIVE BOX
Cc: Right hemiplegia, gait dysfunction, dysphagia      HPI:  61y/o female brought in to [facility] ED s/p fall at home in the bathroom. Patient woke up in the morning feeling "unwell," vomited once with some dry heaves, then had slurred speech during the day. She then had a fall in the bathroom & hit the right side of her head. On presentation with GCS of 11 (E=4 V =2 M=5). Patient diagnosed with IVH and L MCA CVA. Had EVD placed. Patient with persistent dysphagia and planned for PEG.    REVIEW OF SYSTEMS: Unable to attain due to mental status.    PAST MEDICAL & SURGICAL HISTORY  No pertinent past medical history  H/O colonoscopy  No significant past surgical history      SOCIAL HISTORY  Smoking - Denied, EtOH - Denied, Drugs - Denied    FUNCTIONAL HISTORY:   [facility]  PTA Independent ADLs and Ambulation    FAMILY HISTORY   Family history of colon cancer in father (Father)  No pertinent family history in first degree relatives      RECENT LABS/IMAGING  CBC Full  -  ( 28 Jan 2018 05:39 )  WBC Count : 18.9 K/uL  Hemoglobin : 15.2 g/dL  Hematocrit : 44.6 %  Platelet Count - Automated : 226 K/uL  Mean Cell Volume : 106.0 fl  Mean Cell Hemoglobin : 36.2 pg  Mean Cell Hemoglobin Concentration : 34.1 gm/dL  Auto Neutrophil # : x  Auto Lymphocyte # : x  Auto Monocyte # : x  Auto Eosinophil # : x  Auto Basophil # : x  Auto Neutrophil % : x  Auto Lymphocyte % : x  Auto Monocyte % : x  Auto Eosinophil % : x  Auto Basophil % : x    01-28    143  |  103  |  19  ----------------------------<  184<H>  3.7   |  27  |  0.63    Ca    9.5      28 Jan 2018 21:26  Phos  3.0     01-28  Mg     2.1     01-28          VITALS  T(C): 36.9 (01-29-18 @ 11:00), Max: 37.3 (01-28-18 @ 15:00)  HR: 69 (01-29-18 @ 13:30) (69 - 125)  BP: 173/88 (01-29-18 @ 13:30) (123/89 - 220/103)  RR: 17 (01-29-18 @ 13:30) (15 - 28)  SpO2: 100% (01-29-18 @ 13:30) (92% - 100%)  Wt(kg): --    ALLERGIES  No Known Drug Allergies  strawberry (Rash)      MEDICATIONS   ALBUTerol    90 MICROgram(s) HFA Inhaler 1 Puff(s) Inhalation every 4 hours PRN  atorvastatin 40 milliGRAM(s) Oral at bedtime  enoxaparin Injectable 30 milliGRAM(s) SubCutaneous <User Schedule>  hydrALAZINE 100 milliGRAM(s) Oral three times a day  ipratropium    for Nebulization 500 MICROGram(s) Nebulizer every 6 hours  labetalol 100 milliGRAM(s) Oral every 8 hours  levETIRAcetam  IVPB 1000 milliGRAM(s) IV Intermittent every 12 hours  nicotine - 21 mG/24Hr(s) Patch 1 patch Transdermal daily  oxyCODONE    IR 5 milliGRAM(s) Oral every 4 hours PRN  oxyCODONE    IR 10 milliGRAM(s) Oral every 6 hours PRN  polyethylene glycol 3350 17 Gram(s) Oral two times a day  senna Syrup 10 milliLiter(s) Oral at bedtime      ----------------------------------------------------------------------------------------  PHYSICAL EXAM  Constitutional - NAD, Comfortable  HEENT - inc intact  Neck - Supple, No limited ROM  Chest - CTA bilaterally, No wheeze, No rhonchi, No crackles  Cardiovascular - RRR, S1S2, No murmurs  Abdomen - BS+, Soft, NTND  Extremities - No C/C/E, No calf tenderness   Neurologic Exam -                    Cognitive - Awake, Alert,      Communication - + aphasia   Cranial Nerves - CN 2-12 intact     Motor - Right side 0/5, Left side at least antigravity                    Right neglect noted.      Impression:  61 yo with CVA/IPH with Right hemiplegia, aphasia, dysphagia      Plan:  PT- ROM, Bed Mob, Transfers, Amb w AD and bracing as needed  OT- ADLs, bracing  SLP- Dysphagia eval and treat  Prec- Falls, Cardiac, Pulm  DVT Prophylaxis- Lovenox  Skin- Turn q2 h  Dispo- Acute Rehab- can tolerate 3h/d PT/OT/SLP and requires daily physician visits

## 2018-01-29 NOTE — PROGRESS NOTE ADULT - SUBJECTIVE AND OBJECTIVE BOX
Patient seen and examined at bedside.    T(C): 36.8 (01-28-18 @ 23:00), Max: 38 (01-28-18 @ 07:00)  HR: 86 (01-29-18 @ 00:00) (81 - 125)  BP: 142/77 (01-29-18 @ 00:00) (123/89 - 220/103)  RR: 18 (01-29-18 @ 00:00) (17 - 31)  SpO2: 100% (01-29-18 @ 00:00) (92% - 100%)  Wt(kg): --    Exam:    Bipap mask, EO to name, non-verbal, attempts to follow commands on LUE, LLE spontaneous, no movement to stimulation on RUE or RLE

## 2018-01-29 NOTE — PHYSICAL THERAPY INITIAL EVALUATION ADULT - NS ASR RISK AREAS PT EVAL
safety awareness/fall fall/impaired judgment/safety awareness safety awareness/fall/impaired judgment/cognitive impairment

## 2018-01-29 NOTE — CONSULT NOTE ADULT - ASSESSMENT
62yoF with no sig PMH s/p fall at home found to have ICH s/p ventriculostomy and care in NSCU. Rheum called for eval for possible vasculitic process.    Pt has CT Angio that shows no major evidence of vasculitis. ANCA is negative and REZA is 1:160.   elevated REZA can be seen in the general population, more commonly after the age of 60. Pt has no other signs/symptoms of systemic autoimmune disease and possibly of vasculitis is unlikely     - c/w care as per neurosurgery    Please recall if needed 62yoF with no sig PMH s/p fall at home found to have ICH s/p ventriculostomy and care in NSCU. Rheum called for eval for possible vasculitic process.    Pt has CT Angio that shows no evidence of vasculitis. There is no evidence of systemic inflammatory autoimmune process. ANCA is negative and REZA is 1:160.  Elevated REZA can be seen in the general population, more commonly after the age of 60. Pt has no other signs/symptoms of systemic autoimmune disease and possibly of vasculitis is unlikely   - no indications for additional serological evaluation at this time  - c/w care as per neurosurgery    Please recall if needed

## 2018-01-29 NOTE — CONSULT NOTE ADULT - ASSESSMENT
62 year old female with acute L MCA infarct, additional microhemorrhages on MRA and IVH with poor neurologic recovery.  Dysphagia due to neurologic injury    PLAN  -will discuss with family regarding PEG placement for nutrition support  -Continue NGTF in the interrim  -Further care per NSCU team    Thank you for the courtesy of this consult.  Perry Murphy PA-C    [facility]  [phone]

## 2018-01-29 NOTE — PHYSICAL THERAPY INITIAL EVALUATION ADULT - GROSSLY INTACT, SENSORY
unable to fully assess sensation 2/2 pt disorientation however pt responded to noxious stimulus on RUE with grimace.

## 2018-01-29 NOTE — PROGRESS NOTE ADULT - ASSESSMENT
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

## 2018-01-29 NOTE — PHYSICAL THERAPY INITIAL EVALUATION ADULT - MANUAL MUSCLE TESTING RESULTS, REHAB EVAL
LUE and LLE at least 3+/5 grossly throughout, RUE and RLE 0/5 grossly throughout/grossly assessed due to

## 2018-01-29 NOTE — SWALLOW BEDSIDE ASSESSMENT ADULT - SLP PERTINENT HISTORY OF CURRENT PROBLEM
61y/o female brought in to [facility] ED s/p fall at home in the bathroom. Unable to obtain HPI, history, or ROS from patient due to her altered mental status. As per  patient woke up this morning feeling "unwell," vomited once with some dry heaves at 9am, then had slurred speech during the day starting at 10am. She spent most of the day in bed, and was able to have some tea and crackers. She then had a fall in the bathroom & hit the right side of her head. Level I trauma called as patient's altered mental status was initially concerning for potential need for intubation. In trauma bay primary survey significant for GCS of 11 (E=4 V =2 M=5). Secondary survey significant for Right frontal hematoma.

## 2018-01-29 NOTE — SWALLOW BEDSIDE ASSESSMENT ADULT - SLP GENERAL OBSERVATIONS
Pt encountered supine in bed, repositioned upright and EVD raised by RN. Pt nonverbal, following some simple commands. Pt encountered supine in bed, repositioned upright and EVD raised by RN. Pt nonverbal, following some simple commands with visual model. Left gaze preference.

## 2018-01-29 NOTE — PROGRESS NOTE ADULT - ASSESSMENT
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, intubated, EVD placed, angio showing vascular irregularities, now extubated with stable right sided hemiparesis    NEURO:  q2h neuro checks  Nimodipine  TCDs  Pain control  Keppra  EVD at 10, can advance to 20 today  Nicotine patch  f/u vasculitis work up/possible rheum consult?  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, intubated, EVD placed, angio showing vascular irregularities, now extubated with stable right sided hemiparesis    NEURO:  q2h neuro checks  d/c Nimodipine  TCDs  Pain control  Keppra  EVD at 10, can advance to 20 today  Nicotine patch  f/u vasculitis work up -- rheum consult  no further subclinical seizures reported    CARDS:  -180  continue hydralazine    PULM:  sat > 92%  wean high flow NC  Likely COPD   Broncholdilators albuterol prn q 4 hrs     RENAL:  IVL    GASTRO: NPO for PEG, possibly 1/30  ---> Stress ulcer prophylaxis:  PPI  bowel regimen    HEME:  monitor H/H    ---> lovenox    ENDO:  euglycemia    ID:  afebrile    Code status:  Full code  Disposition:  ICU    This patient was at high risk of neurologic deterioration and/or death due to IVH, seizure    Time spent:  45 minutes

## 2018-01-29 NOTE — PHYSICAL THERAPY INITIAL EVALUATION ADULT - ADDITIONAL COMMENTS
continued from above. Additional scattered foci of microhemorrhages. Fairly extensive white matter changes. MRA of the head demonstrates mild narrowing of the left middle and posterior cerebral arteries which is less prominent when compared with the conventional angiogram of 1/24/2018. continued from above. Additional scattered foci of microhemorrhages. Fairly extensive white matter changes. MRA of the head demonstrates mild narrowing of the left middle and posterior cerebral arteries which is less prominent when compared with the conventional angiogram of 1/24/2018. pt extubated on 1/28/2018. pt unable to give social history at this time, as per  Gus: pt lives with  in condo with 1-2 steps to enter.  reports condo was modified to be handicap accessible. Pt was independent with all functional ADLs prior to admit. Pt was an active exerciser prior to admit. Pt has access to SC.     continued from above. Additional scattered foci of microhemorrhages. Fairly extensive white matter changes. MRA of the head demonstrates mild narrowing of the left middle and posterior cerebral arteries which is less prominent when compared with the conventional angiogram of 1/24/2018. pt extubated on 1/28/2018.

## 2018-01-29 NOTE — PHYSICAL THERAPY INITIAL EVALUATION ADULT - DIAGNOSIS, PT EVAL
pt presents with decreased strength, decreased balance, decreased functional mobility, decreased ability to participate in work/leisure activities.

## 2018-01-29 NOTE — PHYSICAL THERAPY INITIAL EVALUATION ADULT - LEVEL OF INDEPENDENCE, REHAB EVAL
functional mobility held at this time 2/2 pt elevated blood pressure throughout session (196/109)./unable to perform maximum assist (25% patients effort)/functional mobility held at this time 2/2 pt elevated blood pressure throughout session (196/109). Functional mobility completed on 1/30/18.

## 2018-01-29 NOTE — PHYSICAL THERAPY INITIAL EVALUATION ADULT - LEVEL OF INDEPENDENCE: SIT/STAND, REHAB EVAL
functional mobility held at this time 2/2 pt elevated blood pressure throughout session (196/109)./unable to perform functional mobility held at this time 2/2 pt elevated blood pressure throughout session (196/109).  Functional mobility completed on 1/30/18./maximum assist (25% patients effort)

## 2018-01-29 NOTE — PHYSICAL THERAPY INITIAL EVALUATION ADULT - PATIENT/FAMILY/SIGNIFICANT OTHER GOALS STATEMENT, PT EVAL
continued from above. Head CT: Motion degraded study. Focal hemorrhage in the fourth ventricle. Large right frontal scalp hematoma. Cervical spine CT: No evidence for acute displaced fracture or traumatic malalignment. CT chest, CT abdomen/pelvis No visceral or vascular traumatic injury to the chest, abdomen or pelvis. Indeterminant L adrenal nodule. MRI head Acute left middle cerebral artery infarct with a small amount of hemorrhage. pt unable to state goals at this time.

## 2018-01-29 NOTE — PHYSICAL THERAPY INITIAL EVALUATION ADULT - PERTINENT HX OF CURRENT PROBLEM, REHAB EVAL
As per  pt woke up feeling "unwell," vomited once with dry heaves at 9am, then had slurred speech during the day starting at 10am. PT spent most of day in bed, & able to have some tea and crackers. She then had a fall in the bathroom & hit the R side of her head. Level I trauma called as pt's AMS was initially concerning for potential need for intubation. In trauma bay primary survey significant for GCS of 11 (E=4 V =2 M=5). Secondary survey significant for R frontal hematoma.

## 2018-01-30 LAB
ANION GAP SERPL CALC-SCNC: 12 MMOL/L — SIGNIFICANT CHANGE UP (ref 5–17)
BUN SERPL-MCNC: 24 MG/DL — HIGH (ref 7–23)
CALCIUM SERPL-MCNC: 9.2 MG/DL — SIGNIFICANT CHANGE UP (ref 8.4–10.5)
CHLORIDE SERPL-SCNC: 106 MMOL/L — SIGNIFICANT CHANGE UP (ref 96–108)
CK MB CFR SERPL CALC: 2 NG/ML — SIGNIFICANT CHANGE UP (ref 0–3.8)
CK SERPL-CCNC: 99 U/L — SIGNIFICANT CHANGE UP (ref 25–170)
CO2 SERPL-SCNC: 27 MMOL/L — SIGNIFICANT CHANGE UP (ref 22–31)
CREAT SERPL-MCNC: 0.64 MG/DL — SIGNIFICANT CHANGE UP (ref 0.5–1.3)
CULTURE RESULTS: SIGNIFICANT CHANGE UP
CULTURE RESULTS: SIGNIFICANT CHANGE UP
GLUCOSE BLDC GLUCOMTR-MCNC: 126 MG/DL — HIGH (ref 70–99)
GLUCOSE SERPL-MCNC: 220 MG/DL — HIGH (ref 70–99)
HCT VFR BLD CALC: 35.2 % — SIGNIFICANT CHANGE UP (ref 34.5–45)
HGB BLD-MCNC: 12.4 G/DL — SIGNIFICANT CHANGE UP (ref 11.5–15.5)
MAGNESIUM SERPL-MCNC: 2.1 MG/DL — SIGNIFICANT CHANGE UP (ref 1.6–2.6)
MCHC RBC-ENTMCNC: 35.1 GM/DL — SIGNIFICANT CHANGE UP (ref 32–36)
MCHC RBC-ENTMCNC: 37.8 PG — HIGH (ref 27–34)
MCV RBC AUTO: 108 FL — HIGH (ref 80–100)
PHOSPHATE SERPL-MCNC: 3 MG/DL — SIGNIFICANT CHANGE UP (ref 2.5–4.5)
PLATELET # BLD AUTO: 326 K/UL — SIGNIFICANT CHANGE UP (ref 150–400)
POTASSIUM SERPL-MCNC: 3.7 MMOL/L — SIGNIFICANT CHANGE UP (ref 3.5–5.3)
POTASSIUM SERPL-SCNC: 3.7 MMOL/L — SIGNIFICANT CHANGE UP (ref 3.5–5.3)
RBC # BLD: 3.28 M/UL — LOW (ref 3.8–5.2)
RBC # FLD: 12.2 % — SIGNIFICANT CHANGE UP (ref 10.3–14.5)
SODIUM SERPL-SCNC: 145 MMOL/L — SIGNIFICANT CHANGE UP (ref 135–145)
SPECIMEN SOURCE: SIGNIFICANT CHANGE UP
SPECIMEN SOURCE: SIGNIFICANT CHANGE UP
TROPONIN T SERPL-MCNC: 0.08 NG/ML — HIGH (ref 0–0.06)
TROPONIN T SERPL-MCNC: 0.12 NG/ML — HIGH (ref 0–0.06)
WBC # BLD: 18.1 K/UL — HIGH (ref 3.8–10.5)
WBC # FLD AUTO: 18.1 K/UL — HIGH (ref 3.8–10.5)

## 2018-01-30 PROCEDURE — 99291 CRITICAL CARE FIRST HOUR: CPT

## 2018-01-30 PROCEDURE — 70450 CT HEAD/BRAIN W/O DYE: CPT | Mod: 26

## 2018-01-30 PROCEDURE — 99292 CRITICAL CARE ADDL 30 MIN: CPT

## 2018-01-30 PROCEDURE — 99232 SBSQ HOSP IP/OBS MODERATE 35: CPT

## 2018-01-30 RX ORDER — LABETALOL HCL 100 MG
250 TABLET ORAL EVERY 8 HOURS
Qty: 0 | Refills: 0 | Status: DISCONTINUED | OUTPATIENT
Start: 2018-01-30 | End: 2018-02-02

## 2018-01-30 RX ORDER — AMLODIPINE BESYLATE 2.5 MG/1
5 TABLET ORAL DAILY
Qty: 0 | Refills: 0 | Status: DISCONTINUED | OUTPATIENT
Start: 2018-01-30 | End: 2018-02-02

## 2018-01-30 RX ORDER — LABETALOL HCL 100 MG
10 TABLET ORAL ONCE
Qty: 0 | Refills: 0 | Status: COMPLETED | OUTPATIENT
Start: 2018-01-30 | End: 2018-01-30

## 2018-01-30 RX ORDER — ACETAMINOPHEN 500 MG
1000 TABLET ORAL ONCE
Qty: 0 | Refills: 0 | Status: COMPLETED | OUTPATIENT
Start: 2018-01-30 | End: 2018-01-30

## 2018-01-30 RX ORDER — DEXTROSE MONOHYDRATE, SODIUM CHLORIDE, AND POTASSIUM CHLORIDE 50; .745; 4.5 G/1000ML; G/1000ML; G/1000ML
1000 INJECTION, SOLUTION INTRAVENOUS
Qty: 0 | Refills: 0 | Status: DISCONTINUED | OUTPATIENT
Start: 2018-01-30 | End: 2018-01-30

## 2018-01-30 RX ORDER — HYDRALAZINE HCL 50 MG
5 TABLET ORAL ONCE
Qty: 0 | Refills: 0 | Status: COMPLETED | OUTPATIENT
Start: 2018-01-30 | End: 2018-01-30

## 2018-01-30 RX ORDER — POTASSIUM CHLORIDE 20 MEQ
40 PACKET (EA) ORAL ONCE
Qty: 0 | Refills: 0 | Status: COMPLETED | OUTPATIENT
Start: 2018-01-30 | End: 2018-01-31

## 2018-01-30 RX ORDER — LABETALOL HCL 100 MG
5 TABLET ORAL ONCE
Qty: 0 | Refills: 0 | Status: COMPLETED | OUTPATIENT
Start: 2018-01-30 | End: 2018-01-30

## 2018-01-30 RX ADMIN — Medication 1 PATCH: at 11:50

## 2018-01-30 RX ADMIN — Medication 5 MILLIGRAM(S): at 03:20

## 2018-01-30 RX ADMIN — LEVETIRACETAM 400 MILLIGRAM(S): 250 TABLET, FILM COATED ORAL at 05:10

## 2018-01-30 RX ADMIN — AMLODIPINE BESYLATE 5 MILLIGRAM(S): 2.5 TABLET ORAL at 11:50

## 2018-01-30 RX ADMIN — Medication 1000 MILLIGRAM(S): at 06:22

## 2018-01-30 RX ADMIN — ATORVASTATIN CALCIUM 40 MILLIGRAM(S): 80 TABLET, FILM COATED ORAL at 23:17

## 2018-01-30 RX ADMIN — Medication 100 MILLIGRAM(S): at 23:17

## 2018-01-30 RX ADMIN — Medication 500 MICROGRAM(S): at 00:24

## 2018-01-30 RX ADMIN — Medication 400 MILLIGRAM(S): at 05:52

## 2018-01-30 RX ADMIN — Medication 500 MICROGRAM(S): at 06:27

## 2018-01-30 RX ADMIN — Medication 100 MILLIGRAM(S): at 05:11

## 2018-01-30 RX ADMIN — LEVETIRACETAM 400 MILLIGRAM(S): 250 TABLET, FILM COATED ORAL at 17:45

## 2018-01-30 RX ADMIN — Medication 100 MILLIGRAM(S): at 13:57

## 2018-01-30 RX ADMIN — Medication 500 MICROGRAM(S): at 12:23

## 2018-01-30 RX ADMIN — Medication 10 MILLIGRAM(S): at 13:05

## 2018-01-30 RX ADMIN — POLYETHYLENE GLYCOL 3350 17 GRAM(S): 17 POWDER, FOR SOLUTION ORAL at 05:11

## 2018-01-30 RX ADMIN — ENOXAPARIN SODIUM 30 MILLIGRAM(S): 100 INJECTION SUBCUTANEOUS at 17:45

## 2018-01-30 RX ADMIN — POTASSIUM PHOSPHATE, MONOBASIC POTASSIUM PHOSPHATE, DIBASIC 62.5 MILLIMOLE(S): 236; 224 INJECTION, SOLUTION INTRAVENOUS at 00:07

## 2018-01-30 RX ADMIN — Medication 500 MICROGRAM(S): at 17:35

## 2018-01-30 RX ADMIN — Medication 5 MILLIGRAM(S): at 03:45

## 2018-01-30 RX ADMIN — Medication 200 MILLIGRAM(S): at 10:04

## 2018-01-30 RX ADMIN — Medication 250 MILLIGRAM(S): at 17:46

## 2018-01-30 RX ADMIN — Medication 200 MILLIGRAM(S): at 01:10

## 2018-01-30 NOTE — CHART NOTE - NSCHARTNOTEFT_GEN_A_CORE
Patient seen for nutrition follow up. Hospital course noted. patient admitted with 4th ventricular hemorrhage, + EVD. Swallow evaluation noted (1/29) recommendations for NPO as patient currently with oropharyngeal dysphagia. Noted recommendations to consider repeat swallow evaluation later this week to reassess eligibility for PO diet.    Source: Patient [ ]    Family [ ]     other [x ] comprehensive chart review, RN    Diet : Jevity 1.2 @ 70 ml/hr x 18 hours/day, currently held for possible PEG placement      Per discussion with RN, patient previously receiving enteral feeds at goal without intolerance. Last BM 1/30    Enteral /Parenteral Nutrition: previously at goal of 70 ml/hr, currently held for possible PEG placement      Current Weight: Weight (kg): 49.9 (01-30 @ 07:05)  % Weight Change    Pertinent Medications: MEDICATIONS  (STANDING):  atorvastatin 40 milliGRAM(s) Oral at bedtime  enoxaparin Injectable 30 milliGRAM(s) SubCutaneous <User Schedule>  hydrALAZINE 100 milliGRAM(s) Oral three times a day  insulin lispro (HumaLOG) corrective regimen sliding scale   SubCutaneous every 6 hours  ipratropium    for Nebulization 500 MICROGram(s) Nebulizer every 6 hours  labetalol 200 milliGRAM(s) Oral every 8 hours  levETIRAcetam  IVPB 1000 milliGRAM(s) IV Intermittent every 12 hours  nicotine - 21 mG/24Hr(s) Patch 1 patch Transdermal daily  polyethylene glycol 3350 17 Gram(s) Oral two times a day  senna Syrup 10 milliLiter(s) Oral at bedtime  sodium chloride 0.9% with potassium chloride 20 mEq/L 1000 milliLiter(s) (75 mL/Hr) IV Continuous <Continuous>    MEDICATIONS  (PRN):  ALBUTerol    90 MICROgram(s) HFA Inhaler 1 Puff(s) Inhalation every 4 hours PRN Wheezing  oxyCODONE    IR 5 milliGRAM(s) Oral every 4 hours PRN Moderate Pain (4 - 6)  oxyCODONE    IR 10 milliGRAM(s) Oral every 6 hours PRN Severe Pain (7 - 10)    Pertinent Labs:  01-29 Na141 mmol/L Glu 209 mg/dL<H> K+ 3.8 mmol/L Cr  0.72 mg/dL BUN 26 mg/dL<H> Phos 2.9 mg/dL POCT blood glucose (1/30) 126 (1/29) 165.    Skin: no edema, no pressure injuries per documentation    Estimated Needs:   [ x] no change since previous assessment  [ ] recalculated:       Previous Nutrition Diagnosis:     [x ] Inadequate Energy Intake, resolved with initiation of enteral feeds, currently active as patient NPO for possible PEG placement         New Nutrition Diagnosis: Swallowing difficulty    Related to: neurological causes     As evidenced by: abnormal swallow evaluation     Interventions:     Recommend    [ ] Change Diet To:    [ ] Nutrition Supplement    [x ] Nutrition Support: resume enteral feeds as medically feasible, current enteral regimen provides 1512 calories, 70 g protein (30 calories/kg, 1.4 g protein/kg) of dosing weight 49.9 kg    [ ] Other:        Monitoring and Evaluation:     [ ] PO intake [x ] Tolerance to diet prescription [ x] weights [x ] follow up per protocol    [ ] other:

## 2018-01-30 NOTE — OCCUPATIONAL THERAPY INITIAL EVALUATION ADULT - LIVES WITH, PROFILE
As per pts , pt lives in condo with 1 step to enter, walk in shower in bathroom. Pt I in ADLs and ambulation prior to admission/spouse

## 2018-01-30 NOTE — PROGRESS NOTE ADULT - SUBJECTIVE AND OBJECTIVE BOX
Patient is a 62y old  Female who presented with a chief complaint of Level I trauma activation s/p fall (24 Jan 2018 13:50)      INTERVAL HPI/OVERNIGHT EVENTS:  more responsive  remains extubated, following some commands    MEDICATIONS  (STANDING):  amLODIPine   Tablet 5 milliGRAM(s) Oral daily  atorvastatin 40 milliGRAM(s) Oral at bedtime  enoxaparin Injectable 30 milliGRAM(s) SubCutaneous <User Schedule>  hydrALAZINE 100 milliGRAM(s) Oral three times a day  ipratropium    for Nebulization 500 MICROGram(s) Nebulizer every 6 hours  labetalol 250 milliGRAM(s) Oral every 8 hours  levETIRAcetam  IVPB 1000 milliGRAM(s) IV Intermittent every 12 hours  nicotine - 21 mG/24Hr(s) Patch 1 patch Transdermal daily  polyethylene glycol 3350 17 Gram(s) Oral two times a day  senna Syrup 10 milliLiter(s) Oral at bedtime    MEDICATIONS  (PRN):  ALBUTerol    90 MICROgram(s) HFA Inhaler 1 Puff(s) Inhalation every 4 hours PRN Wheezing  oxyCODONE    IR 5 milliGRAM(s) Oral every 4 hours PRN Moderate Pain (4 - 6)  oxyCODONE    IR 10 milliGRAM(s) Oral every 6 hours PRN Severe Pain (7 - 10)      Allergies  No Known Drug Allergies  strawberry (Rash)    Review of Systems:  General:  No fever, chills  CV:  No pain, palpitatioins  Resp:  extubated, hi flow oxygen, +secretions  GI:  see HPI +dysphagia on NGTF  :  No pain, bleeding, incontinence, nocturia  Muscle:  Rt hemiparesis  Neuro:  +Rt hemiparesis  Endocrine:  No polyuria, polydypsia, cold/heat intolerance  Heme:  No petechiae, ecchymosis, easy bruisability  Skin:  No rash, tattoos, scars, edema    Vital Signs Last 24 Hrs  T(C): 36.5 (30 Jan 2018 11:00), Max: 36.9 (30 Jan 2018 07:00)  T(F): 97.7 (30 Jan 2018 11:00), Max: 98.4 (30 Jan 2018 07:00)  HR: 65 (30 Jan 2018 13:20) (65 - 93)  BP: 157/85 (30 Jan 2018 13:20) (129/82 - 207/99)  BP(mean): 104 (30 Jan 2018 13:20) (98 - 127)  RR: 19 (30 Jan 2018 13:20) (15 - 26)  SpO2: 100% (30 Jan 2018 13:20) (97% - 100%)    PHYSICAL EXAM:  Constitutional: +EVD (Left) draining +on oxygen awake +NGT  Neck: No LAD, supple  Respiratory: occasional +rhonchi  Cardiovascular: S1 and S2, RRR,  Gastrointestinal: BS+, soft, NT/ND, neg HSM,  Extremities: No peripheral edema, neg clubbing, cyanosis  Vascular: 2+ peripheral pulses  Neurological: +rt hemiparesis  Skin: No rashes    LABS:                        13.4   20.0  )-----------( 306      ( 29 Jan 2018 21:58 )             38.8     01-29    141  |  102  |  26<H>  ----------------------------<  209<H>  3.8   |  29  |  0.72    Ca    9.7      29 Jan 2018 21:58  Phos  2.9     01-29  Mg     2.1     01-29      PT/INR - ( 28 Jan 2018 21:26 )   PT: 12.1 sec;   INR: 1.11 ratio         PTT - ( 28 Jan 2018 21:26 )  PTT:30.7 sec        RADIOLOGY & ADDITIONAL TESTS:

## 2018-01-30 NOTE — PROGRESS NOTE ADULT - ASSESSMENT
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, intubated, EVD placed, angio showing vascular irregularities, now extubated with stable right sided hemiparesis    NEURO:  q2h neuro checks  TCDs  Pain control  Keppra  EVD at 20  Nicotine patch  f/u vasculitis work up -- rheum consult - no further work up necessary  no further subclinical seizures reported  - appreciate PMR recommendations, S+S evaluations    CARDS:  -180  continue hydralazine, labetalol    PULM:  sat > 92%  Likely COPD   Broncholdilators albuterol prn q 4 hrs   ipratropium    RENAL:  IVL    GASTRO: NPO for PEG  bowel regimen    HEME:  monitor H/H    ---> lovenox    ENDO:  euglycemia    ID:  afebrile    Code status:  Full code  Disposition:  ICU    This patient was at high risk of neurologic deterioration and/or death due to IVH, seizure    Time spent:  45 minutes 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, intubated, EVD placed, angio showing vascular irregularities, now extubated with stable right sided hemiparesis    NEURO:  q1h neuro checks during clamp trial  TCDs  Pain control  Keppra  EVD clamped  Nicotine patch  vasculitis work up -- rheum consult - no further work up necessary  no further subclinical seizures reported  - speech/swallow following, will re-eval 1/31    CARDS:  -180  continue hydralazine, labetalol    PULM:  sat > 92%  Likely COPD   Broncholdilators albuterol prn q 4 hrs   ipratropium    RENAL:  IVL    GASTRO: continue TF, NPO after midnight for swallow eval and PEG if fails  bowel regimen    HEME:  monitor H/H    ---> lovenox    ENDO:  euglycemia    ID:  afebrile    Code status:  Full code  Disposition:  ICU    This patient was at high risk of neurologic deterioration and/or death due to IVH, seizure    Time spent:  45 minutes

## 2018-01-30 NOTE — OCCUPATIONAL THERAPY INITIAL EVALUATION ADULT - PLANNED THERAPY INTERVENTIONS, OT EVAL
transfer training/ADL retraining/balance training/ROM/strengthening/bed mobility training/cognitive, visual perceptual

## 2018-01-30 NOTE — OCCUPATIONAL THERAPY INITIAL EVALUATION ADULT - ADDITIONAL COMMENTS
Level I trauma called as pt's AMS was initially concerning for potential need for intubation. In trauma bay primary survey significant for GCS of 11 (E=4 V =2 M=5). Secondary survey significant for Right frontal hematoma. Pt s/p 1/24 EVD placement and selective cerebral angiogram   CT Head: Acute left middle cerebral artery infarct with a small amount of hemorrhage which is now seen on CT compared with the MRI of 1/25/2018. No hydrocephalus. Fourth ventricular/vermian hemorrhage unchanged  CT Cervical Spine: (-), CT Abdomen: (-)

## 2018-01-30 NOTE — PROGRESS NOTE ADULT - ASSESSMENT
62 year old female with acute L MCA infarct, additional microhemorrhages on MRA and IVH with poor neurologic recovery.  Dysphagia due to neurologic injury  Improving Mental status    PLAN  -await SLP evaluation planned tomorrow, will address possibility of PEG if fails SLP eval  -Continue NGTF in the interrim  -Further care per NSCU team      Perry Murphy PA-C    [facility]  [phone]

## 2018-01-30 NOTE — OCCUPATIONAL THERAPY INITIAL EVALUATION ADULT - PERTINENT HX OF CURRENT PROBLEM, REHAB EVAL
63 y/o F s/p fall at home in the bathroom. As per  pt woke up this am feeling "unwell," vomited once with some dry heaves at 9am, then had slurred speech during the day starting at 10am. She spent most of the day in bed, and was able to have some tea and crackers. She then had a fall in the bathroom & hit the R side of her head. See below

## 2018-01-30 NOTE — PROGRESS NOTE ADULT - SUBJECTIVE AND OBJECTIVE BOX
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    VITALS:  T(C): , Max: 36.9 (01-29-18 @ 11:00)  HR:  (69 - 103)  BP:  (129/82 - 209/103)  ABP: --  RR:  (17 - 26)  SpO2:  (97% - 100%)  Wt(kg): --      01-29-18 @ 07:01  -  01-30-18 @ 07:00  --------------------------------------------------------  IN: 1310 mL / OUT: 88 mL / NET: 1222 mL      LABS:  Na: 141 (01-29 @ 21:58), 143 (01-28 @ 21:26), 143 (01-28 @ 07:19), 142 (01-28 @ 05:39), 147 (01-27 @ 21:37)  K: 3.8 (01-29 @ 21:58), 3.7 (01-28 @ 21:26), 4.7 (01-28 @ 07:19), 6.6 (01-28 @ 05:39), 3.2 (01-27 @ 21:37)  Cl: 102 (01-29 @ 21:58), 103 (01-28 @ 21:26), 102 (01-28 @ 07:19), 101 (01-28 @ 05:39), 115 (01-27 @ 21:37)  CO2: 29 (01-29 @ 21:58), 27 (01-28 @ 21:26), 26 (01-28 @ 07:19), 25 (01-28 @ 05:39), 21 (01-27 @ 21:37)  BUN: 26 (01-29 @ 21:58), 19 (01-28 @ 21:26), 15 (01-28 @ 07:19), 14 (01-28 @ 05:39), 12 (01-27 @ 21:37)  Cr: 0.72 (01-29 @ 21:58), 0.63 (01-28 @ 21:26), 0.65 (01-28 @ 07:19), 0.54 (01-28 @ 05:39), 0.42 (01-27 @ 21:37)  Glu: 209(01-29 @ 21:58), 184(01-28 @ 21:26), 143(01-28 @ 07:19), 122(01-28 @ 05:39), 102(01-27 @ 21:37)    Hgb: 13.4 (01-29 @ 21:58), 15.2 (01-28 @ 05:39), 10.8 (01-27 @ 21:37)  Hct: 38.8 (01-29 @ 21:58), 44.6 (01-28 @ 05:39), 31.9 (01-27 @ 21:37)  WBC: 20.0 (01-29 @ 21:58), 18.9 (01-28 @ 05:39), 15.1 (01-27 @ 21:37)  Plt: 306 (01-29 @ 21:58), 226 (01-28 @ 05:39), 158 (01-27 @ 21:37)    INR: 1.11 01-28-18 @ 21:26  PTT: 30.7 01-28-18 @ 21:26    IMAGING:   Recent imaging studies were reviewed.    MEDICATIONS:  ALBUTerol    90 MICROgram(s) HFA Inhaler 1 Puff(s) Inhalation every 4 hours PRN  atorvastatin 40 milliGRAM(s) Oral at bedtime  enoxaparin Injectable 30 milliGRAM(s) SubCutaneous <User Schedule>  hydrALAZINE 100 milliGRAM(s) Oral three times a day  insulin lispro (HumaLOG) corrective regimen sliding scale   SubCutaneous every 6 hours  ipratropium    for Nebulization 500 MICROGram(s) Nebulizer every 6 hours  labetalol 200 milliGRAM(s) Oral every 8 hours  levETIRAcetam  IVPB 1000 milliGRAM(s) IV Intermittent every 12 hours  nicotine - 21 mG/24Hr(s) Patch 1 patch Transdermal daily  oxyCODONE    IR 5 milliGRAM(s) Oral every 4 hours PRN  oxyCODONE    IR 10 milliGRAM(s) Oral every 6 hours PRN  polyethylene glycol 3350 17 Gram(s) Oral two times a day  senna Syrup 10 milliLiter(s) Oral at bedtime  sodium chloride 0.9% with potassium chloride 20 mEq/L 1000 milliLiter(s) IV Continuous <Continuous>    EXAMINATION:  General:  calm  HEENT:  MMM, NG tube in place  Neuro:  awake, mimic with left side: LUE opens/closes, LLE raises. right sided plegia. not following commands.   Cards:  RRR  Respiratory:  no respiratory distress  Adomen:  soft  Extremities:  no edema  Skin:  warm/dry

## 2018-01-30 NOTE — PROGRESS NOTE ADULT - SUBJECTIVE AND OBJECTIVE BOX
O: EVD clamped     T(C): 36.5 (01-30-18 @ 15:00), Max: 36.9 (01-30-18 @ 07:00)  HR: 73 (01-30-18 @ 18:00) (65 - 93)  BP: 159/76 (01-30-18 @ 18:00) (129/82 - 207/99)  RR: 16 (01-30-18 @ 18:00) (15 - 26)  SpO2: 100% (01-30-18 @ 18:00) (96% - 100%)  01-29-18 @ 07:01  -  01-30-18 @ 07:00  --------------------------------------------------------  IN: 1310 mL / OUT: 88 mL / NET: 1222 mL    01-30-18 @ 07:01  -  01-30-18 @ 19:25  --------------------------------------------------------  IN: 875 mL / OUT: 4 mL / NET: 871 mL    ALBUTerol    90 MICROgram(s) HFA Inhaler 1 Puff(s) Inhalation every 4 hours PRN  amLODIPine   Tablet 5 milliGRAM(s) Oral daily  atorvastatin 40 milliGRAM(s) Oral at bedtime  enoxaparin Injectable 30 milliGRAM(s) SubCutaneous <User Schedule>  hydrALAZINE 100 milliGRAM(s) Oral three times a day  ipratropium    for Nebulization 500 MICROGram(s) Nebulizer every 6 hours  labetalol 250 milliGRAM(s) Oral every 8 hours  levETIRAcetam  IVPB 1000 milliGRAM(s) IV Intermittent every 12 hours  nicotine - 21 mG/24Hr(s) Patch 1 patch Transdermal daily  oxyCODONE    IR 5 milliGRAM(s) Oral every 4 hours PRN  oxyCODONE    IR 10 milliGRAM(s) Oral every 6 hours PRN  polyethylene glycol 3350 17 Gram(s) Oral two times a day  senna Syrup 10 milliLiter(s) Oral at bedtime      EXAMINATION:  General:  calm  HEENT:  MMM, NG tube in place  Neuro:  awake, mimic with left side: LUE opens/closes, LLE raises. right sided plegia. not following commands.   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, intubated, EVD placed, angio showing vascular irregularities, now extubated with stable right sided hemiparesis    NEURO:  EVD clamped, will moniotr for hydro signs, unclamp if ICP>25 for > 5 min, q1h neuro checks   Continue keppra    CARDS:  -180  continue hydralazine, labetalol    PULM:  sat > 92%  Likely COPD   Broncholdilators albuterol prn q 4 hrs   ipratropium    RENAL:  IVL    GASTRO: continue TF, NPO after midnight for swallow eval and PEG if fails  bowel regimen    HEME:  monitor H/H    ---> lovenox    ENDO:  euglycemia    ID:  afebrile    Code status:  Full code  Disposition:  ICU    This patient was at high risk of neurologic deterioration and/or death due to IVH, seizure    Time spent:  35 minutes O: EVD clamped     T(C): 36.5 (01-30-18 @ 15:00), Max: 36.9 (01-30-18 @ 07:00)  HR: 73 (01-30-18 @ 18:00) (65 - 93)  BP: 159/76 (01-30-18 @ 18:00) (129/82 - 207/99)  RR: 16 (01-30-18 @ 18:00) (15 - 26)  SpO2: 100% (01-30-18 @ 18:00) (96% - 100%)  01-29-18 @ 07:01  -  01-30-18 @ 07:00  --------------------------------------------------------  IN: 1310 mL / OUT: 88 mL / NET: 1222 mL    01-30-18 @ 07:01  -  01-30-18 @ 19:25  --------------------------------------------------------  IN: 875 mL / OUT: 4 mL / NET: 871 mL    ALBUTerol    90 MICROgram(s) HFA Inhaler 1 Puff(s) Inhalation every 4 hours PRN  amLODIPine   Tablet 5 milliGRAM(s) Oral daily  atorvastatin 40 milliGRAM(s) Oral at bedtime  enoxaparin Injectable 30 milliGRAM(s) SubCutaneous <User Schedule>  hydrALAZINE 100 milliGRAM(s) Oral three times a day  ipratropium    for Nebulization 500 MICROGram(s) Nebulizer every 6 hours  labetalol 250 milliGRAM(s) Oral every 8 hours  levETIRAcetam  IVPB 1000 milliGRAM(s) IV Intermittent every 12 hours  nicotine - 21 mG/24Hr(s) Patch 1 patch Transdermal daily  oxyCODONE    IR 5 milliGRAM(s) Oral every 4 hours PRN  oxyCODONE    IR 10 milliGRAM(s) Oral every 6 hours PRN  polyethylene glycol 3350 17 Gram(s) Oral two times a day  senna Syrup 10 milliLiter(s) Oral at bedtime      EXAMINATION:  General:  calm  HEENT:  MMM, NG tube in place  Neuro:  awake, mimic with left side: LUE opens/closes, LLE raises. right sided plegia. follows commands, lifted her left lower extremity   Cards:  RRR  Respiratory:  no respiratory distress  Adomen:  soft  Extremities:  no edema  Skin:  warm/dry    Basic Metabolic Panel - STAT (01.29.18 @ 21:58)    Sodium, Serum: 141 mmol/L    Potassium, Serum: 3.8 mmol/L    Chloride, Serum: 102 mmol/L    Carbon Dioxide, Serum: 29 mmol/L    Anion Gap, Serum: 10 mmol/L    Blood Urea Nitrogen, Serum: 26 mg/dL    Creatinine, Serum: 0.72 mg/dL    Glucose, Serum: 209 mg/dL    Calcium, Total Serum: 9.7 mg/dL        Complete Blood Count (01.30.18 @ 21:50)    WBC Count: 18.1 K/uL    RBC Count: 3.28 M/uL    Hemoglobin: 12.4 g/dL    Hematocrit: 35.2 %    Mean Cell Volume: 108.0 fl    Mean Cell Hemoglobin: 37.8 pg    Mean Cell Hemoglobin Conc: 35.1 gm/dL    Red Cell Distrib Width: 12.2 %    Platelet Count - Automated: 326 K/uL        Assessment and Plan:   · Assessment		  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, intubated, EVD placed, angio showing vascular irregularities, now extubated with stable right sided hemiparesis    NEURO:  EVD clamped, will moniotr for hydro signs, unclamp if ICP>25 for > 5 min, q1h neuro checks   Continue keppra    CARDS:  -180  continue hydralazine, labetalol    PULM:  sat > 92%  Likely COPD   Broncholdilators albuterol prn q 4 hrs   ipratropium    RENAL:  IVL    GASTRO: continue TF jevity at 70 ml/hr,swallow eval   bowel regimen    HEME:  monitor H/H    ---> lovenox    ENDO:  euglycemia    ID:  afebrile    Code status:  Full code  Disposition:  ICU    This patient was at high risk of neurologic deterioration and/or death due to IVH, seizure    Time spent:  35 minutes

## 2018-01-31 PROBLEM — Z00.00 ENCOUNTER FOR PREVENTIVE HEALTH EXAMINATION: Status: ACTIVE | Noted: 2018-01-31

## 2018-01-31 PROCEDURE — 71045 X-RAY EXAM CHEST 1 VIEW: CPT | Mod: 26

## 2018-01-31 PROCEDURE — 99232 SBSQ HOSP IP/OBS MODERATE 35: CPT

## 2018-01-31 PROCEDURE — 99291 CRITICAL CARE FIRST HOUR: CPT

## 2018-01-31 RX ORDER — LEVETIRACETAM 250 MG/1
1000 TABLET, FILM COATED ORAL
Qty: 0 | Refills: 0 | Status: DISCONTINUED | OUTPATIENT
Start: 2018-01-31 | End: 2018-02-02

## 2018-01-31 RX ADMIN — Medication 100 MILLIGRAM(S): at 06:18

## 2018-01-31 RX ADMIN — Medication 500 MICROGRAM(S): at 00:11

## 2018-01-31 RX ADMIN — POLYETHYLENE GLYCOL 3350 17 GRAM(S): 17 POWDER, FOR SOLUTION ORAL at 17:34

## 2018-01-31 RX ADMIN — Medication 1 PATCH: at 11:00

## 2018-01-31 RX ADMIN — Medication 40 MILLIEQUIVALENT(S): at 06:37

## 2018-01-31 RX ADMIN — POLYETHYLENE GLYCOL 3350 17 GRAM(S): 17 POWDER, FOR SOLUTION ORAL at 06:18

## 2018-01-31 RX ADMIN — LEVETIRACETAM 400 MILLIGRAM(S): 250 TABLET, FILM COATED ORAL at 06:18

## 2018-01-31 RX ADMIN — Medication 100 MILLIGRAM(S): at 23:10

## 2018-01-31 RX ADMIN — LEVETIRACETAM 1000 MILLIGRAM(S): 250 TABLET, FILM COATED ORAL at 17:33

## 2018-01-31 RX ADMIN — SENNA PLUS 10 MILLILITER(S): 8.6 TABLET ORAL at 23:10

## 2018-01-31 RX ADMIN — ENOXAPARIN SODIUM 30 MILLIGRAM(S): 100 INJECTION SUBCUTANEOUS at 17:33

## 2018-01-31 RX ADMIN — Medication 1 PATCH: at 13:00

## 2018-01-31 RX ADMIN — Medication 500 MICROGRAM(S): at 12:32

## 2018-01-31 RX ADMIN — Medication 250 MILLIGRAM(S): at 02:17

## 2018-01-31 RX ADMIN — ATORVASTATIN CALCIUM 40 MILLIGRAM(S): 80 TABLET, FILM COATED ORAL at 23:10

## 2018-01-31 RX ADMIN — Medication 250 MILLIGRAM(S): at 17:33

## 2018-01-31 RX ADMIN — Medication 500 MICROGRAM(S): at 05:05

## 2018-01-31 RX ADMIN — Medication 250 MILLIGRAM(S): at 11:08

## 2018-01-31 RX ADMIN — AMLODIPINE BESYLATE 5 MILLIGRAM(S): 2.5 TABLET ORAL at 06:19

## 2018-01-31 NOTE — SWALLOW BEDSIDE ASSESSMENT ADULT - SWALLOW EVAL: PATIENT/FAMILY GOALS STATEMENT
Pt nonverbal. Family report pt with no appetite and not really "a good eater." Weight loss of ~5lbs per year per sister. No problems chewing or swallowing PTA.
Pt nonverbal; no family present at bedside

## 2018-01-31 NOTE — SWALLOW BEDSIDE ASSESSMENT ADULT - NS ASR SWALLOW FINDINGS DISCUS
Nursing/RN Jose, CLAUDIA Harrison/Patient
RN, YOHANA Keane; sister at bedside, unable to locate spouse/Patient/Nursing

## 2018-01-31 NOTE — PROGRESS NOTE ADULT - ASSESSMENT
62-year old woman with 4th ventricular IVH, unclear etiology on CTA and conventional angiogram, multiple lucencies on CT     -TCDs  -EVD Clamped, f/u CT 2/1  -S/s re-eval in AM

## 2018-01-31 NOTE — SWALLOW BEDSIDE ASSESSMENT ADULT - COMMENTS
CTA: Somewhat asymmetric increased venous phase enhancement seen in the region of the right cerebellopontine angle, with an increased number of small arterial vessels seen in the region of the fourth ventricle, approximately in the region of the previously identified intraventricular hemorrhage. The significance of this finding is uncertain. The possibility of a small extra-axial AVM is not excluded. Catheter angiography may be performed as clinically indicated. MRI: Acute left middle cerebral artery infarct with a small amount of hemorrhage. Additional scattered foci of microhemorrhages. Fairly extensive white matter changes. MRA of the head demonstrates mild narrowing of the left middle and posterior cerebral arteries which is less prominent when compared with the conventional angiogram of 1/24/2018. CT: Slight decreased size of the third and lateral ventricles. Left temporal parietal infarction. Left occipital infarction, increased in conspicuity. No hemorrhagic transformation. Per NSCU, pt found to have 4th vent IVH, now s/p EVD, angio negative for vascular malformation, questionable vasculitis->w/u (-). Course complicated by L hemisphereic sz. 1/25: EEG provides evidence of focal epileptogenicity in the left temporal region.  Two electrographic seizures were recorded from the left hemisphere, maximum in the left temporal region, with no associated clinical signs (duration 1-2 minutes). There is also evidence of a severe diffuse encephalopathy, and a structural lesion in the left hemisphere. Started on keppra, no further seizures on vEEG. Extubated 1/28 with occasional desaturations after extubation->started on BIPAP and then changed to hiflow. GI consulted for possible PEG placement. CXR: Enteric tube with tip in stomach. Trace left pleural effusion.
CTA: Somewhat asymmetric increased venous phase enhancement seen in the region of the right cerebellopontine angle, with an increased number of small arterial vessels seen in the region of the fourth ventricle, approximately in the region of the previously identified intraventricular hemorrhage. The significance of this finding is uncertain. The possibility of a small extra-axial AVM is not excluded. Catheter angiography may be performed as clinically indicated. MRI: Acute left middle cerebral artery infarct with a small amount of hemorrhage. Additional scattered foci of microhemorrhages. Fairly extensive white matter changes. MRA of the head demonstrates mild narrowing of the left middle and posterior cerebral arteries which is less prominent when compared with the conventional angiogram of 1/24/2018. CT: Slight decreased size of the third and lateral ventricles. Left temporal parietal infarction. Left occipital infarction, increased in conspicuity. No hemorrhagic transformation. Per NSCU, pt found to have 4th vent IVH, now s/p EVD, angio negative for vascular malformation, questionable vasculitis->w/u (-). Course complicated by L hemisphereic sz. 1/25: EEG provides evidence of focal epileptogenicity in the left temporal region.  Two electrographic seizures were recorded from the left hemisphere, maximum in the left temporal region, with no associated clinical signs (duration 1-2 minutes). There is also evidence of a severe diffuse encephalopathy, and a structural lesion in the left hemisphere. Started on keppra, no further seizures on vEEG. Extubated 1/28 with occasional desaturations after extubation->started on BIPAP and then changed to hiflow. GI consulted for possible PEG placement. CXR: Enteric tube with tip in stomach. Trace left pleural effusion.

## 2018-01-31 NOTE — SWALLOW BEDSIDE ASSESSMENT ADULT - SWALLOW EVAL: ORAL MUSCULATURE
unable to assess due to poor participation/comprehension/+ facial droop
+ facial droop/unable to assess due to poor participation/comprehension

## 2018-01-31 NOTE — SWALLOW BEDSIDE ASSESSMENT ADULT - SLP GENERAL OBSERVATIONS
Pt encountered supine in bed, repositioned upright and EVD raised by RN. Pt nonverbal, following some simple commands without visual model this time. Left gaze preference.

## 2018-01-31 NOTE — PROGRESS NOTE ADULT - ASSESSMENT
62 year old female with acute L MCA infarct, additional microhemorrhages on MRA and IVH with poor neurologic recovery.  Dysphagia due to neurologic injury  Improving Mental status    PLAN  -await SLP evaluation  -Continue NGTF in the interrim  -Further care per NSCU team

## 2018-01-31 NOTE — SWALLOW BEDSIDE ASSESSMENT ADULT - SLP PERTINENT HISTORY OF CURRENT PROBLEM
63y/o female brought in to [facility] ED s/p fall at home in the bathroom. Unable to obtain HPI, history, or ROS from patient due to her altered mental status. As per  patient woke up this morning feeling "unwell," vomited once with some dry heaves at 9am, then had slurred speech during the day starting at 10am. She spent most of the day in bed, and was able to have some tea and crackers. She then had a fall in the bathroom & hit the right side of her head. Level I trauma called as patient's altered mental status was initially concerning for potential need for intubation. In trauma bay primary survey significant for GCS of 11 (E=4 V =2 M=5). Secondary survey significant for Right frontal hematoma.

## 2018-01-31 NOTE — PROGRESS NOTE ADULT - SUBJECTIVE AND OBJECTIVE BOX
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 ICP increased this morning - no change in exam. ICP improved with closer BP control. Manageable suctioning requirements    VITALS:  T(C): , Max: 36.8 (01-30-18 @ 19:00)  HR:  (65 - 80)  BP:  (116/64 - 192/94)  ABP: --  RR:  (15 - 22)  SpO2:  (95% - 100%)  Wt(kg): --      01-30-18 @ 07:01  -  01-31-18 @ 07:00  --------------------------------------------------------  IN: 1445 mL / OUT: 4 mL / NET: 1441 mL      LABS:  Na: 145 (01-30 @ 21:50), 141 (01-29 @ 21:58), 143 (01-28 @ 21:26), 143 (01-28 @ 07:19)  K: 3.7 (01-30 @ 21:50), 3.8 (01-29 @ 21:58), 3.7 (01-28 @ 21:26), 4.7 (01-28 @ 07:19)  Cl: 106 (01-30 @ 21:50), 102 (01-29 @ 21:58), 103 (01-28 @ 21:26), 102 (01-28 @ 07:19)  CO2: 27 (01-30 @ 21:50), 29 (01-29 @ 21:58), 27 (01-28 @ 21:26), 26 (01-28 @ 07:19)  BUN: 24 (01-30 @ 21:50), 26 (01-29 @ 21:58), 19 (01-28 @ 21:26), 15 (01-28 @ 07:19)  Cr: 0.64 (01-30 @ 21:50), 0.72 (01-29 @ 21:58), 0.63 (01-28 @ 21:26), 0.65 (01-28 @ 07:19)  Glu: 220(01-30 @ 21:50), 209(01-29 @ 21:58), 184(01-28 @ 21:26), 143(01-28 @ 07:19)    Hgb: 12.4 (01-30 @ 21:50), 13.4 (01-29 @ 21:58)  Hct: 35.2 (01-30 @ 21:50), 38.8 (01-29 @ 21:58)  WBC: 18.1 (01-30 @ 21:50), 20.0 (01-29 @ 21:58)  Plt: 326 (01-30 @ 21:50), 306 (01-29 @ 21:58)    INR: 1.11 01-28-18 @ 21:26  PTT: 30.7 01-28-18 @ 21:26    IMAGING:   Recent imaging studies were reviewed.    MEDICATIONS:  ALBUTerol    90 MICROgram(s) HFA Inhaler 1 Puff(s) Inhalation every 4 hours PRN  amLODIPine   Tablet 5 milliGRAM(s) Oral daily  atorvastatin 40 milliGRAM(s) Oral at bedtime  enoxaparin Injectable 30 milliGRAM(s) SubCutaneous <User Schedule>  hydrALAZINE 100 milliGRAM(s) Oral three times a day  ipratropium    for Nebulization 500 MICROGram(s) Nebulizer every 6 hours  labetalol 250 milliGRAM(s) Oral every 8 hours  levETIRAcetam  IVPB 1000 milliGRAM(s) IV Intermittent every 12 hours  nicotine - 21 mG/24Hr(s) Patch 1 patch Transdermal daily  oxyCODONE    IR 5 milliGRAM(s) Oral every 4 hours PRN  oxyCODONE    IR 10 milliGRAM(s) Oral every 6 hours PRN  polyethylene glycol 3350 17 Gram(s) Oral two times a day  senna Syrup 10 milliLiter(s) Oral at bedtime    EXAMINATION:  General:  calm  HEENT:  MMM, NG tube in place  Neuro:  awake, aphasic, LUE, LLE moving spontaneously. Likely element of mimicking v. perseveration. Unclear if patient truly following commands. right sided hemiparesis  Cards:  RRR  Respiratory:  no respiratory distress  Adomen:  soft  Extremities:  no edema  Skin:  warm/dry

## 2018-01-31 NOTE — PROGRESS NOTE ADULT - SUBJECTIVE AND OBJECTIVE BOX
Patient is a 62y old  Female admitted to Level I trauma s/p fall (24 Jan 2018 13:50)    INTERVAL HPI/OVERNIGHT EVENTS:  more responsive  remains extubated, following some commands    MEDICATIONS  (STANDING):  amLODIPine   Tablet 5 milliGRAM(s) Oral daily  atorvastatin 40 milliGRAM(s) Oral at bedtime  enoxaparin Injectable 30 milliGRAM(s) SubCutaneous <User Schedule>  hydrALAZINE 100 milliGRAM(s) Oral three times a day  ipratropium    for Nebulization 500 MICROGram(s) Nebulizer every 6 hours  labetalol 250 milliGRAM(s) Oral every 8 hours  levETIRAcetam  IVPB 1000 milliGRAM(s) IV Intermittent every 12 hours  nicotine - 21 mG/24Hr(s) Patch 1 patch Transdermal daily  polyethylene glycol 3350 17 Gram(s) Oral two times a day  senna Syrup 10 milliLiter(s) Oral at bedtime    MEDICATIONS  (PRN):  ALBUTerol    90 MICROgram(s) HFA Inhaler 1 Puff(s) Inhalation every 4 hours PRN Wheezing  oxyCODONE    IR 5 milliGRAM(s) Oral every 4 hours PRN Moderate Pain (4 - 6)  oxyCODONE    IR 10 milliGRAM(s) Oral every 6 hours PRN Severe Pain (7 - 10)    Allergies  No Known Drug Allergies  strawberry (Rash)    Review of Systems:  General:  No fever, chills  CV:  No pain, palpitatioins  Resp:  extubated, hi flow oxygen, +secretions  GI:  see HPI +dysphagia on NGTF  :  No pain, bleeding, incontinence, nocturia  Muscle:  Rt hemiparesis  Neuro:  +Rt hemiparesis  Endocrine:  No polyuria, polydypsia, cold/heat intolerance  Heme:  No petechiae, ecchymosis, easy bruisability  Skin:  No rash, tattoos, scars, edema    T(F): 97.8 (01-31-18 @ 07:00), Max: 98.2 (01-30-18 @ 19:00)  HR: 88 (01-31-18 @ 10:00) (65 - 88)  BP: 146/82 (01-31-18 @ 10:00) (116/64 - 184/93)  RR: 18 (01-31-18 @ 10:00) (16 - 22)  SpO2: 97% (01-31-18 @ 10:00) (95% - 100%)  Wt(kg): --  ,   I&O's Summary    30 Jan 2018 07:01  -  31 Jan 2018 07:00  --------------------------------------------------------  IN: 1665 mL / OUT: 4 mL / NET: 1661 mL    31 Jan 2018 07:01  -  31 Jan 2018 10:58  --------------------------------------------------------  IN: 280 mL / OUT: 0 mL / NET: 280 mL    PHYSICAL EXAM:  Constitutional: +EVD (Left) draining +on oxygen awake +NGT  Neck: No LAD, supple  Respiratory: occasional +rhonchi  Cardiovascular: S1 and S2, RRR,  Gastrointestinal: BS+, soft, NT/ND, neg HSM,  Extremities: No peripheral edema, neg clubbing, cyanosis  Vascular: 2+ peripheral pulses  Neurological: +rt hemiparesis  Skin: No rashes    LABS:                      12.4   18.1  )-----------( 326      ( 30 Jan 2018 21:50 )             35.2               01-30    145  |  106  |  24<H>  ----------------------------<  220<H>  3.7   |  27  |  0.64    Ca    9.2      30 Jan 2018 21:50  Phos  3.0     01-30  Mg     2.1     01-30         CARDIAC MARKERS ( 30 Jan 2018 21:50 )  x     / 0.08 ng/mL / 99 U/L / x     / 2.0 ng/mL  CARDIAC MARKERS ( 30 Jan 2018 07:08 )  x     / 0.12 ng/mL / x     / x     / x      CARDIAC MARKERS ( 29 Jan 2018 21:58 )  x     / 0.11 ng/mL / 142 U/L / x     / 2.3 ng/mL    RADIOLOGY & ADDITIONAL TESTS:

## 2018-01-31 NOTE — SWALLOW BEDSIDE ASSESSMENT ADULT - ASPIRATION PRECAUTIONS
Aspiration precautions for secretions and enteral feeds
Aspiration precautions for secretions and enteral feeds

## 2018-01-31 NOTE — PROGRESS NOTE ADULT - ASSESSMENT
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 s/p EVD with angio showing vascular irregularities, now extubated with stable right sided hemiparesis and aphasia    NEURO:  q1h neuro checks during clamp trial  Pain control  Keppra  EVD clamped - monitor ICP  Nicotine patch  vasculitis work up -- rheum consult - no further work up necessary  no further subclinical seizures reported    CARDS:  -180  continue hydralazine, labetalol, norvasc  BP improving    PULM:  sat > 92%  Likely COPD   Broncholdilators albuterol prn q 4 hrs   ipratropium    RENAL:  IVL    GASTRO: continue TF  bowel regimen    HEME:  monitor H/H    ---> lovenox    ENDO:  euglycemia    ID:  afebrile    Code status:  Full code  Disposition:  ICU    This patient was at high risk of neurologic deterioration and/or death due to IVH, seizure    Time spent:  45 minutes 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 s/p EVD with angio showing vascular irregularities, now extubated with stable right sided hemiparesis and aphasia    NEURO:  q1h neuro checks during clamp trial  Pain control  Keppra  EVD clamped - monitor ICP  Nicotine patch  vasculitis work up -- rheum consult - no further work up necessary  no further seizures    CARDS:  -180  continue hydralazine, labetalol, norvasc  BP improving    PULM:  sat > 92%  Likely COPD   Broncholdilators albuterol prn q 4 hrs   ipratropium    RENAL:  IVL    GASTRO: continue TF  bowel regimen    HEME:  monitor H/H    ---> lovenox    ENDO:  euglycemia    ID:  afebrile    Code status:  Full code  Disposition:  ICU    This patient was at high risk of neurologic deterioration and/or death due to IVH, seizure    Time spent:  45 minutes

## 2018-01-31 NOTE — SWALLOW BEDSIDE ASSESSMENT ADULT - PHARYNGEAL PHASE
Decreased laryngeal elevation/suspect delay in trigger Cough post oral intake/Decreased laryngeal elevation

## 2018-01-31 NOTE — SWALLOW BEDSIDE ASSESSMENT ADULT - ADDITIONAL RECOMMENDATIONS
This service will continue to follow to reassess candidacy for objective swallow assessment.   Maintain good oral hygiene.
POC pending FEES.   Maintain good oral hygiene.

## 2018-02-01 LAB
ANION GAP SERPL CALC-SCNC: 11 MMOL/L — SIGNIFICANT CHANGE UP (ref 5–17)
APTT BLD: 28.2 SEC — SIGNIFICANT CHANGE UP (ref 27.5–37.4)
BUN SERPL-MCNC: 25 MG/DL — HIGH (ref 7–23)
CALCIUM SERPL-MCNC: 9.2 MG/DL — SIGNIFICANT CHANGE UP (ref 8.4–10.5)
CHLORIDE SERPL-SCNC: 108 MMOL/L — SIGNIFICANT CHANGE UP (ref 96–108)
CO2 SERPL-SCNC: 26 MMOL/L — SIGNIFICANT CHANGE UP (ref 22–31)
CREAT SERPL-MCNC: 0.65 MG/DL — SIGNIFICANT CHANGE UP (ref 0.5–1.3)
GLUCOSE SERPL-MCNC: 197 MG/DL — HIGH (ref 70–99)
HCT VFR BLD CALC: 37.9 % — SIGNIFICANT CHANGE UP (ref 34.5–45)
HGB BLD-MCNC: 12.9 G/DL — SIGNIFICANT CHANGE UP (ref 11.5–15.5)
INR BLD: 1.14 RATIO — SIGNIFICANT CHANGE UP (ref 0.88–1.16)
MAGNESIUM SERPL-MCNC: 2.2 MG/DL — SIGNIFICANT CHANGE UP (ref 1.6–2.6)
MCHC RBC-ENTMCNC: 34.1 GM/DL — SIGNIFICANT CHANGE UP (ref 32–36)
MCHC RBC-ENTMCNC: 36.3 PG — HIGH (ref 27–34)
MCV RBC AUTO: 107 FL — HIGH (ref 80–100)
PHOSPHATE SERPL-MCNC: 3.1 MG/DL — SIGNIFICANT CHANGE UP (ref 2.5–4.5)
PLATELET # BLD AUTO: 461 K/UL — HIGH (ref 150–400)
POTASSIUM SERPL-MCNC: 4 MMOL/L — SIGNIFICANT CHANGE UP (ref 3.5–5.3)
POTASSIUM SERPL-SCNC: 4 MMOL/L — SIGNIFICANT CHANGE UP (ref 3.5–5.3)
PROTHROM AB SERPL-ACNC: 12.4 SEC — SIGNIFICANT CHANGE UP (ref 9.8–12.7)
RBC # BLD: 3.56 M/UL — LOW (ref 3.8–5.2)
RBC # FLD: 12.2 % — SIGNIFICANT CHANGE UP (ref 10.3–14.5)
SODIUM SERPL-SCNC: 145 MMOL/L — SIGNIFICANT CHANGE UP (ref 135–145)
WBC # BLD: 18.5 K/UL — HIGH (ref 3.8–10.5)
WBC # FLD AUTO: 18.5 K/UL — HIGH (ref 3.8–10.5)

## 2018-02-01 PROCEDURE — 71045 X-RAY EXAM CHEST 1 VIEW: CPT | Mod: 26

## 2018-02-01 PROCEDURE — 99292 CRITICAL CARE ADDL 30 MIN: CPT

## 2018-02-01 PROCEDURE — 92612 ENDOSCOPY SWALLOW (FEES) VID: CPT | Mod: GC,CM

## 2018-02-01 PROCEDURE — 99232 SBSQ HOSP IP/OBS MODERATE 35: CPT

## 2018-02-01 PROCEDURE — 70450 CT HEAD/BRAIN W/O DYE: CPT | Mod: 26

## 2018-02-01 PROCEDURE — 99291 CRITICAL CARE FIRST HOUR: CPT

## 2018-02-01 RX ORDER — IPRATROPIUM BROMIDE 0.2 MG/ML
1 SOLUTION, NON-ORAL INHALATION EVERY 6 HOURS
Qty: 0 | Refills: 0 | Status: DISCONTINUED | OUTPATIENT
Start: 2018-02-01 | End: 2018-02-15

## 2018-02-01 RX ORDER — FENTANYL CITRATE 50 UG/ML
12.5 INJECTION INTRAVENOUS ONCE
Qty: 0 | Refills: 0 | Status: DISCONTINUED | OUTPATIENT
Start: 2018-02-01 | End: 2018-02-01

## 2018-02-01 RX ORDER — ENOXAPARIN SODIUM 100 MG/ML
30 INJECTION SUBCUTANEOUS
Qty: 0 | Refills: 0 | Status: DISCONTINUED | OUTPATIENT
Start: 2018-02-02 | End: 2018-02-09

## 2018-02-01 RX ORDER — ENOXAPARIN SODIUM 100 MG/ML
30 INJECTION SUBCUTANEOUS ONCE
Qty: 0 | Refills: 0 | Status: COMPLETED | OUTPATIENT
Start: 2018-02-01 | End: 2018-02-01

## 2018-02-01 RX ORDER — IPRATROPIUM BROMIDE 0.2 MG/ML
500 SOLUTION, NON-ORAL INHALATION EVERY 6 HOURS
Qty: 0 | Refills: 0 | Status: DISCONTINUED | OUTPATIENT
Start: 2018-02-01 | End: 2018-02-01

## 2018-02-01 RX ADMIN — Medication 100 MILLIGRAM(S): at 22:05

## 2018-02-01 RX ADMIN — LEVETIRACETAM 1000 MILLIGRAM(S): 250 TABLET, FILM COATED ORAL at 19:30

## 2018-02-01 RX ADMIN — SENNA PLUS 10 MILLILITER(S): 8.6 TABLET ORAL at 22:05

## 2018-02-01 RX ADMIN — Medication 100 MILLIGRAM(S): at 14:02

## 2018-02-01 RX ADMIN — Medication 500 MICROGRAM(S): at 05:00

## 2018-02-01 RX ADMIN — Medication 250 MILLIGRAM(S): at 02:42

## 2018-02-01 RX ADMIN — FENTANYL CITRATE 12.5 MICROGRAM(S): 50 INJECTION INTRAVENOUS at 12:05

## 2018-02-01 RX ADMIN — FENTANYL CITRATE 12.5 MICROGRAM(S): 50 INJECTION INTRAVENOUS at 11:50

## 2018-02-01 RX ADMIN — Medication 100 MILLIGRAM(S): at 05:17

## 2018-02-01 RX ADMIN — LEVETIRACETAM 1000 MILLIGRAM(S): 250 TABLET, FILM COATED ORAL at 05:16

## 2018-02-01 RX ADMIN — Medication 1 PATCH: at 12:50

## 2018-02-01 RX ADMIN — OXYCODONE HYDROCHLORIDE 5 MILLIGRAM(S): 5 TABLET ORAL at 14:51

## 2018-02-01 RX ADMIN — Medication 250 MILLIGRAM(S): at 11:50

## 2018-02-01 RX ADMIN — AMLODIPINE BESYLATE 5 MILLIGRAM(S): 2.5 TABLET ORAL at 05:17

## 2018-02-01 RX ADMIN — OXYCODONE HYDROCHLORIDE 5 MILLIGRAM(S): 5 TABLET ORAL at 07:52

## 2018-02-01 RX ADMIN — Medication 500 MICROGRAM(S): at 00:13

## 2018-02-01 RX ADMIN — ENOXAPARIN SODIUM 30 MILLIGRAM(S): 100 INJECTION SUBCUTANEOUS at 23:15

## 2018-02-01 RX ADMIN — Medication 250 MILLIGRAM(S): at 17:12

## 2018-02-01 RX ADMIN — Medication 1 PATCH: at 12:02

## 2018-02-01 RX ADMIN — OXYCODONE HYDROCHLORIDE 5 MILLIGRAM(S): 5 TABLET ORAL at 15:00

## 2018-02-01 RX ADMIN — OXYCODONE HYDROCHLORIDE 5 MILLIGRAM(S): 5 TABLET ORAL at 08:22

## 2018-02-01 RX ADMIN — ATORVASTATIN CALCIUM 40 MILLIGRAM(S): 80 TABLET, FILM COATED ORAL at 22:04

## 2018-02-01 NOTE — PROGRESS NOTE ADULT - ASSESSMENT
62 year old female with acute L MCA infarct, additional microhemorrhages on MRA and IVH with poor neurologic recovery.  Dysphagia due to neurologic injury  Improving Mental status    PLAN  -await FEEST  -Continue NGTF in the interrim  -Further care per NSCU team

## 2018-02-01 NOTE — PROGRESS NOTE ADULT - SUBJECTIVE AND OBJECTIVE BOX
EVD clamped yesterday. No ICP spikes.    Exam: Awake, alert, regards, intermittent command following, non-verbal, spontaneous on the left, plegic on the right

## 2018-02-01 NOTE — SWALLOW FEES ASSESSMENT ADULT - ROSENBEK'S PENETRATION ASPIRATION SCALE
(5) material contacts vocal cords, visible residue remains (penetration)/Although unable to visualize, suspect at least trace aspiration.

## 2018-02-01 NOTE — SWALLOW FEES ASSESSMENT ADULT - SLP PERTINENT HISTORY OF CURRENT PROBLEM
61y/o female brought in to [facility] ED s/p fall at home in the bathroom. Unable to obtain HPI, history, or ROS from patient due to her altered mental status. As per  patient woke up this morning feeling "unwell," vomited once with some dry heaves at 9am, then had slurred speech during the day starting at 10am. She spent most of the day in bed, and was able to have some tea and crackers. She then had a fall in the bathroom & hit the right side of her head. Level I trauma called as patient's altered mental status was initially concerning for potential need for intubation. In trauma bay primary survey significant for GCS of 11 (E=4 V =2 M=5). Secondary survey significant for Right frontal hematoma.

## 2018-02-01 NOTE — SWALLOW FEES ASSESSMENT ADULT - SLP GENERAL OBSERVATIONS
Pt encountered supine in bed, Pt nonverbal, following some simple commands without visual model this time. Left gaze preference.

## 2018-02-01 NOTE — SWALLOW FEES ASSESSMENT ADULT - COMMENTS
Poor visualization of oropharynx, hypopharynx, and laryngeal vestibule 2/2 diffuse dried crusted secretions. Poor visualization of oropharynx, hypopharynx, and laryngeal vestibule 2/2 diffuse dried crusted secretions; + KFT CTA: Somewhat asymmetric increased venous phase enhancement seen in the region of the right cerebellopontine angle, with an increased number of small arterial vessels seen in the region of the fourth ventricle, approximately in the region of the previously identified intraventricular hemorrhage. The significance of this finding is uncertain. The possibility of a small extra-axial AVM is not excluded. Catheter angiography may be performed as clinically indicated. MRI: Acute left middle cerebral artery infarct with a small amount of hemorrhage. Additional scattered foci of microhemorrhages. Fairly extensive white matter changes. MRA of the head demonstrates mild narrowing of the left middle and posterior cerebral arteries which is less prominent when compared with the conventional angiogram of 1/24/2018. CT: Slight decreased size of the third and lateral ventricles. Left temporal parietal infarction. Left occipital infarction, increased in conspicuity. No hemorrhagic transformation. Per NSCU, pt found to have 4th vent IVH, now s/p EVD, angio negative for vascular malformation, questionable vasculitis->w/u (-). Course complicated by L hemisphereic sz. 1/25: EEG provides evidence of focal epileptogenicity in the left temporal region.  Two electrographic seizures were recorded from the left hemisphere, maximum in the left temporal region, with no associated clinical signs (duration 1-2 minutes). There is also evidence of a severe diffuse encephalopathy, and a structural lesion in the left hemisphere. Started on keppra, no further seizures on vEEG. Extubated 1/28 with occasional desaturations after extubation->started on BIPAP and then changed to hiflow. GI consulted for possible PEG placement. CXR: Enteric tube with tip in stomach. Trace left pleural effusion.

## 2018-02-01 NOTE — PROGRESS NOTE ADULT - ASSESSMENT
62-year old woman with 4th ventricular IVH, unclear etiology on CTA and conventional angiogram, multiple lucencies on CT     -TCDs  -EVD Clamped, f/u CT 2/1  -FEEST in AM

## 2018-02-01 NOTE — SWALLOW FEES ASSESSMENT ADULT - DIAGNOSTIC IMPRESSIONS
Pt presents with an oropharyngeal dysphagia marked by silent laryngeal penetration to the level of the vocal folds with honey thickened fluid. Although not visualized suspect at least trace aspiration. Pt is not a candidate for postural strategies 2/2 reduced cognition. Visualization reduced 2/2 diffuse dried, crusted bloody secretions. Evidence of reduced supraglottic sensation. Pt presents with an oropharyngeal dysphagia marked by silent laryngeal penetration to the level of the vocal folds with honey thickened fluid. Although not visualized suspect at least trace aspiration. Pt is not a candidate for postural strategies 2/2 reduced cognition. Visualization reduced 2/2 diffuse dried, crusted bloody secretions. Evidence of reduced supraglottic sensation. Trace BRB in left nare following procedure, cleaned by MD. RN and PA aware.

## 2018-02-01 NOTE — PROGRESS NOTE ADULT - SUBJECTIVE AND OBJECTIVE BOX
EVD removed today.     Per sister, is very alert today.     Exam: Awake, alert, regards, intermittent command following, consistently recognizes and points to sister when asked, non-verbal, spontaneous on the left, plegic on the right

## 2018-02-01 NOTE — PROGRESS NOTE ADULT - SUBJECTIVE AND OBJECTIVE BOX
Patient is a 62y old  Female admitted to Level I trauma s/p fall (24 Jan 2018 13:50)    INTERVAL HPI/OVERNIGHT EVENTS:  more responsive  remains extubated, following some commands    MEDICATIONS  (STANDING):  amLODIPine   Tablet 5 milliGRAM(s) Oral daily  atorvastatin 40 milliGRAM(s) Oral at bedtime  enoxaparin Injectable 30 milliGRAM(s) SubCutaneous <User Schedule>  hydrALAZINE 100 milliGRAM(s) Oral three times a day  ipratropium    for Nebulization 500 MICROGram(s) Nebulizer every 6 hours  labetalol 250 milliGRAM(s) Oral every 8 hours  levETIRAcetam  IVPB 1000 milliGRAM(s) IV Intermittent every 12 hours  nicotine - 21 mG/24Hr(s) Patch 1 patch Transdermal daily  polyethylene glycol 3350 17 Gram(s) Oral two times a day  senna Syrup 10 milliLiter(s) Oral at bedtime    MEDICATIONS  (PRN):  ALBUTerol    90 MICROgram(s) HFA Inhaler 1 Puff(s) Inhalation every 4 hours PRN Wheezing  oxyCODONE    IR 5 milliGRAM(s) Oral every 4 hours PRN Moderate Pain (4 - 6)  oxyCODONE    IR 10 milliGRAM(s) Oral every 6 hours PRN Severe Pain (7 - 10)    Allergies  No Known Drug Allergies  strawberry (Rash)    Review of Systems:  General:  No fever, chills  CV:  No pain, palpitatioins  Resp:  extubated, hi flow oxygen, +secretions  GI:  see HPI +dysphagia on NGTF  :  No pain, bleeding, incontinence, nocturia  Muscle:  Rt hemiparesis  Neuro:  +Rt hemiparesis  Endocrine:  No polyuria, polydypsia, cold/heat intolerance  Heme:  No petechiae, ecchymosis, easy bruisability  Skin:  No rash, tattoos, scars, edema    T(F): 97.8 (02-01-18 @ 07:00), Max: 98.4 (01-31-18 @ 15:00)  HR: 74 (02-01-18 @ 09:00) (67 - 93)  BP: 163/84 (02-01-18 @ 09:00) (104/77 - 168/84)  RR: 15 (02-01-18 @ 09:00) (15 - 26)  SpO2: 97% (02-01-18 @ 09:00) (94% - 100%)  Wt(kg): --  ,   I&O's Summary    31 Jan 2018 07:01  -  01 Feb 2018 07:00  --------------------------------------------------------  IN: 1310 mL / OUT: 0 mL / NET: 1310 mL    01 Feb 2018 07:01  -  01 Feb 2018 10:57  --------------------------------------------------------  IN: 210 mL / OUT: 0 mL / NET: 210 mL    PHYSICAL EXAM:  Constitutional: +EVD (Left) draining +on oxygen awake +NGT  Neck: No LAD, supple  Respiratory: occasional +rhonchi  Cardiovascular: S1 and S2, RRR,  Gastrointestinal: BS+, soft, NT/ND, neg HSM,  Extremities: No peripheral edema, neg clubbing, cyanosis  Vascular: 2+ peripheral pulses  Neurological: +rt hemiparesis  Skin: No rashes    LABS:                               12.9   18.5  )-----------( 461      ( 31 Jan 2018 23:45 )             37.9               01-31    145  |  108  |  25<H>  ----------------------------<  197<H>  4.0   |  26  |  0.65    Ca    9.2      31 Jan 2018 23:45  Phos  3.1     01-31  Mg     2.2     01-31    PT/INR - ( 31 Jan 2018 23:45 )   PT: 12.4 sec;   INR: 1.14 ratio    PTT - ( 31 Jan 2018 23:45 )  PTT:28.2 sec         RADIOLOGY & ADDITIONAL TESTS:

## 2018-02-01 NOTE — PROGRESS NOTE ADULT - ASSESSMENT
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    HEME:  monitor H/H    ---> lovenox, start MN tonight    ENDO:  euglycemia    ID:  afebrile    Code status:  Full code  Disposition:  ICU but possible transfer to floors in PM.    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    HEME:  monitor H/H    ---> lovenox, start MN tonight    ENDO:  euglycemia    ID:  afebrile    Code status:  Full code    This patient was at high risk of neurologic deterioration and/or death due to IVH, seizure    Time spent:  45 minutes

## 2018-02-01 NOTE — SWALLOW FEES ASSESSMENT ADULT - PHARYNGEAL PHASE COMMENTS
Honey thickened fluid via teaspoon administered in an attempt to improve visualization. Upon visualization of the laryngeal vestibule, residue was noted along the ventricular folds and the vocal folds b/l. Suspect at least trace aspiration given residue profile.

## 2018-02-01 NOTE — PROGRESS NOTE ADULT - SUBJECTIVE AND OBJECTIVE BOX
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 ICP increased this morning - no change in exam. ICP improved with closer BP control. Manageable suctioning requirements    Overnight Events: none reported.    ROS: negative [] unable to obtain as patient is comatose/intubated/aphasic [x]   VITALS:   T(C): 36.6 (02-01-18 @ 15:00), Max: 36.8 (01-31-18 @ 19:00)  HR: 70 (02-01-18 @ 15:00) (66 - 93)  BP: 141/67 (02-01-18 @ 15:00) (104/77 - 176/87)  RR: 18 (02-01-18 @ 15:00) (15 - 26)  SpO2: 96% (02-01-18 @ 15:00) (94% - 100%)    01-31-18 @ 07:01  -  02-01-18 @ 07:00  --------------------------------------------------------  IN: 1310 mL / OUT: 0 mL / NET: 1310 mL    02-01-18 @ 07:01  -  02-01-18 @ 15:08  --------------------------------------------------------  IN: 490 mL / OUT: 0 mL / NET: 490 mL      LABS:                          12.9   18.5  )-----------( 461      ( 31 Jan 2018 23:45 )             37.9     01-31    145  |  108  |  25<H>  ----------------------------<  197<H>  4.0   |  26  |  0.65    Ca    9.2      31 Jan 2018 23:45  Phos  3.1     01-31  Mg     2.2     01-31      PT/INR - ( 31 Jan 2018 23:45 )   PT: 12.4 sec;   INR: 1.14 ratio         PTT - ( 31 Jan 2018 23:45 )  PTT:28.2 sec  MEDS:  MEDICATIONS  (STANDING):  amLODIPine   Tablet 5 milliGRAM(s) Oral daily  atorvastatin 40 milliGRAM(s) Oral at bedtime  enoxaparin Injectable 30 milliGRAM(s) SubCutaneous once  hydrALAZINE 100 milliGRAM(s) Oral three times a day  labetalol 250 milliGRAM(s) Oral every 8 hours  levETIRAcetam  Solution 1000 milliGRAM(s) Oral two times a day  nicotine - 21 mG/24Hr(s) Patch 1 patch Transdermal daily  polyethylene glycol 3350 17 Gram(s) Oral two times a day  senna Syrup 10 milliLiter(s) Oral at bedtime      [All pertinent recent Imaging/Reports reviewed]    DEVICES: [] Restraints [] LEOLA/HMV []LD [] ET tube [] Trach [] A-line [] Galvin [] NGT [] Rectal Tube       EXAMINATION:  General:  calm  HEENT:  MMM, NG tube in place  Neuro:  awake, aphasic, LUE, LLE moving spontaneously. Likely element of mimicking v. perseveration. Unclear if patient truly following commands. right sided hemiparesis  Cards:  RRR  Respiratory:  no respiratory distress  Adomen:  soft  Extremities:  no edema  Skin:  warm/dry

## 2018-02-01 NOTE — PROGRESS NOTE ADULT - ASSESSMENT
Intraventricular hemorrhage with hydrocephalus  - continue external ventricular drain clamp trial  - CT in AM  - Delirium precautions  - BP control  - seizures: continue levetiracetam    45 minutes critical care time

## 2018-02-01 NOTE — PROGRESS NOTE ADULT - SUBJECTIVE AND OBJECTIVE BOX
Patient seen and examined at bedside.    T(C): 36.8 (01-28-18 @ 23:00), Max: 38 (01-28-18 @ 07:00)  HR: 86 (01-29-18 @ 00:00) (81 - 125)  BP: 142/77 (01-29-18 @ 00:00) (123/89 - 220/103)  RR: 18 (01-29-18 @ 00:00) (17 - 31)  SpO2: 100% (01-29-18 @ 00:00) (92% - 100%)  Wt(kg): --    Exam:    EO to name, non-verbal, attempts to follow commands on LUE, LLE spontaneous, no movement to stimulation on RUE or RLE

## 2018-02-01 NOTE — PROGRESS NOTE ADULT - ASSESSMENT
Intraventricular hemorrhage with hydrocephalus  - CT in AM  - Delirium precautions  - BP control  - seizures: continue levetiracetam  - Needs PEG  - Speech and Language evaluation for communication board, etc.    45 minutes critical care time

## 2018-02-01 NOTE — PROGRESS NOTE ADULT - SUBJECTIVE AND OBJECTIVE BOX
Cc: Right hemiplegia, gait dysfunction, dysphagia    HPI:  Still with right sided hemiplegia; for FEEST,  More alert.    MEDICATIONS  (STANDING):  amLODIPine   Tablet 5 milliGRAM(s) Oral daily  atorvastatin 40 milliGRAM(s) Oral at bedtime  enoxaparin Injectable 30 milliGRAM(s) SubCutaneous once  fentaNYL    Injectable 12.5 MICROGram(s) IV Push once  hydrALAZINE 100 milliGRAM(s) Oral three times a day  ipratropium    for Nebulization 500 MICROGram(s) Nebulizer every 6 hours  labetalol 250 milliGRAM(s) Oral every 8 hours  levETIRAcetam  Solution 1000 milliGRAM(s) Oral two times a day  nicotine - 21 mG/24Hr(s) Patch 1 patch Transdermal daily  polyethylene glycol 3350 17 Gram(s) Oral two times a day  senna Syrup 10 milliLiter(s) Oral at bedtime    MEDICATIONS  (PRN):  ALBUTerol    90 MICROgram(s) HFA Inhaler 1 Puff(s) Inhalation every 4 hours PRN Wheezing  oxyCODONE    IR 5 milliGRAM(s) Oral every 4 hours PRN Moderate Pain (4 - 6)  oxyCODONE    IR 10 milliGRAM(s) Oral every 6 hours PRN Severe Pain (7 - 10)    Vital Signs Last 24 Hrs  T(C): 36.5 (01 Feb 2018 11:00), Max: 36.9 (31 Jan 2018 15:00)  T(F): 97.7 (01 Feb 2018 11:00), Max: 98.4 (31 Jan 2018 15:00)  HR: 78 (01 Feb 2018 11:00) (67 - 93)  BP: 160/71 (01 Feb 2018 11:00) (104/77 - 168/84)  BP(mean): 101 (01 Feb 2018 11:00) (76 - 119)  RR: 18 (01 Feb 2018 11:00) (15 - 26)  SpO2: 97% (01 Feb 2018 11:00) (94% - 100%)    PHYSICAL EXAM  Constitutional - NAD, Comfortable  HEENT - inc intact  Extremities - No C/C/E, No calf tenderness   Neurologic Exam -                    Cognitive - Awake, Alert,      Communication - + aphasia   Cranial Nerves - CN 2-12 intact     Motor - Right side 0/5, Left side at least antigravity                    Right neglect noted but a little improved..      Impression:  61 yo with CVA/IPH with Right hemiplegia, aphasia, dysphagia      Plan:  PT- ROM, Bed Mob, Transfers, Amb w AD and bracing as needed  OT- ADLs, bracing  SLP- Dysphagia eval and treat  Prec- Falls, Cardiac, Pulm  DVT Prophylaxis- Lovenox  Skin- Turn q2 h  Dispo- Acute Rehab- can tolerate 3h/d PT/OT/SLP and requires daily physician visits

## 2018-02-02 DIAGNOSIS — I61.5 NONTRAUMATIC INTRACEREBRAL HEMORRHAGE, INTRAVENTRICULAR: ICD-10-CM

## 2018-02-02 DIAGNOSIS — R56.9 UNSPECIFIED CONVULSIONS: ICD-10-CM

## 2018-02-02 DIAGNOSIS — D72.829 ELEVATED WHITE BLOOD CELL COUNT, UNSPECIFIED: ICD-10-CM

## 2018-02-02 DIAGNOSIS — R13.10 DYSPHAGIA, UNSPECIFIED: ICD-10-CM

## 2018-02-02 DIAGNOSIS — R10.9 UNSPECIFIED ABDOMINAL PAIN: ICD-10-CM

## 2018-02-02 DIAGNOSIS — E87.0 HYPEROSMOLALITY AND HYPERNATREMIA: ICD-10-CM

## 2018-02-02 DIAGNOSIS — R07.9 CHEST PAIN, UNSPECIFIED: ICD-10-CM

## 2018-02-02 DIAGNOSIS — R74.8 ABNORMAL LEVELS OF OTHER SERUM ENZYMES: ICD-10-CM

## 2018-02-02 DIAGNOSIS — G91.9 HYDROCEPHALUS, UNSPECIFIED: ICD-10-CM

## 2018-02-02 DIAGNOSIS — I63.9 CEREBRAL INFARCTION, UNSPECIFIED: ICD-10-CM

## 2018-02-02 DIAGNOSIS — I10 ESSENTIAL (PRIMARY) HYPERTENSION: ICD-10-CM

## 2018-02-02 LAB
ANION GAP SERPL CALC-SCNC: 12 MMOL/L — SIGNIFICANT CHANGE UP (ref 5–17)
BUN SERPL-MCNC: 21 MG/DL — SIGNIFICANT CHANGE UP (ref 7–23)
CALCIUM SERPL-MCNC: 9.3 MG/DL — SIGNIFICANT CHANGE UP (ref 8.4–10.5)
CHLORIDE SERPL-SCNC: 109 MMOL/L — HIGH (ref 96–108)
CK MB BLD-MCNC: 2.1 % — SIGNIFICANT CHANGE UP (ref 0–3.5)
CK MB BLD-MCNC: 2.2 % — SIGNIFICANT CHANGE UP (ref 0–3.5)
CK MB CFR SERPL CALC: 2.5 NG/ML — SIGNIFICANT CHANGE UP (ref 0–3.8)
CK MB CFR SERPL CALC: 2.5 NG/ML — SIGNIFICANT CHANGE UP (ref 0–3.8)
CK SERPL-CCNC: 114 U/L — SIGNIFICANT CHANGE UP (ref 25–170)
CK SERPL-CCNC: 117 U/L — SIGNIFICANT CHANGE UP (ref 25–170)
CO2 SERPL-SCNC: 28 MMOL/L — SIGNIFICANT CHANGE UP (ref 22–31)
CREAT SERPL-MCNC: 0.59 MG/DL — SIGNIFICANT CHANGE UP (ref 0.5–1.3)
GLUCOSE SERPL-MCNC: 118 MG/DL — HIGH (ref 70–99)
HCT VFR BLD CALC: 37.2 % — SIGNIFICANT CHANGE UP (ref 34.5–45)
HGB BLD-MCNC: 12.7 G/DL — SIGNIFICANT CHANGE UP (ref 11.5–15.5)
MAGNESIUM SERPL-MCNC: 2.2 MG/DL — SIGNIFICANT CHANGE UP (ref 1.6–2.6)
MCHC RBC-ENTMCNC: 34.2 GM/DL — SIGNIFICANT CHANGE UP (ref 32–36)
MCHC RBC-ENTMCNC: 36.4 PG — HIGH (ref 27–34)
MCV RBC AUTO: 106 FL — HIGH (ref 80–100)
PHOSPHATE SERPL-MCNC: 3.6 MG/DL — SIGNIFICANT CHANGE UP (ref 2.5–4.5)
PLATELET # BLD AUTO: 564 K/UL — HIGH (ref 150–400)
POTASSIUM SERPL-MCNC: 3.8 MMOL/L — SIGNIFICANT CHANGE UP (ref 3.5–5.3)
POTASSIUM SERPL-SCNC: 3.8 MMOL/L — SIGNIFICANT CHANGE UP (ref 3.5–5.3)
RBC # BLD: 3.5 M/UL — LOW (ref 3.8–5.2)
RBC # FLD: 12.3 % — SIGNIFICANT CHANGE UP (ref 10.3–14.5)
SODIUM SERPL-SCNC: 149 MMOL/L — HIGH (ref 135–145)
TROPONIN T SERPL-MCNC: 0.05 NG/ML — SIGNIFICANT CHANGE UP (ref 0–0.06)
TROPONIN T SERPL-MCNC: 0.05 NG/ML — SIGNIFICANT CHANGE UP (ref 0–0.06)
WBC # BLD: 22.8 K/UL — HIGH (ref 3.8–10.5)
WBC # FLD AUTO: 22.8 K/UL — HIGH (ref 3.8–10.5)

## 2018-02-02 PROCEDURE — 70450 CT HEAD/BRAIN W/O DYE: CPT | Mod: 26

## 2018-02-02 PROCEDURE — 99232 SBSQ HOSP IP/OBS MODERATE 35: CPT

## 2018-02-02 PROCEDURE — 99233 SBSQ HOSP IP/OBS HIGH 50: CPT

## 2018-02-02 PROCEDURE — 93010 ELECTROCARDIOGRAM REPORT: CPT

## 2018-02-02 PROCEDURE — 99497 ADVNCD CARE PLAN 30 MIN: CPT | Mod: 25

## 2018-02-02 PROCEDURE — 99223 1ST HOSP IP/OBS HIGH 75: CPT

## 2018-02-02 RX ORDER — HYDRALAZINE HCL 50 MG
10 TABLET ORAL ONCE
Qty: 0 | Refills: 0 | Status: COMPLETED | OUTPATIENT
Start: 2018-02-02 | End: 2018-02-02

## 2018-02-02 RX ORDER — LABETALOL HCL 100 MG
10 TABLET ORAL
Qty: 0 | Refills: 0 | Status: DISCONTINUED | OUTPATIENT
Start: 2018-02-02 | End: 2018-02-03

## 2018-02-02 RX ORDER — HYDRALAZINE HCL 50 MG
5 TABLET ORAL
Qty: 0 | Refills: 0 | Status: DISCONTINUED | OUTPATIENT
Start: 2018-02-02 | End: 2018-02-03

## 2018-02-02 RX ORDER — LABETALOL HCL 100 MG
10 TABLET ORAL
Qty: 0 | Refills: 0 | Status: DISCONTINUED | OUTPATIENT
Start: 2018-02-02 | End: 2018-02-02

## 2018-02-02 RX ORDER — ACETAMINOPHEN 500 MG
1000 TABLET ORAL ONCE
Qty: 0 | Refills: 0 | Status: COMPLETED | OUTPATIENT
Start: 2018-02-02 | End: 2018-02-05

## 2018-02-02 RX ORDER — SODIUM CHLORIDE 9 MG/ML
1000 INJECTION, SOLUTION INTRAVENOUS
Qty: 0 | Refills: 0 | Status: DISCONTINUED | OUTPATIENT
Start: 2018-02-02 | End: 2018-02-03

## 2018-02-02 RX ORDER — LEVETIRACETAM 250 MG/1
1000 TABLET, FILM COATED ORAL EVERY 12 HOURS
Qty: 0 | Refills: 0 | Status: DISCONTINUED | OUTPATIENT
Start: 2018-02-02 | End: 2018-02-06

## 2018-02-02 RX ADMIN — SODIUM CHLORIDE 70 MILLILITER(S): 9 INJECTION, SOLUTION INTRAVENOUS at 14:02

## 2018-02-02 RX ADMIN — Medication 250 MILLIGRAM(S): at 01:10

## 2018-02-02 RX ADMIN — Medication 5 MILLIGRAM(S): at 21:38

## 2018-02-02 RX ADMIN — Medication 10 MILLIGRAM(S): at 19:30

## 2018-02-02 RX ADMIN — LEVETIRACETAM 1000 MILLIGRAM(S): 250 TABLET, FILM COATED ORAL at 06:19

## 2018-02-02 RX ADMIN — AMLODIPINE BESYLATE 5 MILLIGRAM(S): 2.5 TABLET ORAL at 06:18

## 2018-02-02 RX ADMIN — Medication 10 MILLIGRAM(S): at 07:45

## 2018-02-02 RX ADMIN — Medication 1 PATCH: at 12:24

## 2018-02-02 RX ADMIN — Medication 100 MILLIGRAM(S): at 05:29

## 2018-02-02 RX ADMIN — Medication 5 MILLIGRAM(S): at 18:31

## 2018-02-02 RX ADMIN — Medication 1 PATCH: at 13:10

## 2018-02-02 RX ADMIN — ENOXAPARIN SODIUM 30 MILLIGRAM(S): 100 INJECTION SUBCUTANEOUS at 17:12

## 2018-02-02 RX ADMIN — LEVETIRACETAM 400 MILLIGRAM(S): 250 TABLET, FILM COATED ORAL at 18:31

## 2018-02-02 NOTE — CONSULT NOTE ADULT - SUBJECTIVE AND OBJECTIVE BOX
CC: L MCA infarct, IVH, hydrocephalus    HPI:  61y/o female brought in to [facility] ED s/p fall at home in the bathroom. Unable to obtain HPI, history, or ROS from patient due to her altered mental status. As per  patient woke up this morning feeling "unwell," vomited once with some dry heaves at 9am, then had slurred speech during the day starting at 10am. She spent most of the day in bed, and was able to have some tea and crackers. She then had a fall in the bathroom & hit the right side of her head. Level I trauma called as patient's altered mental status was initially concerning for potential need for intubation. In trauma bay primary survey significant for GCS of 11 (E=4 V =2 M=5). Secondary survey significant for Right frontal hematoma. (24 Jan 2018 05:31)    Patient transferred out of NSCU on 2/2/18. Patient seen and evaluated at the bedside but aphasic and therefore, unable to obtain history. Patient's spouse reports patient had no significant PMHx prior to this admission and last saw her PMD(Dr. Pearl Kathleen) few years ago. Patient was admitted to NSCU on 1/24/18 found to have large right frontal scalp hematoma, acute left MCA territory infarct, IVH,     PAST MEDICAL & SURGICAL HISTORY:  No pertinent past medical history  H/O colonoscopy: 2015, normal      Review of Systems:   CONSTITUTIONAL: No fever, weight loss, or fatigue  HEENT: No sore throat, vision changes  RESPIRATORY: No cough, wheezing, shortness of breath  CARDIOVASCULAR: No chest pain, palpitations, leg edema  GASTROINTESTINAL: No abdominal pain. No nausea, vomiting, diarrhea or constipation. No melena or hematochezia.  GENITOURINARY: No dysuria, frequency, hematuria  NEUROLOGICAL: No headaches, memory loss, loss of strength, numbness, or tremors  SKIN: No itching, burning, rashes, or lesions   MUSCULOSKELETAL: No joint pain or swelling; No muscle, back, or extremity pain  PSYCHIATRIC: No depression, anxiety  HEME/LYMPH: No easy bruising, or bleeding gums      Allergies    No Known Drug Allergies  strawberry (Rash)    Intolerances        Social History:     FAMILY HISTORY:  Family history of colon cancer in father (Father)      MEDICATIONS  (STANDING):  acetaminophen  IVPB. 1000 milliGRAM(s) IV Intermittent once  amLODIPine   Tablet 5 milliGRAM(s) Oral daily  atorvastatin 40 milliGRAM(s) Oral at bedtime  enoxaparin Injectable 30 milliGRAM(s) SubCutaneous <User Schedule>  hydrALAZINE 100 milliGRAM(s) Oral three times a day  labetalol 250 milliGRAM(s) Oral every 8 hours  levETIRAcetam  Solution 1000 milliGRAM(s) Oral two times a day  nicotine - 21 mG/24Hr(s) Patch 1 patch Transdermal daily  polyethylene glycol 3350 17 Gram(s) Oral two times a day  senna Syrup 10 milliLiter(s) Oral at bedtime  sodium chloride 0.45%. 1000 milliLiter(s) (70 mL/Hr) IV Continuous <Continuous>    MEDICATIONS  (PRN):  ALBUTerol    90 MICROgram(s) HFA Inhaler 1 Puff(s) Inhalation every 4 hours PRN Wheezing  ipratropium 17 MICROgram(s) HFA Inhaler 1 Puff(s) Inhalation every 6 hours PRN wheez  oxyCODONE    IR 5 milliGRAM(s) Oral every 4 hours PRN Moderate Pain (4 - 6)  oxyCODONE    IR 10 milliGRAM(s) Oral every 6 hours PRN Severe Pain (7 - 10)      Vital Signs Last 24 Hrs  T(C): 36.7 (02 Feb 2018 10:00), Max: 36.7 (01 Feb 2018 23:00)  T(F): 98 (02 Feb 2018 10:00), Max: 98.1 (01 Feb 2018 23:00)  HR: 80 (02 Feb 2018 10:00) (66 - 84)  BP: 159/93 (02 Feb 2018 10:00) (129/66 - 190/91)  BP(mean): 114 (02 Feb 2018 08:38) (91 - 131)  RR: 22 (02 Feb 2018 10:00) (16 - 26)  SpO2: 96% (02 Feb 2018 10:00) (95% - 99%)  CAPILLARY BLOOD GLUCOSE        I&O's Summary    01 Feb 2018 07:01  -  02 Feb 2018 07:00  --------------------------------------------------------  IN: 1950 mL / OUT: 0 mL / NET: 1950 mL    02 Feb 2018 07:01  -  02 Feb 2018 16:24  --------------------------------------------------------  IN: 70 mL / OUT: 0 mL / NET: 70 mL        PHYSICAL EXAM:  GENERAL: NAD  HEENT: neck supple  LUNG: Clear to auscultation bilaterally; No wheeze  HEART: Regular rate and rhythm; No murmurs  ABDOMEN: Soft, Nontender, Nondistended; Bowel sounds present  EXTREMITIES: No leg edema  PSYCH: normal affect, calm  NEUROLOGY: AAO x3   SKIN: No rashes or lesions    LABS:                        12.7   22.8  )-----------( 564      ( 02 Feb 2018 11:19 )             37.2     02-02    149<H>  |  109<H>  |  21  ----------------------------<  118<H>  3.8   |  28  |  0.59    Ca    9.3      02 Feb 2018 11:19  Phos  3.6     02-02  Mg     2.2     02-02      PT/INR - ( 31 Jan 2018 23:45 )   PT: 12.4 sec;   INR: 1.14 ratio         PTT - ( 31 Jan 2018 23:45 )  PTT:28.2 sec  CARDIAC MARKERS ( 02 Feb 2018 11:19 )  x     / 0.05 ng/mL / 117 U/L / x     / 2.5 ng/mL          RADIOLOGY & ADDITIONAL TESTS:    Imaging Personally Reviewed:    Consultant(s) Notes Reviewed:      Care Discussed with Consultants/Other Providers: CC: L MCA infarct, IVH, hydrocephalus    HPI:  63y/o female brought in to [facility] ED s/p fall at home in the bathroom. Unable to obtain HPI, history, or ROS from patient due to her altered mental status. As per  patient woke up this morning feeling "unwell," vomited once with some dry heaves at 9am, then had slurred speech during the day starting at 10am. She spent most of the day in bed, and was able to have some tea and crackers. She then had a fall in the bathroom & hit the right side of her head. Level I trauma called as patient's altered mental status was initially concerning for potential need for intubation. In trauma bay primary survey significant for GCS of 11 (E=4 V =2 M=5). Secondary survey significant for Right frontal hematoma. (24 Jan 2018 05:31)    Patient seen and evaluated at the bedside but aphasic and therefore, unable to obtain history. Patient's spouse reports patient had no significant PMHx prior to this admission and last saw her PMD(Dr. Felipe Kathleen) few years ago. Patient was admitted to NSCU on 1/24/18 found to have large right frontal scalp hematoma, acute left MCA territory infarct, IVH, and hydrocephalus on CT head requiring intubation and EVD. CTA head with ? micro AVM at the right cerebellarpontine angle but cerebral angio negative for vascular malformation but noted multifocal irregularities of vasculature of the distal left MCA/SHANE/PCA region of unclear etiology. Vasculitis and infectious work up negative. Course c/b seizures on EEG, fevers and leukocytosis with negative infectious work up, also with EKG changes and elevated troponin ? due to demand ischemia, and dysphagia on NG tube feeds. Patient was extubated on 1/28, EVD discontinued on 2/1 and transferred out of ICU on 2/2/18.    Patient endorses chest and lower abdominal pain(when asked to point to where her pain is, she points to her chest and lower abdomen). Per RN, patient with multiple BM. Patient pulled out the NG tube.       PMD: Dr. Felipe Kathleen [phone]    PAST MEDICAL & SURGICAL HISTORY:  No pertinent past medical history  H/O colonoscopy: 2015, normal      Review of Systems:   unable to obtain ROS due to mental status but patient endorses ? chest and lower abdominal pain.   spouse also reports patient had ~ 15 lbs prior to this admission.       Allergies    No Known Drug Allergies  strawberry (Rash)    Intolerances      Social History:   + tobacco use    FAMILY HISTORY:  Family history of colon cancer in father (Father)  Family history of sarcoma in mother    HOME MEDS:  none    MEDICATIONS  (STANDING):  acetaminophen  IVPB. 1000 milliGRAM(s) IV Intermittent once  amLODIPine   Tablet 5 milliGRAM(s) Oral daily  atorvastatin 40 milliGRAM(s) Oral at bedtime  enoxaparin Injectable 30 milliGRAM(s) SubCutaneous <User Schedule>  hydrALAZINE 100 milliGRAM(s) Oral three times a day  labetalol 250 milliGRAM(s) Oral every 8 hours  levETIRAcetam  Solution 1000 milliGRAM(s) Oral two times a day  nicotine - 21 mG/24Hr(s) Patch 1 patch Transdermal daily  polyethylene glycol 3350 17 Gram(s) Oral two times a day  senna Syrup 10 milliLiter(s) Oral at bedtime  sodium chloride 0.45%. 1000 milliLiter(s) (70 mL/Hr) IV Continuous <Continuous>    MEDICATIONS  (PRN):  ALBUTerol    90 MICROgram(s) HFA Inhaler 1 Puff(s) Inhalation every 4 hours PRN Wheezing  ipratropium 17 MICROgram(s) HFA Inhaler 1 Puff(s) Inhalation every 6 hours PRN wheez  oxyCODONE    IR 5 milliGRAM(s) Oral every 4 hours PRN Moderate Pain (4 - 6)  oxyCODONE    IR 10 milliGRAM(s) Oral every 6 hours PRN Severe Pain (7 - 10)      Vital Signs Last 24 Hrs  T(C): 36.7 (02 Feb 2018 10:00), Max: 36.7 (01 Feb 2018 23:00)  T(F): 98 (02 Feb 2018 10:00), Max: 98.1 (01 Feb 2018 23:00)  HR: 80 (02 Feb 2018 10:00) (66 - 84)  BP: 159/93 (02 Feb 2018 10:00) (129/66 - 190/91)  BP(mean): 114 (02 Feb 2018 08:38) (91 - 131)  RR: 22 (02 Feb 2018 10:00) (16 - 26)  SpO2: 96% (02 Feb 2018 10:00) (95% - 99%)  CAPILLARY BLOOD GLUCOSE        I&O's Summary    01 Feb 2018 07:01  -  02 Feb 2018 07:00  --------------------------------------------------------  IN: 1950 mL / OUT: 0 mL / NET: 1950 mL    02 Feb 2018 07:01  -  02 Feb 2018 16:24  --------------------------------------------------------  IN: 70 mL / OUT: 0 mL / NET: 70 mL        PHYSICAL EXAM:  GENERAL: NAD  HEENT: dry oral mucosa   LUNG: Clear to auscultation bilaterally; No wheeze  HEART: S1, S2  ABDOMEN: Soft, Nontender, Nondistended; Bowel sounds present  EXTREMITIES: No leg edema  PSYCH: normal affect, calm  NEUROLOGY: Alert and awake, intermittently follows simple commands (i.e. smile, squeezes hand), right hemiplegic   SKIN: No phlebitis    LABS:                        12.7   22.8  )-----------( 564      ( 02 Feb 2018 11:19 )             37.2     02-02    149<H>  |  109<H>  |  21  ----------------------------<  118<H>  3.8   |  28  |  0.59    Ca    9.3      02 Feb 2018 11:19  Phos  3.6     02-02  Mg     2.2     02-02      PT/INR - ( 31 Jan 2018 23:45 )   PT: 12.4 sec;   INR: 1.14 ratio         PTT - ( 31 Jan 2018 23:45 )  PTT:28.2 sec  CARDIAC MARKERS ( 02 Feb 2018 11:19 )  x     / 0.05 ng/mL / 117 U/L / x     / 2.5 ng/mL          RADIOLOGY & ADDITIONAL TESTS:    EKG tracings reviewed    Imaging Personally Reviewed:    < from: CT Head No Cont (02.02.18 @ 08:31) >  IMPRESSION: Interval removal of right frontal approach ventriculostomy   catheter. Small hemorrhage along the right anterior frontal shunt tract.    Redemonstration of hemorrhagic left frontoparietal infarct, similar in   appearance to the prior examination. No midline shift.    < end of copied text >      < from: CT Head No Cont (01.24.18 @ 18:35) >  IMPRESSION:    Interval placement of a right frontal approach ventriculostomy, with tip   in the left lateral ventricle. Mild persistent hydrocephalus.    Findings concerning for acute nonhemorrhagic left MCA territorial   infarct. No midline shift.    Redemonstration of intraventricular hemorrhage, filling the sylvian   aqueduct and fourth ventricle.    < end of copied text >      < from: CT Angio Head w/ IV Cont (01.24.18 @ 05:25) >  IMPRESSION:    Somewhat asymmetric increased venous phase enhancement seen in the region   of the right cerebellopontine angle, with an increased number of small   arterial vessels seen in the region of the fourth ventricle,   approximately in the region of the previously identified intraventricular   hemorrhage. The significance of this finding is uncertain. The   possibility of a small extra-axial AVM is not excluded. Catheter   angiography may be performed as clinically indicated.    No occlusion or hemodynamically significant stenosis in the neck.    < end of copied text >      < from: MR Head w/wo IV Cont (01.25.18 @ 16:06) >  IMPRESSION: Acute left middle cerebral artery infarct with a small amount   of hemorrhage. Additional scattered foci of microhemorrhages. Fairly   extensive white matter changes.       MRA of the head demonstrates mild narrowing of the left middle and   posterior cerebral arteries which is less prominent when compared with   the conventional angiogram of 1/24/2018.    < end of copied text >      < from: CT Chest w/ IV Cont (01.24.18 @ 05:33) >  FINDINGS:    CHEST:     LUNGS AND LARGE AIRWAYS: Patent central airways. Emphysema. Tiny left   lower lobe calcified granuloma.  PLEURA: No pleural effusion. No pneumothorax.  VESSELS: Atherosclerotic calcification of the thoracic aorta.  HEART: Cardiomegaly. No pericardial effusion. Coronary artery and mitral   annular calcification.  MEDIASTINUM AND ADAN: No lymphadenopathy.  CHEST WALL AND LOWER NECK: Within normal limits.    ABDOMEN AND PELVIS:    LIVER: Within normal limits.  BILE DUCTS: Normal caliber.  GALLBLADDER: Possible cholelithiasis.  SPLEEN: Within normal limits.  PANCREAS: Within normal limits.  ADRENALS: 2.6 x 2.2 cm left adrenal nodule.  KIDNEYS/URETERS: Within normal limits.    BLADDER: Opacified with contrast.  REPRODUCTIVE ORGANS: The uterus and adnexa are withinnormal limits.    BOWEL: Colonic diverticulosis without diverticulitis. No bowel   obstruction. Appendix is not visualized.  PERITONEUM: No ascites. No free air.  VESSELS:  Within normal limits.  RETROPERITONEUM: No lymphadenopathy.    ABDOMINAL WALL: Atherosclerotic calcification of the abdominal aorta and   its branches.  BONES: Suggestion of cortical irregularities of the right 5th and 6th   lateral ribs, however there is some respiratory motion artifact in this   region limiting its evaluation. Degenerative changes of the spine.    IMPRESSION:     No visceral or vascular traumatic injury to the chest, abdomen or pelvis.   Indeterminant left adrenal nodule.    < end of copied text >      < from: Transthoracic Echocardiogram (01.25.18 @ 08:58) >  Conclusions:  Technically difficult study.  1. Dense posterior mitral annular calcification. Thickened  mitral leaflets with normal opening. Minimal mitral  regurgitation.  2. Aortic valve not well visualized; appears trileaflet  with normal opening. No aortic valve regurgitation seen.  3. Concentric left ventricular hypertrophy.  4. Hyperdynamic left ventricular systolic function.  5. Mild diastolic dysfunction (Stage I).  6. Normal right ventricular size and function.    < end of copied text >    1/28/18  EEG Classification / Summary:  Abnormal EEG in the lethargic state:  -left occipital PLEDs  -frequent left temporal sharp waves  -multifocal slowing, persistently worse in the left hemisphere with associated attenuation.    Clinical Impression:  Findings indicate moderate multifocal cerebral dysfunction, though persistently worse in the left hemisphere with associated risk of seizures from the left temporal and occipital regions.  No further seizures.   Consultant(s) Notes Reviewed:  neurology, rheumatology, Gastroenterology    1/25/18  EEG Classification / Summary:  Abnormal EEG in the lethargic state.    -Clinical seizures: No clinical signs  -EEG seizures: Lateralized, Left hemisphere, maximum Left Temporal (2 recorded)    -PLEDs, Lateralized, Left hemisphere, Maximum Left Temporal    -Continuous Slowing, Generalized, and Lateralized, Left hemisphere    Clinical Impression:  This EEG provides evidence of focal epileptogenicity in the left temporal region.  Two electrographic seizures were recorded from the left hemisphere, maximum in the left temporal region, with no associated clinical signs (duration 1-2 minutes).  There is also evidence of a severe diffuse encephalopathy, and a structural lesion in the left hemisphere.      Care Discussed with Consultants/Other Providers: neurosurgery PA CC: L MCA infarct, IVH, hydrocephalus    HPI:  63y/o female brought in to [facility] ED s/p fall at home in the bathroom. Unable to obtain HPI, history, or ROS from patient due to her altered mental status. As per  patient woke up this morning feeling "unwell," vomited once with some dry heaves at 9am, then had slurred speech during the day starting at 10am. She spent most of the day in bed, and was able to have some tea and crackers. She then had a fall in the bathroom & hit the right side of her head. Level I trauma called as patient's altered mental status was initially concerning for potential need for intubation. In trauma bay primary survey significant for GCS of 11 (E=4 V =2 M=5). Secondary survey significant for Right frontal hematoma. (24 Jan 2018 05:31)    Patient seen and evaluated at the bedside but aphasic and therefore, unable to obtain history. Patient's spouse reports patient had no significant PMHx prior to this admission and last saw her PMD(Dr. Felipe Kathleen) few years ago. Patient was admitted to NSCU on 1/24/18 found to have large right frontal scalp hematoma, acute left MCA territory infarct, IVH, and hydrocephalus on CT head requiring intubation and EVD. CTA head with ? micro AVM at the right cerebellarpontine angle but cerebral angio negative for vascular malformation but noted multifocal irregularities of vasculature of the distal left MCA/SHANE/PCA region of unclear etiology. Vasculitis and infectious work up negative. Course c/b seizures on EEG, fevers and leukocytosis with negative infectious work up, also with EKG changes and elevated troponin ? due to demand ischemia, and dysphagia on NG tube feeds. Patient was extubated on 1/28, EVD discontinued on 2/1 and transferred out of ICU on 2/2/18.    Patient endorses chest and lower abdominal pain(when asked to point to where her pain is, she points to her chest and lower abdomen). Per RN, patient with multiple BM. Patient pulled out the NG tube.       PMD: Dr. Felipe Kathleen [phone]    PAST MEDICAL & SURGICAL HISTORY:  No pertinent past medical history  H/O colonoscopy: 2015, normal      Review of Systems:   unable to obtain ROS due to mental status but patient endorses ? chest and lower abdominal pain.   spouse also reports patient had ~ 15 lbs prior to this admission.       Allergies    No Known Drug Allergies  strawberry (Rash)    Intolerances      Social History:   + tobacco use    FAMILY HISTORY:  Family history of colon cancer in father (Father)  Family history of sarcoma in mother    HOME MEDS:  none    MEDICATIONS  (STANDING):  acetaminophen  IVPB. 1000 milliGRAM(s) IV Intermittent once  amLODIPine   Tablet 5 milliGRAM(s) Oral daily  atorvastatin 40 milliGRAM(s) Oral at bedtime  enoxaparin Injectable 30 milliGRAM(s) SubCutaneous <User Schedule>  hydrALAZINE 100 milliGRAM(s) Oral three times a day  labetalol 250 milliGRAM(s) Oral every 8 hours  levETIRAcetam  Solution 1000 milliGRAM(s) Oral two times a day  nicotine - 21 mG/24Hr(s) Patch 1 patch Transdermal daily  polyethylene glycol 3350 17 Gram(s) Oral two times a day  senna Syrup 10 milliLiter(s) Oral at bedtime  sodium chloride 0.45%. 1000 milliLiter(s) (70 mL/Hr) IV Continuous <Continuous>    MEDICATIONS  (PRN):  ALBUTerol    90 MICROgram(s) HFA Inhaler 1 Puff(s) Inhalation every 4 hours PRN Wheezing  ipratropium 17 MICROgram(s) HFA Inhaler 1 Puff(s) Inhalation every 6 hours PRN wheez  oxyCODONE    IR 5 milliGRAM(s) Oral every 4 hours PRN Moderate Pain (4 - 6)  oxyCODONE    IR 10 milliGRAM(s) Oral every 6 hours PRN Severe Pain (7 - 10)      Vital Signs Last 24 Hrs  T(C): 36.7 (02 Feb 2018 10:00), Max: 36.7 (01 Feb 2018 23:00)  T(F): 98 (02 Feb 2018 10:00), Max: 98.1 (01 Feb 2018 23:00)  HR: 80 (02 Feb 2018 10:00) (66 - 84)  BP: 159/93 (02 Feb 2018 10:00) (129/66 - 190/91)  BP(mean): 114 (02 Feb 2018 08:38) (91 - 131)  RR: 22 (02 Feb 2018 10:00) (16 - 26)  SpO2: 96% (02 Feb 2018 10:00) (95% - 99%)  CAPILLARY BLOOD GLUCOSE        I&O's Summary    01 Feb 2018 07:01  -  02 Feb 2018 07:00  --------------------------------------------------------  IN: 1950 mL / OUT: 0 mL / NET: 1950 mL    02 Feb 2018 07:01  -  02 Feb 2018 16:24  --------------------------------------------------------  IN: 70 mL / OUT: 0 mL / NET: 70 mL        PHYSICAL EXAM:  GENERAL: NAD  HEENT: dry oral mucosa , + right scalp hematoma, + staples in place  LUNG: Clear to auscultation bilaterally; No wheeze  HEART: S1, S2  ABDOMEN: Soft, Nontender, Nondistended; Bowel sounds present  EXTREMITIES: No leg edema  PSYCH: normal affect, calm  NEUROLOGY: Alert and awake, intermittently follows simple commands (i.e. smile, squeezes hand), right hemiplegic   SKIN: No phlebitis    LABS:                        12.7   22.8  )-----------( 564      ( 02 Feb 2018 11:19 )             37.2     02-02    149<H>  |  109<H>  |  21  ----------------------------<  118<H>  3.8   |  28  |  0.59    Ca    9.3      02 Feb 2018 11:19  Phos  3.6     02-02  Mg     2.2     02-02      PT/INR - ( 31 Jan 2018 23:45 )   PT: 12.4 sec;   INR: 1.14 ratio         PTT - ( 31 Jan 2018 23:45 )  PTT:28.2 sec  CARDIAC MARKERS ( 02 Feb 2018 11:19 )  x     / 0.05 ng/mL / 117 U/L / x     / 2.5 ng/mL          RADIOLOGY & ADDITIONAL TESTS:    EKG tracings reviewed    Imaging Personally Reviewed:    < from: CT Head No Cont (02.02.18 @ 08:31) >  IMPRESSION: Interval removal of right frontal approach ventriculostomy   catheter. Small hemorrhage along the right anterior frontal shunt tract.    Redemonstration of hemorrhagic left frontoparietal infarct, similar in   appearance to the prior examination. No midline shift.    < end of copied text >      < from: CT Head No Cont (01.24.18 @ 18:35) >  IMPRESSION:    Interval placement of a right frontal approach ventriculostomy, with tip   in the left lateral ventricle. Mild persistent hydrocephalus.    Findings concerning for acute nonhemorrhagic left MCA territorial   infarct. No midline shift.    Redemonstration of intraventricular hemorrhage, filling the sylvian   aqueduct and fourth ventricle.    < end of copied text >      < from: CT Angio Head w/ IV Cont (01.24.18 @ 05:25) >  IMPRESSION:    Somewhat asymmetric increased venous phase enhancement seen in the region   of the right cerebellopontine angle, with an increased number of small   arterial vessels seen in the region of the fourth ventricle,   approximately in the region of the previously identified intraventricular   hemorrhage. The significance of this finding is uncertain. The   possibility of a small extra-axial AVM is not excluded. Catheter   angiography may be performed as clinically indicated.    No occlusion or hemodynamically significant stenosis in the neck.    < end of copied text >      < from: MR Head w/wo IV Cont (01.25.18 @ 16:06) >  IMPRESSION: Acute left middle cerebral artery infarct with a small amount   of hemorrhage. Additional scattered foci of microhemorrhages. Fairly   extensive white matter changes.       MRA of the head demonstrates mild narrowing of the left middle and   posterior cerebral arteries which is less prominent when compared with   the conventional angiogram of 1/24/2018.    < end of copied text >      < from: CT Chest w/ IV Cont (01.24.18 @ 05:33) >  FINDINGS:    CHEST:     LUNGS AND LARGE AIRWAYS: Patent central airways. Emphysema. Tiny left   lower lobe calcified granuloma.  PLEURA: No pleural effusion. No pneumothorax.  VESSELS: Atherosclerotic calcification of the thoracic aorta.  HEART: Cardiomegaly. No pericardial effusion. Coronary artery and mitral   annular calcification.  MEDIASTINUM AND ADAN: No lymphadenopathy.  CHEST WALL AND LOWER NECK: Within normal limits.    ABDOMEN AND PELVIS:    LIVER: Within normal limits.  BILE DUCTS: Normal caliber.  GALLBLADDER: Possible cholelithiasis.  SPLEEN: Within normal limits.  PANCREAS: Within normal limits.  ADRENALS: 2.6 x 2.2 cm left adrenal nodule.  KIDNEYS/URETERS: Within normal limits.    BLADDER: Opacified with contrast.  REPRODUCTIVE ORGANS: The uterus and adnexa are withinnormal limits.    BOWEL: Colonic diverticulosis without diverticulitis. No bowel   obstruction. Appendix is not visualized.  PERITONEUM: No ascites. No free air.  VESSELS:  Within normal limits.  RETROPERITONEUM: No lymphadenopathy.    ABDOMINAL WALL: Atherosclerotic calcification of the abdominal aorta and   its branches.  BONES: Suggestion of cortical irregularities of the right 5th and 6th   lateral ribs, however there is some respiratory motion artifact in this   region limiting its evaluation. Degenerative changes of the spine.    IMPRESSION:     No visceral or vascular traumatic injury to the chest, abdomen or pelvis.   Indeterminant left adrenal nodule.    < end of copied text >      < from: Transthoracic Echocardiogram (01.25.18 @ 08:58) >  Conclusions:  Technically difficult study.  1. Dense posterior mitral annular calcification. Thickened  mitral leaflets with normal opening. Minimal mitral  regurgitation.  2. Aortic valve not well visualized; appears trileaflet  with normal opening. No aortic valve regurgitation seen.  3. Concentric left ventricular hypertrophy.  4. Hyperdynamic left ventricular systolic function.  5. Mild diastolic dysfunction (Stage I).  6. Normal right ventricular size and function.    < end of copied text >    1/28/18  EEG Classification / Summary:  Abnormal EEG in the lethargic state:  -left occipital PLEDs  -frequent left temporal sharp waves  -multifocal slowing, persistently worse in the left hemisphere with associated attenuation.    Clinical Impression:  Findings indicate moderate multifocal cerebral dysfunction, though persistently worse in the left hemisphere with associated risk of seizures from the left temporal and occipital regions.  No further seizures.   Consultant(s) Notes Reviewed:  neurology, rheumatology, Gastroenterology    1/25/18  EEG Classification / Summary:  Abnormal EEG in the lethargic state.    -Clinical seizures: No clinical signs  -EEG seizures: Lateralized, Left hemisphere, maximum Left Temporal (2 recorded)    -PLEDs, Lateralized, Left hemisphere, Maximum Left Temporal    -Continuous Slowing, Generalized, and Lateralized, Left hemisphere    Clinical Impression:  This EEG provides evidence of focal epileptogenicity in the left temporal region.  Two electrographic seizures were recorded from the left hemisphere, maximum in the left temporal region, with no associated clinical signs (duration 1-2 minutes).  There is also evidence of a severe diffuse encephalopathy, and a structural lesion in the left hemisphere.      Care Discussed with Consultants/Other Providers: neurosurgery PA

## 2018-02-02 NOTE — SPEECH LANGUAGE PATHOLOGY EVALUATION - VOICE
? vocal apraxia -> Pt observed with one automatic vocalization during session, no volitional voicing despite effort

## 2018-02-02 NOTE — PROGRESS NOTE ADULT - ASSESSMENT
62 year old female with acute L MCA infarct, additional microhemorrhages on MRA and IVH with poor neurologic recovery.  Dysphagia due to neurologic injury  Improving Mental status    PLAN  -Plan PEG Monday  -Continue NGTF in the interrim  -Further care per NSCU team, discussed with team    Perry Murphy PA-C    [facility]  [phone] 62 year old female with acute L MCA infarct, additional microhemorrhages on MRA and IVH with poor neurologic recovery.  Dysphagia due to neurologic injury  Improving Mental status    PLAN  -Plan PEG Monday  -Continue NGTF in the interrim  -Further care per NSCU team, discussed with team    Perry Murphy PA-C    [facility]  [phone]

## 2018-02-02 NOTE — SPEECH LANGUAGE PATHOLOGY EVALUATION - SLP ORIENTATION
able to select name from field of 3 yes/no options; able to select hospital from field of 2 yes/no options; able to select year from field of 3 written choices

## 2018-02-02 NOTE — CHART NOTE - NSCHARTNOTEFT_GEN_A_CORE
Pt seen for nutrition follow up, as per department protocol.    Hospital course: Pt 61 yo F admitted with 4th ventricular hemorrhage, + EVD, now extubated with stable right sided hemiparesis and aphasia. Swallow evaluation noted (1/29 and 1/31) recommendations for NPO and FEES as patient currently with oropharyngeal dysphagia. Noted FEES failed (02/01) recommendations for NPO. As per chart, PEG pending - continue NGT in the interim.      Pt has been tolerating Jevity 1.2 via NGT 70ml/hr x 18 hours.    Source: Patient [ ]    Family [ ]     other [x]: medical record     Diet : NPO with EN via NGT     GI: noted last BM (01/31) as per flow sheets.     Enteral /Parenteral Nutrition: Jevity 1.2 via NGT @70ml/hr x 18 hours to provide 1260ml, 1512 calories, 70 g protein (30 calories/kg, 1.4 g protein/kg of dosing weight 49.9 kg).     24-hour EN provision = 1540 ml (01/02) vs goal rate 1260ml    Current Weight: dosing 49.9 kg (01/30) -> last weight noted as per flow sheets 49.9 kg (01/31) - weight likely to be stable. Recommend obtain current weight.    Noted +2 right hand edema.     Pertinent Medications: MEDICATIONS  (STANDING):  amLODIPine   Tablet 5 milliGRAM(s) Oral daily  atorvastatin 40 milliGRAM(s) Oral at bedtime  enoxaparin Injectable 30 milliGRAM(s) SubCutaneous <User Schedule>  hydrALAZINE 100 milliGRAM(s) Oral three times a day  labetalol 250 milliGRAM(s) Oral every 8 hours  levETIRAcetam  Solution 1000 milliGRAM(s) Oral two times a day  nicotine - 21 mG/24Hr(s) Patch 1 patch Transdermal daily  polyethylene glycol 3350 17 Gram(s) Oral two times a day  senna Syrup 10 milliLiter(s) Oral at bedtime    MEDICATIONS  (PRN):  ALBUTerol    90 MICROgram(s) HFA Inhaler 1 Puff(s) Inhalation every 4 hours PRN Wheezing  ipratropium 17 MICROgram(s) HFA Inhaler 1 Puff(s) Inhalation every 6 hours PRN wheez  oxyCODONE    IR 5 milliGRAM(s) Oral every 4 hours PRN Moderate Pain (4 - 6)  oxyCODONE    IR 10 milliGRAM(s) Oral every 6 hours PRN Severe Pain (7 - 10)    Pertinent Labs: last obtained (01/31) BUN 25,      Skin: no noted pressure injuries.     Estimated Needs:   [x] no change since previous assessment  [ ] recalculated:     Previous Nutrition Diagnosis:     [x] Swallowing difficulty    Nutrition Diagnosis is [x] ongoing; pt S/P failed FEES, pending PEG placement    New Nutrition Diagnosis: [x] not applicable    Interventions:     1. Recommend continue Jevity 1.2 via NGT @70ml/hr x 18 hours to provide 1260ml, 1512 calories, 70 g protein (30 calories/kg, 1.4 g protein/kg of dosing weight 49.9 kg).   2. Monitor PEG placement, recommend Jevity 1.2 start rate @10ml/hr and increase as needed to @70ml/hr x 18 hours.  3. Monitor weight, labs, skin, EN provision, and GI tolerance.      Monitoring and Evaluation:   Follow up per protocol  RD remains available.   Alvina Lombardo Dietetic Intern [phone]

## 2018-02-02 NOTE — CONSULT NOTE ADULT - PROBLEM SELECTOR RECOMMENDATION 9
- unclear etiology for acute left MCA stroke but neuro considering ? 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   - ? need for TTE with bubble study or ROGELIO, loop recorder  - check LE duplex  - continue with statin  - start aspirin when ok with neurosurgery - unclear etiology for acute left MCA stroke but neuro considering ? 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 LP negative for meningitis/infection.  - ? need for TTE with bubble study or ROGELIO, loop recorder  - check LE duplex  - continue with statin  - start aspirin when ok with neurosurgery    - no acute neurosurgical intervention for IVH - patient failed FEEST  - plan for PEG on 2/5/18 per GI

## 2018-02-02 NOTE — CONSULT NOTE ADULT - ASSESSMENT
Elevated trop  likely demand ischemia in setting of acute hemorrhagic cva , IVH and possible sepsis  levels are trending down   asa once deemed safe by nsx  eventual ischemic work up in future    HTN  cont current meds  add norvasc if ok with neurology     abnormal ekg   likely due to LVH

## 2018-02-02 NOTE — CONSULT NOTE ADULT - PROBLEM SELECTOR RECOMMENDATION 4
- unclear etiology but patient has had varying degree of leukocytosis since admission. patient also had fevers in the beginning of the hospitalization but infectious work up including blood cx, CSF cx neg - patient had EKG changes on admission with elevated CE earlier in the hospital course, ? due to demand ischemia but repeat EKG now with no concerning new ischemic changes and CE negative.   - TTE with concentric LVH, mild diastolic dysfunction  - would have cards evaluate patient given reported chest pain  - monitor on tele

## 2018-02-02 NOTE — PROGRESS NOTE ADULT - SUBJECTIVE AND OBJECTIVE BOX
Cc: Right hemiplegia, gait dysfunction, dysphagia      HPI:  Patient tolerating therapies. Mod A transfers    MEDICATIONS  (STANDING):  acetaminophen  IVPB. 1000 milliGRAM(s) IV Intermittent once  amLODIPine   Tablet 5 milliGRAM(s) Oral daily  atorvastatin 40 milliGRAM(s) Oral at bedtime  enoxaparin Injectable 30 milliGRAM(s) SubCutaneous <User Schedule>  hydrALAZINE 100 milliGRAM(s) Oral three times a day  labetalol 250 milliGRAM(s) Oral every 8 hours  levETIRAcetam  Solution 1000 milliGRAM(s) Oral two times a day  nicotine - 21 mG/24Hr(s) Patch 1 patch Transdermal daily  polyethylene glycol 3350 17 Gram(s) Oral two times a day  senna Syrup 10 milliLiter(s) Oral at bedtime  sodium chloride 0.45%. 1000 milliLiter(s) (70 mL/Hr) IV Continuous <Continuous>    MEDICATIONS  (PRN):  ALBUTerol    90 MICROgram(s) HFA Inhaler 1 Puff(s) Inhalation every 4 hours PRN Wheezing  ipratropium 17 MICROgram(s) HFA Inhaler 1 Puff(s) Inhalation every 6 hours PRN wheez  oxyCODONE    IR 5 milliGRAM(s) Oral every 4 hours PRN Moderate Pain (4 - 6)  oxyCODONE    IR 10 milliGRAM(s) Oral every 6 hours PRN Severe Pain (7 - 10)    Vital Signs Last 24 Hrs  T(C): 36.7 (02 Feb 2018 10:00), Max: 36.7 (01 Feb 2018 23:00)  T(F): 98 (02 Feb 2018 10:00), Max: 98.1 (01 Feb 2018 23:00)  HR: 80 (02 Feb 2018 10:00) (66 - 84)  BP: 159/93 (02 Feb 2018 10:00) (129/66 - 190/91)  BP(mean): 114 (02 Feb 2018 08:38) (91 - 131)  RR: 22 (02 Feb 2018 10:00) (16 - 37)  SpO2: 96% (02 Feb 2018 10:00) (95% - 99%)    PHYSICAL EXAM  Constitutional - NAD, Comfortable  HEENT - inc intact  Extremities - No C/C/E, No calf tenderness   Neurologic Exam -                    Cognitive - Awake, Alert,      Communication - + aphasia   Cranial Nerves - CN 2-12 intact     Motor - Right side 0/5, Left side at least antigravity                    Right neglect       Impression:  61 yo with CVA/IPH with Right hemiplegia, aphasia, dysphagia      Plan:  PT- ROM, Bed Mob, Transfers, Amb w AD and bracing as needed  OT- ADLs, bracing  SLP- Dysphagia eval and treat  Prec- Falls, Cardiac, Pulm  DVT Prophylaxis- Lovenox  Skin- Turn q2 h  Dispo- Acute Rehab- can tolerate 3h/d PT/OT/SLP and requires daily physician visits

## 2018-02-02 NOTE — CONSULT NOTE ADULT - PROBLEM SELECTOR RECOMMENDATION 5
- unclear etiology  - last BM earlier today, abdomen soft and nontender on exam  - check UA and if abdominal pain persists, to consider repeat CT a/p  - check lactate

## 2018-02-02 NOTE — PROGRESS NOTE ADULT - ASSESSMENT
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 s/p EVD with angio showing vascular irregularities, now extubated with stable right sided hemiplegia and aphasia    NEURO:    q4h neuro checks  Pain control  EVD removed 02/01, tolerates well  Nicotine patch  no further seizures, on Keppra    CARDS:  -180  continue hydralazine, labetalol, norvasc  BP improving    PULM:  sat > 92%  Likely COPD   Broncholdilators albuterol prn q 4 hrs   ipratropium    RENAL:  IVL    GASTRO: continue TF  bowel regimen  FEEST failed, PEG on 02/05/2018    HEME:  monitor H/H    ---> loveno    ENDO:  euglycemia    ID:  afebrile    Code status:  Full code    This patient was at high risk of neurologic deterioration and/or death due to IVH, seizure    Time spent:  45 minutes 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 s/p EVD with angio showing vascular irregularities, now extubated with stable right sided hemiplegia and aphasia    NEURO:    q4h neuro checks  Pain control  EVD removed 02/01, tolerates well  Nicotine patch  no further seizures, on Keppra    CARDS:  -180  continue hydralazine, labetalol, norvasc  BP improving    PULM:  sat > 92%  Likely COPD   Broncholdilators albuterol prn q 4 hrs   ipratropium    RENAL:  IVL    GASTRO: continue TF  bowel regimen  FEEST failed, PEG on 02/05/2018    HEME:  monitor H/H    ---> loveno    ENDO:  euglycemia    ID:  afebrile    Code status:  Full code    Patient was not critically ill, but was medically complex.  30 minutes spent.

## 2018-02-02 NOTE — SPEECH LANGUAGE PATHOLOGY EVALUATION - SLP DIAGNOSIS
Pt presents with a severe mixed expressive and receptive aphasia. Pt essentially non-verbal with comprehension a relative strength, although comprehension does break down inconsistently at the simple level. Pt attempts to verbalize occasional single words that are unintelligible due to absent vocalization. 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 Pt with good cooperation and task persistence for duration of evaluation.

## 2018-02-02 NOTE — PROGRESS NOTE ADULT - ASSESSMENT
Pt is a 62 year old F with no significant medical history (no pertinent family history), active smoker, came to the ED s/p fall with worsening mental status, found to have 4th ventriclar hemorrhage with hydrocephalus. Conventional angiogram showed L Ant circulation vasular spasm MCA ; SHANE / Etiology of L vascular narrowing unclear. At this time PE is stable with aphasia and right sided hemiplegia. She is minimally verbal, right sided hemiplegia and aphasia. Currently, Lab work remarkable for uptrend of WBC (remains afebrile, not on steroids at this time). Repeat CT head done on 2/2 shows no acute changes. Removal of right frontal ventriculostomy with small hemorrhage along the right anterior frontal shunt tract.     Impression - Right hemiparesis syndrome 2/2 acute L MCA infarct with a small  hemorrhage and scattered foci of microhemorrhages. 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 Cerebral vasculitis unlikely as there is minimal radiographic evidence. CSF has normal protein levels and other labwork grossly unremarkable.   and hba1c 5.4.     Reccs:  Stroke workup complete, TTE unrevealing (Unknown if there is any PFO)  Etiology possibly RCVS,   c/w Keppra 1000mg BID for subclinical seizures  c/w Atorvastatin 40mg daily Pt is a 62 year old F with no significant medical history (no pertinent family history), active smoker, came to the ED s/p fall with worsening mental status, found to have 4th ventriclar hemorrhage with hydrocephalus. Conventional angiogram showed L Ant circulation vasular spasm MCA ; SHANE / Etiology of L vascular narrowing unclear. At this time PE is stable with aphasia and right sided hemiplegia. She is minimally verbal, right sided hemiplegia and aphasia. Currently, Lab work remarkable for uptrend of WBC (remains afebrile, not on steroids at this time). Repeat CT head done on 2/2 shows no acute changes. Removal of right frontal ventriculostomy with small hemorrhage along the right anterior frontal shunt tract.     Impression - Right hemiparesis syndrome 2/2 acute L MCA infarct with a small  hemorrhage and scattered foci of microhemorrhages. 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 Cerebral vasculitis unlikely as there is minimal radiographic evidence. CSF has normal protein levels and other labwork grossly unremarkable.   and hba1c 5.4.     Reccs:  Stroke workup complete, TTE unrevealing (Unknown if there is any PFO)  Etiology possibly RCVS,   c/w Keppra 1000mg BID for subclinical seizures  c/w Atorvastatin 40mg daily  Start ASA 81mg daily when deemed stable.

## 2018-02-02 NOTE — SPEECH LANGUAGE PATHOLOGY EVALUATION - SLP GESTURES
head nod/shake for yes/no, but not reliable; not able to demonstrate functional object use via gesture, but with object 3/5.

## 2018-02-02 NOTE — PROGRESS NOTE ADULT - SUBJECTIVE AND OBJECTIVE BOX
Neurology Follow Up Note     Interval History - S/p EVD dc on 2/1.    Vital Signs Last 24 Hrs  T(C): 36.7 (02 Feb 2018 10:00), Max: 36.7 (01 Feb 2018 23:00)  T(F): 98 (02 Feb 2018 10:00), Max: 98.1 (01 Feb 2018 23:00)  HR: 80 (02 Feb 2018 10:00) (66 - 84)  BP: 159/93 (02 Feb 2018 10:00) (129/66 - 190/91)  BP(mean): 114 (02 Feb 2018 08:38) (91 - 131)  RR: 22 (02 Feb 2018 10:00) (16 - 37)  SpO2: 96% (02 Feb 2018 10:00) (95% - 99%)    Neuro Exam:  MS - Awake and alert, minimally verbal, tracks with eyes, does not follow commands (possibly some perseveration)  Motor - Plegic on the right side. Left sided movement upper and lower  Sensory - unable to assess due to pt's clinical status  Coordination and gait - deferred due to pt's clinical status    Labs                        12.7   22.8  )-----------( 564      ( 02 Feb 2018 11:19 )             37.2   02-02    149<H>  |  109<H>  |  21  ----------------------------<  118<H>  3.8   |  28  |  0.59    Ca    9.3      02 Feb 2018 11:19  Phos  3.6     02-02  Mg     2.2     02-02    PT/INR - ( 31 Jan 2018 23:45 )   PT: 12.4 sec;   INR: 1.14 ratio    PTT - ( 31 Jan 2018 23:45 )  PTT:28.2 sec    Imaging  [x] Conventional Angiogram - showed multifocal, multisegmental vessel irregularities along the left hemisphere (left PCA, left MCA, left SHANE, along proximal and distal trunks. Paucity of vessels along the left posterior frontal and anterior parietal territories but without clear vessel cut off. No venous occlusions. Calcified brachiocephalic extracranial vessel, mild narrowing at the origin of the left vertebral artery and moderate narrowing at the origin of the right vertebral    [x] MRI b: Acute L MCA infarct with a small amount of hemorrhage w/ scattered foci of microhemorrhages and extensive white matter changes.     [x] MRA of the head demonstrates mild narrowing of the left middle and PCA which is less prominent when compared with the conventional angiogram of 1/24/2018.     [x] MRI c-spine: No acute cervical spine pathology or evidence for ligamentous injury. No antoine cervical cord compression.   -- C4-C5: Moderate degenerative changes with broad-based osteophyte drainage, contacting and minimally indenting the left ventral cervical cord.   -- Upper thoracic dorsal interfascial 3.0 x 0.5 cm hematoma.   -- Redemonstration of hemorrhage filling the 4th ventricle, as visualized on head CT of 1/24/18.     [x] CTA H/N: Somewhat asymmetric increased venous phase enhancement seen in the region of the right cerebellopontine angle, with an increased number of small arterial vessels seen in the region of the fourth ventricle, approximately in the region of the previously identified intraventricular hemorrhage. The significance of this finding is uncertain. The possibility of a small extra-axial AVM is not excluded. Catheter angiography may be performed as clinically indicated. No occlusion or hemodynamically significant stenosis in the neck.     TTE  1. Dense posterior mitral annular calcification. Thickened  mitral leaflets with normal opening. Minimal mitral  regurgitation.  2. Aortic valve not well visualized; appears trileaflet  with normal opening. No aortic valve regurgitation seen.  3. Concentric left ventricular hypertrophy.  4. Hyperdynamic left ventricular systolic function.  5. Mild diastolic dysfunction (Stage I).  6. Normal right ventricular size and function.

## 2018-02-02 NOTE — CONSULT NOTE ADULT - PROBLEM SELECTOR RECOMMENDATION 2
- no acute neurosurgical intervention per neurosurgery - EVD removed on 2/1.   - repeat CT head today without evidence of hydrocephalus

## 2018-02-02 NOTE — CONSULT NOTE ADULT - SUBJECTIVE AND OBJECTIVE BOX
CHIEF COMPLAINT:Patient is a 62y old  Female who presents with a chief complaint of Level I trauma activation s/p fall (24 Jan 2018 13:50)      HISTORY OF PRESENT ILLNESS:  This is a 62 f with no significant history except smoking, admitted with AMS,fall and found to have acute left MCA infarct and IVH , hydrocephalus was intubated and evd. now on floors.   She also had deizuers a, fever , leukocytosis with neg infection work up.   cardilogy is called to evaluate for elevated trop   Due to altered mental status, subjective information were not able to be obtained from the patient. History was obtained, to the extent possible, from review of the chart and collateral sources of information.      PAST MEDICAL & SURGICAL HISTORY:  No pertinent past medical history  H/O colonoscopy: 2015, normal          MEDICATIONS:  amLODIPine   Tablet 5 milliGRAM(s) Oral daily  enoxaparin Injectable 30 milliGRAM(s) SubCutaneous <User Schedule>  hydrALAZINE 100 milliGRAM(s) Oral three times a day  labetalol 250 milliGRAM(s) Oral every 8 hours      ALBUTerol    90 MICROgram(s) HFA Inhaler 1 Puff(s) Inhalation every 4 hours PRN  ipratropium 17 MICROgram(s) HFA Inhaler 1 Puff(s) Inhalation every 6 hours PRN    acetaminophen  IVPB. 1000 milliGRAM(s) IV Intermittent once  levETIRAcetam  Solution 1000 milliGRAM(s) Oral two times a day  oxyCODONE    IR 5 milliGRAM(s) Oral every 4 hours PRN  oxyCODONE    IR 10 milliGRAM(s) Oral every 6 hours PRN    polyethylene glycol 3350 17 Gram(s) Oral two times a day  senna Syrup 10 milliLiter(s) Oral at bedtime    atorvastatin 40 milliGRAM(s) Oral at bedtime    sodium chloride 0.45%. 1000 milliLiter(s) IV Continuous <Continuous>      FAMILY HISTORY:  Family history of colon cancer in father (Father)      Non-contributory    SOCIAL HISTORY:    No tobacco, drugs or etoh    Allergies    No Known Drug Allergies  strawberry (Rash)    Intolerances    	    REVIEW OF SYSTEMS:  as above  The rest of the 14 points ROS reviewed and except above they are unremarkable.        PHYSICAL EXAM:  T(C): 36.7 (02-02-18 @ 10:00), Max: 36.7 (02-01-18 @ 23:00)  HR: 80 (02-02-18 @ 10:00) (66 - 84)  BP: 159/93 (02-02-18 @ 10:00) (129/66 - 190/91)  RR: 22 (02-02-18 @ 10:00) (16 - 26)  SpO2: 96% (02-02-18 @ 10:00) (95% - 99%)  Wt(kg): --  I&O's Summary    01 Feb 2018 07:01  -  02 Feb 2018 07:00  --------------------------------------------------------  IN: 1950 mL / OUT: 0 mL / NET: 1950 mL    02 Feb 2018 07:01  -  02 Feb 2018 17:34  --------------------------------------------------------  IN: 70 mL / OUT: 0 mL / NET: 70 mL      JVP: Normal  Neck: supple  Lung: clear   CV: S1 S2 , Murmur:  Abd: soft  Ext: No edema  neuro: Awake / aphasic   Psych: flat affect  Skin: normal     LABS/DATA:    TELEMETRY: 	    ECG:  	   	  CARDIAC MARKERS:  Troponin T, Serum: 0.05 ng/mL (02-02 @ 11:19)  Troponin T, Serum: 0.05 ng/mL (02-02 @ 11:19)  Troponin T, Serum: 0.08 ng/mL (01-30 @ 21:50)                                  12.7   22.8  )-----------( 564      ( 02 Feb 2018 11:19 )             37.2     02-02    149<H>  |  109<H>  |  21  ----------------------------<  118<H>  3.8   |  28  |  0.59    Ca    9.3      02 Feb 2018 11:19  Phos  3.6     02-02  Mg     2.2     02-02      proBNP:   Lipid Profile:   HgA1c:   TSH:   < from: Transthoracic Echocardiogram (01.25.18 @ 08:58) >  ------------------------------------------------------------------------  Conclusions:  Technically difficult study.  1. Dense posterior mitral annular calcification. Thickened  mitral leaflets with normal opening. Minimal mitral  regurgitation.  2. Aortic valve not well visualized; appears trileaflet  with normal opening. No aortic valve regurgitation seen.  3. Concentric left ventricular hypertrophy.  4. Hyperdynamic left ventricular systolic function.  5. Mild diastolic dysfunction (Stage I).  6. Normal right ventricular size and function.  *** No previous Echo exam.    < end of copied text >    < from: 12 Lead ECG (01.29.18 @ 21:27) >    Diagnosis Line NORMAL SINUS RHYTHM  BIATRIAL ENLARGEMENT  PULMONARY DISEASE PATTERN  LEFT VENTRICULAR HYPERTROPHY  T WAVE ABNORMALITY, CONSIDER INFEROLATERAL ISCHEMIA  ABNORMAL ECG    < end of copied text >

## 2018-02-02 NOTE — CONSULT NOTE ADULT - ASSESSMENT
62 F, active smoker, with no sig PMHx, recent URI symptoms admitted on 1/24/18 with AMS, fall with head trauma found to have large right frontal scalp hematoma, acute left MCA territory infarct, IVH, and hydrocephalus on CT head requiring intubation and EVD. CTA head with ? 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 Vasculitis and infectious work up negative. Course c/b subclinical seizures, fevers and leukocytosis with negative infectious work up, also with EKG changes and elevated troponin ? due to demand ischemia, and dysphagia on NG tube feeds. Patient was extubated on 1/28, EVD discontinued on 2/1 and transferred out of ICU on 2/2/18, now with complaints of chest and abdominal pain of unclear etiology, hypernatremia, persistent leukocytosis.     PMD: Dr. Felipe Kathleen [phone]

## 2018-02-02 NOTE — SPEECH LANGUAGE PATHOLOGY EVALUATION - SLP VERBAL EXPRESSION
impaired/nonverbal throughout this assessment; occasional attempt to mouthe words -> unintelligible with no volitional voicing despite effort

## 2018-02-02 NOTE — PROGRESS NOTE ADULT - SUBJECTIVE AND OBJECTIVE BOX
Patient is a 62y old  Female who presented with a chief complaint of Level I trauma activation s/p fall (24 Jan 2018 13:50)      INTERVAL HPI/OVERNIGHT EVENTS:  failed SLP swallow evalutaion yesterday  remains on NGTF    MEDICATIONS  (STANDING):  amLODIPine   Tablet 5 milliGRAM(s) Oral daily  atorvastatin 40 milliGRAM(s) Oral at bedtime  enoxaparin Injectable 30 milliGRAM(s) SubCutaneous <User Schedule>  hydrALAZINE 100 milliGRAM(s) Oral three times a day  labetalol 250 milliGRAM(s) Oral every 8 hours  levETIRAcetam  Solution 1000 milliGRAM(s) Oral two times a day  nicotine - 21 mG/24Hr(s) Patch 1 patch Transdermal daily  polyethylene glycol 3350 17 Gram(s) Oral two times a day  senna Syrup 10 milliLiter(s) Oral at bedtime    MEDICATIONS  (PRN):  ALBUTerol    90 MICROgram(s) HFA Inhaler 1 Puff(s) Inhalation every 4 hours PRN Wheezing  ipratropium 17 MICROgram(s) HFA Inhaler 1 Puff(s) Inhalation every 6 hours PRN wheez  oxyCODONE    IR 5 milliGRAM(s) Oral every 4 hours PRN Moderate Pain (4 - 6)  oxyCODONE    IR 10 milliGRAM(s) Oral every 6 hours PRN Severe Pain (7 - 10)      Allergies  No Known Drug Allergies  strawberry (Rash)      Review of Systems:  General:  No fever, chills  CV:  No pain, palpitatioins  Resp:  extubated, hi flow oxygen, +secretions  GI:  see HPI +dysphagia on NGTF  :  No pain, bleeding, incontinence, nocturia  Muscle:  Rt hemiparesis  Neuro:  +Rt hemiparesis  Endocrine:  No polyuria, polydypsia, cold/heat intolerance  Heme:  No petechiae, ecchymosis, easy bruisability  Skin:  No rash, tattoos, scars, edema    Vital Signs Last 24 Hrs  T(C): 36.7 (02 Feb 2018 07:00), Max: 36.7 (01 Feb 2018 23:00)  T(F): 98 (02 Feb 2018 07:00), Max: 98.1 (01 Feb 2018 23:00)  HR: 73 (02 Feb 2018 08:38) (66 - 84)  BP: 167/79 (02 Feb 2018 08:38) (129/66 - 190/91)  BP(mean): 114 (02 Feb 2018 08:38) (91 - 131)  RR: 20 (02 Feb 2018 08:38) (15 - 37)  SpO2: 95% (02 Feb 2018 08:38) (95% - 99%)    PHYSICAL EXAM:  Constitutional:  awake +NGT +hyphonia  Neck: No LAD, supple  Respiratory: occasional +rhonchi  Cardiovascular: S1 and S2, RRR,  Gastrointestinal: BS+, soft, NT/ND, neg HSM,  Extremities: No peripheral edema, neg clubbing, cyanosis  Vascular: 2+ peripheral pulses  Neurological: +rt hemiparesis  Skin: No rashes    LABS:                        12.9   18.5  )-----------( 461      ( 31 Jan 2018 23:45 )             37.9     01-31    145  |  108  |  25<H>  ----------------------------<  197<H>  4.0   |  26  |  0.65    Ca    9.2      31 Jan 2018 23:45  Phos  3.1     01-31  Mg     2.2     01-31      PT/INR - ( 31 Jan 2018 23:45 )   PT: 12.4 sec;   INR: 1.14 ratio    PTT - ( 31 Jan 2018 23:45 )  PTT:28.2 sec        RADIOLOGY & ADDITIONAL TESTS: Patient is a 62y old  Female who presented with a chief complaint of Level I trauma activation s/p fall (24 Jan 2018 13:50)    INTERVAL HPI/OVERNIGHT EVENTS:  failed SLP swallow evalutaion yesterday  remains on NGTF    MEDICATIONS  (STANDING):  amLODIPine   Tablet 5 milliGRAM(s) Oral daily  atorvastatin 40 milliGRAM(s) Oral at bedtime  enoxaparin Injectable 30 milliGRAM(s) SubCutaneous <User Schedule>  hydrALAZINE 100 milliGRAM(s) Oral three times a day  labetalol 250 milliGRAM(s) Oral every 8 hours  levETIRAcetam  Solution 1000 milliGRAM(s) Oral two times a day  nicotine - 21 mG/24Hr(s) Patch 1 patch Transdermal daily  polyethylene glycol 3350 17 Gram(s) Oral two times a day  senna Syrup 10 milliLiter(s) Oral at bedtime    MEDICATIONS  (PRN):  ALBUTerol    90 MICROgram(s) HFA Inhaler 1 Puff(s) Inhalation every 4 hours PRN Wheezing  ipratropium 17 MICROgram(s) HFA Inhaler 1 Puff(s) Inhalation every 6 hours PRN wheez  oxyCODONE    IR 5 milliGRAM(s) Oral every 4 hours PRN Moderate Pain (4 - 6)  oxyCODONE    IR 10 milliGRAM(s) Oral every 6 hours PRN Severe Pain (7 - 10)      Allergies  No Known Drug Allergies  strawberry (Rash)      Review of Systems:  General:  No fever, chills  CV:  No pain, palpitatioins  Resp:  extubated, hi flow oxygen, +secretions  GI:  see HPI +dysphagia on NGTF  :  No pain, bleeding, incontinence, nocturia  Muscle:  Rt hemiparesis  Neuro:  +Rt hemiparesis  Endocrine:  No polyuria, polydypsia, cold/heat intolerance  Heme:  No petechiae, ecchymosis, easy bruisability  Skin:  No rash, tattoos, scars, edema    Vital Signs Last 24 Hrs  T(C): 36.7 (02 Feb 2018 07:00), Max: 36.7 (01 Feb 2018 23:00)  T(F): 98 (02 Feb 2018 07:00), Max: 98.1 (01 Feb 2018 23:00)  HR: 73 (02 Feb 2018 08:38) (66 - 84)  BP: 167/79 (02 Feb 2018 08:38) (129/66 - 190/91)  BP(mean): 114 (02 Feb 2018 08:38) (91 - 131)  RR: 20 (02 Feb 2018 08:38) (15 - 37)  SpO2: 95% (02 Feb 2018 08:38) (95% - 99%)    PHYSICAL EXAM:  Constitutional:  awake +NGT +hyphonia  Neck: No LAD, supple  Respiratory: occasional +rhonchi  Cardiovascular: S1 and S2, RRR,  Gastrointestinal: BS+, soft, NT/ND, neg HSM,  Extremities: No peripheral edema, neg clubbing, cyanosis  Vascular: 2+ peripheral pulses  Neurological: +rt hemiparesis  Skin: No rashes    LABS:                        12.9   18.5  )-----------( 461      ( 31 Jan 2018 23:45 )             37.9     01-31    145  |  108  |  25<H>  ----------------------------<  197<H>  4.0   |  26  |  0.65    Ca    9.2      31 Jan 2018 23:45  Phos  3.1     01-31  Mg     2.2     01-31      PT/INR - ( 31 Jan 2018 23:45 )   PT: 12.4 sec;   INR: 1.14 ratio    PTT - ( 31 Jan 2018 23:45 )  PTT:28.2 sec        RADIOLOGY & ADDITIONAL TESTS:

## 2018-02-02 NOTE — SPEECH LANGUAGE PATHOLOGY EVALUATION - COMMENTS
CTA: Somewhat asymmetric increased venous phase enhancement seen in the region of the right cerebellopontine angle, with an increased number of small arterial vessels seen in the region of the fourth ventricle, approximately in the region of the previously identified intraventricular hemorrhage. The significance of this finding is uncertain. The possibility of a small extra-axial AVM is not excluded. Catheter angiography may be performed as clinically indicated. MRI: Acute left middle cerebral artery infarct with a small amount of hemorrhage. Additional scattered foci of microhemorrhages. Fairly extensive white matter changes. MRA of the head demonstrates mild narrowing of the left middle and posterior cerebral arteries which is less prominent when compared with the conventional angiogram of 1/24/2018. CT: Slight decreased size of the third and lateral ventricles. Left temporal parietal infarction. Left occipital infarction, increased in conspicuity. No hemorrhagic transformation. Per NSCU, pt found to have 4th vent IVH, now s/p EVD, angio negative for vascular malformation, questionable vasculitis->w/u (-). Course complicated by L hemisphereic sz. 1/25: EEG provides evidence of focal epileptogenicity in the left temporal region.  Two electrographic seizures were recorded from the left hemisphere, maximum in the left temporal region, with no associated clinical signs (duration 1-2 minutes). There is also evidence of a severe diffuse encephalopathy, and a structural lesion in the left hemisphere. Started on keppra, no further seizures on vEEG. Extubated 1/28 with occasional desaturations after extubation->started on BIPAP and then changed to hiflow. GI consulted for possible PEG placement. CXR: Enteric tube with tip in stomach. Trace left pleural effusion. Able to demonstrate functional object use with object: 3/5 word ID in field of 6 -> 3/6 given verbal prompt  single digit number ID in field of 9 -> 3/5 Unable to assess due to severity of expressive and receptive deficits

## 2018-02-02 NOTE — CONSULT NOTE ADULT - ATTENDING COMMENTS
11. Advanced care planning  - discussed with patient's spouse regarding advance care planning in person. spouse wants everything to be done and remains full code at this time. Time spent > 16 mins. 11. Advanced care planning  - discussed with patient's spouse regarding advance care planning in person. spouse wants everything to be done and remains full code at this time. Face to face encounter time spent > 20mins.

## 2018-02-02 NOTE — SPEECH LANGUAGE PATHOLOGY EVALUATION - SLP INITIATE PHONATION
unable to volitionally initiate despite effort; one automatic vocalization only observed throughout session

## 2018-02-02 NOTE — PROGRESS NOTE ADULT - ASSESSMENT
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

## 2018-02-02 NOTE — CONSULT NOTE ADULT - PROBLEM SELECTOR RECOMMENDATION 3
- EVD removed on 2/1.   - repeat CT head today without evidence of hydrocephalus - unclear etiology but patient has had varying degree of leukocytosis since admission. patient also had fevers in the beginning of the hospitalization but infectious work up including blood cx, CSF cx neg, UA neg, CT c/a/p on 1/24/18 negative for infection.  - ? in part due to dehydration/hemoconcentration. start gentle IVF.  - patient remains afebrile  - check repeat Bcx, UA/UCx. CXR on 2/1 with clear lungs  - check LE duplex  - if rising leukocytosis, consider ID evaluation

## 2018-02-02 NOTE — PROGRESS NOTE ADULT - SUBJECTIVE AND OBJECTIVE BOX
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 s/p EVD with angio showing vascular irregularities, now extubated with stable right sided hemiparesis and aphasia.    1/30 EVD clamped, exam unchanged. ICP increased this morning - no change in exam. ICP improved with closer BP control. Manageable suctioning requirements  2/1 EVD removed, tolerates well.    Overnight Events:     ROS: negative [] unable to obtain as patient is comatose/intubated/aphasic []   VITALS:   T(C): 36.7 (02-02-18 @ 07:00), Max: 36.7 (02-01-18 @ 23:00)  HR: 73 (02-02-18 @ 08:38) (66 - 84)  BP: 167/79 (02-02-18 @ 08:38) (129/66 - 190/91)  RR: 20 (02-02-18 @ 08:38) (15 - 37)  SpO2: 95% (02-02-18 @ 08:38) (95% - 99%)    02-01-18 @ 07:01  -  02-02-18 @ 07:00  --------------------------------------------------------  IN: 1950 mL / OUT: 0 mL / NET: 1950 mL    02-02-18 @ 07:01  -  02-02-18 @ 08:44  --------------------------------------------------------  IN: 70 mL / OUT: 0 mL / NET: 70 mL      LABS:                          12.9   18.5  )-----------( 461      ( 31 Jan 2018 23:45 )             37.9     01-31    145  |  108  |  25<H>  ----------------------------<  197<H>  4.0   |  26  |  0.65    Ca    9.2      31 Jan 2018 23:45  Phos  3.1     01-31  Mg     2.2     01-31      PT/INR - ( 31 Jan 2018 23:45 )   PT: 12.4 sec;   INR: 1.14 ratio         PTT - ( 31 Jan 2018 23:45 )  PTT:28.2 sec  MEDS:  MEDICATIONS  (STANDING):  amLODIPine   Tablet 5 milliGRAM(s) Oral daily  atorvastatin 40 milliGRAM(s) Oral at bedtime  enoxaparin Injectable 30 milliGRAM(s) SubCutaneous <User Schedule>  hydrALAZINE 100 milliGRAM(s) Oral three times a day  hydrALAZINE Injectable 10 milliGRAM(s) IV Push once  labetalol 250 milliGRAM(s) Oral every 8 hours  levETIRAcetam  Solution 1000 milliGRAM(s) Oral two times a day  nicotine - 21 mG/24Hr(s) Patch 1 patch Transdermal daily  polyethylene glycol 3350 17 Gram(s) Oral two times a day  senna Syrup 10 milliLiter(s) Oral at bedtime      [All pertinent recent Imaging/Reports reviewed]    DEVICES: [] Restraints [] LEOLA/HMV []LD [] ET tube [] Trach [] A-line [] Galvin [x] NGT [] Rectal Tube       EXAMINATION:  General:  calm  HEENT:  MMM, NG tube in place  Neuro:  awake, expressive aphasia, LUE, LLE moving spontaneously. Follows commands. right sided hemiplegia.  Cards:  RRR  Respiratory:  no respiratory distress  Adomen:  soft  Extremities:  no edema  Skin:  warm/dry 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 s/p EVD with angio showing vascular irregularities, now extubated with stable right sided hemiparesis and aphasia.    1/30 EVD clamped, exam unchanged. ICP increased this morning - no change in exam. ICP improved with closer BP control. Manageable suctioning requirements  2/1 EVD removed, tolerates well.    Overnight Events:     ROS: negative [] unable to obtain as patient is comatose/intubated/aphasic []   VITALS:   T(C): 36.7 (02-02-18 @ 07:00), Max: 36.7 (02-01-18 @ 23:00)  HR: 73 (02-02-18 @ 08:38) (66 - 84)  BP: 167/79 (02-02-18 @ 08:38) (129/66 - 190/91)  RR: 20 (02-02-18 @ 08:38) (15 - 37)  SpO2: 95% (02-02-18 @ 08:38) (95% - 99%)    02-01-18 @ 07:01  -  02-02-18 @ 07:00  --------------------------------------------------------  IN: 1950 mL / OUT: 0 mL / NET: 1950 mL    02-02-18 @ 07:01  -  02-02-18 @ 08:44  --------------------------------------------------------  IN: 70 mL / OUT: 0 mL / NET: 70 mL      LABS:                          12.9   18.5  )-----------( 461      ( 31 Jan 2018 23:45 )             37.9     01-31    145  |  108  |  25<H>  ----------------------------<  197<H>  4.0   |  26  |  0.65    Ca    9.2      31 Jan 2018 23:45  Phos  3.1     01-31  Mg     2.2     01-31      PT/INR - ( 31 Jan 2018 23:45 )   PT: 12.4 sec;   INR: 1.14 ratio         PTT - ( 31 Jan 2018 23:45 )  PTT:28.2 sec  MEDS:  MEDICATIONS  (STANDING):  amLODIPine   Tablet 5 milliGRAM(s) Oral daily  atorvastatin 40 milliGRAM(s) Oral at bedtime  enoxaparin Injectable 30 milliGRAM(s) SubCutaneous <User Schedule>  hydrALAZINE 100 milliGRAM(s) Oral three times a day  hydrALAZINE Injectable 10 milliGRAM(s) IV Push once  labetalol 250 milliGRAM(s) Oral every 8 hours  levETIRAcetam  Solution 1000 milliGRAM(s) Oral two times a day  nicotine - 21 mG/24Hr(s) Patch 1 patch Transdermal daily  polyethylene glycol 3350 17 Gram(s) Oral two times a day  senna Syrup 10 milliLiter(s) Oral at bedtime      [All pertinent recent Imaging/Reports reviewed]    DEVICES: [] Restraints [] LEOLA/HMV []LD [] ET tube [] Trach [] A-line [] Galvin [x] NGT [] Rectal Tube       EXAMINATION:  General:  calm  HEENT:  MMM, NG tube in place  Neuro:  awake, expressive aphasia, LUE, LLE moving spontaneously. Follows commands intermittently. right sided hemiplegia.  Cards:  RRR  Respiratory:  no respiratory distress  Adomen:  soft  Extremities:  no edema  Skin:  warm/dry

## 2018-02-02 NOTE — SPEECH LANGUAGE PATHOLOGY EVALUATION - SLP PERTINENT HISTORY OF CURRENT PROBLEM
63y/o female brought in to [facility] ED s/p fall at home in the bathroom. Unable to obtain HPI, history, or ROS from patient due to her altered mental status. As per  patient woke up this morning feeling "unwell," vomited once with some dry heaves at 9am, then had slurred speech during the day starting at 10am. She spent most of the day in bed, and was able to have some tea and crackers. She then had a fall in the bathroom & hit the right side of her head. Level I trauma called as patient's altered mental status was initially concerning for potential need for intubation. In trauma bay primary survey significant for GCS of 11 (E=4 V =2 M=5). Secondary survey significant for Right frontal hematoma.

## 2018-02-03 DIAGNOSIS — I16.9 HYPERTENSIVE CRISIS, UNSPECIFIED: ICD-10-CM

## 2018-02-03 LAB
ANION GAP SERPL CALC-SCNC: 14 MMOL/L — SIGNIFICANT CHANGE UP (ref 5–17)
BASOPHILS # BLD AUTO: 0 K/UL — SIGNIFICANT CHANGE UP (ref 0–0.2)
BASOPHILS NFR BLD AUTO: 0.2 % — SIGNIFICANT CHANGE UP (ref 0–2)
BUN SERPL-MCNC: 17 MG/DL — SIGNIFICANT CHANGE UP (ref 7–23)
CALCIUM SERPL-MCNC: 9 MG/DL — SIGNIFICANT CHANGE UP (ref 8.4–10.5)
CHLORIDE SERPL-SCNC: 107 MMOL/L — SIGNIFICANT CHANGE UP (ref 96–108)
CO2 SERPL-SCNC: 27 MMOL/L — SIGNIFICANT CHANGE UP (ref 22–31)
CREAT SERPL-MCNC: 0.68 MG/DL — SIGNIFICANT CHANGE UP (ref 0.5–1.3)
EOSINOPHIL # BLD AUTO: 0.2 K/UL — SIGNIFICANT CHANGE UP (ref 0–0.5)
EOSINOPHIL NFR BLD AUTO: 1.2 % — SIGNIFICANT CHANGE UP (ref 0–6)
GLUCOSE BLDC GLUCOMTR-MCNC: 99 MG/DL — SIGNIFICANT CHANGE UP (ref 70–99)
GLUCOSE SERPL-MCNC: 111 MG/DL — HIGH (ref 70–99)
HCT VFR BLD CALC: 38.6 % — SIGNIFICANT CHANGE UP (ref 34.5–45)
HGB BLD-MCNC: 12.9 G/DL — SIGNIFICANT CHANGE UP (ref 11.5–15.5)
LACTATE SERPL-SCNC: 1 MMOL/L — SIGNIFICANT CHANGE UP (ref 0.7–2)
LYMPHOCYTES # BLD AUTO: 1.6 K/UL — SIGNIFICANT CHANGE UP (ref 1–3.3)
LYMPHOCYTES # BLD AUTO: 9.4 % — LOW (ref 13–44)
MCHC RBC-ENTMCNC: 33.3 GM/DL — SIGNIFICANT CHANGE UP (ref 32–36)
MCHC RBC-ENTMCNC: 35.9 PG — HIGH (ref 27–34)
MCV RBC AUTO: 108 FL — HIGH (ref 80–100)
MONOCYTES # BLD AUTO: 1.1 K/UL — HIGH (ref 0–0.9)
MONOCYTES NFR BLD AUTO: 6.4 % — SIGNIFICANT CHANGE UP (ref 2–14)
NEUTROPHILS # BLD AUTO: 14.5 K/UL — HIGH (ref 1.8–7.4)
NEUTROPHILS NFR BLD AUTO: 82.9 % — HIGH (ref 43–77)
PLATELET # BLD AUTO: 578 K/UL — HIGH (ref 150–400)
POTASSIUM SERPL-MCNC: 3.6 MMOL/L — SIGNIFICANT CHANGE UP (ref 3.5–5.3)
POTASSIUM SERPL-SCNC: 3.6 MMOL/L — SIGNIFICANT CHANGE UP (ref 3.5–5.3)
RBC # BLD: 3.59 M/UL — LOW (ref 3.8–5.2)
RBC # FLD: 12.4 % — SIGNIFICANT CHANGE UP (ref 10.3–14.5)
SODIUM SERPL-SCNC: 148 MMOL/L — HIGH (ref 135–145)
WBC # BLD: 17.5 K/UL — HIGH (ref 3.8–10.5)
WBC # FLD AUTO: 17.5 K/UL — HIGH (ref 3.8–10.5)

## 2018-02-03 PROCEDURE — 93010 ELECTROCARDIOGRAM REPORT: CPT

## 2018-02-03 PROCEDURE — 99233 SBSQ HOSP IP/OBS HIGH 50: CPT

## 2018-02-03 RX ORDER — LABETALOL HCL 100 MG
20 TABLET ORAL EVERY 4 HOURS
Qty: 0 | Refills: 0 | Status: DISCONTINUED | OUTPATIENT
Start: 2018-02-03 | End: 2018-02-04

## 2018-02-03 RX ORDER — LABETALOL HCL 100 MG
10 TABLET ORAL EVERY 4 HOURS
Qty: 0 | Refills: 0 | Status: DISCONTINUED | OUTPATIENT
Start: 2018-02-03 | End: 2018-02-03

## 2018-02-03 RX ORDER — HYDRALAZINE HCL 50 MG
20 TABLET ORAL EVERY 6 HOURS
Qty: 0 | Refills: 0 | Status: DISCONTINUED | OUTPATIENT
Start: 2018-02-03 | End: 2018-02-05

## 2018-02-03 RX ORDER — HYDRALAZINE HCL 50 MG
10 TABLET ORAL EVERY 6 HOURS
Qty: 0 | Refills: 0 | Status: DISCONTINUED | OUTPATIENT
Start: 2018-02-03 | End: 2018-02-03

## 2018-02-03 RX ADMIN — LEVETIRACETAM 400 MILLIGRAM(S): 250 TABLET, FILM COATED ORAL at 05:45

## 2018-02-03 RX ADMIN — Medication 20 MILLIGRAM(S): at 21:33

## 2018-02-03 RX ADMIN — Medication 20 MILLIGRAM(S): at 17:50

## 2018-02-03 RX ADMIN — Medication 10 MILLIGRAM(S): at 09:13

## 2018-02-03 RX ADMIN — Medication 20 MILLIGRAM(S): at 18:35

## 2018-02-03 RX ADMIN — Medication 10 MILLIGRAM(S): at 11:23

## 2018-02-03 RX ADMIN — Medication 5 MILLIGRAM(S): at 04:15

## 2018-02-03 RX ADMIN — ENOXAPARIN SODIUM 30 MILLIGRAM(S): 100 INJECTION SUBCUTANEOUS at 18:35

## 2018-02-03 RX ADMIN — Medication 10 MILLIGRAM(S): at 04:00

## 2018-02-03 RX ADMIN — Medication 1.25 MILLIGRAM(S): at 23:56

## 2018-02-03 RX ADMIN — LEVETIRACETAM 400 MILLIGRAM(S): 250 TABLET, FILM COATED ORAL at 18:35

## 2018-02-03 RX ADMIN — Medication 10 MILLIGRAM(S): at 01:11

## 2018-02-03 RX ADMIN — Medication 10 MILLIGRAM(S): at 14:13

## 2018-02-03 RX ADMIN — Medication 20 MILLIGRAM(S): at 23:56

## 2018-02-03 RX ADMIN — Medication 1.25 MILLIGRAM(S): at 16:43

## 2018-02-03 RX ADMIN — Medication 1 PATCH: at 11:23

## 2018-02-03 RX ADMIN — Medication 1 PATCH: at 11:38

## 2018-02-03 NOTE — PROGRESS NOTE ADULT - SUBJECTIVE AND OBJECTIVE BOX
Subjective: Patient seen and examined. No new events except as noted.     SUBJECTIVE/ROS:    Due to altered mental status, subjective information were not able to be obtained from the patient. History was obtained, to the extent possible, from review of the chart and collateral sources of information.     MEDICATIONS:  MEDICATIONS  (STANDING):  acetaminophen  IVPB. 1000 milliGRAM(s) IV Intermittent once  atorvastatin 40 milliGRAM(s) Oral at bedtime  enoxaparin Injectable 30 milliGRAM(s) SubCutaneous <User Schedule>  hydrALAZINE 100 milliGRAM(s) Oral three times a day  hydrALAZINE Injectable 10 milliGRAM(s) IV Push every 6 hours  labetalol Injectable 10 milliGRAM(s) IV Push every 4 hours  levETIRAcetam  IVPB 1000 milliGRAM(s) IV Intermittent every 12 hours  nicotine - 21 mG/24Hr(s) Patch 1 patch Transdermal daily  sodium chloride 0.45%. 1000 milliLiter(s) (70 mL/Hr) IV Continuous <Continuous>      PHYSICAL EXAM:  T(C): 36.4 (02-03-18 @ 09:01), Max: 37.2 (02-02-18 @ 17:36)  HR: 89 (02-03-18 @ 11:28) (62 - 90)  BP: 199/89 (02-03-18 @ 11:28) (165/70 - 227/111)  RR: 18 (02-03-18 @ 09:01) (16 - 22)  SpO2: 97% (02-03-18 @ 09:01) (93% - 97%)  Wt(kg): --  I&O's Summary    02 Feb 2018 07:01  -  03 Feb 2018 07:00  --------------------------------------------------------  IN: 70 mL / OUT: 0 mL / NET: 70 mL          Appearance: Normal	  HEENT:   Normal oral mucosa, PERRL, EOMI	  Cardiovascular: Normal S1 S2,    Murmur:   Neck: JVP normal  Respiratory: Lungs clear to auscultation  Gastrointestinal:  Soft, Non-tender, + BS	  Skin: normal   Neuro: No gross deficits.   Psychiatry:  Mood & affect appropriate  Ext: No edema      LABS/DATA:    CARDIAC MARKERS:  CARDIAC MARKERS ( 02 Feb 2018 11:19 )  x     / 0.05 ng/mL / 117 U/L / x     / 2.5 ng/mL                                12.9   17.5  )-----------( 578      ( 03 Feb 2018 04:11 )             38.6     02-03    148<H>  |  107  |  17  ----------------------------<  111<H>  3.6   |  27  |  0.68    Ca    9.0      03 Feb 2018 04:11  Phos  3.6     02-02  Mg     2.2     02-02      proBNP:   Lipid Profile:   HgA1c:   TSH:     TELE:  EKG:

## 2018-02-03 NOTE — PROGRESS NOTE ADULT - ASSESSMENT
62 F, active smoker, with no sig PMHx, recent URI symptoms admitted on 1/24/18 with AMS, fall with head trauma found to have large right frontal scalp hematoma, acute left MCA territory infarct, IVH, and hydrocephalus on CT head requiring intubation and EVD. CTA head with ? 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 Vasculitis and infectious work up negative. Course c/b subclinical seizures, fevers and leukocytosis with negative infectious work up, also with EKG changes and elevated troponin ? due to demand ischemia, and dysphagia on NG tube feeds. Patient was extubated on 1/28, EVD discontinued on 2/1 and transferred out of ICU on 2/2/18, now with complaints of chest and abdominal pain of unclear etiology, hypernatremia, persistent leukocytosis.     PMD: Dr. Felipe Kathleen [phone]

## 2018-02-03 NOTE — PROGRESS NOTE ADULT - PROBLEM SELECTOR PLAN 4
- Varying degree of leukocytosis since admission.  - Last fever 1/28. Infectious work up negative.   - checking LE duplex  - if leukocytosis worsens, consider ID evaluation.

## 2018-02-03 NOTE — PROGRESS NOTE ADULT - SUBJECTIVE AND OBJECTIVE BOX
Wing Mayer MD  ([facility] Hospitalist)  Pager [phone]      Patient is a 62y old  Female who presents with a chief complaint of Level I trauma activation s/p fall (24 Jan 2018 13:50)      SUBJECTIVE / OVERNIGHT EVENTS: The patient is aphasic, awake and alert. Follow a few simple commands. Seems to be denying any pain.     MEDICATIONS  (STANDING):  acetaminophen  IVPB. 1000 milliGRAM(s) IV Intermittent once  atorvastatin 40 milliGRAM(s) Oral at bedtime  enalaprilat Injectable 1.25 milliGRAM(s) IV Push every 6 hours  enoxaparin Injectable 30 milliGRAM(s) SubCutaneous <User Schedule>  hydrALAZINE 100 milliGRAM(s) Oral three times a day  hydrALAZINE Injectable 10 milliGRAM(s) IV Push every 6 hours  labetalol Injectable 10 milliGRAM(s) IV Push every 4 hours  levETIRAcetam  IVPB 1000 milliGRAM(s) IV Intermittent every 12 hours  nicotine - 21 mG/24Hr(s) Patch 1 patch Transdermal daily  sodium chloride 0.45%. 1000 milliLiter(s) (70 mL/Hr) IV Continuous <Continuous>    MEDICATIONS  (PRN):  ALBUTerol    90 MICROgram(s) HFA Inhaler 1 Puff(s) Inhalation every 4 hours PRN Wheezing  ipratropium 17 MICROgram(s) HFA Inhaler 1 Puff(s) Inhalation every 6 hours PRN wheez  oxyCODONE    IR 5 milliGRAM(s) Oral every 4 hours PRN Moderate Pain (4 - 6)  oxyCODONE    IR 10 milliGRAM(s) Oral every 6 hours PRN Severe Pain (7 - 10)      Vital Signs Last 24 Hrs  T(C): 36.4 (03 Feb 2018 09:01), Max: 37.2 (02 Feb 2018 17:36)  T(F): 97.6 (03 Feb 2018 09:01), Max: 99 (02 Feb 2018 17:36)  HR: 89 (03 Feb 2018 11:28) (62 - 90)  BP: 199/89 (03 Feb 2018 11:28) (165/70 - 227/111)  BP(mean): --  RR: 18 (03 Feb 2018 09:01) (16 - 22)  SpO2: 97% (03 Feb 2018 09:01) (93% - 97%)  CAPILLARY BLOOD GLUCOSE      POCT Blood Glucose.: 99 mg/dL (03 Feb 2018 04:55)    I&O's Summary    02 Feb 2018 07:01  -  03 Feb 2018 07:00  --------------------------------------------------------  IN: 70 mL / OUT: 0 mL / NET: 70 mL        PHYSICAL EXAM:  GENERAL: NAD  HEENT: + right scalp hematoma, + staples in place  LUNG: Clear to auscultation bilaterally; No wheeze  HEART: S1, S2  ABDOMEN: Soft, Nontender, Nondistended; Bowel sounds present  EXTREMITIES: No leg edema  PSYCH: calm  NEUROLOGY: Alert and awake, intermittently follows simple commands (i.e. smile, close your eyes, squeeze hand), right side hemiplegic   SKIN: No phlebitis      LABS:                        12.9   17.5  )-----------( 578      ( 03 Feb 2018 04:11 )             38.6     02-03    148<H>  |  107  |  17  ----------------------------<  111<H>  3.6   |  27  |  0.68    Ca    9.0      03 Feb 2018 04:11  Phos  3.6     02-02  Mg     2.2     02-02        CARDIAC MARKERS ( 02 Feb 2018 11:19 )  x     / 0.05 ng/mL / 117 U/L / x     / 2.5 ng/mL          RADIOLOGY & ADDITIONAL TESTS:    Imaging Personally Reviewed: CT head    Consultant(s) Notes Reviewed: Neurosurgery, Neurology, Cardiology, GI, Speech pathology    Care Discussed with Consultants/Other Providers: Neurosurgery

## 2018-02-03 NOTE — PROGRESS NOTE ADULT - PROBLEM SELECTOR PLAN 1
Pt pulled out her NG tube again. Cannot take oral BP meds.  This morning I switched her meds to:  Hydralazine 10mg IV q6h, labetalol 10mg IV q4h  Cardiology also started Vasotec 1.25mg IV q6h  Monitor BP goal ~140/90

## 2018-02-03 NOTE — PROGRESS NOTE ADULT - PROBLEM SELECTOR PLAN 5
- Likely demand ischemia. Repeat EKG now with no concerning new ischemic changes and CE negative.   - TTE with concentric LVH, mild diastolic dysfunction  - Cardiology evaluation appreciated

## 2018-02-03 NOTE — PROGRESS NOTE ADULT - PROBLEM SELECTOR PLAN 2
- unclear etiology for acute left MCA stroke but neuro considering ? reversible cerebral vasoconstriction syndrome (RCVS or Betty syndrome) as possible etiology and less likely vasculitis (work up unremarkable). LP negative for meningitis/infection.  - Neurology to comment if there is a need for a cardio-embolic work up.  - LE duplex ordered, pending - r/o DVT  - continue with statin  - start aspirin when ok with neurosurgery  - no acute neurosurgical intervention for IVH.

## 2018-02-03 NOTE — PROGRESS NOTE ADULT - ASSESSMENT
Elevated trop  likely demand ischemia in setting of acute hemorrhagic cva , IVH and possible sepsis  levels are trending down   asa once deemed safe by nsx  eventual ischemic work up in future    HTN  cont current meds  will add IV vasotec    abnormal ekg   likely due to LVH

## 2018-02-03 NOTE — PROGRESS NOTE ADULT - SUBJECTIVE AND OBJECTIVE BOX
CHIEF COMPLAINT: patient restless in bed, remains hypertensive despite multiple IV medications, patient has pulled out multiple NGTs even with restraints bc she is quite limber and awake and frustrated she is unable to speak  transferred from NSCU overnight    Vital Signs Last 24 Hrs  T(C): 36.7 (2018 16:19), Max: 37.2 (2018 17:36)  T(F): 98.1 (2018 16:19), Max: 99 (2018 17:36)  HR: 73 (2018 16:19) (62 - 90)  BP: 212/97 (2018 16:22) (165/70 - 227/111)  BP(mean): --  RR: 18 (2018 16:19) (16 - 22)  SpO2: 92% (2018 16:19) (92% - 97%)    PHYSICAL EXAM:    General: No Acute Distress     Neurological: Awake, alert, mostly Following Commands, PERRLA 2mm bilat, EOMI, regards both sides, Face Symmetrical,   shows 2 fingers at times on left, left UE/LE 5/5, right hemiparesis    Pulmonary: Clear to Auscultation, No Rales, No Rhonchi, No Wheezes     Cardiovascular: S1, S2, Regular Rate and Rhythm     Gastrointestinal: Soft, Nontender, Nondistended     Extremities: No calf tenderness     Incision: + staples at EVD site    LABS:                        12.9   17.5  )-----------( 578      ( 2018 04:11 )             38.6    02-03    148<H>  |  107  |  17  ----------------------------<  111<H>  3.6   |  27  |  0.68    Ca    9.0      2018 04:11  Phos  3.6     02-02  Mg     2.2     02-      Hemoglobin A1C, Whole Blood: 5.4 % ( @ 22:40)       @ 07:01  -  02-03 @ 07:00  --------------------------------------------------------  IN: 70 mL / OUT: 0 mL / NET: 70 mL        MEDICATIONS:  Anticoagulation:  enoxaparin Injectable 30 milliGRAM(s) SubCutaneous <User Schedule>    Endo:  atorvastatin 40 milliGRAM(s) Oral at bedtime    Neuro:  acetaminophen  IVPB. 1000 milliGRAM(s) IV Intermittent once  levETIRAcetam  IVPB 1000 milliGRAM(s) IV Intermittent every 12 hours    Cardiac:  enalaprilat Injectable 1.25 milliGRAM(s) IV Push every 6 hours  hydrALAZINE Injectable 20 milliGRAM(s) IV Push every 6 hours  labetalol Injectable 20 milliGRAM(s) IV Push every 4 hours    Pulm:  ALBUTerol    90 MICROgram(s) HFA Inhaler 1 Puff(s) Inhalation every 4 hours PRN Wheezing  ipratropium 17 MICROgram(s) HFA Inhaler 1 Puff(s) Inhalation every 6 hours PRN wheez    Other:   nicotine - 21 mG/24Hr(s) Patch 1 patch Transdermal daily    DIET: [] Regular [] CCD [] Renal [] Puree [] Dysphagia [] Tube Feeds:   NPO, awaiting PEG on monday, pulled out multiple NGTs    IMAGIN < from: CT Head No Cont (18 @ 08:31) >  IMPRESSION: Interval removal of right frontal approach ventriculostomy   catheter. Small hemorrhage along the right anterior frontal shunt tract.    Redemonstration of hemorrhagic left frontoparietal infarct, similar in   appearance to the prior examination. No midline shift.  < end of copied text >    2 < from: MR Head w/wo IV Cont (18 @ 16:06) >  IMPRESSION: Acute left middle cerebral artery infarct with a small amount   of hemorrhage. Additional scattered foci of microhemorrhages. Fairly   extensive white matter changes.    < end of copied text >

## 2018-02-03 NOTE — PROGRESS NOTE ADULT - ASSESSMENT
61y/o female brought in to [facility] ED s/p fall at home in the bathroom. Unable to obtain HPI, history, or ROS from patient due to her altered mental status. As per  patient woke up this morning feeling "unwell," vomited once with some dry heaves at 9am, then had slurred speech during the day starting at 10am. She spent most of the day in bed, and was able to have some tea and crackers. She then had a fall in the bathroom & hit the right side of her head. Level I trauma called as patient's altered mental status was initially concerning for potential need for intubation. In trauma bay primary survey significant for GCS of 11 (E=4 V =2 M=5). Secondary survey significant for Right frontal hematoma. (24 Jan 2018 05:31)    PAST MEDICAL & SURGICAL HISTORY:  No pertinent past medical history  H/O colonoscopy: 2015, normal    PROCEDURE:  1/24/18 s/p EVD, removed on 2/1/18 1/24/18 s/p angiogram- no vascular malformations, vascular irregularities noted    PLAN:  Neuro: f/u stroke neurology, cont keppra for seizure prophylaxis   Respiratory: patient instructed on incentive spirometer  CV: cont labetalol, hydralazine, enalaprilat IV for uncontrolled HTN            DVT ppx: [] SQH [x] SQL and venodynes bilaterally  GI: bowel regimen, failed FEEST, awaiting PEG placement on monday  PT/OT/PMR- acute rehab upon d/c  Hospitalist medicine following  Dr Pelaez- cardiology following     Will discuss with Dr Poonam Joyce # [phone]

## 2018-02-04 PROCEDURE — 93970 EXTREMITY STUDY: CPT | Mod: 26

## 2018-02-04 PROCEDURE — 99233 SBSQ HOSP IP/OBS HIGH 50: CPT

## 2018-02-04 RX ORDER — SODIUM CHLORIDE 9 MG/ML
1000 INJECTION, SOLUTION INTRAVENOUS
Qty: 0 | Refills: 0 | Status: DISCONTINUED | OUTPATIENT
Start: 2018-02-04 | End: 2018-02-06

## 2018-02-04 RX ORDER — LABETALOL HCL 100 MG
30 TABLET ORAL EVERY 4 HOURS
Qty: 0 | Refills: 0 | Status: DISCONTINUED | OUTPATIENT
Start: 2018-02-04 | End: 2018-02-05

## 2018-02-04 RX ADMIN — Medication 30 MILLIGRAM(S): at 17:26

## 2018-02-04 RX ADMIN — Medication 20 MILLIGRAM(S): at 06:58

## 2018-02-04 RX ADMIN — Medication 20 MILLIGRAM(S): at 14:45

## 2018-02-04 RX ADMIN — Medication 2.5 MILLIGRAM(S): at 23:50

## 2018-02-04 RX ADMIN — Medication 20 MILLIGRAM(S): at 02:59

## 2018-02-04 RX ADMIN — Medication 30 MILLIGRAM(S): at 21:50

## 2018-02-04 RX ADMIN — Medication 20 MILLIGRAM(S): at 12:16

## 2018-02-04 RX ADMIN — Medication 20 MILLIGRAM(S): at 18:20

## 2018-02-04 RX ADMIN — SODIUM CHLORIDE 75 MILLILITER(S): 9 INJECTION, SOLUTION INTRAVENOUS at 10:16

## 2018-02-04 RX ADMIN — ENOXAPARIN SODIUM 30 MILLIGRAM(S): 100 INJECTION SUBCUTANEOUS at 17:28

## 2018-02-04 RX ADMIN — Medication 20 MILLIGRAM(S): at 23:48

## 2018-02-04 RX ADMIN — LEVETIRACETAM 400 MILLIGRAM(S): 250 TABLET, FILM COATED ORAL at 05:57

## 2018-02-04 RX ADMIN — Medication 2.5 MILLIGRAM(S): at 12:17

## 2018-02-04 RX ADMIN — Medication 2.5 MILLIGRAM(S): at 17:27

## 2018-02-04 RX ADMIN — Medication 1 PATCH: at 12:17

## 2018-02-04 RX ADMIN — Medication 1.25 MILLIGRAM(S): at 05:53

## 2018-02-04 RX ADMIN — Medication 1 PATCH: at 12:21

## 2018-02-04 RX ADMIN — LEVETIRACETAM 400 MILLIGRAM(S): 250 TABLET, FILM COATED ORAL at 17:28

## 2018-02-04 RX ADMIN — Medication 20 MILLIGRAM(S): at 05:52

## 2018-02-04 RX ADMIN — Medication 20 MILLIGRAM(S): at 10:15

## 2018-02-04 RX ADMIN — SODIUM CHLORIDE 75 MILLILITER(S): 9 INJECTION, SOLUTION INTRAVENOUS at 21:51

## 2018-02-04 NOTE — PROGRESS NOTE ADULT - PROBLEM SELECTOR PLAN 6
- When asked about pain, the patient still points to her abdomen in a very general way. However, physical exam is benign. Monitor symptoms.  - Consider trying some IV morphine (may help with the BP too).

## 2018-02-04 NOTE — PROGRESS NOTE ADULT - ASSESSMENT
Elevated trop  likely demand ischemia in setting of acute hemorrhagic cva , IVH and possible sepsis  levels are trending down   asa once deemed safe by nsx  eventual ischemic work up in future    HTN  not well controlled  recommend to change meds to PO if possbile through feeding tube  change labetalol to PO at 200 tid and vasotec to enalapril 5 bid     abnormal ekg   likely due to LVH

## 2018-02-04 NOTE — PROGRESS NOTE ADULT - SUBJECTIVE AND OBJECTIVE BOX
Subjective: Patient seen and examined. No new events except as noted.     SUBJECTIVE/ROS:  Due to altered mental status, subjective information were not able to be obtained from the patient. History was obtained, to the extent possible, from review of the chart and collateral sources of information.       MEDICATIONS:  MEDICATIONS  (STANDING):  acetaminophen  IVPB. 1000 milliGRAM(s) IV Intermittent once  atorvastatin 40 milliGRAM(s) Oral at bedtime  dextrose 5% + sodium chloride 0.45%. 1000 milliLiter(s) (75 mL/Hr) IV Continuous <Continuous>  enalaprilat Injectable 2.5 milliGRAM(s) IV Push every 6 hours  enoxaparin Injectable 30 milliGRAM(s) SubCutaneous <User Schedule>  hydrALAZINE Injectable 20 milliGRAM(s) IV Push every 6 hours  labetalol Injectable 20 milliGRAM(s) IV Push every 4 hours  levETIRAcetam  IVPB 1000 milliGRAM(s) IV Intermittent every 12 hours  nicotine - 21 mG/24Hr(s) Patch 1 patch Transdermal daily      PHYSICAL EXAM:  T(C): 36.4 (02-04-18 @ 08:04), Max: 36.7 (02-03-18 @ 16:19)  HR: 66 (02-04-18 @ 10:11) (66 - 73)  BP: 186/98 (02-04-18 @ 10:11) (159/83 - 224/109)  RR: 18 (02-04-18 @ 08:04) (18 - 18)  SpO2: 94% (02-04-18 @ 08:04) (92% - 96%)  Wt(kg): --  I&O's Summary    03 Feb 2018 07:01  -  04 Feb 2018 07:00  --------------------------------------------------------  IN: 100 mL / OUT: 0 mL / NET: 100 mL          Appearance: Normal	  HEENT:   Normal oral mucosa, PERRL, EOMI	  Cardiovascular: Normal S1 S2,    Murmur:   Neck: JVP normal  Respiratory: Lungs clear to auscultation  Gastrointestinal:  Soft, Non-tender, + BS	  Skin: normal   Neuro: No gross deficits.   Psychiatry:  Mood & affect appropriate  Ext: No edema      LABS/DATA:    CARDIAC MARKERS:  CARDIAC MARKERS ( 02 Feb 2018 11:19 )  x     / 0.05 ng/mL / 117 U/L / x     / 2.5 ng/mL                                12.9   17.5  )-----------( 578      ( 03 Feb 2018 04:11 )             38.6     02-03    148<H>  |  107  |  17  ----------------------------<  111<H>  3.6   |  27  |  0.68    Ca    9.0      03 Feb 2018 04:11      proBNP:   Lipid Profile:   HgA1c:   TSH:     TELE:  EKG:

## 2018-02-04 NOTE — PROGRESS NOTE ADULT - PROBLEM SELECTOR PLAN 1
Pt pulled out her NG tube again 2 nights ago. Cannot take oral BP meds.  Continue hydralazine 20mg IV q6h, labetalol 20mg IV q4h, but increase Vasotec 2.5mg IV q6h (first dose at noon).  If she remains hypertensive after the above change, may further increase Vasotec IV to 5mg q6h. Can still increase hydralazine to 20mg IV q4h, and increase labetalol IV dose (max daily dose is 300mg IV).  Monitor BP goal ~140/90

## 2018-02-04 NOTE — PROGRESS NOTE ADULT - ASSESSMENT
62 F, active smoker, with no sig PMHx, recent URI symptoms admitted on 1/24/18 with AMS, fall with head trauma found to have large right frontal scalp hematoma, acute left MCA territory infarct, IVH, and hydrocephalus on CT head requiring intubation and EVD. CTA head with ? 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 Vasculitis and infectious work up negative. Course c/b subclinical seizures, fevers and leukocytosis with negative infectious work up, also with EKG changes and elevated troponin ? due to demand ischemia, and dysphagia on NG tube feeds. Patient was extubated on 1/28, EVD discontinued on 2/1 and transferred out of ICU on 2/2/18, now with complaints of chest and abdominal pain of unclear etiology, hypernatremia, persistent leukocytosis.

## 2018-02-04 NOTE — PROGRESS NOTE ADULT - SUBJECTIVE AND OBJECTIVE BOX
SUBJECTIVE: Patient seen, laying in bed.  Comfortable, in NAD.  Has pulled out several ngts.      CHIEF COMPLAINT: IVH    OVERNIGHT EVENTS: none    Vital Signs Last 24 Hrs  T(C): 36.4 (04 Feb 2018 08:04), Max: 36.7 (03 Feb 2018 16:19)  T(F): 97.5 (04 Feb 2018 08:04), Max: 98.1 (03 Feb 2018 16:19)  HR: 66 (04 Feb 2018 08:04) (66 - 89)  BP: 179/80 (04 Feb 2018 08:04) (159/83 - 224/109)  BP(mean): --  RR: 18 (04 Feb 2018 08:04) (18 - 18)  SpO2: 94% (04 Feb 2018 08:04) (92% - 96%)    PHYSICAL EXAM:    General: No Acute Distress     Neurological: Awake, alert; oriented to self and place by choices (nods appropriately); follows simple commands on left side (able to lift up arm and leg); unable to show two fingers on left; Rt hemiplegia    Pulmonary: Clear to Auscultation, No Rales, No Rhonchi, No Wheezes     Cardiovascular: S1, S2, Regular Rate and Rhythm     Gastrointestinal: Soft, Nontender, Nondistended     Extremities: No calf tenderness     Incision: +staples, clean/dry    LABS:                        12.9   17.5  )-----------( 578      ( 03 Feb 2018 04:11 )             38.6    02-03    148<H>  |  107  |  17  ----------------------------<  111<H>  3.6   |  27  |  0.68    Ca    9.0      03 Feb 2018 04:11  Phos  3.6     02-02  Mg     2.2     02-02      Hemoglobin A1C, Whole Blood: 5.4 % (01-24 @ 22:40)      02-03 @ 07:01  -  02-04 @ 07:00  --------------------------------------------------------  IN: 100 mL / OUT: 0 mL / NET: 100 mL      DRAINS:     MEDICATIONS:  Antibiotics:    Neuro:  acetaminophen  IVPB. 1000 milliGRAM(s) IV Intermittent once  levETIRAcetam  IVPB 1000 milliGRAM(s) IV Intermittent every 12 hours    Cardiac:  enalaprilat Injectable 2.5 milliGRAM(s) IV Push every 6 hours  hydrALAZINE Injectable 20 milliGRAM(s) IV Push every 6 hours  labetalol Injectable 20 milliGRAM(s) IV Push every 4 hours    Pulm:  ALBUTerol    90 MICROgram(s) HFA Inhaler 1 Puff(s) Inhalation every 4 hours PRN Wheezing  ipratropium 17 MICROgram(s) HFA Inhaler 1 Puff(s) Inhalation every 6 hours PRN wheez    GI/:    Other:   atorvastatin 40 milliGRAM(s) Oral at bedtime  dextrose 5% + sodium chloride 0.45%. 1000 milliLiter(s) IV Continuous <Continuous>  enoxaparin Injectable 30 milliGRAM(s) SubCutaneous <User Schedule>  nicotine - 21 mG/24Hr(s) Patch 1 patch Transdermal daily    DIET: [] Regular [] CCD [] Renal [] Puree [] Dysphagia [] Tube Feeds: NPO, preop for peg in am    IMAGING:   < from: CT Head No Cont (02.02.18 @ 08:31) >  IMPRESSION: Interval removal of right frontal approach ventriculostomy   catheter. Small hemorrhage along the right anterior frontal shunt tract.    Redemonstration of hemorrhagic left frontoparietal infarct, similar in   appearance to the prior examination. No midline shift.    < end of copied text >

## 2018-02-04 NOTE — PROGRESS NOTE ADULT - SUBJECTIVE AND OBJECTIVE BOX
Wing Mayer MD  ([facility] Hospitalist)  Pager [phone]      Patient is a 62y old  Female who presents with a chief complaint of Level I trauma activation s/p fall (24 Jan 2018 13:50)      SUBJECTIVE / OVERNIGHT EVENTS: No events overnight. The patient's uncontrolled hypertension has improved moderately from yesterday to today, but still has a much room for improvement. Neurologically she's not deteriorating. Following some basic commands. Aphasic. Nods her head "yes" with enthusiasm when asked if she's thirsty.     MEDICATIONS  (STANDING):  acetaminophen  IVPB. 1000 milliGRAM(s) IV Intermittent once  atorvastatin 40 milliGRAM(s) Oral at bedtime  dextrose 5% + sodium chloride 0.45%. 1000 milliLiter(s) (75 mL/Hr) IV Continuous <Continuous>  enalaprilat Injectable 2.5 milliGRAM(s) IV Push every 6 hours  enoxaparin Injectable 30 milliGRAM(s) SubCutaneous <User Schedule>  hydrALAZINE Injectable 20 milliGRAM(s) IV Push every 6 hours  labetalol Injectable 20 milliGRAM(s) IV Push every 4 hours  levETIRAcetam  IVPB 1000 milliGRAM(s) IV Intermittent every 12 hours  nicotine - 21 mG/24Hr(s) Patch 1 patch Transdermal daily    MEDICATIONS  (PRN):  ALBUTerol    90 MICROgram(s) HFA Inhaler 1 Puff(s) Inhalation every 4 hours PRN Wheezing  ipratropium 17 MICROgram(s) HFA Inhaler 1 Puff(s) Inhalation every 6 hours PRN wheez      Vital Signs Last 24 Hrs  T(C): 36.4 (04 Feb 2018 08:04), Max: 36.7 (03 Feb 2018 16:19)  T(F): 97.5 (04 Feb 2018 08:04), Max: 98.1 (03 Feb 2018 16:19)  HR: 66 (04 Feb 2018 10:11) (66 - 73)  BP: 186/98 (04 Feb 2018 10:11) (159/83 - 224/109)  BP(mean): --  RR: 18 (04 Feb 2018 08:04) (18 - 18)  SpO2: 94% (04 Feb 2018 08:04) (92% - 96%)  CAPILLARY BLOOD GLUCOSE        I&O's Summary    03 Feb 2018 07:01  -  04 Feb 2018 07:00  --------------------------------------------------------  IN: 100 mL / OUT: 0 mL / NET: 100 mL        PHYSICAL EXAM:  GENERAL: NAD  HEENT: + right scalp hematoma, + staples in place, MM very dry  LUNG: Clear to auscultation bilaterally; No wheeze  HEART: S1, S2, rrr  ABDOMEN: Soft, Nontender, Nondistended; Bowel sounds present  EXTREMITIES: No leg edema  PSYCH: calm  NEUROLOGY: Alert and awake, intermittently follows simple commands (i.e. smile, close your eyes, squeeze hand), right side hemiplegic       LABS:                        12.9   17.5  )-----------( 578      ( 03 Feb 2018 04:11 )             38.6     02-03    148<H>  |  107  |  17  ----------------------------<  111<H>  3.6   |  27  |  0.68    Ca    9.0      03 Feb 2018 04:11          RADIOLOGY & ADDITIONAL TESTS:    Consultant(s) Notes Reviewed: Neurosurgery     Care Discussed with Consultants/Other Providers: Neurosurgery re plan of care, hypertension management

## 2018-02-04 NOTE — PROGRESS NOTE ADULT - ASSESSMENT
62 F, active smoker, with no sig PMHx, recent URI symptoms admitted on 1/24/18 with AMS, fall with head trauma found to have large right frontal scalp hematoma, acute left MCA territory infarct, IVH, and hydrocephalus on CT head requiring intubation and EVD. CTA head with ? 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 Vasculitis and infectious work up negative. Course c/b subclinical seizures, fevers and dysphagia on NG tube feeds. Patient was extubated on 1/28, EVD discontinued on 2/1 and transferred out of ICU on 2/2/18.    Neuro:  -Q 4 hour neuro checks  -continue Keppra for seizures  -monitor wound    Pulm:  -Room air  -Incentive spirometry when able    GI:  - remains NPO  -preop for peg in am    Cardiac:  -HTN, will increase enalapril dose; continue hydralazine and labetalol    Renal:  -Will add IVF for hydration    DVT ppx: SQL, SCDs 62 F, active smoker, with no sig PMHx, recent URI symptoms admitted on 1/24/18 with AMS, fall with head trauma found to have large right frontal scalp hematoma, acute left MCA territory infarct, IVH, and hydrocephalus on CT head requiring intubation and EVD. CTA head with ? 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 Vasculitis and infectious work up negative. Course c/b subclinical seizures, fevers and dysphagia on NG tube feeds. Patient was extubated on 1/28, EVD discontinued on 2/1 and transferred out of ICU on 2/2/18.    Neuro:  -Q 4 hour neuro checks  -continue Keppra for seizures  -monitor wound    Pulm:  -Room air  -Incentive spirometry when able    GI:  - remains NPO  -preop for peg in am    Cardiac:  -HTN, will increase enalapril dose; continue hydralazine and labetalol    Renal:  -Will add IVF for hydration    DVT ppx: SQL, SCDs  Acute TBI per PT

## 2018-02-05 ENCOUNTER — TRANSCRIPTION ENCOUNTER (OUTPATIENT)
Age: 63
End: 2018-02-05

## 2018-02-05 LAB
ANION GAP SERPL CALC-SCNC: 11 MMOL/L — SIGNIFICANT CHANGE UP (ref 5–17)
APTT BLD: 29.7 SEC — SIGNIFICANT CHANGE UP (ref 27.5–37.4)
BLD GP AB SCN SERPL QL: NEGATIVE — SIGNIFICANT CHANGE UP
BUN SERPL-MCNC: 18 MG/DL — SIGNIFICANT CHANGE UP (ref 7–23)
CALCIUM SERPL-MCNC: 8.8 MG/DL — SIGNIFICANT CHANGE UP (ref 8.4–10.5)
CHLORIDE SERPL-SCNC: 104 MMOL/L — SIGNIFICANT CHANGE UP (ref 96–108)
CO2 SERPL-SCNC: 26 MMOL/L — SIGNIFICANT CHANGE UP (ref 22–31)
CREAT SERPL-MCNC: 0.62 MG/DL — SIGNIFICANT CHANGE UP (ref 0.5–1.3)
GLUCOSE SERPL-MCNC: 135 MG/DL — HIGH (ref 70–99)
HCT VFR BLD CALC: 37.9 % — SIGNIFICANT CHANGE UP (ref 34.5–45)
HGB BLD-MCNC: 12.1 G/DL — SIGNIFICANT CHANGE UP (ref 11.5–15.5)
INR BLD: 1.15 RATIO — SIGNIFICANT CHANGE UP (ref 0.88–1.16)
MCHC RBC-ENTMCNC: 31.9 GM/DL — LOW (ref 32–36)
MCHC RBC-ENTMCNC: 33.2 PG — SIGNIFICANT CHANGE UP (ref 27–34)
MCV RBC AUTO: 104.1 FL — HIGH (ref 80–100)
PLATELET # BLD AUTO: 623 K/UL — HIGH (ref 150–400)
POTASSIUM SERPL-MCNC: 3.7 MMOL/L — SIGNIFICANT CHANGE UP (ref 3.5–5.3)
POTASSIUM SERPL-SCNC: 3.7 MMOL/L — SIGNIFICANT CHANGE UP (ref 3.5–5.3)
PROTHROM AB SERPL-ACNC: 13 SEC — SIGNIFICANT CHANGE UP (ref 10–13.1)
RBC # BLD: 3.64 M/UL — LOW (ref 3.8–5.2)
RBC # FLD: 13.1 % — SIGNIFICANT CHANGE UP (ref 10.3–14.5)
RH IG SCN BLD-IMP: POSITIVE — SIGNIFICANT CHANGE UP
SODIUM SERPL-SCNC: 141 MMOL/L — SIGNIFICANT CHANGE UP (ref 135–145)
WBC # BLD: 15.74 K/UL — HIGH (ref 3.8–10.5)
WBC # FLD AUTO: 15.74 K/UL — HIGH (ref 3.8–10.5)

## 2018-02-05 PROCEDURE — 99233 SBSQ HOSP IP/OBS HIGH 50: CPT

## 2018-02-05 PROCEDURE — 43246 EGD PLACE GASTROSTOMY TUBE: CPT | Mod: 62

## 2018-02-05 PROCEDURE — 99232 SBSQ HOSP IP/OBS MODERATE 35: CPT

## 2018-02-05 RX ORDER — LABETALOL HCL 100 MG
200 TABLET ORAL EVERY 8 HOURS
Qty: 0 | Refills: 0 | Status: DISCONTINUED | OUTPATIENT
Start: 2018-02-05 | End: 2018-02-06

## 2018-02-05 RX ADMIN — LEVETIRACETAM 400 MILLIGRAM(S): 250 TABLET, FILM COATED ORAL at 17:35

## 2018-02-05 RX ADMIN — Medication 200 MILLIGRAM(S): at 17:36

## 2018-02-05 RX ADMIN — Medication 1 PATCH: at 13:59

## 2018-02-05 RX ADMIN — Medication 30 MILLIGRAM(S): at 03:08

## 2018-02-05 RX ADMIN — ATORVASTATIN CALCIUM 40 MILLIGRAM(S): 80 TABLET, FILM COATED ORAL at 22:46

## 2018-02-05 RX ADMIN — Medication 20 MILLIGRAM(S): at 13:58

## 2018-02-05 RX ADMIN — Medication 2.5 MILLIGRAM(S): at 13:58

## 2018-02-05 RX ADMIN — Medication 1 PATCH: at 13:00

## 2018-02-05 RX ADMIN — ENOXAPARIN SODIUM 30 MILLIGRAM(S): 100 INJECTION SUBCUTANEOUS at 17:36

## 2018-02-05 RX ADMIN — Medication 2.5 MILLIGRAM(S): at 06:05

## 2018-02-05 RX ADMIN — Medication 400 MILLIGRAM(S): at 13:59

## 2018-02-05 RX ADMIN — Medication 5 MILLIGRAM(S): at 17:35

## 2018-02-05 RX ADMIN — LEVETIRACETAM 400 MILLIGRAM(S): 250 TABLET, FILM COATED ORAL at 06:05

## 2018-02-05 RX ADMIN — Medication 1000 MILLIGRAM(S): at 14:30

## 2018-02-05 NOTE — PROGRESS NOTE ADULT - ASSESSMENT
62 F, active smoker, with no sig PMHx, recent URI symptoms admitted on 1/24/18 with AMS, fall with head trauma found to have large right frontal scalp hematoma, acute left MCA territory infarct, IVH, and hydrocephalus on CT head requiring intubation and EVD. CTA head with ? 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 Vasculitis and infectious work up negative. Course c/b subclinical seizures, fevers and leukocytosis with negative infectious work up, also with EKG changes and elevated troponin ? due to demand ischemia, and dysphagia on NG tube feeds. Patient was extubated on 1/28, EVD discontinued on 2/1 and transferred out of ICU on 2/2/18, with hypernatremia, leukocytosis now improving.    PMD: Dr. Felipe Kathleen [phone] (but hasn't seen in many years)

## 2018-02-05 NOTE — PROGRESS NOTE ADULT - PROBLEM SELECTOR PLAN 7
- On D5 1/2 NS IV at 75 cc/hr but d/c IVF once patient can be started on tube feed.  - would add free water bolus via PEG  - Hypernatremia resolved

## 2018-02-05 NOTE — PROGRESS NOTE ADULT - PROBLEM SELECTOR PLAN 5
- Likely demand ischemia. Repeat EKG now with no concerning new ischemic changes and CE negative.   - TTE with concentric LVH, mild diastolic dysfunction  - Cardiology following

## 2018-02-05 NOTE — PROGRESS NOTE ADULT - PROBLEM SELECTOR PLAN 1
Pt pulled out her NG tube and cannot take oral BP meds.  Continue hydralazine 20mg IV q6h, labetalol 30mg IV q4h, Vasotec 2.5mg IV q6h for now.   Once patient gets PEG placement today, transition to PO meds (labetalol 200mg TID, enalapril 5mg BID, and amlodipine 5mg)  Monitor BP (goal ~140/90)

## 2018-02-05 NOTE — PROGRESS NOTE ADULT - SUBJECTIVE AND OBJECTIVE BOX
Subjective: Patient seen and examined. No new events except as noted.     SUBJECTIVE/ROS:  no interval events       MEDICATIONS:  MEDICATIONS  (STANDING):  acetaminophen  IVPB. 1000 milliGRAM(s) IV Intermittent once  atorvastatin 40 milliGRAM(s) Oral at bedtime  dextrose 5% + sodium chloride 0.45%. 1000 milliLiter(s) (75 mL/Hr) IV Continuous <Continuous>  enalaprilat Injectable 2.5 milliGRAM(s) IV Push every 6 hours  enoxaparin Injectable 30 milliGRAM(s) SubCutaneous <User Schedule>  hydrALAZINE Injectable 20 milliGRAM(s) IV Push every 6 hours  labetalol Injectable 30 milliGRAM(s) IV Push every 4 hours  levETIRAcetam  IVPB 1000 milliGRAM(s) IV Intermittent every 12 hours  nicotine - 21 mG/24Hr(s) Patch 1 patch Transdermal daily      PHYSICAL EXAM:  T(C): 36.8 (02-05-18 @ 07:57), Max: 36.8 (02-04-18 @ 12:03)  HR: 57 (02-05-18 @ 07:57) (57 - 95)  BP: 176/83 (02-05-18 @ 07:57) (149/90 - 194/84)  RR: 18 (02-05-18 @ 07:57) (18 - 18)  SpO2: 95% (02-05-18 @ 07:57) (91% - 99%)  Wt(kg): --  I&O's Summary    04 Feb 2018 07:01  -  05 Feb 2018 07:00  --------------------------------------------------------  IN: 775 mL / OUT: 0 mL / NET: 775 mL      JVP: Normal  Neck: supple  Lung: clear   CV: S1 S2 , Murmur:  Abd: soft  Ext: No edema  neuro: Awake   Psych: flat affect  Skin: normal       LABS/DATA:    CARDIAC MARKERS:                                12.1   15.74 )-----------( 623      ( 05 Feb 2018 07:17 )             37.9     02-05    141  |  104  |  18  ----------------------------<  135<H>  3.7   |  26  |  0.62    Ca    8.8      05 Feb 2018 07:17      proBNP:   Lipid Profile:   HgA1c:   TSH:     TELE:  EKG:

## 2018-02-05 NOTE — PROGRESS NOTE ADULT - SUBJECTIVE AND OBJECTIVE BOX
SUBJECTIVE:     OVERNIGHT EVENTS: none    Vital Signs Last 24 Hrs  T(C): 36.9 (05 Feb 2018 16:41), Max: 36.9 (05 Feb 2018 16:41)  T(F): 98.4 (05 Feb 2018 16:41), Max: 98.4 (05 Feb 2018 16:41)  HR: 62 (05 Feb 2018 16:41) (57 - 87)  BP: 156/76 (05 Feb 2018 16:41) (149/90 - 194/84)  BP(mean): --  RR: 18 (05 Feb 2018 16:41) (18 - 18)  SpO2: 96% (05 Feb 2018 16:41) (91% - 99%)    PHYSICAL EXAM:    Constitutional: No Acute Distress     Neurological: AOx3, Following simple Commands, Moving LUE/LLE 5/5 RLE 3/5  RUE 0/5. Follows simple commands. Severe expressive aphasia    Lungs CTA  CV S1 S2 +  Abdomen soft  BS + mild tenderness peg site    LABS:                        12.1   15.74 )-----------( 623      ( 05 Feb 2018 07:17 )             37.9    02-05    141  |  104  |  18  ----------------------------<  135<H>  3.7   |  26  |  0.62    Ca    8.8      05 Feb 2018 07:17    PT/INR - ( 05 Feb 2018 07:17 )   PT: 13.0 sec;   INR: 1.15 ratio            IMAGING:         MEDICATIONS:    levETIRAcetam  IVPB 1000 milliGRAM(s) IV Intermittent every 12 hours  enalapril 5 milliGRAM(s) Oral every 12 hours  labetalol 200 milliGRAM(s) Oral every 8 hours  ALBUTerol    90 MICROgram(s) HFA Inhaler 1 Puff(s) Inhalation every 4 hours PRN Wheezing  ipratropium 17 MICROgram(s) HFA Inhaler 1 Puff(s) Inhalation every 6 hours PRN whee  atorvastatin 40 milliGRAM(s) Oral at bedtime  dextrose 5% + sodium chloride 0.45%. 1000 milliLiter(s) IV Continuous <Continuous>  enoxaparin Injectable 30 milliGRAM(s) SubCutaneous <User Schedule>  nicotine - 21 mG/24Hr(s) Patch 1 patch Transdermal daily      DIET:     NPO

## 2018-02-05 NOTE — PROGRESS NOTE ADULT - PROBLEM SELECTOR PLAN 4
- Varying degree of leukocytosis since admission.  - Last fever 1/28. Infectious work up negative.   - f/u LE duplex  - Leukocytosis improving now

## 2018-02-05 NOTE — PROGRESS NOTE ADULT - SUBJECTIVE AND OBJECTIVE BOX
Pre-Endoscopy Evaluation      Referring Physician:  Dr. Isak Levin                               Procedure: Egd/Peg placement    Indication for Procedure: Dysphagia    Pertinent History: 62 year old female Active tobacco use,  no other significant PMHx, recent URI symptoms admitted on 1/24/18 with AMS, fall with head trauma found to have large Right Frontal Scalp Hematoma, Acute Left MCA territory infarct, IVH, and Hydrocephalus on CT head; additional microhemorrhages on MRA. S/P intubation and EVD. Course c/b subclinical seizures, fevers and leukocytosis with negative infectious work up, also with EKG changes and elevated troponin likely due to demand ischemia in the setting of acute CVA as per Cardiology    Sedation by Anesthesia [X]    PAST MEDICAL & SURGICAL HISTORY:  No pertinent past medical history  H/O colonoscopy: 2015, normal      PMH of Gastroparesis [ ]  Gastric Surgery [ ]  Gastric Outlet Obstruction [ ]    Allergies:    No Known Drug Allergies  strawberry (Rash)    Intolerances:    Latex allergy: [ ] yes [X] no    Medications:MEDICATIONS  (STANDING):  acetaminophen  IVPB. 1000 milliGRAM(s) IV Intermittent once  atorvastatin 40 milliGRAM(s) Oral at bedtime  dextrose 5% + sodium chloride 0.45%. 1000 milliLiter(s) (75 mL/Hr) IV Continuous <Continuous>  enalaprilat Injectable 2.5 milliGRAM(s) IV Push every 6 hours  enoxaparin Injectable 30 milliGRAM(s) SubCutaneous <User Schedule>  hydrALAZINE Injectable 20 milliGRAM(s) IV Push every 6 hours  labetalol Injectable 30 milliGRAM(s) IV Push every 4 hours  levETIRAcetam  IVPB 1000 milliGRAM(s) IV Intermittent every 12 hours  nicotine - 21 mG/24Hr(s) Patch 1 patch Transdermal daily    MEDICATIONS  (PRN):  ALBUTerol    90 MICROgram(s) HFA Inhaler 1 Puff(s) Inhalation every 4 hours PRN Wheezing  ipratropium 17 MICROgram(s) HFA Inhaler 1 Puff(s) Inhalation every 6 hours PRN wheez      Smoking: [ ] yes  [X] no    AICD/PPM: [ ] yes   [X] no    Pertinent lab data:                        12.1   15.74 )-----------( 623      ( 05 Feb 2018 07:17 )             37.9     02-05    141  |  104  |  18  ----------------------------<  135<H>  3.7   |  26  |  0.62    Ca    8.8      05 Feb 2018 07:17      PT/INR - ( 05 Feb 2018 07:17 )   PT: 13.0 sec;   INR: 1.15 ratio    PTT - ( 05 Feb 2018 07:17 )  PTT:29.7 sec  < from: Transthoracic Echocardiogram (01.25.18 @ 08:58) >  Patient name: INA GODINEZ  YOB: 1955   Age: 62 (F)   MR#: [id number]  Study Date: 1/25/2018  [facility]leny [facility] RDCS  Study quality: Technically difficult  Referring Physician: Isak Levin MD  Blood Pressure: 144/79 mmHg  Height: 152 cm  Weight: 50 kg  BSA: 1.5 m2  ------------------------------------------------------------------------  PROCEDURE: Transthoracic echocardiogram with 2-D, M-Mode  and complete spectral and color flow Doppler.  INDICATION: Nontraumatic subarachnoid hemorrhage,  unspecified (I60.9)  ------------------------------------------------------------------------  Dimensions:    Normal Values:  LA:     2.7    2.0 - 4.0 cm  Ao:     2.6    2.0 - 3.8 cm  SEPTUM: 1.2    0.6 - 1.2 cm  PWT:    1.2    0.6 - 1.1 cm  LVIDd:  3.4    3.0 - 5.6 cm  LVIDs:         1.8 - 4.0 cm  Derived variables:  LVMI: 90 g/m2  RWT: 0.70  EF (Visual Estimate): 75-80 %  ------------------------------------------------------------------------  Observations:  Mitral Valve: Dense posterior mitral annular calcification.  Thickened mitral leaflets with normal opening. Minimal  mitral regurgitation.  Aortic Valve/Aorta: Aortic valve not well visualized;  appears trileaflet with normal opening. Noaortic valve  regurgitation seen.  Aortic Root: 2.6 cm.  Left Atrium: Left atrium not well visualized, probably  normal.  Left Ventricle: Hyperdynamic left ventricular systolic  function. Concentric left ventricular hypertrophy. Mild  diastolic dysfunction (Stage I).  Right Heart: Right atrium not well visualized, probably  normal. Normal right ventricular size and function. Normal  tricuspid valve. Mild-moderate tricuspid regurgitation.  Normal pulmonic valve. Minimal pulmonic regurgitation.  Pericardium/Pleura: Normal pericardium with no pericardial  effusion.  Hemodynamic: Estimated right atrial pressure is 8 mm Hg.  Estimated right ventricular systolic pressure equals 32 mm  Hg, assuming right atrial pressure equals 8 mm Hg,  consistent with normal pulmonary pressures.  ------------------------------------------------------------------------  Conclusions:  Technically difficult study.  1. Dense posterior mitral annular calcification. Thickened  mitral leaflets with normal opening. Minimal mitral  regurgitation.  2. Aortic valve not well visualized; appears trileaflet  with normal opening. No aortic valve regurgitation seen.  3. Concentric left ventricular hypertrophy.  4. Hyperdynamic left ventricular systolic function.  5. Mild diastolic dysfunction (Stage I).  6. Normal right ventricular size and function.  *** No previous Echo exam.  ------------------------------------------------------------------------  Confirmed on  1/25/2018 - 11:04:31 by Jay Boothe M.D.  ---------------------------------------------------------------------------      Physical Examination:      Daily   Vital Signs Last 24 Hrs  T(C): 36.8 (05 Feb 2018 07:57), Max: 36.8 (04 Feb 2018 12:03)  T(F): 98.2 (05 Feb 2018 07:57), Max: 98.3 (04 Feb 2018 12:03)  HR: 57 (05 Feb 2018 07:57) (57 - 95)  BP: 176/83 (05 Feb 2018 07:57) (149/90 - 194/84)  BP(mean): --  RR: 18 (05 Feb 2018 07:57) (18 - 18)  SpO2: 95% (05 Feb 2018 07:57) (91% - 99%)        Constitutional: NAD   Neck:  No JVD  Respiratory: CTAB/L  Cardiovascular: S1 and S2  Gastrointestinal: BS+, soft, NT/ND  Extremities: No peripheral edema  Neurological: Awake, alert, aphasic  : No Galvin  Skin: No rashes    Comments:    ASA Class: I [ ]  II [ ]  III [ ]  IV [X]    The patient is a suitable candidate for the planned procedure unless box checked [ ]  No, explain:

## 2018-02-05 NOTE — PROGRESS NOTE ADULT - ASSESSMENT
62 F, active smoker, with no sig PMHx, recent URI symptoms admitted on 1/24/18 with AMS, fall with head trauma found to have large right frontal scalp hematoma, acute left MCA territory infarct, IVH, and hydrocephalus on CT head requiring intubation and EVD. CTA head with ? 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 Vasculitis and infectious work up negative. Course c/b subclinical seizures, fevers and leukocytosis with negative infectious work up, also with EKG changes and elevated troponin ? due to demand ischemia, and dysphagia on NG tube feeds. Patient was extubated on 1/28, EVD discontinued on 2/1 and transferred out of ICU on 2/2/18, with hypernatremia, leukocytosis now improving. s/p peg placement today    Plan:  Neuro stable Continue Keppra  Hypertension- Changed to Labetalol 200 q8 and Enalapril 5mg q12. Renal artery dopplers- r/o renal artery stenosis. NPO until then  Will start feeds in am  DVT ppx  PT/OT- acute rehab. Possible d/c in 48 hrs

## 2018-02-05 NOTE — PROGRESS NOTE ADULT - ASSESSMENT
Elevated trop  likely demand ischemia in setting of acute hemorrhagic cva , IVH and possible sepsis  levels are trending down   asa once deemed safe by nsx  eventual ischemic work up in future    HTN  not well controlled  recommend to change meds to PO if possbile through feeding tube  change labetalol to PO at 200 tid and vasotec to enalapril 5 bid and dc other antihypertensives     abnormal ekg   likely due to LVH

## 2018-02-05 NOTE — PROGRESS NOTE ADULT - SUBJECTIVE AND OBJECTIVE BOX
Cc: Right hemiplegia, gait dysfunction, dysphagia      HPI:  Patient tolerating therapies. S/P PEG today    MEDICATIONS  (STANDING):  atorvastatin 40 milliGRAM(s) Oral at bedtime  dextrose 5% + sodium chloride 0.45%. 1000 milliLiter(s) (75 mL/Hr) IV Continuous <Continuous>  enalaprilat Injectable 2.5 milliGRAM(s) IV Push every 6 hours  enoxaparin Injectable 30 milliGRAM(s) SubCutaneous <User Schedule>  hydrALAZINE Injectable 20 milliGRAM(s) IV Push every 6 hours  labetalol Injectable 30 milliGRAM(s) IV Push every 4 hours  levETIRAcetam  IVPB 1000 milliGRAM(s) IV Intermittent every 12 hours  nicotine - 21 mG/24Hr(s) Patch 1 patch Transdermal daily    MEDICATIONS  (PRN):  ALBUTerol    90 MICROgram(s) HFA Inhaler 1 Puff(s) Inhalation every 4 hours PRN Wheezing  ipratropium 17 MICROgram(s) HFA Inhaler 1 Puff(s) Inhalation every 6 hours PRN wheez    Vital Signs Last 24 Hrs  T(C): 36.8 (05 Feb 2018 07:57), Max: 36.8 (04 Feb 2018 16:56)  T(F): 98.2 (05 Feb 2018 07:57), Max: 98.2 (04 Feb 2018 16:56)  HR: 57 (05 Feb 2018 07:57) (57 - 95)  BP: 176/83 (05 Feb 2018 07:57) (149/90 - 194/84)  BP(mean): --  RR: 18 (05 Feb 2018 07:57) (18 - 18)  SpO2: 95% (05 Feb 2018 07:57) (91% - 99%)    PHYSICAL EXAM  Constitutional - NAD, Comfortable  HEENT - inc intact  Extremities - No C/C/E, No calf tenderness   Neurologic Exam -                    Cognitive - Awake, Alert,      Communication - + aphasia   Cranial Nerves - CN 2-12 intact     Motor - Right side 0/5, Left side at least antigravity                    Right neglect       Impression:  61 yo with CVA/IPH with Right hemiplegia, aphasia, dysphagia      Plan:  PT- ROM, Bed Mob, Transfers, Amb w AD and bracing as needed  OT- ADLs, bracing  SLP- Dysphagia eval and treat; S/P PEG today  Prec- Falls, Cardiac, Pulm  DVT Prophylaxis- Lovenox  Skin- Turn q2 h  Dispo- Acute Rehab- can tolerate 3h/d PT/OT/SLP and requires daily physician visits

## 2018-02-05 NOTE — PROGRESS NOTE ADULT - PROBLEM SELECTOR PLAN 2
- unclear etiology for acute left MCA stroke but neuro considering ? reversible cerebral vasoconstriction syndrome (RCVS or Betty syndrome) as possible etiology and less likely vasculitis (work up unremarkable). LP negative for meningitis/infection.  - Neurology to follow up regarding if there is a need for a cardio-embolic work up.  - Follow up LE duplex  - continue with statin  - start aspirin when ok with neurosurgery  - no acute neurosurgical intervention for IVH.

## 2018-02-05 NOTE — PROGRESS NOTE ADULT - SUBJECTIVE AND OBJECTIVE BOX
Patient is a 62y old  Female who presents with a chief complaint of Level I trauma activation s/p fall (24 Jan 2018 13:50)      SUBJECTIVE / OVERNIGHT EVENTS: unable to obtain accurate ROS from patient due to aphasia but patient able to nod yes or no when asked questions. no chest or ab pain elicited today.     MEDICATIONS  (STANDING):  acetaminophen  IVPB. 1000 milliGRAM(s) IV Intermittent once  atorvastatin 40 milliGRAM(s) Oral at bedtime  dextrose 5% + sodium chloride 0.45%. 1000 milliLiter(s) (75 mL/Hr) IV Continuous <Continuous>  enalaprilat Injectable 2.5 milliGRAM(s) IV Push every 6 hours  enoxaparin Injectable 30 milliGRAM(s) SubCutaneous <User Schedule>  hydrALAZINE Injectable 20 milliGRAM(s) IV Push every 6 hours  labetalol Injectable 30 milliGRAM(s) IV Push every 4 hours  levETIRAcetam  IVPB 1000 milliGRAM(s) IV Intermittent every 12 hours  nicotine - 21 mG/24Hr(s) Patch 1 patch Transdermal daily    MEDICATIONS  (PRN):  ALBUTerol    90 MICROgram(s) HFA Inhaler 1 Puff(s) Inhalation every 4 hours PRN Wheezing  ipratropium 17 MICROgram(s) HFA Inhaler 1 Puff(s) Inhalation every 6 hours PRN wheez      Vital Signs Last 24 Hrs  T(C): 36.8 (05 Feb 2018 07:57), Max: 36.8 (04 Feb 2018 12:03)  T(F): 98.2 (05 Feb 2018 07:57), Max: 98.3 (04 Feb 2018 12:03)  HR: 57 (05 Feb 2018 07:57) (57 - 95)  BP: 176/83 (05 Feb 2018 07:57) (149/90 - 194/84)  BP(mean): --  RR: 18 (05 Feb 2018 07:57) (18 - 18)  SpO2: 95% (05 Feb 2018 07:57) (91% - 99%)  CAPILLARY BLOOD GLUCOSE        I&O's Summary    04 Feb 2018 07:01  -  05 Feb 2018 07:00  --------------------------------------------------------  IN: 775 mL / OUT: 0 mL / NET: 775 mL        PHYSICAL EXAM:  GENERAL: NAD  HEENT: + right scalp hematoma, + staples in place  LUNG: Clear to auscultation bilaterally; No wheeze  HEART: S1, S2  ABDOMEN: Soft, Nontender, Nondistended; Bowel sounds present  EXTREMITIES: No leg edema  PSYCH: normal affect, calm  NEUROLOGY: Alert and awake, intermittently follows simple commands (i.e. smile, squeezes hand), right hemiplegic   SKIN: No phlebitis        LABS:                        12.1   15.74 )-----------( 623      ( 05 Feb 2018 07:17 )             37.9     02-05    141  |  104  |  18  ----------------------------<  135<H>  3.7   |  26  |  0.62    Ca    8.8      05 Feb 2018 07:17      PT/INR - ( 05 Feb 2018 07:17 )   PT: 13.0 sec;   INR: 1.15 ratio         PTT - ( 05 Feb 2018 07:17 )  PTT:29.7 sec          RADIOLOGY & ADDITIONAL TESTS:    Imaging Personally Reviewed:    Consultant(s) Notes Reviewed:  neuro, cardiology    Care Discussed with Consultants/Other Providers: neurosurgery PA

## 2018-02-06 PROCEDURE — 93975 VASCULAR STUDY: CPT | Mod: 26

## 2018-02-06 PROCEDURE — 99233 SBSQ HOSP IP/OBS HIGH 50: CPT

## 2018-02-06 PROCEDURE — 99232 SBSQ HOSP IP/OBS MODERATE 35: CPT

## 2018-02-06 RX ORDER — ACETAMINOPHEN 500 MG
650 TABLET ORAL EVERY 6 HOURS
Qty: 0 | Refills: 0 | Status: DISCONTINUED | OUTPATIENT
Start: 2018-02-06 | End: 2018-02-15

## 2018-02-06 RX ORDER — LEVETIRACETAM 250 MG/1
1000 TABLET, FILM COATED ORAL
Qty: 0 | Refills: 0 | Status: DISCONTINUED | OUTPATIENT
Start: 2018-02-06 | End: 2018-02-12

## 2018-02-06 RX ORDER — LABETALOL HCL 100 MG
200 TABLET ORAL EVERY 8 HOURS
Qty: 0 | Refills: 0 | Status: DISCONTINUED | OUTPATIENT
Start: 2018-02-06 | End: 2018-02-07

## 2018-02-06 RX ADMIN — Medication 1 PATCH: at 13:47

## 2018-02-06 RX ADMIN — ENOXAPARIN SODIUM 30 MILLIGRAM(S): 100 INJECTION SUBCUTANEOUS at 18:13

## 2018-02-06 RX ADMIN — Medication 10 MILLIGRAM(S): at 18:14

## 2018-02-06 RX ADMIN — LEVETIRACETAM 1000 MILLIGRAM(S): 250 TABLET, FILM COATED ORAL at 18:14

## 2018-02-06 RX ADMIN — Medication 5 MILLIGRAM(S): at 10:45

## 2018-02-06 RX ADMIN — Medication 650 MILLIGRAM(S): at 11:13

## 2018-02-06 RX ADMIN — Medication 5 MILLIGRAM(S): at 05:09

## 2018-02-06 RX ADMIN — LEVETIRACETAM 400 MILLIGRAM(S): 250 TABLET, FILM COATED ORAL at 05:09

## 2018-02-06 RX ADMIN — ATORVASTATIN CALCIUM 40 MILLIGRAM(S): 80 TABLET, FILM COATED ORAL at 23:08

## 2018-02-06 RX ADMIN — Medication 200 MILLIGRAM(S): at 13:47

## 2018-02-06 RX ADMIN — Medication 1 PATCH: at 13:00

## 2018-02-06 RX ADMIN — Medication 200 MILLIGRAM(S): at 23:08

## 2018-02-06 RX ADMIN — Medication 650 MILLIGRAM(S): at 11:45

## 2018-02-06 RX ADMIN — Medication 200 MILLIGRAM(S): at 05:09

## 2018-02-06 NOTE — PROGRESS NOTE ADULT - PROBLEM SELECTOR PLAN 4
- Varying degree of leukocytosis since admission.  - Last fever 1/28. Infectious work up negative.   - f/u LE duplex  - Leukocytosis improving now - Varying degree of leukocytosis since admission.  - Last fever 1/28. Infectious work up negative.   - LE duplex negative for DVT  - Leukocytosis improving now. monitor CBC periodically

## 2018-02-06 NOTE — PROGRESS NOTE ADULT - SUBJECTIVE AND OBJECTIVE BOX
SUBJECTIVE:   No a/c distress  OVERNIGHT EVENTS: none    Vital Signs Last 24 Hrs  T(C): 36.3 (06 Feb 2018 08:00), Max: 36.9 (05 Feb 2018 16:41)  T(F): 97.4 (06 Feb 2018 08:00), Max: 98.4 (05 Feb 2018 16:41)  HR: 54 (06 Feb 2018 08:00) (54 - 62)  BP: 197/96 (06 Feb 2018 08:00) (143/75 - 197/96)  BP(mean): --  RR: 16 (06 Feb 2018 08:00) (16 - 18)  SpO2: 96% (06 Feb 2018 08:00) (93% - 96%)    PHYSICAL EXAM:      Neurological: Awake alert, oriented x 1-2 with choices, Follows some simple Commands, Moving LUE/LLE 5/5 RLE 3/5  RUE 0/5. Severe expressive aphasia .     Lungs CTA    CV S1 S2 +    Abdomen soft  BS + mild tenderness peg     LABS:                        12.1   15.74 )-----------( 623      ( 05 Feb 2018 07:17 )             37.9    02-05    141  |  104  |  18  ----------------------------<  135<H>  3.7   |  26  |  0.62    Ca    8.8      05 Feb 2018 07:17    PT/INR - ( 05 Feb 2018 07:17 )   PT: 13.0 sec;   INR: 1.15 ratio         PTT - ( 05 Feb 2018 07:17 )  PTT:29.7 sec        IMAGING:   renal u/s- No significant renal artery stenosis      MEDICATIONS:    acetaminophen    Suspension. 650 milliGRAM(s) Oral every 6 hours PRN Mild Pain (1 - 3)  levETIRAcetam  Solution 1000 milliGRAM(s) Oral two times a day  enalapril 10 milliGRAM(s) Oral two times a day  labetalol 200 milliGRAM(s) Oral every 8 hours  ALBUTerol    90 MICROgram(s) HFA Inhaler 1 Puff(s) Inhalation every 4 hours PRN Wheezing  ipratropium 17 MICROgram(s) HFA Inhaler 1 Puff(s) Inhalation every 6 hours PRN wheez  atorvastatin 40 milliGRAM(s) Oral at bedtime  enoxaparin Injectable 30 milliGRAM(s) SubCutaneous <User Schedule>  nicotine - 21 mG/24Hr(s) Patch 1 patch Transdermal daily      DIET:

## 2018-02-06 NOTE — PROGRESS NOTE ADULT - PROBLEM SELECTOR PLAN 5
- Likely demand ischemia. Repeat EKG now with no concerning new ischemic changes and CE negative.   - TTE with concentric LVH, mild diastolic dysfunction  - Cardiology following - Likely demand ischemia. Repeat EKG now with no concerning new ischemic changes and CE negative.   - TTE with concentric LVH, mild diastolic dysfunction  - Cardiology following  - Eventual ischemic evaluation likely as outpatient  - low dose aspirin when stable from neurosurgery standpoint

## 2018-02-06 NOTE — PROGRESS NOTE ADULT - SUBJECTIVE AND OBJECTIVE BOX
Patient is a 62y old  Female who presents with a chief complaint of Level I trauma activation s/p fall (24 Jan 2018 13:50)      SUBJECTIVE / OVERNIGHT EVENTS:     MEDICATIONS  (STANDING):  atorvastatin 40 milliGRAM(s) Oral at bedtime  dextrose 5% + sodium chloride 0.45%. 1000 milliLiter(s) (75 mL/Hr) IV Continuous <Continuous>  enalapril 10 milliGRAM(s) Oral two times a day  enoxaparin Injectable 30 milliGRAM(s) SubCutaneous <User Schedule>  labetalol 200 milliGRAM(s) Oral every 8 hours  levETIRAcetam  IVPB 1000 milliGRAM(s) IV Intermittent every 12 hours  nicotine - 21 mG/24Hr(s) Patch 1 patch Transdermal daily    MEDICATIONS  (PRN):  ALBUTerol    90 MICROgram(s) HFA Inhaler 1 Puff(s) Inhalation every 4 hours PRN Wheezing  ipratropium 17 MICROgram(s) HFA Inhaler 1 Puff(s) Inhalation every 6 hours PRN wheez      Vital Signs Last 24 Hrs  T(C): 36.3 (06 Feb 2018 08:00), Max: 36.9 (05 Feb 2018 16:41)  T(F): 97.4 (06 Feb 2018 08:00), Max: 98.4 (05 Feb 2018 16:41)  HR: 54 (06 Feb 2018 08:00) (54 - 62)  BP: 197/96 (06 Feb 2018 08:00) (143/75 - 197/96)  BP(mean): --  RR: 16 (06 Feb 2018 08:00) (16 - 18)  SpO2: 96% (06 Feb 2018 08:00) (93% - 96%)  CAPILLARY BLOOD GLUCOSE        I&O's Summary    05 Feb 2018 07:01  -  06 Feb 2018 07:00  --------------------------------------------------------  IN: 750 mL / OUT: 0 mL / NET: 750 mL        PHYSICAL EXAM:  GENERAL: NAD  HEENT: neck supple  LUNG: Clear to auscultation bilaterally; No wheeze  HEART: Regular rate and rhythm; No murmurs  ABDOMEN: Soft, Nontender, Nondistended; Bowel sounds present  EXTREMITIES: No leg edema  PSYCH: normal affect, calm  NEUROLOGY: AAO x3   SKIN: No rashes or lesions      LABS:                        12.1   15.74 )-----------( 623      ( 05 Feb 2018 07:17 )             37.9     02-05    141  |  104  |  18  ----------------------------<  135<H>  3.7   |  26  |  0.62    Ca    8.8      05 Feb 2018 07:17      PT/INR - ( 05 Feb 2018 07:17 )   PT: 13.0 sec;   INR: 1.15 ratio         PTT - ( 05 Feb 2018 07:17 )  PTT:29.7 sec          RADIOLOGY & ADDITIONAL TESTS:    Imaging Personally Reviewed:    Consultant(s) Notes Reviewed:      Care Discussed with Consultants/Other Providers: Patient is a 62y old  Female who presents with a chief complaint of Level I trauma activation s/p fall (24 Jan 2018 13:50)      SUBJECTIVE / OVERNIGHT EVENTS: s/p PEG on 2/5. no acute events overnight. BP remains elevated this morning.     MEDICATIONS  (STANDING):  atorvastatin 40 milliGRAM(s) Oral at bedtime  dextrose 5% + sodium chloride 0.45%. 1000 milliLiter(s) (75 mL/Hr) IV Continuous <Continuous>  enalapril 10 milliGRAM(s) Oral two times a day  enoxaparin Injectable 30 milliGRAM(s) SubCutaneous <User Schedule>  labetalol 200 milliGRAM(s) Oral every 8 hours  levETIRAcetam  IVPB 1000 milliGRAM(s) IV Intermittent every 12 hours  nicotine - 21 mG/24Hr(s) Patch 1 patch Transdermal daily    MEDICATIONS  (PRN):  ALBUTerol    90 MICROgram(s) HFA Inhaler 1 Puff(s) Inhalation every 4 hours PRN Wheezing  ipratropium 17 MICROgram(s) HFA Inhaler 1 Puff(s) Inhalation every 6 hours PRN wheez      Vital Signs Last 24 Hrs  T(C): 36.3 (06 Feb 2018 08:00), Max: 36.9 (05 Feb 2018 16:41)  T(F): 97.4 (06 Feb 2018 08:00), Max: 98.4 (05 Feb 2018 16:41)  HR: 54 (06 Feb 2018 08:00) (54 - 62)  BP: 197/96 (06 Feb 2018 08:00) (143/75 - 197/96)  BP(mean): --  RR: 16 (06 Feb 2018 08:00) (16 - 18)  SpO2: 96% (06 Feb 2018 08:00) (93% - 96%)  CAPILLARY BLOOD GLUCOSE        I&O's Summary    05 Feb 2018 07:01  -  06 Feb 2018 07:00  --------------------------------------------------------  IN: 750 mL / OUT: 0 mL / NET: 750 mL        PHYSICAL EXAM:  GENERAL: NAD  HEENT: + right scalp hematoma  LUNG: Clear to auscultation bilaterally; No wheeze  HEART: S1, S2  ABDOMEN: Soft, Nontender, Nondistended; Bowel sounds present, + PEG covered with abdominal binder  EXTREMITIES: No leg edema  PSYCH: normal affect, calm  NEUROLOGY: Alert and awake, intermittently follows simple commands (i.e. smile, squeezes hand), unable to move right arm but slight movement noted in RLE  SKIN: warm, dry      LABS:                        12.1   15.74 )-----------( 623      ( 05 Feb 2018 07:17 )             37.9     02-05    141  |  104  |  18  ----------------------------<  135<H>  3.7   |  26  |  0.62    Ca    8.8      05 Feb 2018 07:17      PT/INR - ( 05 Feb 2018 07:17 )   PT: 13.0 sec;   INR: 1.15 ratio         PTT - ( 05 Feb 2018 07:17 )  PTT:29.7 sec          RADIOLOGY & ADDITIONAL TESTS:    Imaging Personally Reviewed:    < from: US Duplex Kidneys (02.06.18 @ 09:09) >  IMPRESSION:     No evidence of hemodynamically significant renal artery stenosis.    < end of copied text >      < from: [facility] Duplex Lower Ext Vein Scan, Bilat (02.04.18 @ 16:19) >  IMPRESSION:     No evidence of bilateral lower extremity deep venous thrombosis.    < end of copied text >      < from: EGD PEG (02.05.18 @ 11:12) >    Impression:          - Gastritis.          - An externally removable PEG placement was successfully completed.                       - No specimens collected.    < end of copied text >    Consultant(s) Notes Reviewed:      Care Discussed with Consultants/Other Providers: neurosurgery PA Patient is a 62y old  Female who presents with a chief complaint of Level I trauma activation s/p fall (24 Jan 2018 13:50)      SUBJECTIVE / OVERNIGHT EVENTS: s/p PEG on 2/5. no acute events overnight. BP remains elevated this morning.     MEDICATIONS  (STANDING):  atorvastatin 40 milliGRAM(s) Oral at bedtime  dextrose 5% + sodium chloride 0.45%. 1000 milliLiter(s) (75 mL/Hr) IV Continuous <Continuous>  enalapril 10 milliGRAM(s) Oral two times a day  enoxaparin Injectable 30 milliGRAM(s) SubCutaneous <User Schedule>  labetalol 200 milliGRAM(s) Oral every 8 hours  levETIRAcetam  IVPB 1000 milliGRAM(s) IV Intermittent every 12 hours  nicotine - 21 mG/24Hr(s) Patch 1 patch Transdermal daily    MEDICATIONS  (PRN):  ALBUTerol    90 MICROgram(s) HFA Inhaler 1 Puff(s) Inhalation every 4 hours PRN Wheezing  ipratropium 17 MICROgram(s) HFA Inhaler 1 Puff(s) Inhalation every 6 hours PRN wheez      Vital Signs Last 24 Hrs  T(C): 36.3 (06 Feb 2018 08:00), Max: 36.9 (05 Feb 2018 16:41)  T(F): 97.4 (06 Feb 2018 08:00), Max: 98.4 (05 Feb 2018 16:41)  HR: 54 (06 Feb 2018 08:00) (54 - 62)  BP: 197/96 (06 Feb 2018 08:00) (143/75 - 197/96)  BP(mean): --  RR: 16 (06 Feb 2018 08:00) (16 - 18)  SpO2: 96% (06 Feb 2018 08:00) (93% - 96%)  CAPILLARY BLOOD GLUCOSE        I&O's Summary    05 Feb 2018 07:01  -  06 Feb 2018 07:00  --------------------------------------------------------  IN: 750 mL / OUT: 0 mL / NET: 750 mL        PHYSICAL EXAM:  GENERAL: NAD  HEENT: + right scalp hematoma  LUNG: Clear to auscultation bilaterally; No wheeze  HEART: S1, S2  ABDOMEN: Soft, Nontender, Nondistended; Bowel sounds present, + PEG covered with abdominal binder  EXTREMITIES: No leg edema  PSYCH: normal affect, calm  NEUROLOGY: Alert and awake, aphasic, intermittently follows simple commands (i.e. smile, squeezes hand), unable to move right arm but slight movement noted in RLE  SKIN: warm, dry      LABS:                        12.1   15.74 )-----------( 623      ( 05 Feb 2018 07:17 )             37.9     02-05    141  |  104  |  18  ----------------------------<  135<H>  3.7   |  26  |  0.62    Ca    8.8      05 Feb 2018 07:17      PT/INR - ( 05 Feb 2018 07:17 )   PT: 13.0 sec;   INR: 1.15 ratio         PTT - ( 05 Feb 2018 07:17 )  PTT:29.7 sec          RADIOLOGY & ADDITIONAL TESTS:    Imaging Personally Reviewed:    < from: US Duplex Kidneys (02.06.18 @ 09:09) >  IMPRESSION:     No evidence of hemodynamically significant renal artery stenosis.    < end of copied text >      < from: [facility] Duplex Lower Ext Vein Scan, Bilat (02.04.18 @ 16:19) >  IMPRESSION:     No evidence of bilateral lower extremity deep venous thrombosis.    < end of copied text >      < from: EGD PEG (02.05.18 @ 11:12) >    Impression:          - Gastritis.          - An externally removable PEG placement was successfully completed.                       - No specimens collected.    < end of copied text >    Consultant(s) Notes Reviewed:      Care Discussed with Consultants/Other Providers: neurosurgery PA

## 2018-02-06 NOTE — PROGRESS NOTE ADULT - PROBLEM SELECTOR PLAN 7
- On D5 1/2 NS IV at 75 cc/hr but d/c IVF once patient can be started on tube feed.  - would add free water bolus via PEG  - Hypernatremia resolved - continue with Keppra.

## 2018-02-06 NOTE — PROGRESS NOTE ADULT - SUBJECTIVE AND OBJECTIVE BOX
Patient is a 62y old  Female admitted after Level I trauma activation s/p fall (24 Jan 2018 13:50)    INTERVAL HPI/OVERNIGHT EVENTS: s/p PEG    MEDICATIONS  (STANDING):  atorvastatin 40 milliGRAM(s) Oral at bedtime  enalapril 10 milliGRAM(s) Oral two times a day  enoxaparin Injectable 30 milliGRAM(s) SubCutaneous <User Schedule>  labetalol 200 milliGRAM(s) Oral every 8 hours  levETIRAcetam  Solution 1000 milliGRAM(s) Oral two times a day  nicotine - 21 mG/24Hr(s) Patch 1 patch Transdermal daily    MEDICATIONS  (PRN):  acetaminophen    Suspension. 650 milliGRAM(s) Oral every 6 hours PRN Mild Pain (1 - 3)  ALBUTerol    90 MICROgram(s) HFA Inhaler 1 Puff(s) Inhalation every 4 hours PRN Wheezing  ipratropium 17 MICROgram(s) HFA Inhaler 1 Puff(s) Inhalation every 6 hours PRN wheeze    Allergies  No Known Drug Allergies  strawberry (Rash)    Review of Systems:  General:  No fever, chills  CV:  No pain, palpitatioins  Resp:  extubated, hi flow oxygen, +secretions  GI:  see HPI +dysphagia on NGTF  :  No pain, bleeding, incontinence, nocturia  Muscle:  Rt hemiparesis  Neuro:  +Rt hemiparesis  Endocrine:  No polyuria, polydypsia, cold/heat intolerance  Heme:  No petechiae, ecchymosis, easy bruisability  Skin:  No rash, tattoos, scars, edema    Vital Signs Last 24 Hrs  T(C): 36.5 (06 Feb 2018 16:22), Max: 36.7 (06 Feb 2018 04:43)  T(F): 97.7 (06 Feb 2018 16:22), Max: 98.1 (06 Feb 2018 04:43)  HR: 55 (06 Feb 2018 16:22) (54 - 77)  BP: 129/76 (06 Feb 2018 16:22) (129/76 - 197/96)  BP(mean): --  RR: 16 (06 Feb 2018 16:22) (16 - 18)  SpO2: 95% (06 Feb 2018 16:22) (93% - 96%)    PHYSICAL EXAM:  Constitutional: +EVD (Left) draining +on oxygen awake  Neck: No LAD, supple  Respiratory: +rhonchi  Cardiovascular: S1 and S2, RRR,  Gastrointestinal: BS+, soft, NT/ND, neg HSM, PEG site C&D, external bumper rotates freely at 2cm  Extremities: No peripheral edema, neg clubbing, cyanosis  Vascular: 2+ peripheral pulses  Neurological: +rt hemiparesis  Skin: No rashes    LABS:                      12.1   15.74 )-----------( 623      ( 05 Feb 2018 07:17 )             37.9     02-05    141  |  104  |  18  ----------------------------<  135<H>  3.7   |  26  |  0.62    Ca    8.8      05 Feb 2018 07:17    PT/INR - ( 05 Feb 2018 07:17 )   PT: 13.0 sec;   INR: 1.15 ratio    PTT - ( 05 Feb 2018 07:17 )  PTT:29.7 sec    RADIOLOGY & ADDITIONAL TESTS:

## 2018-02-06 NOTE — PROGRESS NOTE ADULT - PROBLEM SELECTOR PLAN 2
- unclear etiology for acute left MCA stroke but neuro considering ? reversible cerebral vasoconstriction syndrome (RCVS or Betty syndrome) as possible etiology and less likely vasculitis (work up unremarkable). LP negative for meningitis/infection.  - Neurology to follow up regarding if there is a need for a cardio-embolic work up.  - Follow up LE duplex  - continue with statin  - start aspirin when ok with neurosurgery  - no acute neurosurgical intervention for IVH. - unclear etiology for acute left MCA stroke but neuro considering ? reversible cerebral vasoconstriction syndrome (RCVS or Betty syndrome) as possible etiology and less likely vasculitis (work up unremarkable). LP negative for meningitis/infection.  - Neurology follow up regarding if there is a need for a cardio-embolic work up.  - LE duplex negative for DVT  - continue with statin  - start aspirin when ok with neurosurgery  - no acute neurosurgical intervention for IVH.

## 2018-02-06 NOTE — PROGRESS NOTE ADULT - PROBLEM SELECTOR PLAN 9
- patient failed FEESST  - plan for PEG today per GI. - incidental 2.6 x 2.2 cm left adrenal nodule noted on CT a/p  - will need outpatient follow up.

## 2018-02-06 NOTE — PROGRESS NOTE ADULT - ASSESSMENT
62 F, active smoker, with no sig PMHx, recent URI symptoms admitted on 1/24/18 with AMS, fall with head trauma found to have large right frontal scalp hematoma, acute left MCA territory infarct, IVH, and hydrocephalus on CT head requiring intubation and EVD. CTA head with ? 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 Vasculitis and infectious work up negative. Course c/b subclinical seizures, fevers and leukocytosis with negative infectious work up, also with EKG changes and elevated troponin ? due to demand ischemia, and dysphagia on NG tube feeds. Patient was extubated on 1/28, EVD discontinued on 2/1 and transferred out of ICU on 2/2/18, with hypernatremia, leukocytosis now improving.    PMD: Dr. Felipe Kathleen [phone] (but hasn't seen in many years) 62 F, active smoker, with no sig PMHx, recent URI symptoms admitted on 1/24/18 with AMS, fall with head trauma found to have large right frontal scalp hematoma, acute left MCA territory infarct, IVH, and hydrocephalus on CT head requiring intubation and EVD. CTA head with ? 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 Vasculitis and infectious work up negative. Course c/b subclinical seizures, fevers and leukocytosis with negative infectious work up, also with EKG changes and elevated troponin ? due to demand ischemia, and dysphagia on NG tube feeds. Patient was extubated on 1/28, EVD discontinued on 2/1 and transferred out of ICU on 2/2/18, with hypernatremia, leukocytosis now improving s/p PEG on 2/5.    PMD: Dr. Felipe Kathleen [phone] (but hasn't seen in many years)

## 2018-02-06 NOTE — PROGRESS NOTE ADULT - ASSESSMENT
62 year old female with acute L MCA infarct, additional microhemorrhages on MRA and IVH with poor neurologic recovery.  Dysphagia due to neurologic injury.  s/p PEG 2/5/18    PLAN  -To start TF today.  -Local PEG care.

## 2018-02-06 NOTE — PROGRESS NOTE ADULT - PROBLEM SELECTOR PLAN 1
Pt pulled out her NG tube and cannot take oral BP meds.  Continue hydralazine 20mg IV q6h, labetalol 30mg IV q4h, Vasotec 2.5mg IV q6h for now.   Once patient gets PEG placement today, transition to PO meds (labetalol 200mg TID, enalapril 5mg BID, and amlodipine 5mg)  Monitor BP (goal ~140/90) PO meds resumed s/p PEG yesterday but BP remains elevated  Continue labetalol 200mg TID, increase enalapril 10mg BID and if BP remains still elevated, add back norvasc 5mg QD  Renal duplex negative for ALEX   consider d/c IVF  Monitor BP (goal ~140/90)

## 2018-02-06 NOTE — PROGRESS NOTE ADULT - PROBLEM SELECTOR PLAN 6
- Patient denies ab pain today  - Continue to monitor - On D5 1/2 NS IV at 75 cc/hr   - would add free water bolus via PEG  - Hypernatremia resolved

## 2018-02-06 NOTE — PROGRESS NOTE ADULT - SUBJECTIVE AND OBJECTIVE BOX
Subjective: Patient seen and examined. No new events except as noted.     SUBJECTIVE/ROS:  Due to altered mental status, subjective information were not able to be obtained from the patient. History was obtained, to the extent possible, from review of the chart and collateral sources of information.       MEDICATIONS:  MEDICATIONS  (STANDING):  atorvastatin 40 milliGRAM(s) Oral at bedtime  enalapril 10 milliGRAM(s) Oral two times a day  enoxaparin Injectable 30 milliGRAM(s) SubCutaneous <User Schedule>  labetalol 200 milliGRAM(s) Oral every 8 hours  levETIRAcetam  Solution 1000 milliGRAM(s) Oral two times a day  nicotine - 21 mG/24Hr(s) Patch 1 patch Transdermal daily      PHYSICAL EXAM:  T(C): 36.3 (02-06-18 @ 14:00), Max: 36.9 (02-05-18 @ 16:41)  HR: 77 (02-06-18 @ 12:00) (54 - 77)  BP: 138/80 (02-06-18 @ 12:00) (138/80 - 197/96)  RR: 18 (02-06-18 @ 12:00) (16 - 18)  SpO2: 96% (02-06-18 @ 12:00) (93% - 96%)  Wt(kg): --  I&O's Summary    05 Feb 2018 07:01  -  06 Feb 2018 07:00  --------------------------------------------------------  IN: 750 mL / OUT: 0 mL / NET: 750 mL      JVP: Normal  Neck: supple  Lung: clear   CV: S1 S2 , Murmur:  Abd: soft  Ext: No edema  neuro: Awake   Psych: flat affect  Skin: normal       LABS/DATA:    CARDIAC MARKERS:                                12.1   15.74 )-----------( 623      ( 05 Feb 2018 07:17 )             37.9     02-05    141  |  104  |  18  ----------------------------<  135<H>  3.7   |  26  |  0.62    Ca    8.8      05 Feb 2018 07:17      proBNP:   Lipid Profile:   HgA1c:   TSH:     TELE:  EKG:

## 2018-02-06 NOTE — PROGRESS NOTE ADULT - SUBJECTIVE AND OBJECTIVE BOX
Cc: Right hemiplegia, gait dysfunction, dysphagia      HPI:  Patient tolerating therapies.  Mod A with transfers.    MEDICATIONS  (STANDING):  atorvastatin 40 milliGRAM(s) Oral at bedtime  dextrose 5% + sodium chloride 0.45%. 1000 milliLiter(s) (75 mL/Hr) IV Continuous <Continuous>  enalapril 10 milliGRAM(s) Oral two times a day  enalapril 5 milliGRAM(s) Oral once  enoxaparin Injectable 30 milliGRAM(s) SubCutaneous <User Schedule>  labetalol 200 milliGRAM(s) Oral every 8 hours  levETIRAcetam  IVPB 1000 milliGRAM(s) IV Intermittent every 12 hours  nicotine - 21 mG/24Hr(s) Patch 1 patch Transdermal daily    MEDICATIONS  (PRN):  ALBUTerol    90 MICROgram(s) HFA Inhaler 1 Puff(s) Inhalation every 4 hours PRN Wheezing  ipratropium 17 MICROgram(s) HFA Inhaler 1 Puff(s) Inhalation every 6 hours PRN wheez    Vital Signs Last 24 Hrs  T(C): 36.3 (06 Feb 2018 08:00), Max: 36.9 (05 Feb 2018 16:41)  T(F): 97.4 (06 Feb 2018 08:00), Max: 98.4 (05 Feb 2018 16:41)  HR: 54 (06 Feb 2018 08:00) (54 - 62)  BP: 197/96 (06 Feb 2018 08:00) (143/75 - 197/96)  BP(mean): --  RR: 16 (06 Feb 2018 08:00) (16 - 18)  SpO2: 96% (06 Feb 2018 08:00) (93% - 96%)    PHYSICAL EXAM  Constitutional - NAD, Comfortable  HEENT - inc intact  Extremities - No C/C/E, No calf tenderness   Neurologic Exam -                    Cognitive - Awake, Alert,      Communication - + aphasia   Cranial Nerves - CN 2-12 intact     Motor - Right side 0/5, Left side at least antigravity                    Right neglect     Impression:  63 yo with CVA/IPH with Right hemiplegia, aphasia, dysphagia      Plan:  PT- ROM, Bed Mob, Transfers, Amb w AD and bracing as needed  OT- ADLs, bracing  SLP- Dysphagia eval and treat; S/P PEG   Prec- Falls, Cardiac, Pulm  DVT Prophylaxis- Lovenox  Skin- Turn q2 h  Dispo- Acute Rehab- can tolerate 3h/d PT/OT/SLP and requires daily physician visits

## 2018-02-06 NOTE — PROGRESS NOTE ADULT - ASSESSMENT
Elevated trop  likely demand ischemia in setting of acute hemorrhagic cva , IVH and possible sepsis  levels are trending down   asa once deemed safe by nsx  eventual ischemic work up in future    HTN  much better  cont current PO meds     abnormal ekg   likely due to LVH

## 2018-02-07 LAB
ANION GAP SERPL CALC-SCNC: 14 MMOL/L — SIGNIFICANT CHANGE UP (ref 5–17)
BUN SERPL-MCNC: 13 MG/DL — SIGNIFICANT CHANGE UP (ref 7–23)
CALCIUM SERPL-MCNC: 8.7 MG/DL — SIGNIFICANT CHANGE UP (ref 8.4–10.5)
CHLORIDE SERPL-SCNC: 104 MMOL/L — SIGNIFICANT CHANGE UP (ref 96–108)
CO2 SERPL-SCNC: 21 MMOL/L — LOW (ref 22–31)
CREAT SERPL-MCNC: 0.53 MG/DL — SIGNIFICANT CHANGE UP (ref 0.5–1.3)
CULTURE RESULTS: SIGNIFICANT CHANGE UP
GLUCOSE SERPL-MCNC: 86 MG/DL — SIGNIFICANT CHANGE UP (ref 70–99)
HCT VFR BLD CALC: 35.2 % — SIGNIFICANT CHANGE UP (ref 34.5–45)
HGB BLD-MCNC: 11.5 G/DL — SIGNIFICANT CHANGE UP (ref 11.5–15.5)
MCHC RBC-ENTMCNC: 32.7 GM/DL — SIGNIFICANT CHANGE UP (ref 32–36)
MCHC RBC-ENTMCNC: 33.4 PG — SIGNIFICANT CHANGE UP (ref 27–34)
MCV RBC AUTO: 102.3 FL — HIGH (ref 80–100)
PLATELET # BLD AUTO: 520 K/UL — HIGH (ref 150–400)
POTASSIUM SERPL-MCNC: 4 MMOL/L — SIGNIFICANT CHANGE UP (ref 3.5–5.3)
POTASSIUM SERPL-SCNC: 4 MMOL/L — SIGNIFICANT CHANGE UP (ref 3.5–5.3)
RBC # BLD: 3.44 M/UL — LOW (ref 3.8–5.2)
RBC # FLD: 13.6 % — SIGNIFICANT CHANGE UP (ref 10.3–14.5)
SODIUM SERPL-SCNC: 139 MMOL/L — SIGNIFICANT CHANGE UP (ref 135–145)
SPECIMEN SOURCE: SIGNIFICANT CHANGE UP
WBC # BLD: 17.08 K/UL — HIGH (ref 3.8–10.5)
WBC # FLD AUTO: 17.08 K/UL — HIGH (ref 3.8–10.5)

## 2018-02-07 PROCEDURE — 99233 SBSQ HOSP IP/OBS HIGH 50: CPT

## 2018-02-07 RX ORDER — LACTULOSE 10 G/15ML
20 SOLUTION ORAL
Qty: 0 | Refills: 0 | Status: COMPLETED | OUTPATIENT
Start: 2018-02-07 | End: 2018-02-07

## 2018-02-07 RX ORDER — VERAPAMIL HCL 240 MG
40 CAPSULE, EXTENDED RELEASE PELLETS 24 HR ORAL THREE TIMES A DAY
Qty: 0 | Refills: 0 | Status: DISCONTINUED | OUTPATIENT
Start: 2018-02-07 | End: 2018-02-10

## 2018-02-07 RX ORDER — ASPIRIN/CALCIUM CARB/MAGNESIUM 324 MG
81 TABLET ORAL DAILY
Qty: 0 | Refills: 0 | Status: DISCONTINUED | OUTPATIENT
Start: 2018-02-07 | End: 2018-02-15

## 2018-02-07 RX ORDER — NICOTINE POLACRILEX 2 MG
1 GUM BUCCAL DAILY
Qty: 0 | Refills: 0 | Status: DISCONTINUED | OUTPATIENT
Start: 2018-02-07 | End: 2018-02-15

## 2018-02-07 RX ADMIN — Medication 5 MILLIGRAM(S): at 22:32

## 2018-02-07 RX ADMIN — Medication 1 PATCH: at 13:04

## 2018-02-07 RX ADMIN — Medication 650 MILLIGRAM(S): at 14:00

## 2018-02-07 RX ADMIN — Medication 10 MILLIGRAM(S): at 05:56

## 2018-02-07 RX ADMIN — Medication 650 MILLIGRAM(S): at 20:30

## 2018-02-07 RX ADMIN — Medication 650 MILLIGRAM(S): at 13:04

## 2018-02-07 RX ADMIN — Medication 81 MILLIGRAM(S): at 13:04

## 2018-02-07 RX ADMIN — Medication 650 MILLIGRAM(S): at 19:34

## 2018-02-07 RX ADMIN — ENOXAPARIN SODIUM 30 MILLIGRAM(S): 100 INJECTION SUBCUTANEOUS at 19:01

## 2018-02-07 RX ADMIN — LEVETIRACETAM 1000 MILLIGRAM(S): 250 TABLET, FILM COATED ORAL at 19:01

## 2018-02-07 RX ADMIN — Medication 1 PATCH: at 13:00

## 2018-02-07 RX ADMIN — Medication 200 MILLIGRAM(S): at 05:56

## 2018-02-07 RX ADMIN — Medication 40 MILLIGRAM(S): at 19:01

## 2018-02-07 RX ADMIN — ATORVASTATIN CALCIUM 40 MILLIGRAM(S): 80 TABLET, FILM COATED ORAL at 22:32

## 2018-02-07 RX ADMIN — LACTULOSE 20 GRAM(S): 10 SOLUTION ORAL at 13:04

## 2018-02-07 RX ADMIN — LEVETIRACETAM 1000 MILLIGRAM(S): 250 TABLET, FILM COATED ORAL at 05:56

## 2018-02-07 NOTE — PROGRESS NOTE ADULT - PROBLEM SELECTOR PLAN 1
BP overall improved  Continue enalapril 10mg BID and will switch labetalol to verapamil 40mg TID given concern for possible RCVS per discussion with neurology/stroke attending  Renal duplex negative for ALEX   Monitor BP (goal ~140/90)

## 2018-02-07 NOTE — PROGRESS NOTE ADULT - SUBJECTIVE AND OBJECTIVE BOX
INTERVAL HPI/OVERNIGHT EVENTS:  no overnight events  tolerating PEG feeds at goal rate    MEDICATIONS  (STANDING):  aspirin  chewable 81 milliGRAM(s) Oral daily  atorvastatin 40 milliGRAM(s) Oral at bedtime  enalapril 10 milliGRAM(s) Oral two times a day  enoxaparin Injectable 30 milliGRAM(s) SubCutaneous <User Schedule>  levETIRAcetam  Solution 1000 milliGRAM(s) Oral two times a day  nicotine - 21 mG/24Hr(s) Patch 1 patch Transdermal daily  verapamil 40 milliGRAM(s) Oral three times a day    MEDICATIONS  (PRN):  acetaminophen    Suspension. 650 milliGRAM(s) Oral every 6 hours PRN Mild Pain (1 - 3)  ALBUTerol    90 MICROgram(s) HFA Inhaler 1 Puff(s) Inhalation every 4 hours PRN Wheezing  ipratropium 17 MICROgram(s) HFA Inhaler 1 Puff(s) Inhalation every 6 hours PRN wheez      Allergies  No Known Drug Allergies  strawberry (Rash)    Review of Systems:  General:  No fever, chills  CV:  No pain  Resp:  no cough  :  No pain, bleeding, incontinence, nocturia  Muscle:  Rt hemiparesis  Neuro:  +Rt hemiparesis  Heme:  No petechiae, ecchymosis, easy bruisability  Skin:  No rash, tattoos, scars, edema    Vital Signs Last 24 Hrs  T(C): 36.8 (07 Feb 2018 08:20), Max: 36.8 (07 Feb 2018 08:20)  T(F): 98.2 (07 Feb 2018 08:20), Max: 98.2 (07 Feb 2018 08:20)  HR: 60 (07 Feb 2018 08:20) (55 - 77)  BP: 117/68 (07 Feb 2018 08:20) (117/68 - 162/82)  BP(mean): --  RR: 18 (07 Feb 2018 08:20) (16 - 18)  SpO2: 94% (07 Feb 2018 08:20) (94% - 98%)    PHYSICAL EXAM:  Constitutional: resting comfortably, non-toxic, arousable to verbal and tactile stimuli  Neck: No LAD, supple  Respiratory: +rhonchi  Cardiovascular: S1 and S2, RRR,  Gastrointestinal: BS+, soft, NT/ND, neg HSM, PEG site C&D, external bumper rotates freely at 2cm +abdominal binder in place  Extremities: No peripheral edema, neg clubbing, cyanosis  Vascular: 2+ peripheral pulses  Neurological: +rt hemiparesis  Skin: No rashes    LABS:                        11.5   17.08 )-----------( 520      ( 07 Feb 2018 07:32 )             35.2     02-07    139  |  104  |  13  ----------------------------<  86  4.0   |  21<L>  |  0.53    Ca    8.7      07 Feb 2018 07:28        RADIOLOGY & ADDITIONAL TESTS:

## 2018-02-07 NOTE — PROGRESS NOTE ADULT - ASSESSMENT
62 F, active smoker, with no sig PMHx, recent URI symptoms admitted on 1/24/18 with AMS, fall with head trauma found to have large right frontal scalp hematoma, acute left MCA territory infarct, IVH, and hydrocephalus on CT head requiring intubation and EVD. CTA head with ? 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 Course c/b subclinical seizures, fevers and leukocytosis with negative infectious work up, also with EKG changes and elevated troponin ? due to demand ischemia, and dysphagia .  Patient was extubated on 1/28, EVD discontinued on 2/1 and transferred out of ICU on 2/2/18, with hypernatremia, leukocytosis now improving. s/p peg placement 2/5/18    Plan:  Neuro stable Continue Keppra. stroke work up complete. Started on ASA 81mg  Hypertension-  Started on Verapamil 40q8 as per recs from stroke neurology in setting of possible RCVS. continue enalapril 10q12. Medicine f/u appreciated   Dysphagia- Tolerating feeds via peg  Taper nicotine patch  Persistent leucocytosis without signs of infection. Infectious work up neg. will send UA.   DVT ppx  [facility]

## 2018-02-07 NOTE — PROGRESS NOTE ADULT - PROBLEM SELECTOR PLAN 2
- unclear etiology for acute left MCA stroke but neuro considering ? reversible cerebral vasoconstriction syndrome (RCVS or Betty syndrome) as possible etiology and less likely vasculitis (work up unremarkable). LP negative for meningitis/infection.  - per discussion with neurology/stroke attending, started on verapamil 40mg TID given concern for possible RCVS  - LE duplex negative for DVT  - continue with statin  - started aspirin  - no acute neurosurgical intervention for IVH.

## 2018-02-07 NOTE — PROGRESS NOTE ADULT - ASSESSMENT
Elevated trop  likely demand ischemia in setting of acute hemorrhagic cva , IVH and possible sepsis  levels are trending down   asa once deemed safe by nsx  eventual ischemic work up in future    HTN  stable  on verapamil as per neurology  off labetalol     abnormal ekg   likely due to LVH

## 2018-02-07 NOTE — PROGRESS NOTE ADULT - SUBJECTIVE AND OBJECTIVE BOX
Patient is a 62y old  Female who presents with a chief complaint of Level I trauma activation s/p fall (24 Jan 2018 13:50)      SUBJECTIVE / OVERNIGHT EVENTS: unable to obtain ROS due to aphasia.    MEDICATIONS  (STANDING):  aspirin  chewable 81 milliGRAM(s) Oral daily  atorvastatin 40 milliGRAM(s) Oral at bedtime  enalapril 10 milliGRAM(s) Oral two times a day  enoxaparin Injectable 30 milliGRAM(s) SubCutaneous <User Schedule>  levETIRAcetam  Solution 1000 milliGRAM(s) Oral two times a day  nicotine - 21 mG/24Hr(s) Patch 1 patch Transdermal daily  verapamil 40 milliGRAM(s) Oral three times a day    MEDICATIONS  (PRN):  acetaminophen    Suspension. 650 milliGRAM(s) Oral every 6 hours PRN Mild Pain (1 - 3)  ALBUTerol    90 MICROgram(s) HFA Inhaler 1 Puff(s) Inhalation every 4 hours PRN Wheezing  ipratropium 17 MICROgram(s) HFA Inhaler 1 Puff(s) Inhalation every 6 hours PRN wheez      Vital Signs Last 24 Hrs  T(C): 36.8 (07 Feb 2018 08:20), Max: 36.8 (07 Feb 2018 08:20)  T(F): 98.2 (07 Feb 2018 08:20), Max: 98.2 (07 Feb 2018 08:20)  HR: 60 (07 Feb 2018 08:20) (55 - 77)  BP: 117/68 (07 Feb 2018 08:20) (117/68 - 162/82)  BP(mean): --  RR: 18 (07 Feb 2018 08:20) (16 - 18)  SpO2: 94% (07 Feb 2018 08:20) (94% - 98%)  CAPILLARY BLOOD GLUCOSE        I&O's Summary    06 Feb 2018 07:01  -  07 Feb 2018 07:00  --------------------------------------------------------  IN: 440 mL / OUT: 0 mL / NET: 440 mL        PHYSICAL EXAM:  GEN: NAD  HEENT: + right scalp hematoma  LUNG: Clear to auscultation bilaterally; No wheeze  HEART: S1, S2  ABDOMEN: Soft, Nontender, Nondistended; Bowel sounds present, + PEG covered with abdominal binder  EXTREMITIES: No leg edema  PSYCH: normal affect, calm  NEUROLOGY: Alert and awake, aphasic, intermittently follows simple commands (i.e. smile, squeezes hand), unable to move right arm but slight movement noted in RLE  SKIN: warm, dry      LABS:                    RADIOLOGY & ADDITIONAL TESTS:    Imaging Personally Reviewed:    Consultant(s) Notes Reviewed:  GI, cards    Care Discussed with Consultants/Other Providers: neurosurgery PA, neuro/stroke attending(Dr. Perez)

## 2018-02-07 NOTE — PROGRESS NOTE ADULT - SUBJECTIVE AND OBJECTIVE BOX
SUBJECTIVE:     OVERNIGHT EVENTS: none    Vital Signs Last 24 Hrs  T(C): 36.3 (07 Feb 2018 16:15), Max: 36.8 (07 Feb 2018 08:20)  T(F): 97.4 (07 Feb 2018 16:15), Max: 98.2 (07 Feb 2018 08:20)  HR: 60 (07 Feb 2018 16:15) (56 - 62)  BP: 136/81 (07 Feb 2018 16:15) (117/68 - 162/82)  BP(mean): --  RR: 18 (07 Feb 2018 16:15) (18 - 18)  SpO2: 96% (07 Feb 2018 16:15) (94% - 98%)    PHYSICAL EXAM:    Neurological: Awake alert, oriented x 1-2 with choices, Follows some simple Commands, Moving LUE/LLE 5/5 RLE 3/5  RUE 0/5. Severe expressive aphasia .     Lungs CTA    CV S1 S2 +    Abdomen soft  BS + mild tenderness peg       LABS:                        11.5   17.08 )-----------( 520      ( 07 Feb 2018 07:32 )             35.2    02-07    139  |  104  |  13  ----------------------------<  86  4.0   |  21<L>  |  0.53    Ca    8.7      07 Feb 2018 07:28        02-06 @ 07:01  -  02-07 @ 07:00  --------------------------------------------------------  IN: 440 mL / OUT: 0 mL / NET: 440 mL      IMAGING:         MEDICATIONS:    levETIRAcetam  Solution 1000 milliGRAM(s) Oral two times a day  enalapril 10 milliGRAM(s) Oral <User Schedule>  verapamil 40 milliGRAM(s) Oral three times a day  ALBUTerol    90 MICROgram(s) HFA Inhaler 1 Puff(s) Inhalation every 4 hours PRN Wheezing  ipratropium 17 MICROgram(s) HFA Inhaler 1 Puff(s) Inhalation every 6 hours PRN wheez  aspirin  chewable 81 milliGRAM(s) Oral daily  atorvastatin 40 milliGRAM(s) Oral at bedtime  enoxaparin Injectable 30 milliGRAM(s) SubCutaneous <User Schedule>  nicotine - 21 mG/24Hr(s) Patch 1 patch Transdermal daily      DIET:

## 2018-02-07 NOTE — PROGRESS NOTE ADULT - SUBJECTIVE AND OBJECTIVE BOX
Subjective: Patient seen and examined. No new events except as noted.     SUBJECTIVE/ROS:  Due to altered mental status, subjective information were not able to be obtained from the patient. History was obtained, to the extent possible, from review of the chart and collateral sources of information.       MEDICATIONS:  MEDICATIONS  (STANDING):  aspirin  chewable 81 milliGRAM(s) Oral daily  atorvastatin 40 milliGRAM(s) Oral at bedtime  enalapril 5 milliGRAM(s) Oral <User Schedule>  enoxaparin Injectable 30 milliGRAM(s) SubCutaneous <User Schedule>  levETIRAcetam  Solution 1000 milliGRAM(s) Oral two times a day  nicotine -  14 mG/24Hr(s) Patch 1 patch Transdermal daily  verapamil 40 milliGRAM(s) Oral three times a day      PHYSICAL EXAM:  T(C): 36.3 (02-07-18 @ 16:15), Max: 36.8 (02-07-18 @ 08:20)  HR: 60 (02-07-18 @ 16:15) (58 - 62)  BP: 136/81 (02-07-18 @ 16:15) (117/68 - 162/82)  RR: 18 (02-07-18 @ 16:15) (18 - 18)  SpO2: 96% (02-07-18 @ 16:15) (94% - 98%)  Wt(kg): --  I&O's Summary    06 Feb 2018 07:01  -  07 Feb 2018 07:00  --------------------------------------------------------  IN: 440 mL / OUT: 0 mL / NET: 440 mL        JVP: Normal  Neck: supple  Lung: clear   CV: S1 S2 , Murmur:  Abd: soft  Ext: No edema  neuro: Awake   Psych: flat affect  Skin: normal       LABS/DATA:    CARDIAC MARKERS:                                11.5   17.08 )-----------( 520      ( 07 Feb 2018 07:32 )             35.2     02-07    139  |  104  |  13  ----------------------------<  86  4.0   |  21<L>  |  0.53    Ca    8.7      07 Feb 2018 07:28      proBNP:   Lipid Profile:   HgA1c:   TSH:     TELE:  EKG:

## 2018-02-07 NOTE — PROGRESS NOTE ADULT - ASSESSMENT
62 year old female with acute L MCA infarct, additional microhemorrhages on MRA and IVH with poor neurologic recovery.  Dysphagia due to neurologic injury.  s/p PEG 2/5/18    PLAN  -PEG feeds and local care  -Monitor TF tolerance    Further care per Primary team    Perry Murphy PA-C    [facility]  [phone]

## 2018-02-07 NOTE — PROGRESS NOTE ADULT - PROBLEM SELECTOR PLAN 6
- On D5 1/2 NS IV at 75 cc/hr   - would add free water bolus via PEG  - Hypernatremia resolved - resolved  - would add free water bolus via PEG if recurs

## 2018-02-07 NOTE — PROGRESS NOTE ADULT - PROBLEM SELECTOR PLAN 5
- Likely demand ischemia. Repeat EKG now with no concerning new ischemic changes and CE negative.   - TTE with concentric LVH, mild diastolic dysfunction  - Cardiology following  - Eventual ischemic evaluation likely as outpatient  - low dose aspirin when stable from neurosurgery standpoint - Likely demand ischemia. Repeat EKG now with no concerning new ischemic changes and CE negative.   - TTE with concentric LVH, mild diastolic dysfunction  - Cardiology following  - Eventual ischemic evaluation likely as outpatient  - low dose aspirin started

## 2018-02-07 NOTE — PROGRESS NOTE ADULT - PROBLEM SELECTOR PLAN 4
- Varying degree of leukocytosis since admission.  - Last fever 1/28. Infectious work up negative.   - LE duplex negative for DVT  - Leukocytosis improving now. follow up repeat CBC today

## 2018-02-07 NOTE — PROGRESS NOTE ADULT - ASSESSMENT
62 F, active smoker, with no sig PMHx, recent URI symptoms admitted on 1/24/18 with AMS, fall with head trauma found to have large right frontal scalp hematoma, acute left MCA territory infarct, IVH, and hydrocephalus on CT head requiring intubation and EVD. CTA head with ? 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 Vasculitis and infectious work up negative. Course c/b subclinical seizures, fevers and leukocytosis with negative infectious work up, also with EKG changes and elevated troponin ? due to demand ischemia, and dysphagia on NG tube feeds. Patient was extubated on 1/28, EVD discontinued on 2/1 and transferred out of ICU on 2/2/18, with hypernatremia, leukocytosis now improving s/p PEG on 2/5.    PMD: Dr. Felipe Kathleen [phone] (but hasn't seen in many years) 62 F, active smoker, with no sig PMHx, recent URI symptoms admitted on 1/24/18 with AMS, fall with head trauma found to have large right frontal scalp hematoma, acute left MCA territory infarct, IVH, and hydrocephalus on CT head requiring intubation and EVD. CTA head with ? 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 Vasculitis and infectious work up negative. Course c/b subclinical seizures, fevers and leukocytosis with negative infectious work up, also with EKG changes and elevated troponin ? due to demand ischemia, and dysphagia. Patient was extubated on 1/28, EVD discontinued on 2/1 and transferred out of ICU on 2/2/18, with hypernatremia, leukocytosis now improving s/p PEG on 2/5.    PMD: Dr. Felipe aKthleen [phone] (but hasn't seen in many years)

## 2018-02-08 LAB
ANION GAP SERPL CALC-SCNC: 10 MMOL/L — SIGNIFICANT CHANGE UP (ref 5–17)
BASOPHILS # BLD AUTO: 0.1 K/UL — SIGNIFICANT CHANGE UP (ref 0–0.2)
BASOPHILS NFR BLD AUTO: 0.6 % — SIGNIFICANT CHANGE UP (ref 0–2)
BUN SERPL-MCNC: 11 MG/DL — SIGNIFICANT CHANGE UP (ref 7–23)
CALCIUM SERPL-MCNC: 8.8 MG/DL — SIGNIFICANT CHANGE UP (ref 8.4–10.5)
CHLORIDE SERPL-SCNC: 101 MMOL/L — SIGNIFICANT CHANGE UP (ref 96–108)
CO2 SERPL-SCNC: 26 MMOL/L — SIGNIFICANT CHANGE UP (ref 22–31)
CREAT SERPL-MCNC: 0.54 MG/DL — SIGNIFICANT CHANGE UP (ref 0.5–1.3)
EOSINOPHIL # BLD AUTO: 0.2 K/UL — SIGNIFICANT CHANGE UP (ref 0–0.5)
EOSINOPHIL NFR BLD AUTO: 1.1 % — SIGNIFICANT CHANGE UP (ref 0–6)
GLUCOSE SERPL-MCNC: 89 MG/DL — SIGNIFICANT CHANGE UP (ref 70–99)
HCT VFR BLD CALC: 35.7 % — SIGNIFICANT CHANGE UP (ref 34.5–45)
HGB BLD-MCNC: 12.3 G/DL — SIGNIFICANT CHANGE UP (ref 11.5–15.5)
LYMPHOCYTES # BLD AUTO: 11.2 % — LOW (ref 13–44)
LYMPHOCYTES # BLD AUTO: 2.2 K/UL — SIGNIFICANT CHANGE UP (ref 1–3.3)
MCHC RBC-ENTMCNC: 34.5 GM/DL — SIGNIFICANT CHANGE UP (ref 32–36)
MCHC RBC-ENTMCNC: 36.4 PG — HIGH (ref 27–34)
MCV RBC AUTO: 105 FL — HIGH (ref 80–100)
MONOCYTES # BLD AUTO: 1 K/UL — HIGH (ref 0–0.9)
MONOCYTES NFR BLD AUTO: 4.9 % — SIGNIFICANT CHANGE UP (ref 2–14)
NEUTROPHILS # BLD AUTO: 16 K/UL — HIGH (ref 1.8–7.4)
NEUTROPHILS NFR BLD AUTO: 82.2 % — HIGH (ref 43–77)
PLATELET # BLD AUTO: 531 K/UL — HIGH (ref 150–400)
POTASSIUM SERPL-MCNC: 3.6 MMOL/L — SIGNIFICANT CHANGE UP (ref 3.5–5.3)
POTASSIUM SERPL-SCNC: 3.6 MMOL/L — SIGNIFICANT CHANGE UP (ref 3.5–5.3)
RBC # BLD: 3.39 M/UL — LOW (ref 3.8–5.2)
RBC # FLD: 11.9 % — SIGNIFICANT CHANGE UP (ref 10.3–14.5)
SODIUM SERPL-SCNC: 137 MMOL/L — SIGNIFICANT CHANGE UP (ref 135–145)
WBC # BLD: 19.5 K/UL — HIGH (ref 3.8–10.5)
WBC # FLD AUTO: 19.5 K/UL — HIGH (ref 3.8–10.5)

## 2018-02-08 PROCEDURE — 99233 SBSQ HOSP IP/OBS HIGH 50: CPT

## 2018-02-08 RX ORDER — SODIUM CHLORIDE 9 MG/ML
2 INJECTION INTRAMUSCULAR; INTRAVENOUS; SUBCUTANEOUS EVERY 8 HOURS
Qty: 0 | Refills: 0 | Status: DISCONTINUED | OUTPATIENT
Start: 2018-02-08 | End: 2018-02-11

## 2018-02-08 RX ORDER — POTASSIUM CHLORIDE 20 MEQ
40 PACKET (EA) ORAL ONCE
Qty: 0 | Refills: 0 | Status: COMPLETED | OUTPATIENT
Start: 2018-02-08 | End: 2018-02-08

## 2018-02-08 RX ADMIN — ENOXAPARIN SODIUM 30 MILLIGRAM(S): 100 INJECTION SUBCUTANEOUS at 17:41

## 2018-02-08 RX ADMIN — Medication 1 PATCH: at 13:04

## 2018-02-08 RX ADMIN — Medication 40 MILLIGRAM(S): at 05:27

## 2018-02-08 RX ADMIN — Medication 40 MILLIEQUIVALENT(S): at 18:23

## 2018-02-08 RX ADMIN — ATORVASTATIN CALCIUM 40 MILLIGRAM(S): 80 TABLET, FILM COATED ORAL at 21:26

## 2018-02-08 RX ADMIN — Medication 81 MILLIGRAM(S): at 13:56

## 2018-02-08 RX ADMIN — SODIUM CHLORIDE 2 GRAM(S): 9 INJECTION INTRAMUSCULAR; INTRAVENOUS; SUBCUTANEOUS at 14:04

## 2018-02-08 RX ADMIN — Medication 5 MILLIGRAM(S): at 21:26

## 2018-02-08 RX ADMIN — LEVETIRACETAM 1000 MILLIGRAM(S): 250 TABLET, FILM COATED ORAL at 05:27

## 2018-02-08 RX ADMIN — Medication 40 MILLIGRAM(S): at 13:55

## 2018-02-08 RX ADMIN — Medication 5 MILLIGRAM(S): at 09:41

## 2018-02-08 RX ADMIN — Medication 650 MILLIGRAM(S): at 17:41

## 2018-02-08 RX ADMIN — LEVETIRACETAM 1000 MILLIGRAM(S): 250 TABLET, FILM COATED ORAL at 17:41

## 2018-02-08 RX ADMIN — Medication 650 MILLIGRAM(S): at 18:26

## 2018-02-08 RX ADMIN — SODIUM CHLORIDE 2 GRAM(S): 9 INJECTION INTRAMUSCULAR; INTRAVENOUS; SUBCUTANEOUS at 21:26

## 2018-02-08 RX ADMIN — Medication 40 MILLIGRAM(S): at 21:26

## 2018-02-08 RX ADMIN — Medication 1 PATCH: at 13:55

## 2018-02-08 NOTE — PROGRESS NOTE ADULT - ASSESSMENT
HPI:  61y/o female brought in to [facility] ED s/p fall at home in the bathroom. Unable to obtain HPI, history, or ROS from patient due to her altered mental status. As per  patient woke up this morning feeling "unwell," vomited once with some dry heaves at 9am, then had slurred speech during the day starting at 10am. She spent most of the day in bed, and was able to have some tea and crackers. She then had a fall in the bathroom & hit the right side of her head. Level I trauma called as patient's altered mental status was initially concerning for potential need for intubation. In trauma bay primary survey significant for GCS of 11 (E=4 V =2 M=5). Secondary survey significant for Right frontal hematoma. (24 Jan 2018 05:31)        s/p EVD removed on 2/1 . Admitted with 4th ventricular IVH on 1/24

## 2018-02-08 NOTE — CHART NOTE - NSCHARTNOTEFT_GEN_A_CORE
Patient seen for nutrition follow up. Hospital course noted, patient with L MCA infarct and hemorrhage, s/p PEG 2/5.    Source: Patient [ ]    Family [ ]     other [x ] comprehensive chart review, RN    Diet : Jevity 1.2 @ 70 ml/hr x 18 hours/day      Patient tolerating enteral feeds at goal per documentation. 3 BMs 2/7    Enteral /Parenteral Nutrition: Jevity 1.2 infusing at goal of 70 ml/hr      Current Weight: no new weight  % Weight Change    Pertinent Medications: MEDICATIONS  (STANDING):  aspirin  chewable 81 milliGRAM(s) Oral daily  atorvastatin 40 milliGRAM(s) Oral at bedtime  enalapril 5 milliGRAM(s) Oral <User Schedule>  enoxaparin Injectable 30 milliGRAM(s) SubCutaneous <User Schedule>  levETIRAcetam  Solution 1000 milliGRAM(s) Oral two times a day  nicotine -  14 mG/24Hr(s) Patch 1 patch Transdermal daily  verapamil 40 milliGRAM(s) Oral three times a day    MEDICATIONS  (PRN):  acetaminophen    Suspension. 650 milliGRAM(s) Oral every 6 hours PRN Mild Pain (1 - 3)  ALBUTerol    90 MICROgram(s) HFA Inhaler 1 Puff(s) Inhalation every 4 hours PRN Wheezing  ipratropium 17 MICROgram(s) HFA Inhaler 1 Puff(s) Inhalation every 6 hours PRN wheez    Pertinent Labs:  02-08 Na137 mmol/L Glu 89 mg/dL K+ 3.6 mmol/L Cr  0.54 mg/dL BUN 11 mg/dL       Skin: no edema, no pressure injuries per documentation    Estimated Needs:   [x ] no change since previous assessment  [ ] recalculated:       Previous Nutrition Diagnosis: Swallowing difficulty being addressed with provision of enteral feeds    New Nutrition Diagnosis: [x ] not applicable     Interventions:     Recommend    [ ] Change Diet To:    [ ] Nutrition Supplement    [x ] Nutrition Support: continue with Jevity 1.2 at goal of 70 ml//hr x 18 hours/day as ordered. provides 1512 calories, 70 g protein (30 calories/kg, 1.4 g protein/kg) of dosing weight 49.9 kg    [ ] Other:        Monitoring and Evaluation:     [ ] PO intake [x ] Tolerance to diet prescription [ x] weights [x ] follow up per protocol    [ ] other:

## 2018-02-08 NOTE — PROGRESS NOTE ADULT - ASSESSMENT
62 year old female with acute L MCA infarct, additional microhemorrhages on MRA and IVH with poor neurologic recovery.  Dysphagia due to neurologic injury.  s/p PEG 2/5/18    PLAN  -PEG feeds and local care  -Monitor TF tolerance

## 2018-02-08 NOTE — PROGRESS NOTE ADULT - PROBLEM SELECTOR PLAN 4
- Varying degree of leukocytosis since admission but now rising again of unclear etiology  - Last fever 1/28. Previous infectious work up negative.   - LE duplex negative for DVT  - would have ID evaluate patient today

## 2018-02-08 NOTE — PROGRESS NOTE ADULT - PROBLEM SELECTOR PLAN 1
1 Out od bed   2 continue pt   3 dvt ppx sql scds   4 continue aspirin   5 continue keppra   6 ID  to see today   7 f/u cbc in am   8 continue tube feeds   9 stool softeners   10 dispo :p 1 Out od bed   2 continue pt   3 dvt ppx sql scds   4 continue aspirin   5 continue keppra   6 ID  to see today   7 f/u cbc in am   8 continue tube feeds   9 stool softeners   10 dispo :p  >35 minutes spend with patient

## 2018-02-08 NOTE — PROGRESS NOTE ADULT - PROBLEM SELECTOR PLAN 1
enalapril decreased to 5mg BID yesterday and labetalol switched to verapamil 40mg TID given concern for possible RCVS per discussion with neurology/stroke attending yesterday  Renal duplex negative for ALEX   SBP overnight elevated but trending down now. continue to monitor BP for now but if SBP persistently > 150-160's, would increase enalapril back to 10mg BID with hold parameter (goal BP ~140/90)

## 2018-02-08 NOTE — PROGRESS NOTE ADULT - SUBJECTIVE AND OBJECTIVE BOX
SUBJECTIVE: Patient was seen and evaluated at bedside. Patient is resting comfortably in bed and is in no new acute distress.     OVERNIGHT EVENTS: none     Vital Signs Last 24 Hrs  T(C): 36.7 (08 Feb 2018 09:19), Max: 36.7 (08 Feb 2018 09:19)  T(F): 98 (08 Feb 2018 09:19), Max: 98 (08 Feb 2018 09:19)  HR: 66 (08 Feb 2018 09:39) (57 - 79)  BP: 167/81 (08 Feb 2018 09:19) (127/76 - 185/91)  BP(mean): --  RR: 18 (08 Feb 2018 09:19) (18 - 18)  SpO2: 96% (08 Feb 2018 09:19) (95% - 96%)    PHYSICAL EXAM:    General: No Acute Distress     Neurological: awake alert oriented to person and place with choices , has expressive aphasia, follows simple commands, motor left upper and lower wnl, right side plegic     Pulmonary: Clear to Auscultation, No Rales, No Rhonchi, No Wheezes     Cardiovascular: S1, S2, Regular Rate and Rhythm     Gastrointestinal: Soft, Nontender, Nondistended     Extremities: No calf tenderness     Incision:     LABS:                        12.3   19.5  )-----------( 531      ( 08 Feb 2018 06:46 )             35.7    02-08    137  |  101  |  11  ----------------------------<  89  3.6   |  26  |  0.54    Ca    8.8      08 Feb 2018 06:46      Hemoglobin A1C, Whole Blood: 5.4 % (01-24 @ 22:40)      02-07 @ 07:01  -  02-08 @ 07:00  --------------------------------------------------------  IN: 550 mL / OUT: 0 mL / NET: 550 mL      DRAINS:     MEDICATIONS:  Antibiotics:    Neuro:  acetaminophen    Suspension. 650 milliGRAM(s) Oral every 6 hours PRN Mild Pain (1 - 3)  levETIRAcetam  Solution 1000 milliGRAM(s) Oral two times a day    Cardiac:  enalapril 5 milliGRAM(s) Oral <User Schedule>  verapamil 40 milliGRAM(s) Oral three times a day    Pulm:  ALBUTerol    90 MICROgram(s) HFA Inhaler 1 Puff(s) Inhalation every 4 hours PRN Wheezing  ipratropium 17 MICROgram(s) HFA Inhaler 1 Puff(s) Inhalation every 6 hours PRN wheez    GI/:    Other:   aspirin  chewable 81 milliGRAM(s) Oral daily  atorvastatin 40 milliGRAM(s) Oral at bedtime  enoxaparin Injectable 30 milliGRAM(s) SubCutaneous <User Schedule>  nicotine -  14 mG/24Hr(s) Patch 1 patch Transdermal daily    DIET: [] Regular [] CCD [] Renal [] Puree [] Dysphagia [] Tube Feeds: tube feeds     IMAGING: no new imaging today

## 2018-02-08 NOTE — PROGRESS NOTE ADULT - SUBJECTIVE AND OBJECTIVE BOX
Patient is a 62y old  Female who presents with a chief complaint of Level I trauma activation s/p fall (24 Jan 2018 13:50)      SUBJECTIVE / OVERNIGHT EVENTS: no acute events overnight. unable to obtain accurate ROS but patient able to nod yes or no when asked questions. denies any pain.     MEDICATIONS  (STANDING):  aspirin  chewable 81 milliGRAM(s) Oral daily  atorvastatin 40 milliGRAM(s) Oral at bedtime  enalapril 5 milliGRAM(s) Oral <User Schedule>  enoxaparin Injectable 30 milliGRAM(s) SubCutaneous <User Schedule>  levETIRAcetam  Solution 1000 milliGRAM(s) Oral two times a day  nicotine -  14 mG/24Hr(s) Patch 1 patch Transdermal daily  potassium chloride   Solution 40 milliEquivalent(s) Oral once  sodium chloride 2 Gram(s) Oral every 8 hours  verapamil 40 milliGRAM(s) Oral three times a day    MEDICATIONS  (PRN):  acetaminophen    Suspension. 650 milliGRAM(s) Oral every 6 hours PRN Mild Pain (1 - 3)  ALBUTerol    90 MICROgram(s) HFA Inhaler 1 Puff(s) Inhalation every 4 hours PRN Wheezing  ipratropium 17 MICROgram(s) HFA Inhaler 1 Puff(s) Inhalation every 6 hours PRN wheez      Vital Signs Last 24 Hrs  T(C): 36.7 (08 Feb 2018 09:19), Max: 36.7 (08 Feb 2018 09:19)  T(F): 98 (08 Feb 2018 09:19), Max: 98 (08 Feb 2018 09:19)  HR: 66 (08 Feb 2018 09:39) (57 - 79)  BP: 167/81 (08 Feb 2018 09:19) (127/76 - 185/91)  BP(mean): --  RR: 18 (08 Feb 2018 09:19) (18 - 18)  SpO2: 96% (08 Feb 2018 09:19) (95% - 96%)  CAPILLARY BLOOD GLUCOSE        I&O's Summary    07 Feb 2018 07:01  -  08 Feb 2018 07:00  --------------------------------------------------------  IN: 550 mL / OUT: 0 mL / NET: 550 mL        PHYSICAL EXAM:  GEN: NAD  HEENT: + right scalp hematoma  LUNG: Clear to auscultation bilaterally; No wheeze  HEART: S1, S2  ABDOMEN: Soft, Nontender, Nondistended; Bowel sounds present, + PEG covered with abdominal binder(site C/D/I)  EXTREMITIES: No leg edema  PSYCH: normal affect, calm  NEUROLOGY: Alert and awake, aphasic, follows simple commands (i.e. smile, squeezes hand, moves left arm and leg on command), unable to move right arm but slight movement noted in RLE  SKIN: warm, dry, no phlebitis      LABS:                        12.3   19.5  )-----------( 531      ( 08 Feb 2018 06:46 )             35.7     02-08    137  |  101  |  11  ----------------------------<  89  3.6   |  26  |  0.54    Ca    8.8      08 Feb 2018 06:46                RADIOLOGY & ADDITIONAL TESTS:    Imaging Personally Reviewed:    Consultant(s) Notes Reviewed:  cardiology, GI    Care Discussed with Consultants/Other Providers: neurosurgery PA

## 2018-02-08 NOTE — CONSULT NOTE ADULT - SUBJECTIVE AND OBJECTIVE BOX
HPI:   Patient is a 62y female admitted with hx fall, slurred speech, R frontal hematoma, 4th ventricular IVH on 1/24, had EVD removed, now s/p PEG placement. Pt had leukocytosis since admission 19-22, initially improved, now with rising WBC again. She had no resp or  symptoms, but started to have diarrhea today, which was attributed to beginning of tube feeds. She has residual R sided weakness and awaiting rehab which was cancelled due to leukocytosis    REVIEW OF SYSTEMS:  All other review of systems negative (Comprehensive ROS)    PAST MEDICAL & SURGICAL HISTORY:  No pertinent past medical history  H/O colonoscopy: 2015, normal      Allergies    No Known Drug Allergies  strawberry (Rash)    Intolerances        Antimicrobials Day #      Other Medications:  acetaminophen    Suspension. 650 milliGRAM(s) Oral every 6 hours PRN  ALBUTerol    90 MICROgram(s) HFA Inhaler 1 Puff(s) Inhalation every 4 hours PRN  aspirin  chewable 81 milliGRAM(s) Oral daily  atorvastatin 40 milliGRAM(s) Oral at bedtime  enalapril 5 milliGRAM(s) Oral <User Schedule>  enoxaparin Injectable 30 milliGRAM(s) SubCutaneous <User Schedule>  ipratropium 17 MICROgram(s) HFA Inhaler 1 Puff(s) Inhalation every 6 hours PRN  levETIRAcetam  Solution 1000 milliGRAM(s) Oral two times a day  nicotine -  14 mG/24Hr(s) Patch 1 patch Transdermal daily  potassium chloride   Solution 40 milliEquivalent(s) Oral once  sodium chloride 2 Gram(s) Oral every 8 hours  verapamil 40 milliGRAM(s) Oral three times a day      FAMILY HISTORY:  Family history of colon cancer in father (Father)      SOCIAL HISTORY:  Smoking:     ETOH:     Drug Use:     Single     T(F): 98.7 (02-08-18 @ 13:00), Max: 98.7 (02-08-18 @ 13:00)  HR: 64 (02-08-18 @ 13:00)  BP: 167/82 (02-08-18 @ 13:00)  RR: 18 (02-08-18 @ 13:00)  SpO2: 97% (02-08-18 @ 13:00)  Wt(kg): --    PHYSICAL EXAM:  General: alert, no acute distress  Eyes:  anicteric, no conjunctival injection, no discharge  Oropharynx: no lesions or injection   scalp incisions clean, drain removed	  Neck: supple, without adenopathy  Lungs: clear to auscultation  Heart: regular rate and rhythm; no murmur, rubs or gallops  Abdomen: soft, nondistended, nontender, without mass or organomegaly  Skin: no lesions  Extremities: no clubbing, cyanosis, or edema  Neurologic: alert, R sided weakness, aphasic, but follows comands    LAB RESULTS:                        12.3   19.5  )-----------( 531      ( 08 Feb 2018 06:46 )             35.7     02-08    137  |  101  |  11  ----------------------------<  89  3.6   |  26  |  0.54    Ca    8.8      08 Feb 2018 06:46            MICROBIOLOGY REVIEWED:    RADIOLOGY REVIEWED:

## 2018-02-08 NOTE — PROGRESS NOTE ADULT - PROBLEM SELECTOR PLAN 5
- Likely demand ischemia. Repeat EKG now with no concerning new ischemic changes and CE negative.   - TTE with concentric LVH, mild diastolic dysfunction  - Cardiology following  - Eventual ischemic evaluation likely as outpatient  - low dose aspirin started

## 2018-02-08 NOTE — CONSULT NOTE ADULT - ASSESSMENT
s/p fall, IVH hemorrhage, s/p EVD drain removed  had leukocytosis since admission 2 weeks ago, now also with thrombocytosis  diarrhea probably related to tube feeds, but nosocomial C diff also in differential    PLAN:   check CBC with diff in am  check stool for C diff  d/w neurosurgery  if leukocytosis and thrombocytosis persists may need to r/o underlying myelodysplasia  no indications for antibiotics at present  d/w neurosurgery PA, RN  d/w family at bedside

## 2018-02-08 NOTE — PROGRESS NOTE ADULT - SUBJECTIVE AND OBJECTIVE BOX
Subjective: Patient seen and examined. No new events except as noted.     SUBJECTIVE/ROS:    Due to altered mental status, subjective information were not able to be obtained from the patient. History was obtained, to the extent possible, from review of the chart and collateral sources of information.     MEDICATIONS:  MEDICATIONS  (STANDING):  aspirin  chewable 81 milliGRAM(s) Oral daily  atorvastatin 40 milliGRAM(s) Oral at bedtime  enalapril 5 milliGRAM(s) Oral <User Schedule>  enoxaparin Injectable 30 milliGRAM(s) SubCutaneous <User Schedule>  levETIRAcetam  Solution 1000 milliGRAM(s) Oral two times a day  nicotine -  14 mG/24Hr(s) Patch 1 patch Transdermal daily  potassium chloride   Solution 40 milliEquivalent(s) Oral once  sodium chloride 2 Gram(s) Oral every 8 hours  verapamil 40 milliGRAM(s) Oral three times a day      PHYSICAL EXAM:  T(C): 36.7 (02-08-18 @ 15:55), Max: 37.1 (02-08-18 @ 13:00)  HR: 68 (02-08-18 @ 15:55) (57 - 79)  BP: 158/80 (02-08-18 @ 15:55) (158/80 - 185/91)  RR: 18 (02-08-18 @ 15:55) (18 - 18)  SpO2: 94% (02-08-18 @ 15:55) (94% - 97%)  Wt(kg): --  I&O's Summary    07 Feb 2018 07:01  -  08 Feb 2018 07:00  --------------------------------------------------------  IN: 550 mL / OUT: 0 mL / NET: 550 mL          Appearance: Normal	  HEENT:   Normal oral mucosa, PERRL, EOMI	  Cardiovascular: Normal S1 S2,    Murmur:   Neck: JVP normal  Respiratory: Lungs clear to auscultation  Gastrointestinal:  Soft, Non-tender, + BS	  Skin: normal   Neuro: No gross deficits.   Psychiatry:  Mood & affect appropriate  Ext: No edema      LABS/DATA:    CARDIAC MARKERS:                                12.3   19.5  )-----------( 531      ( 08 Feb 2018 06:46 )             35.7     02-08    137  |  101  |  11  ----------------------------<  89  3.6   |  26  |  0.54    Ca    8.8      08 Feb 2018 06:46      proBNP:   Lipid Profile:   HgA1c:   TSH:     TELE:  EKG:

## 2018-02-08 NOTE — PROGRESS NOTE ADULT - ASSESSMENT
62 F, active smoker, with no sig PMHx, recent URI symptoms admitted on 1/24/18 with AMS, fall with head trauma found to have large right frontal scalp hematoma, acute left MCA territory infarct, IVH, and hydrocephalus on CT head requiring intubation and EVD. CTA head with ? 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 Vasculitis and infectious work up negative. Course c/b subclinical seizures, fevers and leukocytosis with negative infectious work up, also with EKG changes and elevated troponin ? due to demand ischemia, and dysphagia. Patient was extubated on 1/28, EVD discontinued on 2/1 and transferred out of ICU on 2/2/18, with hypernatremia(resolved), s/p PEG on 2/5 with rising leukocytosis of unclear etiology.     PMD: Dr. Felipe Kathleen [phone] (but hasn't seen in many years)

## 2018-02-08 NOTE — PROGRESS NOTE ADULT - SUBJECTIVE AND OBJECTIVE BOX
INTERVAL HPI/OVERNIGHT EVENTS:  no overnight events  tolerating PEG feeds at goal rate    MEDICATIONS  (STANDING):  aspirin  chewable 81 milliGRAM(s) Oral daily  atorvastatin 40 milliGRAM(s) Oral at bedtime  enalapril 10 milliGRAM(s) Oral two times a day  enoxaparin Injectable 30 milliGRAM(s) SubCutaneous <User Schedule>  levETIRAcetam  Solution 1000 milliGRAM(s) Oral two times a day  nicotine - 21 mG/24Hr(s) Patch 1 patch Transdermal daily  verapamil 40 milliGRAM(s) Oral three times a day    MEDICATIONS  (PRN):  acetaminophen    Suspension. 650 milliGRAM(s) Oral every 6 hours PRN Mild Pain (1 - 3)  ALBUTerol    90 MICROgram(s) HFA Inhaler 1 Puff(s) Inhalation every 4 hours PRN Wheezing  ipratropium 17 MICROgram(s) HFA Inhaler 1 Puff(s) Inhalation every 6 hours PRN wheez    Allergies  No Known Drug Allergies  strawberry (Rash)    Review of Systems:  General:  No fever, chills  CV:  No pain  Resp:  no cough  :  No pain, bleeding, incontinence, nocturia  Muscle:  Rt hemiparesis  Neuro:  +Rt hemiparesis  Heme:  No petechiae, ecchymosis, easy bruisability  Skin:  No rash, tattoos, scars, edema    T(F): 98 (02-08-18 @ 09:19), Max: 98 (02-08-18 @ 09:19)  HR: 66 (02-08-18 @ 09:39) (57 - 79)  BP: 167/81 (02-08-18 @ 09:19) (127/76 - 185/91)  RR: 18 (02-08-18 @ 09:19) (18 - 18)  SpO2: 96% (02-08-18 @ 09:19) (95% - 96%)  Wt(kg): --  ,   I&O's Summary    07 Feb 2018 07:01  -  08 Feb 2018 07:00  --------------------------------------------------------  IN: 550 mL / OUT: 0 mL / NET: 550 mL    PHYSICAL EXAM:  Constitutional: resting comfortably, non-toxic, arousable to verbal and tactile stimuli  Neck: No LAD, supple  Respiratory: +rhonchi  Cardiovascular: S1 and S2, RRR,  Gastrointestinal: BS+, soft, NT/ND, neg HSM, PEG site C&D, external bumper tight and loosened to 2.5cm +abdominal binder in place  Extremities: No peripheral edema, neg clubbing, cyanosis  Vascular: 2+ peripheral pulses  Neurological: +rt hemiparesis  Skin: No rashes    LABS:                      11.5   17.08 )-----------( 520      ( 07 Feb 2018 07:32 )             35.2     02-07    139  |  104  |  13  ----------------------------<  86  4.0   |  21<L>  |  0.53    Ca    8.7      07 Feb 2018 07:28    RADIOLOGY & ADDITIONAL TESTS:

## 2018-02-09 LAB
ANION GAP SERPL CALC-SCNC: 9 MMOL/L — SIGNIFICANT CHANGE UP (ref 5–17)
BASOPHILS # BLD AUTO: 0.02 K/UL — SIGNIFICANT CHANGE UP (ref 0–0.2)
BASOPHILS NFR BLD AUTO: 0.1 % — SIGNIFICANT CHANGE UP (ref 0–2)
BUN SERPL-MCNC: 11 MG/DL — SIGNIFICANT CHANGE UP (ref 7–23)
CALCIUM SERPL-MCNC: 9 MG/DL — SIGNIFICANT CHANGE UP (ref 8.4–10.5)
CHLORIDE SERPL-SCNC: 101 MMOL/L — SIGNIFICANT CHANGE UP (ref 96–108)
CO2 SERPL-SCNC: 27 MMOL/L — SIGNIFICANT CHANGE UP (ref 22–31)
CREAT SERPL-MCNC: 0.44 MG/DL — LOW (ref 0.5–1.3)
EOSINOPHIL # BLD AUTO: 0.19 K/UL — SIGNIFICANT CHANGE UP (ref 0–0.5)
EOSINOPHIL NFR BLD AUTO: 1.2 % — SIGNIFICANT CHANGE UP (ref 0–6)
GLUCOSE SERPL-MCNC: 112 MG/DL — HIGH (ref 70–99)
HCT VFR BLD CALC: 36.7 % — SIGNIFICANT CHANGE UP (ref 34.5–45)
HGB BLD-MCNC: 11.8 G/DL — SIGNIFICANT CHANGE UP (ref 11.5–15.5)
IMM GRANULOCYTES NFR BLD AUTO: 0.8 % — SIGNIFICANT CHANGE UP (ref 0–1.5)
LYMPHOCYTES # BLD AUTO: 1.87 K/UL — SIGNIFICANT CHANGE UP (ref 1–3.3)
LYMPHOCYTES # BLD AUTO: 12 % — LOW (ref 13–44)
MCHC RBC-ENTMCNC: 32.2 GM/DL — SIGNIFICANT CHANGE UP (ref 32–36)
MCHC RBC-ENTMCNC: 33.7 PG — SIGNIFICANT CHANGE UP (ref 27–34)
MCV RBC AUTO: 104.9 FL — HIGH (ref 80–100)
MONOCYTES # BLD AUTO: 0.84 K/UL — SIGNIFICANT CHANGE UP (ref 0–0.9)
MONOCYTES NFR BLD AUTO: 5.4 % — SIGNIFICANT CHANGE UP (ref 2–14)
NEUTROPHILS # BLD AUTO: 12.54 K/UL — HIGH (ref 1.8–7.4)
NEUTROPHILS NFR BLD AUTO: 80.5 % — HIGH (ref 43–77)
PLATELET # BLD AUTO: 595 K/UL — HIGH (ref 150–400)
POTASSIUM SERPL-MCNC: 4.1 MMOL/L — SIGNIFICANT CHANGE UP (ref 3.5–5.3)
POTASSIUM SERPL-SCNC: 4.1 MMOL/L — SIGNIFICANT CHANGE UP (ref 3.5–5.3)
RBC # BLD: 3.5 M/UL — LOW (ref 3.8–5.2)
RBC # FLD: 13.1 % — SIGNIFICANT CHANGE UP (ref 10.3–14.5)
SODIUM SERPL-SCNC: 137 MMOL/L — SIGNIFICANT CHANGE UP (ref 135–145)
WBC # BLD: 15.59 K/UL — HIGH (ref 3.8–10.5)
WBC # FLD AUTO: 15.59 K/UL — HIGH (ref 3.8–10.5)

## 2018-02-09 PROCEDURE — 99233 SBSQ HOSP IP/OBS HIGH 50: CPT

## 2018-02-09 PROCEDURE — 99232 SBSQ HOSP IP/OBS MODERATE 35: CPT

## 2018-02-09 RX ORDER — HYDRALAZINE HCL 50 MG
10 TABLET ORAL ONCE
Qty: 0 | Refills: 0 | Status: DISCONTINUED | OUTPATIENT
Start: 2018-02-09 | End: 2018-02-09

## 2018-02-09 RX ORDER — ENOXAPARIN SODIUM 100 MG/ML
30 INJECTION SUBCUTANEOUS
Qty: 0 | Refills: 0 | Status: DISCONTINUED | OUTPATIENT
Start: 2018-02-09 | End: 2018-02-15

## 2018-02-09 RX ADMIN — Medication 10 MILLIGRAM(S): at 11:00

## 2018-02-09 RX ADMIN — Medication 81 MILLIGRAM(S): at 12:35

## 2018-02-09 RX ADMIN — Medication 650 MILLIGRAM(S): at 05:26

## 2018-02-09 RX ADMIN — Medication 40 MILLIGRAM(S): at 13:27

## 2018-02-09 RX ADMIN — Medication 1 PATCH: at 12:35

## 2018-02-09 RX ADMIN — SODIUM CHLORIDE 2 GRAM(S): 9 INJECTION INTRAMUSCULAR; INTRAVENOUS; SUBCUTANEOUS at 21:55

## 2018-02-09 RX ADMIN — ATORVASTATIN CALCIUM 40 MILLIGRAM(S): 80 TABLET, FILM COATED ORAL at 21:55

## 2018-02-09 RX ADMIN — Medication 10 MILLIGRAM(S): at 21:55

## 2018-02-09 RX ADMIN — ENOXAPARIN SODIUM 30 MILLIGRAM(S): 100 INJECTION SUBCUTANEOUS at 17:27

## 2018-02-09 RX ADMIN — SODIUM CHLORIDE 2 GRAM(S): 9 INJECTION INTRAMUSCULAR; INTRAVENOUS; SUBCUTANEOUS at 13:27

## 2018-02-09 RX ADMIN — SODIUM CHLORIDE 2 GRAM(S): 9 INJECTION INTRAMUSCULAR; INTRAVENOUS; SUBCUTANEOUS at 05:26

## 2018-02-09 RX ADMIN — LEVETIRACETAM 1000 MILLIGRAM(S): 250 TABLET, FILM COATED ORAL at 05:26

## 2018-02-09 RX ADMIN — Medication 1 PATCH: at 12:30

## 2018-02-09 RX ADMIN — LEVETIRACETAM 1000 MILLIGRAM(S): 250 TABLET, FILM COATED ORAL at 17:27

## 2018-02-09 RX ADMIN — Medication 40 MILLIGRAM(S): at 05:25

## 2018-02-09 RX ADMIN — Medication 40 MILLIGRAM(S): at 21:55

## 2018-02-09 NOTE — PROGRESS NOTE ADULT - SUBJECTIVE AND OBJECTIVE BOX
Patient is a 62y old  Female who presents with a chief complaint of Patient was admitted on 1/24 as a trauma. Patient had a fall as per  with altered mental status. Patient had an EVD drain in place for Inta-ventricular hemorrhage  and it was removed on 2/1 (24 Jan 2018 13:50)      SUBJECTIVE / OVERNIGHT EVENTS: per RN, no loose stool/diarrhea overnight. patient denies any pain. per spouse, patient is overall doing better and getting stronger.     MEDICATIONS  (STANDING):  aspirin  chewable 81 milliGRAM(s) Oral daily  atorvastatin 40 milliGRAM(s) Oral at bedtime  enalapril 10 milliGRAM(s) Oral <User Schedule>  enoxaparin Injectable 30 milliGRAM(s) SubCutaneous <User Schedule>  levETIRAcetam  Solution 1000 milliGRAM(s) Oral two times a day  nicotine -  14 mG/24Hr(s) Patch 1 patch Transdermal daily  sodium chloride 2 Gram(s) Oral every 8 hours  verapamil 40 milliGRAM(s) Oral three times a day    MEDICATIONS  (PRN):  acetaminophen    Suspension. 650 milliGRAM(s) Oral every 6 hours PRN Mild Pain (1 - 3)  ALBUTerol    90 MICROgram(s) HFA Inhaler 1 Puff(s) Inhalation every 4 hours PRN Wheezing  hydrALAZINE Injectable 10 milliGRAM(s) IV Push once PRN sbp > 180  ipratropium 17 MICROgram(s) HFA Inhaler 1 Puff(s) Inhalation every 6 hours PRN wheez      Vital Signs Last 24 Hrs  T(C): 36.2 (09 Feb 2018 05:08), Max: 37.1 (08 Feb 2018 13:00)  T(F): 97.2 (09 Feb 2018 05:08), Max: 98.7 (08 Feb 2018 13:00)  HR: 59 (09 Feb 2018 06:25) (58 - 68)  BP: 167/81 (09 Feb 2018 06:25) (158/80 - 203/101)  BP(mean): --  RR: 18 (09 Feb 2018 05:08) (18 - 18)  SpO2: 96% (09 Feb 2018 05:08) (94% - 98%)  CAPILLARY BLOOD GLUCOSE        I&O's Summary    08 Feb 2018 07:01  -  09 Feb 2018 07:00  --------------------------------------------------------  IN: 620 mL / OUT: 0 mL / NET: 620 mL        PHYSICAL EXAM:  GEN: NAD  HEENT: + right scalp hematoma  LUNG: Clear to auscultation bilaterally; No wheeze  HEART: S1, S2  ABDOMEN: Soft, Nontender, Nondistended; Bowel sounds present, + PEG covered with abdominal binder(site C/D/I)  EXTREMITIES: No leg edema  PSYCH: normal affect, calm  NEUROLOGY: Alert and awake, aphasic, follows simple commands (i.e. smile, squeezes hand, moves left arm and leg on command), unable to move right arm but slight movement noted in RLE  SKIN: warm, dry, no phlebitis      LABS:                        11.8   15.59 )-----------( 595      ( 09 Feb 2018 07:59 )             36.7     02-09    137  |  101  |  11  ----------------------------<  112<H>  4.1   |  27  |  0.44<L>    Ca    9.0      09 Feb 2018 07:45                RADIOLOGY & ADDITIONAL TESTS:    Imaging Personally Reviewed:    Consultant(s) Notes Reviewed:  ID, GI    Care Discussed with Consultants/Other Providers: neurosurgery

## 2018-02-09 NOTE — PROGRESS NOTE ADULT - ASSESSMENT
HPI:  63y/o female brought in to [facility] ED s/p fall at home in the bathroom. Unable to obtain HPI, history, or ROS from patient due to her altered mental status. As per  patient woke up this morning feeling "unwell," vomited once with some dry heaves at 9am, then had slurred speech during the day starting at 10am. She spent most of the day in bed, and was able to have some tea and crackers. She then had a fall in the bathroom & hit the right side of her head. Level I trauma called as patient's altered mental status was initially concerning for potential need for intubation. In trauma bay primary survey significant for GCS of 11 (E=4 V =2 M=5). Secondary survey significant for Right frontal hematoma. (24 Jan 2018 05:31)  s/p EVD removed on 2/1 . Admitted with 4th ventricular IVH on 1/24

## 2018-02-09 NOTE — PROGRESS NOTE ADULT - SUBJECTIVE AND OBJECTIVE BOX
SUBJECTIVE: Patient was seen and evaluated at bedside. Patient is resting comfortably in bed and is in no new acute distress.     OVERNIGHT EVENTS: none     Vital Signs Last 24 Hrs  T(C): 36.6 (09 Feb 2018 09:53), Max: 37.1 (08 Feb 2018 13:00)  T(F): 97.9 (09 Feb 2018 09:53), Max: 98.7 (08 Feb 2018 13:00)  HR: 68 (09 Feb 2018 09:53) (58 - 68)  BP: 199/78 (09 Feb 2018 09:53) (158/80 - 203/101)  BP(mean): --  RR: 18 (09 Feb 2018 09:53) (18 - 18)  SpO2: 96% (09 Feb 2018 09:53) (94% - 98%)    PHYSICAL EXAM:    General: No Acute Distress     Neurological: awake alert oriented to person and place with choices , has expressive aphasia, follows simple commands, motor left upper and lower wnl, right side plegic       Pulmonary: Clear to Auscultation, No Rales, No Rhonchi, No Wheezes     Cardiovascular: S1, S2, Regular Rate and Rhythm     Gastrointestinal: Soft, Nontender, Nondistended     Extremities: No calf tenderness     Incision: staples on head     LABS:                        11.8   15.59 )-----------( 595      ( 09 Feb 2018 07:59 )             36.7    02-09    137  |  101  |  11  ----------------------------<  112<H>  4.1   |  27  |  0.44<L>    Ca    9.0      09 Feb 2018 07:45      Hemoglobin A1C, Whole Blood: 5.4 % (01-24 @ 22:40)      02-08 @ 07:01  -  02-09 @ 07:00  --------------------------------------------------------  IN: 620 mL / OUT: 0 mL / NET: 620 mL      DRAINS:     MEDICATIONS:  Antibiotics:    Neuro:  acetaminophen    Suspension. 650 milliGRAM(s) Oral every 6 hours PRN Mild Pain (1 - 3)  levETIRAcetam  Solution 1000 milliGRAM(s) Oral two times a day    Cardiac:  enalapril 10 milliGRAM(s) Oral <User Schedule>  verapamil 40 milliGRAM(s) Oral three times a day    Pulm:  ALBUTerol    90 MICROgram(s) HFA Inhaler 1 Puff(s) Inhalation every 4 hours PRN Wheezing  ipratropium 17 MICROgram(s) HFA Inhaler 1 Puff(s) Inhalation every 6 hours PRN wheez    GI/:    Other:   aspirin  chewable 81 milliGRAM(s) Oral daily  atorvastatin 40 milliGRAM(s) Oral at bedtime  enoxaparin Injectable 30 milliGRAM(s) SubCutaneous <User Schedule>  nicotine -  14 mG/24Hr(s) Patch 1 patch Transdermal daily  sodium chloride 2 Gram(s) Oral every 8 hours    DIET: [] Regular [] CCD [] Renal [] Puree [] Dysphagia [] Tube Feeds: tube feeds     IMAGING: no new imaging

## 2018-02-09 NOTE — PROGRESS NOTE ADULT - PROBLEM SELECTOR PLAN 5
- Likely demand ischemia. Repeat EKG now with no concerning new ischemic changes and CE negative.   - TTE with concentric LVH, mild diastolic dysfunction  - Cardiology following  - Eventual ischemic evaluation likely as outpatient  - continue aspirin

## 2018-02-09 NOTE — PROGRESS NOTE ADULT - ASSESSMENT
Elevated trop  likely demand ischemia in setting of acute hemorrhagic cva , IVH and possible sepsis  levels are trending down   asa once deemed safe by nsx  eventual ischemic work up in future    HTN  elevated  increase enalapril   on verapamil as per neurology  off labetalol     abnormal ekg   likely due to LVH

## 2018-02-09 NOTE — PROGRESS NOTE ADULT - SUBJECTIVE AND OBJECTIVE BOX
CC: f/u for    Patient reports    REVIEW OF SYSTEMS: no fevers, no chills, no nausea, no vomiting, no abd pain, no resp symptoms  All other review of systems negative (Comprehensive ROS)    Antimicrobials Day #      Other Medications Reviewed    T(F): 98.4 (02-09-18 @ 12:00), Max: 98.4 (02-09-18 @ 12:00)  HR: 60 (02-09-18 @ 12:00)  BP: 175/75 (02-09-18 @ 12:00)  RR: 18 (02-09-18 @ 12:00)  SpO2: 95% (02-09-18 @ 12:00)    PHYSICAL EXAM:  General: alert, no acute distress  Eyes:  anicteric, no conjunctival injection, no discharge  Oropharynx: no lesions or injection 	  Neck: supple, without adenopathy  Lungs: clear to auscultation  Heart: regular rate and rhythm; no murmur, rubs or gallops  Abdomen: soft, nondistended, nontender, without mass or organomegaly  +PEG site clean  Skin: no lesions  Extremities: no clubbing, cyanosis, or edema  Neurologic: alert, R sided weakness, aphasic    LAB RESULTS:                        11.8   15.59 )-----------( 595      ( 09 Feb 2018 07:59 )             36.7     02-09    137  |  101  |  11  ----------------------------<  112<H>  4.1   |  27  |  0.44<L>    Ca    9.0      09 Feb 2018 07:45            MICROBIOLOGY REVIEWED:    RADIOLOGY REVIEWED:

## 2018-02-09 NOTE — PROGRESS NOTE ADULT - SUBJECTIVE AND OBJECTIVE BOX
Subjective: Patient seen and examined. No new events except as noted.     SUBJECTIVE/ROS:  Due to altered mental status, subjective information were not able to be obtained from the patient. History was obtained, to the extent possible, from review of the chart and collateral sources of information.       MEDICATIONS:  MEDICATIONS  (STANDING):  aspirin  chewable 81 milliGRAM(s) Oral daily  atorvastatin 40 milliGRAM(s) Oral at bedtime  enalapril 10 milliGRAM(s) Oral <User Schedule>  enoxaparin Injectable 30 milliGRAM(s) SubCutaneous <User Schedule>  levETIRAcetam  Solution 1000 milliGRAM(s) Oral two times a day  nicotine -  14 mG/24Hr(s) Patch 1 patch Transdermal daily  sodium chloride 2 Gram(s) Oral every 8 hours  verapamil 40 milliGRAM(s) Oral three times a day      PHYSICAL EXAM:  T(C): 36.9 (02-09-18 @ 12:00), Max: 36.9 (02-09-18 @ 12:00)  HR: 60 (02-09-18 @ 12:00) (58 - 68)  BP: 175/75 (02-09-18 @ 12:00) (158/80 - 203/101)  RR: 18 (02-09-18 @ 12:00) (18 - 18)  SpO2: 95% (02-09-18 @ 12:00) (94% - 98%)  Wt(kg): --  I&O's Summary    08 Feb 2018 07:01  -  09 Feb 2018 07:00  --------------------------------------------------------  IN: 620 mL / OUT: 0 mL / NET: 620 mL        JVP: Normal  Neck: supple  Lung: clear   CV: S1 S2 , Murmur:  Abd: soft  Ext: No edema  neuro: Awake   Psych: flat affect  Skin: normal       LABS/DATA:    CARDIAC MARKERS:                                11.8   15.59 )-----------( 595      ( 09 Feb 2018 07:59 )             36.7     02-09    137  |  101  |  11  ----------------------------<  112<H>  4.1   |  27  |  0.44<L>    Ca    9.0      09 Feb 2018 07:45      proBNP:   Lipid Profile:   HgA1c:   TSH:     TELE:  EKG:

## 2018-02-09 NOTE — PROGRESS NOTE ADULT - ASSESSMENT
62 F, active smoker, with no sig PMHx, recent URI symptoms admitted on 1/24/18 with AMS, fall with head trauma found to have large right frontal scalp hematoma, acute left MCA territory infarct, IVH, and hydrocephalus on CT head requiring intubation and EVD. CTA head with ? 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 Vasculitis and infectious work up negative. Course c/b subclinical seizures, fevers and leukocytosis with negative infectious work up, also with EKG changes and elevated troponin ? due to demand ischemia, and dysphagia. Patient was extubated on 1/28, EVD discontinued on 2/1 and transferred out of ICU on 2/2/18, with hypernatremia(resolved), s/p PEG on 2/5 with persistent leukocytosis of unclear etiology.     PMD: Dr. Felipe Kathleen [phone] (but hasn't seen in many years)

## 2018-02-09 NOTE — PROGRESS NOTE ADULT - SUBJECTIVE AND OBJECTIVE BOX
INTERVAL HPI/OVERNIGHT EVENTS:  no overnight events  tolerating PEG feeds at goal rate  Unclear from flow sheet when last bowel movement was    MEDICATIONS  (STANDING):  aspirin  chewable 81 milliGRAM(s) Oral daily  atorvastatin 40 milliGRAM(s) Oral at bedtime  enalapril 10 milliGRAM(s) Oral <User Schedule>  enoxaparin Injectable 30 milliGRAM(s) SubCutaneous <User Schedule>  levETIRAcetam  Solution 1000 milliGRAM(s) Oral two times a day  nicotine -  14 mG/24Hr(s) Patch 1 patch Transdermal daily  sodium chloride 2 Gram(s) Oral every 8 hours  verapamil 40 milliGRAM(s) Oral three times a day    MEDICATIONS  (PRN):  acetaminophen    Suspension. 650 milliGRAM(s) Oral every 6 hours PRN Mild Pain (1 - 3)  ALBUTerol    90 MICROgram(s) HFA Inhaler 1 Puff(s) Inhalation every 4 hours PRN Wheezing  hydrALAZINE Injectable 10 milliGRAM(s) IV Push once PRN sbp > 180  ipratropium 17 MICROgram(s) HFA Inhaler 1 Puff(s) Inhalation every 6 hours PRN wheez    Allergies  No Known Drug Allergies  strawberry (Rash)    Review of Systems:  General:  No fever, chills  CV:  No pain  Resp:  no cough  :  No pain, bleeding, incontinence, nocturia  Muscle:  Rt hemiparesis  Neuro:  +Rt hemiparesis  Heme:  No petechiae, ecchymosis, easy bruisability  Skin:  No rash, tattoos, scars, edema    T(F): 97.2 (02-09-18 @ 05:08), Max: 98.7 (02-08-18 @ 13:00)  HR: 59 (02-09-18 @ 06:25) (58 - 68)  BP: 167/81 (02-09-18 @ 06:25) (158/80 - 203/101)  RR: 18 (02-09-18 @ 05:08) (18 - 18)  SpO2: 96% (02-09-18 @ 05:08) (94% - 98%)  Wt(kg): --  ,   I&O's Summary    08 Feb 2018 07:01  -  09 Feb 2018 07:00  --------------------------------------------------------  IN: 620 mL / OUT: 0 mL / NET: 620 mL    PHYSICAL EXAM:  Constitutional: resting comfortably, non-toxic, arousable to verbal and tactile stimuli  Neck: No LAD, supple  Respiratory: +rhonchi  Cardiovascular: S1 and S2, RRR,  Gastrointestinal: BS+, soft, NT/ND, neg HSM, PEG site C&D, external bumper rotates freely at 2.5cm +abdominal binder in place  Rectal: Small amount soft brown stool without impaction  Extremities: No peripheral edema, neg clubbing, cyanosis  Vascular: 2+ peripheral pulses  Neurological: +rt hemiparesis  Skin: No rashes    LABS:                               11.8   15.59 )-----------( 595      ( 09 Feb 2018 07:59 )             36.7               02-09    137  |  101  |  11  ----------------------------<  112<H>  4.1   |  27  |  0.44<L>    Ca    9.0      09 Feb 2018 07:45    RADIOLOGY & ADDITIONAL TESTS:

## 2018-02-09 NOTE — PROGRESS NOTE ADULT - PROBLEM SELECTOR PLAN 9
- incidental 2.6 x 2.2 cm left adrenal nodule noted on CT a/p  - will need outpatient follow up. d/w spouse.

## 2018-02-09 NOTE — PROGRESS NOTE ADULT - PROBLEM SELECTOR PLAN 2
- unclear etiology for acute left MCA stroke but neuro considering ? reversible cerebral vasoconstriction syndrome (RCVS or Betty syndrome) as possible etiology and less likely vasculitis (work up unremarkable).   - LP negative for meningitis/infection.  - per discussion with neurology/stroke attending, started on verapamil 40mg TID given concern for possible RCVS  - LE duplex negative for DVT  - continue with statin, aspirin  - no acute neurosurgical intervention for IVH.

## 2018-02-09 NOTE — PROGRESS NOTE ADULT - ASSESSMENT
62 year old female with acute L MCA infarct, additional microhemorrhages on MRA and IVH with poor neurologic recovery.  Dysphagia due to neurologic injury.  s/p PEG 2/5/18    PLAN  -PEG feeds and local care  -Monitor TF tolerance    Please call over weekend prn with GI concerns.

## 2018-02-09 NOTE — PROGRESS NOTE ADULT - PROBLEM SELECTOR PLAN 1
1 Out od bed   2 continue pt   3 dvt ppx sql scds   4 continue aspirin   5 continue keppra   6 ID  consult appreciated   7 continue tube feeds   9 stool softeners   10 dispo :p  11 increased enalapril dose   12 continue verapamil   13 continue Proventil and Atrovent.   >35 minutes spend with patient. 1 Out od bed   2 continue pt   3 dvt ppx sql scds   4 continue aspirin   5 continue keppra   6 ID  consult appreciated   7 continue tube feeds   9 stool softeners   10 dispo :p  11 increased enalapril dose   12 continue verapamil   13 continue Proventil and Atrovent.   14 continue salt tabs   >35 minutes spend with patient.

## 2018-02-09 NOTE — PROGRESS NOTE ADULT - PROBLEM SELECTOR PLAN 4
- Varying degree of leukocytosis since admission of unclear etiology  - Last fever 1/28. Previous infectious work up negative.   - LE duplex negative for DVT  - appreciate ID input  - patient is not having loose stool or diarrhea per RN so cdiff was not sent. leukocytosis improving today.   - will need close follow up as outpatient.

## 2018-02-09 NOTE — PROGRESS NOTE ADULT - ASSESSMENT
s/p fall, IVH hemorrhage, s/p EVD drain removed  had leukocytosis since admission 2 weeks ago, now also with thrombocytosis  diarrhea probably related to tube feeds, but nosocomial C diff also in differential  no abd pain or diarrhea today and leukocytosis now improving    PLAN:   monitor WBC trend--improving  check stool for C diff if diarrhea  no indications for antibiotics at present  d/c planning

## 2018-02-09 NOTE — PROGRESS NOTE ADULT - PROBLEM SELECTOR PLAN 1
BP not optimal (goal BP ~140/90)  increased enalapril to 10mg BID today   continue verapamil 40mg TID given concern for possible RCVS per discussion with neurology/stroke attending   Renal duplex negative for ALEX

## 2018-02-09 NOTE — PROGRESS NOTE ADULT - PROBLEM SELECTOR PLAN 6
- resolved  - would add free water bolus via PEG if recurs - resolved  - would add free water bolus via PEG if recurs  - taper off salt tab

## 2018-02-10 PROCEDURE — 99233 SBSQ HOSP IP/OBS HIGH 50: CPT

## 2018-02-10 PROCEDURE — 93010 ELECTROCARDIOGRAM REPORT: CPT

## 2018-02-10 RX ORDER — HYDRALAZINE HCL 50 MG
10 TABLET ORAL ONCE
Qty: 0 | Refills: 0 | Status: COMPLETED | OUTPATIENT
Start: 2018-02-10 | End: 2018-02-10

## 2018-02-10 RX ORDER — VERAPAMIL HCL 240 MG
80 CAPSULE, EXTENDED RELEASE PELLETS 24 HR ORAL THREE TIMES A DAY
Qty: 0 | Refills: 0 | Status: DISCONTINUED | OUTPATIENT
Start: 2018-02-10 | End: 2018-02-15

## 2018-02-10 RX ORDER — LABETALOL HCL 100 MG
100 TABLET ORAL EVERY 8 HOURS
Qty: 0 | Refills: 0 | Status: DISCONTINUED | OUTPATIENT
Start: 2018-02-10 | End: 2018-02-10

## 2018-02-10 RX ORDER — HYDRALAZINE HCL 50 MG
25 TABLET ORAL
Qty: 0 | Refills: 0 | Status: DISCONTINUED | OUTPATIENT
Start: 2018-02-10 | End: 2018-02-13

## 2018-02-10 RX ADMIN — SODIUM CHLORIDE 2 GRAM(S): 9 INJECTION INTRAMUSCULAR; INTRAVENOUS; SUBCUTANEOUS at 06:38

## 2018-02-10 RX ADMIN — Medication 100 MILLIGRAM(S): at 11:51

## 2018-02-10 RX ADMIN — LEVETIRACETAM 1000 MILLIGRAM(S): 250 TABLET, FILM COATED ORAL at 06:38

## 2018-02-10 RX ADMIN — Medication 10 MILLIGRAM(S): at 00:46

## 2018-02-10 RX ADMIN — Medication 80 MILLIGRAM(S): at 06:38

## 2018-02-10 RX ADMIN — Medication 25 MILLIGRAM(S): at 18:05

## 2018-02-10 RX ADMIN — Medication 1 PATCH: at 11:53

## 2018-02-10 RX ADMIN — Medication 80 MILLIGRAM(S): at 22:30

## 2018-02-10 RX ADMIN — SODIUM CHLORIDE 2 GRAM(S): 9 INJECTION INTRAMUSCULAR; INTRAVENOUS; SUBCUTANEOUS at 14:04

## 2018-02-10 RX ADMIN — ATORVASTATIN CALCIUM 40 MILLIGRAM(S): 80 TABLET, FILM COATED ORAL at 22:30

## 2018-02-10 RX ADMIN — Medication 1 PATCH: at 11:52

## 2018-02-10 RX ADMIN — SODIUM CHLORIDE 2 GRAM(S): 9 INJECTION INTRAMUSCULAR; INTRAVENOUS; SUBCUTANEOUS at 22:30

## 2018-02-10 RX ADMIN — Medication 80 MILLIGRAM(S): at 14:04

## 2018-02-10 RX ADMIN — Medication 10 MILLIGRAM(S): at 10:00

## 2018-02-10 RX ADMIN — ENOXAPARIN SODIUM 30 MILLIGRAM(S): 100 INJECTION SUBCUTANEOUS at 18:05

## 2018-02-10 RX ADMIN — Medication 10 MILLIGRAM(S): at 22:29

## 2018-02-10 RX ADMIN — Medication 10 MILLIGRAM(S): at 05:29

## 2018-02-10 RX ADMIN — Medication 81 MILLIGRAM(S): at 11:51

## 2018-02-10 RX ADMIN — LEVETIRACETAM 1000 MILLIGRAM(S): 250 TABLET, FILM COATED ORAL at 18:05

## 2018-02-10 NOTE — PROVIDER CONTACT NOTE (OTHER) - RECOMMENDATIONS
MD to assess patient at bedside. assess PEG status, order IV meds, patient has no access for meds via PEG

## 2018-02-10 NOTE — PROGRESS NOTE ADULT - SUBJECTIVE AND OBJECTIVE BOX
CC: f/u for    Patient reports    REVIEW OF SYSTEMS: no fevers, no chills, no nausea, no vomiting, no abd pain, no resp symptoms  All other review of systems negative (Comprehensive ROS)    Antimicrobials Day #  none  	    Other Medications Reviewed    Vital Signs Last 24 Hrs  T(C): 36.3 (10 Feb 2018 07:28), Max: 36.9 (09 Feb 2018 12:00)  T(F): 97.4 (10 Feb 2018 07:28), Max: 98.4 (09 Feb 2018 12:00)  HR: 79 (10 Feb 2018 07:28) (60 - 91)  BP: 162/79 (10 Feb 2018 07:28) (152/81 - 209/117)  BP(mean): --  RR: 18 (10 Feb 2018 07:28) (18 - 18)  SpO2: 94% (10 Feb 2018 07:28) (94% - 97%)    PHYSICAL EXAM:  General: alert, no acute distress  Eyes:  anicteric, no conjunctival injection, no discharge  Oropharynx: no lesions or injection 	  Neck: supple, without adenopathy  Lungs: clear to auscultation  Heart: regular rate and rhythm; no murmur, rubs or gallops  Abdomen: soft, nondistended, nontender, without mass or organomegaly  +PEG site clean  Skin: no lesions  Extremities: no clubbing, cyanosis, or edema  Neurologic: alert, R sided weakness, aphasic    LAB RESULTS:                          11.8   15.59 )-----------( 595      ( 09 Feb 2018 07:59 )             36.7   02-09    137  |  101  |  11  ----------------------------<  112<H>  4.1   |  27  |  0.44<L>    Ca    9.0      09 Feb 2018 07:45                MICROBIOLOGY REVIEWED:    RADIOLOGY REVIEWED:

## 2018-02-10 NOTE — PROGRESS NOTE ADULT - SUBJECTIVE AND OBJECTIVE BOX
Subjective: Patient seen and examined. No new events except as noted.     SUBJECTIVE/ROS:  Due to altered mental status, subjective information were not able to be obtained from the patient. History was obtained, to the extent possible, from review of the chart and collateral sources of information.        MEDICATIONS:  MEDICATIONS  (STANDING):  aspirin  chewable 81 milliGRAM(s) Oral daily  atorvastatin 40 milliGRAM(s) Oral at bedtime  enalapril 10 milliGRAM(s) Oral <User Schedule>  enoxaparin Injectable 30 milliGRAM(s) SubCutaneous <User Schedule>  hydrALAZINE 25 milliGRAM(s) Oral four times a day  levETIRAcetam  Solution 1000 milliGRAM(s) Oral two times a day  nicotine -  14 mG/24Hr(s) Patch 1 patch Transdermal daily  sodium chloride 2 Gram(s) Oral every 8 hours  verapamil 80 milliGRAM(s) Oral three times a day      PHYSICAL EXAM:  T(C): 37.1 (02-10-18 @ 12:54), Max: 37.1 (02-10-18 @ 12:54)  HR: 64 (02-10-18 @ 12:54) (61 - 91)  BP: 148/76 (02-10-18 @ 12:54) (148/76 - 209/117)  RR: 18 (02-10-18 @ 12:54) (18 - 18)  SpO2: 98% (02-10-18 @ 12:54) (94% - 98%)  Wt(kg): --  I&O's Summary    09 Feb 2018 07:01  -  10 Feb 2018 07:00  --------------------------------------------------------  IN: 540 mL / OUT: 0 mL / NET: 540 mL          Appearance: Normal	  HEENT:   Normal oral mucosa, PERRL, EOMI	  Cardiovascular: Normal S1 S2,    Murmur:   Neck: JVP normal  Respiratory: Lungs clear to auscultation  Gastrointestinal:  Soft, Non-tender, + BS	  Skin: normal   Neuro: No gross deficits.   Psychiatry:  Mood & affect appropriate  Ext: No edema      LABS/DATA:    CARDIAC MARKERS:                                11.8   15.59 )-----------( 595      ( 09 Feb 2018 07:59 )             36.7     02-09    137  |  101  |  11  ----------------------------<  112<H>  4.1   |  27  |  0.44<L>    Ca    9.0      09 Feb 2018 07:45      proBNP:   Lipid Profile:   HgA1c:   TSH:     TELE:  EKG:

## 2018-02-10 NOTE — PROGRESS NOTE ADULT - ASSESSMENT
s/p fall, IVH hemorrhage, s/p EVD drain removed  had leukocytosis since admission 2 weeks ago, now also with thrombocytosis  diarrhea probably related to tube feeds, but nosocomial C diff also in differential  no abd pain and leukocytosis now improving    PLAN:   monitor WBC trend--improving  check stool for C diff if diarrhea  no indications for antibiotics at present  d/c planning

## 2018-02-10 NOTE — PROGRESS NOTE ADULT - SUBJECTIVE AND OBJECTIVE BOX
SUBJECTIVE: Patient was seen and evaluated at bedside. Patient is resting comfortably in bed and is in no new acute distress.   MEDICATIONS  (STANDING):  aspirin  chewable 81 milliGRAM(s) Oral daily  atorvastatin 40 milliGRAM(s) Oral at bedtime  enalapril 10 milliGRAM(s) Oral <User Schedule>  enoxaparin Injectable 30 milliGRAM(s) SubCutaneous <User Schedule>  levETIRAcetam  Solution 1000 milliGRAM(s) Oral two times a day  nicotine -  14 mG/24Hr(s) Patch 1 patch Transdermal daily  sodium chloride 2 Gram(s) Oral every 8 hours  verapamil 80 milliGRAM(s) Oral three times a day    MEDICATIONS  (PRN):  acetaminophen    Suspension. 650 milliGRAM(s) Oral every 6 hours PRN Mild Pain (1 - 3)  ALBUTerol    90 MICROgram(s) HFA Inhaler 1 Puff(s) Inhalation every 4 hours PRN Wheezing  artificial  tears Solution 1 Drop(s) Both EYES every 4 hours PRN dryness  ipratropium 17 MICROgram(s) HFA Inhaler 1 Puff(s) Inhalation every 6 hours PRN wheez    OVERNIGHT EVENTS: Overnight patient removed feeding tube spout, tubing noted to be cracked. /90 HR 61, patient restless.   On examination, alert, aphasic. Patient was given PRN hydralazine overnight, this am SBP improved.     ICU Vital Signs Last 24 Hrs  T(C): 36.3 (10 Feb 2018 07:28), Max: 36.9 (09 Feb 2018 12:00)  T(F): 97.4 (10 Feb 2018 07:28), Max: 98.4 (09 Feb 2018 12:00)  HR: 79 (10 Feb 2018 07:28) (60 - 91)  BP: 162/79 (10 Feb 2018 07:28) (152/81 - 209/117)  BP(mean): --  ABP: --  ABP(mean): --  RR: 18 (10 Feb 2018 07:28) (18 - 18)  SpO2: 94% (10 Feb 2018 07:28) (94% - 97%)      PHYSICAL EXAM:    General: No Acute Distress     Neurological: awake alert oriented to person and place with choices , has expressive aphasia, follows simple commands, motor left upper and lower wnl, right side plegic     Pulmonary: Clear to Auscultation, No Rales, No Rhonchi, No Wheezes     Cardiovascular: S1, S2, Regular Rate and Rhythm     Gastrointestinal: Soft, Nontender, Nondistended     Extremities: No calf tenderness     Incision: staples on head         Assessment and Plan:   · Assessment		  HPI:  61y/o female brought in to [facility] ED s/p fall at home in the bathroom. Unable to obtain HPI, history, or ROS from patient due to her altered mental status. As per  patient woke up this morning feeling "unwell," vomited once with some dry heaves at 9am, then had slurred speech during the day starting at 10am. She spent most of the day in bed, and was able to have some tea and crackers. She then had a fall in the bathroom & hit the right side of her head. Level I trauma called as patient's altered mental status was initially concerning for potential need for intubation. In trauma bay primary survey significant for GCS of 11 (E=4 V =2 M=5). Secondary survey significant for Right frontal hematoma. (24 Jan 2018 05:31)  s/p EVD removed on 2/1 . Admitted with 4th ventricular IVH on 1/24         Cardiology   - elevated troponin, likely demand ischemia in setting of acute hemorrhagic cva , IVH and possible sepsis  levels are trending down   will require resuming ASA at some point  eventual ischemic work up in future    -HTN   increase enalapril   on verapamil as per neurology  off labetalol     ID  -had leukocytosis since admission 2 weeks ago, now also with thrombocytosis  -diarrhea probably related to tube feeds, but nosocomial C diff also in differential  -monitor WBC trend--improving  -check stool for C diff if diarrhea  -no indications for antibiotics at present    GI  -will need f/u regarding cracked PEG tube    PT- ROM, Bed Mob, Transfers, Amb w AD and bracing as needed  OT- ADLs, bracing  SLP- Dysphagia eval and treat; S/P PEG   Prec- Falls, Cardiac, Pulm  DVT Prophylaxis- Lovenox  Skin- Turn q2 h    Dispo  - [facility] ([facility]), initially pending transfer today, due to use of IV hydralazine transfer refused,   -d/w Dr. Miguel ([place] [facility]), to re-call on Monday

## 2018-02-10 NOTE — PROGRESS NOTE ADULT - PROBLEM SELECTOR PLAN 1
BP not optimal  Yesterday increased enalapril to 10mg BID, and continued verapamil 40mg TID.   Start labetalol 100mg PO TID. Monitor BP and HR.   Renal duplex negative for ALEX

## 2018-02-10 NOTE — PROGRESS NOTE ADULT - SUBJECTIVE AND OBJECTIVE BOX
Wing Mayer MD  ([facility] Hospitalist)  Pager [phone])      Patient is a 62y old  Female who presents with a chief complaint of Patient was admitted on 1/24 as a trauma. Patient had a fall as per  with altered mental status. Patient had an EVD drain in place for Inta-ventricular hemorrhage  and it was removed on 2/1 (24 Jan 2018 13:50)      SUBJECTIVE / OVERNIGHT EVENTS: The patient's blood pressure was not well controlled overnight; thus, her discharge to rehab was cancelled. I visited the patient twice this morning. Initially she appeared calm, no distress, no acute complaints. Later when her  came in, the patient appeared to be in distress and seemed to be agitated. The patient's  expressed his concern about the blood pressure medication regimen.     MEDICATIONS  (STANDING):  aspirin  chewable 81 milliGRAM(s) Oral daily  atorvastatin 40 milliGRAM(s) Oral at bedtime  enalapril 10 milliGRAM(s) Oral <User Schedule>  enoxaparin Injectable 30 milliGRAM(s) SubCutaneous <User Schedule>  labetalol 100 milliGRAM(s) Oral every 8 hours  levETIRAcetam  Solution 1000 milliGRAM(s) Oral two times a day  nicotine -  14 mG/24Hr(s) Patch 1 patch Transdermal daily  sodium chloride 2 Gram(s) Oral every 8 hours  verapamil 80 milliGRAM(s) Oral three times a day    MEDICATIONS  (PRN):  acetaminophen    Suspension. 650 milliGRAM(s) Oral every 6 hours PRN Mild Pain (1 - 3)  ALBUTerol    90 MICROgram(s) HFA Inhaler 1 Puff(s) Inhalation every 4 hours PRN Wheezing  artificial  tears Solution 1 Drop(s) Both EYES every 4 hours PRN dryness  ipratropium 17 MICROgram(s) HFA Inhaler 1 Puff(s) Inhalation every 6 hours PRN wheez    Vital Signs Last 24 Hrs  T(C): 36.3 (10 Feb 2018 07:28), Max: 36.9 (09 Feb 2018 12:00)  T(F): 97.4 (10 Feb 2018 07:28), Max: 98.4 (09 Feb 2018 12:00)  HR: 79 (10 Feb 2018 07:28) (60 - 91)  BP: 162/79 (10 Feb 2018 07:28) (152/81 - 209/117)  BP(mean): --  RR: 18 (10 Feb 2018 07:28) (18 - 18)  SpO2: 94% (10 Feb 2018 07:28) (94% - 97%)  CAPILLARY BLOOD GLUCOSE      I&O's Summary  09 Feb 2018 07:01  -  10 Feb 2018 07:00  --------------------------------------------------------  IN: 540 mL / OUT: 0 mL / NET: 540 mL      PHYSICAL EXAM:  GEN: NAD  HEENT: + right scalp hematoma  LUNG: Clear to auscultation bilaterally; No wheeze  HEART: S1, S2, rrr  ABDOMEN: Soft, Nontender, Nondistended; Bowel sounds present, + PEG covered with abdominal binder(site C/D/I)  EXTREMITIES: No leg edema  PSYCH: normal affect, calm  NEUROLOGY: Alert and awake, aphasic, follows simple commands (i.e. smile, squeezes hand, moves left arm and leg on command), unable to move right arm but slight movement noted in RLE  SKIN: warm, dry, no phlebitis    LABS:                        11.8   15.59 )-----------( 595      ( 09 Feb 2018 07:59 )             36.7     02-09    137  |  101  |  11  ----------------------------<  112<H>  4.1   |  27  |  0.44<L>    Ca    9.0      09 Feb 2018 07:45        RADIOLOGY & ADDITIONAL TESTS:    Consultant(s) Notes Reviewed: ID, Neurosurgery, Cardiology, GI    Care Discussed with Consultants/Other Providers: Neurosurgery re hypertension

## 2018-02-10 NOTE — PROGRESS NOTE ADULT - ASSESSMENT
62 F, active smoker, with no sig PMHx, recent URI symptoms admitted on 1/24/18 with AMS, fall with head trauma found to have large right frontal scalp hematoma, acute left MCA territory infarct, IVH, and hydrocephalus on CT head requiring intubation and EVD. CTA head with ? 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 Vasculitis and infectious work up negative. Course c/b subclinical seizures, fevers and leukocytosis with negative infectious work up, also with EKG changes and elevated troponin ? due to demand ischemia, and dysphagia. Patient was extubated on 1/28, EVD discontinued on 2/1 and transferred out of ICU on 2/2/18, with hypernatremia(resolved), s/p PEG on 2/5 with persistent leukocytosis of unclear etiology.     PMD: Dr. Felipe Kathleen [phone] (but hasn't seen in many years)

## 2018-02-10 NOTE — PROGRESS NOTE ADULT - PROBLEM SELECTOR PLAN 9
- incidental 2.6 x 2.2 cm left adrenal nodule noted on CT a/p  - will need outpatient follow up. Spouse aware.

## 2018-02-11 PROCEDURE — 99233 SBSQ HOSP IP/OBS HIGH 50: CPT

## 2018-02-11 RX ORDER — ACETAMINOPHEN 500 MG
1000 TABLET ORAL ONCE
Qty: 0 | Refills: 0 | Status: COMPLETED | OUTPATIENT
Start: 2018-02-11 | End: 2018-02-12

## 2018-02-11 RX ADMIN — Medication 1 PATCH: at 12:46

## 2018-02-11 RX ADMIN — Medication 80 MILLIGRAM(S): at 15:51

## 2018-02-11 RX ADMIN — LEVETIRACETAM 1000 MILLIGRAM(S): 250 TABLET, FILM COATED ORAL at 18:00

## 2018-02-11 RX ADMIN — SODIUM CHLORIDE 2 GRAM(S): 9 INJECTION INTRAMUSCULAR; INTRAVENOUS; SUBCUTANEOUS at 05:15

## 2018-02-11 RX ADMIN — Medication 25 MILLIGRAM(S): at 00:18

## 2018-02-11 RX ADMIN — Medication 25 MILLIGRAM(S): at 05:15

## 2018-02-11 RX ADMIN — Medication 81 MILLIGRAM(S): at 12:47

## 2018-02-11 RX ADMIN — Medication 25 MILLIGRAM(S): at 18:00

## 2018-02-11 RX ADMIN — Medication 25 MILLIGRAM(S): at 12:47

## 2018-02-11 RX ADMIN — ENOXAPARIN SODIUM 30 MILLIGRAM(S): 100 INJECTION SUBCUTANEOUS at 18:00

## 2018-02-11 RX ADMIN — Medication 0.1 MILLIGRAM(S): at 12:47

## 2018-02-11 RX ADMIN — Medication 10 MILLIGRAM(S): at 12:47

## 2018-02-11 RX ADMIN — Medication 80 MILLIGRAM(S): at 05:15

## 2018-02-11 RX ADMIN — LEVETIRACETAM 1000 MILLIGRAM(S): 250 TABLET, FILM COATED ORAL at 05:15

## 2018-02-11 NOTE — PROGRESS NOTE ADULT - SUBJECTIVE AND OBJECTIVE BOX
Wing Mayer MD  ([facility] Hospitalist)  Pager [phone]    Patient is a 62y old  Female who presents with a chief complaint of altered mental status    SUBJECTIVE / OVERNIGHT EVENTS: The patient remained hypertensive overnight.     MEDICATIONS  (STANDING):  aspirin  chewable 81 milliGRAM(s) Oral daily  atorvastatin 40 milliGRAM(s) Oral at bedtime  cloNIDine 0.1 milliGRAM(s) Oral <User Schedule>  enalapril 10 milliGRAM(s) Oral <User Schedule>  enoxaparin Injectable 30 milliGRAM(s) SubCutaneous <User Schedule>  hydrALAZINE 25 milliGRAM(s) Oral four times a day  levETIRAcetam  Solution 1000 milliGRAM(s) Oral two times a day  nicotine -  14 mG/24Hr(s) Patch 1 patch Transdermal daily  verapamil 80 milliGRAM(s) Oral three times a day    MEDICATIONS  (PRN):  acetaminophen    Suspension. 650 milliGRAM(s) Oral every 6 hours PRN Mild Pain (1 - 3)  ALBUTerol    90 MICROgram(s) HFA Inhaler 1 Puff(s) Inhalation every 4 hours PRN Wheezing  artificial  tears Solution 1 Drop(s) Both EYES every 4 hours PRN dryness  ipratropium 17 MICROgram(s) HFA Inhaler 1 Puff(s) Inhalation every 6 hours PRN wheez      Vital Signs Last 24 Hrs  T(C): 36.8 (11 Feb 2018 07:48), Max: 37.2 (10 Feb 2018 23:30)  T(F): 98.3 (11 Feb 2018 07:48), Max: 99 (10 Feb 2018 23:30)  HR: 68 (11 Feb 2018 07:48) (57 - 71)  BP: 172/82 (11 Feb 2018 07:48) (148/76 - 200/83)  BP(mean): --  RR: 18 (11 Feb 2018 07:48) (18 - 18)  SpO2: 96% (11 Feb 2018 07:48) (94% - 98%)  CAPILLARY BLOOD GLUCOSE        I&O's Summary    10 Feb 2018 07:01  -  11 Feb 2018 07:00  --------------------------------------------------------  IN: 700 mL / OUT: 0 mL / NET: 700 mL        PHYSICAL EXAM:  GEN: NAD  HEENT: + right scalp hematoma  LUNG: Clear to auscultation bilaterally; No wheeze  HEART: S1, S2, rrr. HR ~60  ABDOMEN: Soft, Nontender, Nondistended; Bowel sounds present, + PEG covered with abdominal binder(site C/D/I)  EXTREMITIES: No leg edema  PSYCH: calm  NEUROLOGY: Alert and awake, aphasic, follows simple commands (i.e. smile, squeezes hand, moves left arm and leg on command), unable to move right arm but slight movement noted in RLE  SKIN: warm, dry, no phlebitis      RADIOLOGY & ADDITIONAL TESTS:    Consultant(s) Notes Reviewed: Cardiology, Neurosurgery     Care Discussed with Consultants/Other Providers: Neurosurgery re overall management

## 2018-02-11 NOTE — PROGRESS NOTE ADULT - SUBJECTIVE AND OBJECTIVE BOX
SUBJECTIVE:   No a/c distress.   OVERNIGHT EVENTS: none    Vital Signs Last 24 Hrs  T(C): 36.8 (11 Feb 2018 07:48), Max: 37.2 (10 Feb 2018 23:30)  T(F): 98.3 (11 Feb 2018 07:48), Max: 99 (10 Feb 2018 23:30)  HR: 68 (11 Feb 2018 07:48) (57 - 71)  BP: 172/82 (11 Feb 2018 07:48) (148/76 - 200/83)  RR: 18 (11 Feb 2018 07:48) (18 - 18)  SpO2: 96% (11 Feb 2018 07:48) (94% - 98%)    PHYSICAL EXAM:    Constitutional: No Acute Distress     Neurological: AOx3, Following Commands, Moving LUE/LLE 5/5. RUE- 0/5 RLE HF 1/5 KF 2/5 KE 3/5 DF/PF 3/5    Pulmonary: Clear to Auscultation,    Cardiovascular: S1, S2, Regular rate and rhythm     Gastrointestinal: Soft, Non-tender, Non-distended . Peg in place    Extremities: No calf tenderness         LABS:         No new labs      IMAGING:         MEDICATIONS:    acetaminophen    Suspension. 650 milliGRAM(s) Oral every 6 hours PRN Mild Pain (1 - 3)  levETIRAcetam  Solution 1000 milliGRAM(s) Oral two times a day  cloNIDine 0.1 milliGRAM(s) Oral <User Schedule>  enalapril 10 milliGRAM(s) Oral <User Schedule>  hydrALAZINE 25 milliGRAM(s) Oral four times a day  verapamil 80 milliGRAM(s) Oral three times a day  ALBUTerol    90 MICROgram(s) HFA Inhaler 1 Puff(s) Inhalation every 4 hours PRN Wheezing  ipratropium 17 MICROgram(s) HFA Inhaler 1 Puff(s) Inhalation every 6 hours PRN wheez  artificial  tears Solution 1 Drop(s) Both EYES every 4 hours PRN dryness  aspirin  chewable 81 milliGRAM(s) Oral daily  atorvastatin 40 milliGRAM(s) Oral at bedtime  enoxaparin Injectable 30 milliGRAM(s) SubCutaneous <User Schedule>  nicotine -  14 mG/24Hr(s) Patch 1 patch Transdermal daily      DIET:

## 2018-02-11 NOTE — PROGRESS NOTE ADULT - PROBLEM SELECTOR PLAN 4
- Varying degree of leukocytosis since admission of unclear etiology  - Last fever 1/28. Previous infectious work up negative.   - LE duplex negative for DVT  - appreciate ID input  - patient is not having loose stool or diarrhea per RN so cdiff was not sent. - leukocytosis improving?  - will need close follow up as outpatient.

## 2018-02-11 NOTE — PROGRESS NOTE ADULT - PROBLEM SELECTOR PLAN 1
BP not optimal  Management per cardiology  On enalapril 10mg BID, verapamil 40mg TID, started on hydralazine 25mg TID yesterday, this morning started on clonidine 0.1mg BID.  Renal duplex negative for ALEX

## 2018-02-11 NOTE — PROGRESS NOTE ADULT - ASSESSMENT
Elevated trop  likely demand ischemia in setting of acute hemorrhagic cva , IVH and possible sepsis  levels are trending down   asa once deemed safe by nsx  eventual ischemic work up in future    HTN  on verapamil as per neuro  add clonidine    abnormal ekg   likely due to LVH

## 2018-02-11 NOTE — PROGRESS NOTE ADULT - ASSESSMENT
62 F, active smoker, with no sig PMHx, recent URI symptoms admitted on 1/24/18 with AMS, fall with head trauma found to have large right frontal scalp hematoma, acute left MCA territory infarct, IVH, and hydrocephalus on CT head requiring intubation and EVD. CTA head with ? micro AVM at the right cerebellarpontine angle but cerebral angio negative for vascular malformation but noted multifocal vascular irregularities of the distal left MCA/SAHNE/PCA region of unclear etiology. Vasculitis and infectious work up negative. Course c/b subclinical seizures, fevers and leukocytosis with negative infectious work up, also with EKG changes and elevated troponin ? due to demand ischemia, and dysphagia. Patient was extubated on 1/28, EVD discontinued on 2/1 and transferred out of ICU on 2/2/18, with hypernatremia(resolved), s/p PEG on 2/5 with persistent leukocytosis of unclear etiology.     PMD: Dr. Felipe Kathleen [phone] (but hasn't seen in many years)

## 2018-02-11 NOTE — PROGRESS NOTE ADULT - SUBJECTIVE AND OBJECTIVE BOX
Subjective: Patient seen and examined. No new events except as noted.     SUBJECTIVE/ROS:  Due to altered mental status, subjective information were not able to be obtained from the patient. History was obtained, to the extent possible, from review of the chart and collateral sources of information.       MEDICATIONS:  MEDICATIONS  (STANDING):  aspirin  chewable 81 milliGRAM(s) Oral daily  atorvastatin 40 milliGRAM(s) Oral at bedtime  cloNIDine 0.1 milliGRAM(s) Oral <User Schedule>  enalapril 10 milliGRAM(s) Oral <User Schedule>  enoxaparin Injectable 30 milliGRAM(s) SubCutaneous <User Schedule>  hydrALAZINE 25 milliGRAM(s) Oral four times a day  levETIRAcetam  Solution 1000 milliGRAM(s) Oral two times a day  nicotine -  14 mG/24Hr(s) Patch 1 patch Transdermal daily  verapamil 80 milliGRAM(s) Oral three times a day      PHYSICAL EXAM:  T(C): 36.4 (02-11-18 @ 12:55), Max: 37.2 (02-10-18 @ 23:30)  HR: 80 (02-11-18 @ 12:55) (57 - 80)  BP: 150/76 (02-11-18 @ 12:55) (150/76 - 200/83)  RR: 18 (02-11-18 @ 12:55) (18 - 18)  SpO2: 99% (02-11-18 @ 12:55) (94% - 99%)  Wt(kg): --  I&O's Summary    10 Feb 2018 07:01  -  11 Feb 2018 07:00  --------------------------------------------------------  IN: 700 mL / OUT: 0 mL / NET: 700 mL        JVP: Normal  Neck: supple  Lung: clear   CV: S1 S2 , Murmur:  Abd: soft  Ext: No edema  neuro: Awake / alert  Psych: flat affect  Skin: normal     LABS/DATA:    CARDIAC MARKERS:                  proBNP:   Lipid Profile:   HgA1c:   TSH:     TELE:  EKG:

## 2018-02-11 NOTE — PROGRESS NOTE ADULT - ASSESSMENT
62 F, active smoker, with no sig PMHx, recent URI symptoms admitted on 1/24/18 with AMS, fall with head trauma found to have large right frontal scalp hematoma, acute left MCA territory infarct, IVH, and hydrocephalus on CT head requiring intubation and EVD. CTA head with ? 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 Course c/b subclinical seizures, fevers and leukocytosis with negative infectious work up, also with EKG changes and elevated troponin ? due to demand ischemia, and dysphagia .  Patient was extubated on 1/28, EVD discontinued on 2/1 and transferred out of ICU on 2/2/18, s/p peg placement 2/5/18  hypernatremia, leukocytosis now improving and intermittent uncontrolled hypertension.    Plan:  Neuro stable. On ASA. Verapamil for treatment of RCVS  HTN- -200.  On Hydralazine 25 qid, Verapamil 80q8 Enalapril 10 BID. d/w Dr Pelaez . to start Clonidine 0.1mg BID  Dysphagia- Continue tube feeds  DVT ppx  Possible d/c to acute rehab if BP better controlled

## 2018-02-12 DIAGNOSIS — R50.9 FEVER, UNSPECIFIED: ICD-10-CM

## 2018-02-12 DIAGNOSIS — K94.23 GASTROSTOMY MALFUNCTION: ICD-10-CM

## 2018-02-12 LAB
ANION GAP SERPL CALC-SCNC: 11 MMOL/L — SIGNIFICANT CHANGE UP (ref 5–17)
ANION GAP SERPL CALC-SCNC: 9 MMOL/L — SIGNIFICANT CHANGE UP (ref 5–17)
APPEARANCE UR: CLEAR — SIGNIFICANT CHANGE UP
BILIRUB UR-MCNC: NEGATIVE — SIGNIFICANT CHANGE UP
BUN SERPL-MCNC: 12 MG/DL — SIGNIFICANT CHANGE UP (ref 7–23)
BUN SERPL-MCNC: 13 MG/DL — SIGNIFICANT CHANGE UP (ref 7–23)
CALCIUM SERPL-MCNC: 8.5 MG/DL — SIGNIFICANT CHANGE UP (ref 8.4–10.5)
CALCIUM SERPL-MCNC: 8.7 MG/DL — SIGNIFICANT CHANGE UP (ref 8.4–10.5)
CHLORIDE SERPL-SCNC: 94 MMOL/L — LOW (ref 96–108)
CHLORIDE SERPL-SCNC: 96 MMOL/L — SIGNIFICANT CHANGE UP (ref 96–108)
CO2 SERPL-SCNC: 26 MMOL/L — SIGNIFICANT CHANGE UP (ref 22–31)
CO2 SERPL-SCNC: 29 MMOL/L — SIGNIFICANT CHANGE UP (ref 22–31)
COLOR SPEC: YELLOW — SIGNIFICANT CHANGE UP
CREAT SERPL-MCNC: 0.54 MG/DL — SIGNIFICANT CHANGE UP (ref 0.5–1.3)
CREAT SERPL-MCNC: 0.58 MG/DL — SIGNIFICANT CHANGE UP (ref 0.5–1.3)
DIFF PNL FLD: NEGATIVE — SIGNIFICANT CHANGE UP
GLUCOSE SERPL-MCNC: 107 MG/DL — HIGH (ref 70–99)
GLUCOSE SERPL-MCNC: 97 MG/DL — SIGNIFICANT CHANGE UP (ref 70–99)
GLUCOSE UR QL: NEGATIVE — SIGNIFICANT CHANGE UP
HCT VFR BLD CALC: 36.6 % — SIGNIFICANT CHANGE UP (ref 34.5–45)
HGB BLD-MCNC: 12.2 G/DL — SIGNIFICANT CHANGE UP (ref 11.5–15.5)
KETONES UR-MCNC: NEGATIVE — SIGNIFICANT CHANGE UP
LEUKOCYTE ESTERASE UR-ACNC: NEGATIVE — SIGNIFICANT CHANGE UP
MCHC RBC-ENTMCNC: 33.3 GM/DL — SIGNIFICANT CHANGE UP (ref 32–36)
MCHC RBC-ENTMCNC: 34.4 PG — HIGH (ref 27–34)
MCV RBC AUTO: 103 FL — HIGH (ref 80–100)
NITRITE UR-MCNC: NEGATIVE — SIGNIFICANT CHANGE UP
PH UR: 6.5 — SIGNIFICANT CHANGE UP (ref 5–8)
PLATELET # BLD AUTO: 494 K/UL — HIGH (ref 150–400)
POTASSIUM SERPL-MCNC: 3.8 MMOL/L — SIGNIFICANT CHANGE UP (ref 3.5–5.3)
POTASSIUM SERPL-MCNC: 4 MMOL/L — SIGNIFICANT CHANGE UP (ref 3.5–5.3)
POTASSIUM SERPL-SCNC: 3.8 MMOL/L — SIGNIFICANT CHANGE UP (ref 3.5–5.3)
POTASSIUM SERPL-SCNC: 4 MMOL/L — SIGNIFICANT CHANGE UP (ref 3.5–5.3)
PROT UR-MCNC: SIGNIFICANT CHANGE UP
RBC # BLD: 3.54 M/UL — LOW (ref 3.8–5.2)
RBC # FLD: 12 % — SIGNIFICANT CHANGE UP (ref 10.3–14.5)
SODIUM SERPL-SCNC: 131 MMOL/L — LOW (ref 135–145)
SODIUM SERPL-SCNC: 134 MMOL/L — LOW (ref 135–145)
SP GR SPEC: 1.02 — SIGNIFICANT CHANGE UP (ref 1.01–1.02)
UROBILINOGEN FLD QL: 2 MG/DL
WBC # BLD: 12.3 K/UL — HIGH (ref 3.8–10.5)
WBC # FLD AUTO: 12.3 K/UL — HIGH (ref 3.8–10.5)

## 2018-02-12 PROCEDURE — 99233 SBSQ HOSP IP/OBS HIGH 50: CPT

## 2018-02-12 PROCEDURE — 74018 RADEX ABDOMEN 1 VIEW: CPT | Mod: 26,59

## 2018-02-12 PROCEDURE — 71045 X-RAY EXAM CHEST 1 VIEW: CPT | Mod: 26

## 2018-02-12 RX ORDER — HYDRALAZINE HCL 50 MG
5 TABLET ORAL
Qty: 0 | Refills: 0 | Status: DISCONTINUED | OUTPATIENT
Start: 2018-02-12 | End: 2018-02-12

## 2018-02-12 RX ORDER — HYDRALAZINE HCL 50 MG
5 TABLET ORAL ONCE
Qty: 0 | Refills: 0 | Status: COMPLETED | OUTPATIENT
Start: 2018-02-12 | End: 2018-02-12

## 2018-02-12 RX ORDER — LEVETIRACETAM 250 MG/1
1000 TABLET, FILM COATED ORAL EVERY 12 HOURS
Qty: 0 | Refills: 0 | Status: DISCONTINUED | OUTPATIENT
Start: 2018-02-12 | End: 2018-02-13

## 2018-02-12 RX ORDER — HYDRALAZINE HCL 50 MG
5 TABLET ORAL
Qty: 0 | Refills: 0 | Status: DISCONTINUED | OUTPATIENT
Start: 2018-02-12 | End: 2018-02-13

## 2018-02-12 RX ADMIN — Medication 1.25 MILLIGRAM(S): at 23:00

## 2018-02-12 RX ADMIN — Medication 80 MILLIGRAM(S): at 23:46

## 2018-02-12 RX ADMIN — Medication 10 MILLIGRAM(S): at 23:46

## 2018-02-12 RX ADMIN — Medication 1 PATCH: at 12:16

## 2018-02-12 RX ADMIN — Medication 5 MILLIGRAM(S): at 19:43

## 2018-02-12 RX ADMIN — Medication 0.1 MILLIGRAM(S): at 00:15

## 2018-02-12 RX ADMIN — LEVETIRACETAM 400 MILLIGRAM(S): 250 TABLET, FILM COATED ORAL at 12:52

## 2018-02-12 RX ADMIN — Medication 25 MILLIGRAM(S): at 23:46

## 2018-02-12 RX ADMIN — Medication 80 MILLIGRAM(S): at 00:14

## 2018-02-12 RX ADMIN — ATORVASTATIN CALCIUM 40 MILLIGRAM(S): 80 TABLET, FILM COATED ORAL at 23:46

## 2018-02-12 RX ADMIN — Medication 25 MILLIGRAM(S): at 00:14

## 2018-02-12 RX ADMIN — Medication 1.25 MILLIGRAM(S): at 18:13

## 2018-02-12 RX ADMIN — Medication 5 MILLIGRAM(S): at 21:20

## 2018-02-12 RX ADMIN — Medication 0.1 MILLIGRAM(S): at 23:45

## 2018-02-12 RX ADMIN — ATORVASTATIN CALCIUM 40 MILLIGRAM(S): 80 TABLET, FILM COATED ORAL at 00:15

## 2018-02-12 RX ADMIN — Medication 1 PATCH: at 11:36

## 2018-02-12 RX ADMIN — Medication 400 MILLIGRAM(S): at 00:16

## 2018-02-12 RX ADMIN — Medication 5 MILLIGRAM(S): at 11:36

## 2018-02-12 RX ADMIN — Medication 10 MILLIGRAM(S): at 00:15

## 2018-02-12 NOTE — PROGRESS NOTE ADULT - SUBJECTIVE AND OBJECTIVE BOX
INTERVAL HPI/OVERNIGHT EVENTS:  Asked to see patient for possible clogged PEG.  Noted to have clamp closed.      Allergies  No Known Drug Allergies  strawberry (Rash)    Review of Systems:  General:  No fever, chills  CV:  No pain  Resp:  no cough  :  No pain, bleeding, incontinence, nocturia  Muscle:  Rt hemiparesis  Neuro:  +Rt hemiparesis  Heme:  No petechiae, ecchymosis, easy bruisability  Skin:  No rash, tattoos, scars, edema      08 Feb 2018 07:01  -  09 Feb 2018 07:00  --------------------------------------------------------  IN: 620 mL / OUT: 0 mL / NET: 620 mL    PHYSICAL EXAM:  Constitutional: resting comfortably, non-toxic, arousable to verbal and tactile stimuli  Neck: No LAD, supple  Respiratory: +rhonchi  Cardiovascular: S1 and S2, RRR,  Gastrointestinal: BS+, soft, NT/ND, neg HSM, PEG site C&D, external bumper rotates freely at 2.5cm +abdominal binder in place  Rectal: Small amount soft brown stool without impaction  Extremities: No peripheral edema, neg clubbing, cyanosis  Vascular: 2+ peripheral pulses  Neurological: +rt hemiparesis  Skin: No rashes    acetaminophen    Suspension. 650 milliGRAM(s) Oral every 6 hours PRN  ALBUTerol    90 MICROgram(s) HFA Inhaler 1 Puff(s) Inhalation every 4 hours PRN  artificial  tears Solution 1 Drop(s) Both EYES every 4 hours PRN  aspirin  chewable 81 milliGRAM(s) Oral daily  atorvastatin 40 milliGRAM(s) Oral at bedtime  cloNIDine 0.1 milliGRAM(s) Oral <User Schedule>  enalapril 10 milliGRAM(s) Oral <User Schedule>  enalaprilat Injectable 1.25 milliGRAM(s) IV Push every 6 hours  enoxaparin Injectable 30 milliGRAM(s) SubCutaneous <User Schedule>  hydrALAZINE 25 milliGRAM(s) Oral four times a day  hydrALAZINE Injectable 5 milliGRAM(s) IV Push every 3 hours PRN  ipratropium 17 MICROgram(s) HFA Inhaler 1 Puff(s) Inhalation every 6 hours PRN  levETIRAcetam  IVPB 1000 milliGRAM(s) IV Intermittent every 12 hours  nicotine -  14 mG/24Hr(s) Patch 1 patch Transdermal daily  verapamil 80 milliGRAM(s) Oral three times a day                            12.2   12.3  )-----------( 494      ( 12 Feb 2018 01:07 )             36.6       Hemoglobin: 12.2 g/dL (02-12 @ 01:07)  Hemoglobin: 11.8 g/dL (02-09 @ 07:59)  Hemoglobin: 12.3 g/dL (02-08 @ 06:46)      02-12    134<L>  |  96  |  12  ----------------------------<  97  3.8   |  29  |  0.58    Ca    8.7      12 Feb 2018 09:10      Creatinine Trend: 0.58<--, 0.54<--, 0.44<--, 0.54<--, 0.53<--, 0.62<--    COAGS:       T(C): 36.6 (02-12-18 @ 07:15), Max: 39.2 (02-12-18 @ 01:00)  HR: 68 (02-12-18 @ 07:15) (59 - 82)  BP: 169/74 (02-12-18 @ 11:37) (150/76 - 175/84)  RR: 18 (02-12-18 @ 07:15) (18 - 18)  SpO2: 98% (02-12-18 @ 07:15) (92% - 99%)  Wt(kg): --    I&O's Summary    11 Feb 2018 07:01  -  12 Feb 2018 07:00  --------------------------------------------------------  IN: 0 mL / OUT: 0 mL / NET: 0 mL

## 2018-02-12 NOTE — PROGRESS NOTE ADULT - ASSESSMENT
HTN  on verapamil as per neuro  cont clonidine  monitor BP     abnormal ekg   likely due to LVH    Fever  infectious work up  fu with Med

## 2018-02-12 NOTE — PROGRESS NOTE ADULT - SUBJECTIVE AND OBJECTIVE BOX
SUBJECTIVE:  Pt seen and examined. Pt is sleeping comfortably in bed with  at bedside.     PAST MEDICAL & SURGICAL HISTORY:  No pertinent past medical history  H/O colonoscopy: 2015, normal    Vital Signs Last 24 Hrs  T(C): 36.6 (12 Feb 2018 07:15), Max: 39.2 (12 Feb 2018 01:00)  T(F): 97.9 (12 Feb 2018 07:15), Max: 102.6 (12 Feb 2018 01:00)  HR: 68 (12 Feb 2018 07:15) (59 - 82)  BP: 167/83 (12 Feb 2018 07:15) (150/76 - 175/84)  RR: 18 (12 Feb 2018 07:15) (18 - 18)  SpO2: 98% (12 Feb 2018 07:15) (92% - 99%)                        12.2   12.3  )-----------( 494      ( 12 Feb 2018 01:07 )             36.6    02-12    134<L>  |  96  |  12  ----------------------------<  97  3.8   |  29  |  0.58    Ca    8.7      12 Feb 2018 09:10    Stroke Core Measures   Hemoglobin A1C, Whole Blood: 5.4 % (01-24 @ 22:40)    NEUROIMAGING:     PHYSICAL EXAM:    General: No Acute Distress     Neurological: Opens eyes to voice, interactive with facial expressions, attempts to verbalize, oriented to person and place with choices, attempts to follow commands. Moving Lt side spontaneously, RUE nothing, RLE 2/5.     Pulmonary: Clear to Auscultation, No Rales, No Rhonchi, No Wheezes     Cardiovascular: S1, S2, Regular Rate and Rhythm     Gastrointestinal: Soft, Nontender, Nondistended     Extremities: No calf tenderness     MEDICATIONS:   Antibiotics:    Neuro:  acetaminophen    Suspension. 650 milliGRAM(s) Oral every 6 hours PRN Mild Pain (1 - 3)  levETIRAcetam  IVPB 1000 milliGRAM(s) IV Intermittent every 12 hours    Anticoagulation:  aspirin  chewable 81 milliGRAM(s) Oral daily  enoxaparin Injectable 30 milliGRAM(s) SubCutaneous <User Schedule>    Cardiology:  cloNIDine 0.1 milliGRAM(s) Oral <User Schedule>  enalapril 10 milliGRAM(s) Oral <User Schedule>  hydrALAZINE 25 milliGRAM(s) Oral four times a day  hydrALAZINE Injectable 5 milliGRAM(s) IV Push every 3 hours PRN  verapamil 80 milliGRAM(s) Oral three times a day  atorvastatin 40 milliGRAM(s) Oral at bedtime    Endo:     Pulm:  ALBUTerol    90 MICROgram(s) HFA Inhaler 1 Puff(s) Inhalation every 4 hours PRN  ipratropium 17 MICROgram(s) HFA Inhaler 1 Puff(s) Inhalation every 6 hours PRN    GI/:    Other:  artificial  tears Solution 1 Drop(s) Both EYES every 4 hours PRN dryness  nicotine -  14 mG/24Hr(s) Patch 1 patch Transdermal daily

## 2018-02-12 NOTE — PROGRESS NOTE ADULT - ASSESSMENT
62 F, active smoker, with no sig PMHx, recent URI symptoms admitted on 1/24/18 with AMS, fall with head trauma found to have large right frontal scalp hematoma, acute left MCA territory infarct, IVH, and hydrocephalus on CT head requiring intubation and EVD. CTA head with ? 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 Vasculitis and infectious work up negative. Course c/b subclinical seizures, fevers and leukocytosis with negative infectious work up, also with EKG changes and elevated troponin ? due to demand ischemia, and dysphagia. Patient was extubated on 1/28, EVD discontinued on 2/1 and transferred out of ICU on 2/2/18, with hypernatremia(resolved), s/p PEG on 2/5 with persistent leukocytosis of unclear etiology.     PMD: Dr. Felipe Kathleen [phone] (but hasn't seen in many years)

## 2018-02-12 NOTE — PROGRESS NOTE ADULT - PROBLEM SELECTOR PLAN 6
- continue with Keppra. - Likely demand ischemia. Repeat EKG now with no concerning new ischemic changes and CE negative.   - TTE with concentric LVH, mild diastolic dysfunction  - Cardiology following  - Eventual ischemic evaluation likely as outpatient  - continue aspirin

## 2018-02-12 NOTE — PROVIDER CONTACT NOTE (OTHER) - BACKGROUND
Dx: IVH w/ hydro.  shunt removal
Stroke- s/p PEG
s/P fall at home   IVH w/ hydrocephalus  s/p  shunt removal
s/p PEG - Stroke
s/p fall, dx IVH R frontal hematoma, s/p EVD hx HTN, seizure, hydrocephalus
CVA

## 2018-02-12 NOTE — SWALLOW BEDSIDE ASSESSMENT ADULT - SWALLOW EVAL: DIAGNOSIS
Pt with known h/o dysphagia with silent deep laryngeal penetration of conservative consistency, as noted during MBS of 2/1/18. Will proceed with objective swallow study for comprehensive evaluation of swallow function to assess candidacy for possible PO intake.
Pt presents with: 1) oropharyngeal dysphagia marked by delayed oral transit time and coughing with thick puree. 2) Aphasia. Consider formal speech and language evaluation to formally assess.
Pt presents with: 1) oropharyngeal dysphagia marked by oral holding vs prolonged manipulation and wet vocal quality and subtle throat clearing. Pt with orientation to feeding process, would consider reassessment as mentation improves prior to PEG placement if clinically feasible. 2) Aphasia. Consider formal speech and language evaluation to formally assess.

## 2018-02-12 NOTE — PROGRESS NOTE ADULT - PROBLEM SELECTOR PLAN 5
- Likely demand ischemia. Repeat EKG now with no concerning new ischemic changes and CE negative.   - TTE with concentric LVH, mild diastolic dysfunction  - Cardiology following  - Eventual ischemic evaluation likely as outpatient  - continue aspirin - EVD removed on 2/1.  Repeat CT head without evidence of hydrocephalus.

## 2018-02-12 NOTE — PROGRESS NOTE ADULT - ASSESSMENT
s/p fall, IVH hemorrhage, s/p EVD drain removed  had leukocytosis since admission 2 weeks ago--now moderating  diarrhea probably related to tube feeds, but nosocomial C diff also in differential  no abd pain and leukocytosis now improving  had fever overnight to 102 and cultures repeated    PLAN:   monitor WBC trend--improving  check stool for C diff if diarrhea  f/u repeat cult in view of fever  monitor closely off abx for now s/p fall, IVH hemorrhage, s/p EVD drain removed  had leukocytosis since admission 2 weeks ago--now moderating  diarrhea probably related to tube feeds, but nosocomial C diff also in differential  no abd pain and leukocytosis now improving  had fever overnight to 102 and cultures repeated    PLAN:   monitor WBC trend--improving  check stool for C diff if diarrhea  f/u repeat cult in view of fever  monitor closely off abx for now  pt's  updated at bedside  GI f/u for tube placement appreciated  aspiration precautions, serial CXR

## 2018-02-12 NOTE — PROVIDER CONTACT NOTE (OTHER) - ACTION/TREATMENT ORDERED:
Labetalol 10mg IV push given   Hydralazine 5mg IV push given  /93 HR 65  EKG done, Robby MARINA assessed  BMP, CBC, Lactate sent
Hold morning meds. Xray pending
IV tylenol, UA, BC, Chest Xray
MD notified, will order prn hydralazine and IV meds for 06;00  will continue to monitor
Robby MARINA aware. Requests for BP to be checked in 30 min.   SBP changed to notify MD >200   BP rechecked 182/94.  No other action at this time. Will continue to monitor.   Manager Wes aware.
verapamil 40mg given via PEG. tylenol 65omg given. Will recheck BP in hour.

## 2018-02-12 NOTE — PROVIDER CONTACT NOTE (OTHER) - ASSESSMENT
Denies chest pain at this time
Patient alert, aphasic, unable to assess orientation, follows some commands, restless /90 HR 61  Patient found with Peg tube feeding spout removed and tubing cracked. Patient handling feeding tube
Pt aphasic   Following commands  Pt NPO   At Baseline (Lethargic, Ax0x0)
Pt remains aphasic and follows commands. Tube feeds held.
Tube feedings held for 4 hours. Peg site clean and dry. Fever of 102.6 F noted at 2315.
Patient has generalized pain.

## 2018-02-12 NOTE — PROGRESS NOTE ADULT - SUBJECTIVE AND OBJECTIVE BOX
Subjective: Patient seen and examined. No new events except as noted.     SUBJECTIVE/ROS:  Due to altered mental status, subjective information were not able to be obtained from the patient. History was obtained, to the extent possible, from review of the chart and collateral sources of information.        MEDICATIONS:  MEDICATIONS  (STANDING):  aspirin  chewable 81 milliGRAM(s) Oral daily  atorvastatin 40 milliGRAM(s) Oral at bedtime  cloNIDine 0.1 milliGRAM(s) Oral <User Schedule>  enalapril 10 milliGRAM(s) Oral <User Schedule>  enoxaparin Injectable 30 milliGRAM(s) SubCutaneous <User Schedule>  hydrALAZINE 25 milliGRAM(s) Oral four times a day  levETIRAcetam  Solution 1000 milliGRAM(s) Oral two times a day  nicotine -  14 mG/24Hr(s) Patch 1 patch Transdermal daily  verapamil 80 milliGRAM(s) Oral three times a day      PHYSICAL EXAM:  T(C): 36.6 (02-12-18 @ 07:15), Max: 39.2 (02-12-18 @ 01:00)  HR: 68 (02-12-18 @ 07:15) (59 - 82)  BP: 167/83 (02-12-18 @ 07:15) (150/76 - 175/84)  RR: 18 (02-12-18 @ 07:15) (18 - 18)  SpO2: 98% (02-12-18 @ 07:15) (92% - 99%)  Wt(kg): --  I&O's Summary    11 Feb 2018 07:01  -  12 Feb 2018 07:00  --------------------------------------------------------  IN: 0 mL / OUT: 0 mL / NET: 0 mL          JVP: Normal  Neck: supple  Lung: clear   CV: S1 S2 , Murmur:  Abd: soft  Ext: No edema  neuro: Awake / alert  Psych: flat affect  Skin: normal     LABS/DATA:    CARDIAC MARKERS:                                12.2   12.3  )-----------( 494      ( 12 Feb 2018 01:07 )             36.6     02-12    131<L>  |  94<L>  |  13  ----------------------------<  107<H>  4.0   |  26  |  0.54    Ca    8.5      12 Feb 2018 01:07      proBNP:   Lipid Profile:   HgA1c:   TSH:     TELE:  EKG:

## 2018-02-12 NOTE — PROGRESS NOTE ADULT - PROBLEM SELECTOR PLAN 4
- EVD removed on 2/1.  Repeat CT head without evidence of hydrocephalus. - unclear etiology for acute left MCA stroke but neuro considering ? reversible cerebral vasoconstriction syndrome (RCVS or Betty syndrome) as possible etiology and less likely vasculitis (work up unremarkable).   - LP negative for meningitis/infection.  - per discussion with neurology/stroke attending, started on verapamil 40mg TID given concern for possible RCVS  - LE duplex negative for DVT  - continue with statin, aspirin  - no acute neurosurgical intervention for IVH.

## 2018-02-12 NOTE — PROVIDER CONTACT NOTE (OTHER) - DATE AND TIME:
03-Feb-2018 03:50
03-Feb-2018 05:00
10-Feb-2018 00:27
11-Feb-2018 23:15
12-Feb-2018 04:08
09-Feb-2018 05:10
denies

## 2018-02-12 NOTE — PROGRESS NOTE ADULT - PROBLEM SELECTOR PLAN 3
- unclear etiology for acute left MCA stroke but neuro considering ? reversible cerebral vasoconstriction syndrome (RCVS or Betty syndrome) as possible etiology and less likely vasculitis (work up unremarkable).   - LP negative for meningitis/infection.  - per discussion with neurology/stroke attending, started on verapamil 40mg TID given concern for possible RCVS  - LE duplex negative for DVT  - continue with statin, aspirin  - no acute neurosurgical intervention for IVH. f/u GI - xray tube check

## 2018-02-12 NOTE — PROGRESS NOTE ADULT - SUBJECTIVE AND OBJECTIVE BOX
Authored by Dr Juan Weaver [phone]    Patient is a 62y old  Female who presents with a chief complaint of Patient was admitted on  as a trauma. Patient had a fall as per  with altered mental status. Patient had an EVD drain in place for Inta-ventricular hemorrhage  and it was removed on  (2018 13:50)    SUBJECTIVE / OVERNIGHT EVENTS: Febrile overnight    MEDICATIONS  (STANDING):  aspirin  chewable 81 milliGRAM(s) Oral daily  atorvastatin 40 milliGRAM(s) Oral at bedtime  cloNIDine 0.1 milliGRAM(s) Oral <User Schedule>  enalapril 10 milliGRAM(s) Oral <User Schedule>  enoxaparin Injectable 30 milliGRAM(s) SubCutaneous <User Schedule>  hydrALAZINE 25 milliGRAM(s) Oral four times a day  levETIRAcetam  Solution 1000 milliGRAM(s) Oral two times a day  nicotine -  14 mG/24Hr(s) Patch 1 patch Transdermal daily  verapamil 80 milliGRAM(s) Oral three times a day    MEDICATIONS  (PRN):  acetaminophen    Suspension. 650 milliGRAM(s) Oral every 6 hours PRN Mild Pain (1 - 3)  ALBUTerol    90 MICROgram(s) HFA Inhaler 1 Puff(s) Inhalation every 4 hours PRN Wheezing  artificial  tears Solution 1 Drop(s) Both EYES every 4 hours PRN dryness  hydrALAZINE Injectable 5 milliGRAM(s) IV Push every 3 hours PRN SBP>180  ipratropium 17 MICROgram(s) HFA Inhaler 1 Puff(s) Inhalation every 6 hours PRN wheez      Vital Signs Last 24 Hrs  T(C): 36.6 (2018 07:15), Max: 39.2 (2018 01:00)  T(F): 97.9 (2018 07:15), Max: 102.6 (2018 01:00)  HR: 68 (2018 07:15) (59 - 82)  BP: 167/83 (2018 07:15) (150/76 - 175/84)  BP(mean): --  RR: 18 (2018 07:15) (18 - 18)  SpO2: 98% (2018 07:15) (92% - 99%)  CAPILLARY BLOOD GLUCOSE        I&O's Summary    2018 07:01  -  2018 07:00  --------------------------------------------------------  IN: 0 mL / OUT: 0 mL / NET: 0 mL        PHYSICAL EXAM  GEN: NAD  HEENT: + right scalp hematoma  LUNG: Clear to auscultation bilaterally; No wheeze  HEART: S1, S2, rrr. HR ~60  ABDOMEN: Soft, Nontender, Nondistended; Bowel sounds present, + PEG covered with abdominal binder(site C/D/I)  EXTREMITIES: No leg edema  PSYCH: calm  NEUROLOGY: Alert and awake, aphasic, follows simple commands (i.e. smile, squeezes hand, moves left arm and leg on command), unable to move right arm but slight movement noted in RLE  SKIN: warm, dry, no phlebitis    LABS:                        12.2   12.3  )-----------( 494      ( 2018 01:07 )             36.6     02-12    134<L>  |  96  |  12  ----------------------------<  97  3.8   |  29  |  0.58    Ca    8.7      2018 09:10            Urinalysis Basic - ( 2018 01:07 )    Color: Yellow / Appearance: Clear / S.022 / pH: x  Gluc: x / Ketone: Negative  / Bili: Negative / Urobili: 2 mg/dL   Blood: x / Protein: Trace / Nitrite: Negative   Leuk Esterase: Negative / RBC: x / WBC x   Sq Epi: x / Non Sq Epi: x / Bacteria: x    RADIOLOGY & ADDITIONAL TESTS:    Imaging Personally Reviewed:  Consultant(s) Notes Reviewed:  Cards  Care Discussed with Consultants/Other Providers: Authored by Dr Juan Weaver [phone]    Patient is a 62y old  Female who presents with a chief complaint of Patient was admitted on  as a trauma. Patient had a fall as per  with altered mental status. Patient had an EVD drain in place for Inta-ventricular hemorrhage  and it was removed on  (2018 13:50)    SUBJECTIVE / OVERNIGHT EVENTS: Febrile overnight.  at bedside. pt not able to provide ROS due to CVA    MEDICATIONS  (STANDING):  aspirin  chewable 81 milliGRAM(s) Oral daily  atorvastatin 40 milliGRAM(s) Oral at bedtime  cloNIDine 0.1 milliGRAM(s) Oral <User Schedule>  enalapril 10 milliGRAM(s) Oral <User Schedule>  enoxaparin Injectable 30 milliGRAM(s) SubCutaneous <User Schedule>  hydrALAZINE 25 milliGRAM(s) Oral four times a day  levETIRAcetam  Solution 1000 milliGRAM(s) Oral two times a day  nicotine -  14 mG/24Hr(s) Patch 1 patch Transdermal daily  verapamil 80 milliGRAM(s) Oral three times a day    MEDICATIONS  (PRN):  acetaminophen    Suspension. 650 milliGRAM(s) Oral every 6 hours PRN Mild Pain (1 - 3)  ALBUTerol    90 MICROgram(s) HFA Inhaler 1 Puff(s) Inhalation every 4 hours PRN Wheezing  artificial  tears Solution 1 Drop(s) Both EYES every 4 hours PRN dryness  hydrALAZINE Injectable 5 milliGRAM(s) IV Push every 3 hours PRN SBP>180  ipratropium 17 MICROgram(s) HFA Inhaler 1 Puff(s) Inhalation every 6 hours PRN wheez      Vital Signs Last 24 Hrs  T(C): 36.6 (2018 07:15), Max: 39.2 (2018 01:00)  T(F): 97.9 (2018 07:15), Max: 102.6 (2018 01:00)  HR: 68 (2018 07:15) (59 - 82)  BP: 167/83 (2018 07:15) (150/76 - 175/84)  BP(mean): --  RR: 18 (2018 07:15) (18 - 18)  SpO2: 98% (2018 07:15) (92% - 99%)  CAPILLARY BLOOD GLUCOSE        I&O's Summary    2018 07:01  -  2018 07:00  --------------------------------------------------------  IN: 0 mL / OUT: 0 mL / NET: 0 mL        PHYSICAL EXAM  GEN: NAD  HEENT: + right scalp hematoma resolving  LUNG: Clear to auscultation bilaterally; No wheeze  HEART: RRR no murmur  ABDOMEN: Soft, Nontender, Nondistended; Bowel sounds present, + PEG covered with abdominal binder(site C/D/I)  EXTREMITIES: No leg edema  PSYCH: calm  NEUROLOGY: Alert and awake, aphasic, follows simple commands (i.e. smile, squeezes hand, moves left arm and leg on command), unable to move right arm but slight movement noted in RLE  SKIN: warm, dry, no phlebitis    LABS:                        12.2   12.3  )-----------( 494      ( 2018 01:07 )             36.6     02-12    134<L>  |  96  |  12  ----------------------------<  97  3.8   |  29  |  0.58    Ca    8.7      2018 09:10            Urinalysis Basic - ( 2018 01:07 )    Color: Yellow / Appearance: Clear / S.022 / pH: x  Gluc: x / Ketone: Negative  / Bili: Negative / Urobili: 2 mg/dL   Blood: x / Protein: Trace / Nitrite: Negative   Leuk Esterase: Negative / RBC: x / WBC x   Sq Epi: x / Non Sq Epi: x / Bacteria: x    RADIOLOGY & ADDITIONAL TESTS:    Imaging Personally Reviewed:  Consultant(s) Notes Reviewed:  Cards  Care Discussed with Consultants/Other Providers: BUTCH Vernon re: PEG mgmt, BP control

## 2018-02-12 NOTE — PROGRESS NOTE ADULT - ASSESSMENT
ASSESSMENT AND PLAN: 62 F, active smoker, with no sig PMHx, recent URI symptoms admitted on 1/24/18 with AMS, fall with head trauma found to have large right frontal scalp hematoma, acute left MCA territory infarct, IVH, and hydrocephalus on CT head requiring intubation and EVD. 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 Course c/b subclinical seizures, fevers and leukocytosis with negative infectious work up, also with EKG changes and elevated troponin possibly due to demand ischemia. Pt also has dysphagia .  Patient was extubated on 1/28, EVD discontinued on 2/1 and transferred out of ICU on 2/2/18, s/p peg placement 2/5/18.  Overnight events significant for difficult administration of drugs through PEG and difficult aspiration of  gastric content through PEG. Pt was also afebrile overnight. Pt has also had episodes of uncontrolled hypertension.    NEURO:   Seizure ppx: continue keppra 1G BID (switched to IV)   Pain management: continue tylenol     PULM: Encouraged Incentive spirometer. Continue inhalers and nicoderm     CV: SBP control with IVP hydralazine and enapril as needed.  Cardiology following.   Continue ASA post stroke     HEME/ONC:                      DVT ppx: SQL continue.     RENAL: Na 134 (131). Consider starting NaCl tabs once PEG is working.  Family requesting Nephrology consult for HTN and hyponatremia oversight - called.      ID: Febrile overnight. CXR and UA negative. Blood Cx- P     GI:  PEG not working. GI service called and pending consult.  NPO for now. HTN and seizure meds changed to IV. Repeat S/S in place for evaluation post peg.     DISCHARGE PLANNING: PT/OT: Acute TBI rehab

## 2018-02-12 NOTE — SWALLOW BEDSIDE ASSESSMENT ADULT - ASR SWALLOW RECOMMEND DIAG
scheduled for 2/13, as per d/w YOHANA Vernon/VFSS/MBS
Defer at this time; however would favor reassessment prior to PEG placement if clinically appropriate.
Ordered and scheduled for 2/1/FEES

## 2018-02-12 NOTE — PROVIDER CONTACT NOTE (OTHER) - SITUATION
/110 HR 70. Pt complaining of chest pain. Pt aphasic unable to rate from 0-10 but nods yes to 8/10 pain.
/93 HR 64   FS 99  Temp. 98.7 Oral. 97.6 Rectal   BP not decreasing after two Labetalol IV 10mg and Hydralazine 5mg IV push
No tube feed residual or audible air noted when PEG injected with 30 cc air.
Oral Temp of 102.6
Patient removed feeding tube spout, tubing cracked. Patient restless, /90 HR 61
elevated /101  HR 58

## 2018-02-12 NOTE — PROGRESS NOTE ADULT - PROBLEM SELECTOR PLAN 1
WBC resolving  - recurrent fever last night, prior fever was 1/28. Previous infectious work up negative. BCx sent last night, UA/CXR unremarkable  recent LE duplex negative for DVT - would repeat. Consider RVP.  Reportedly pt was being fed by mouth by family, aspiration is a concern - if this turns out to be cause, would monitor off abx, monitor resp status and reiterate discussions w/family about aspiration risk  - ID f/u WBC resolving  - recurrent fever last night, prior fever was 1/28. Previous infectious work up negative. BCx sent last night, UA/CXR unremarkable  recent LE duplex negative for DVT - would repeat. Consider RVP.  Pt was being fed a small amount of apple juice by mouth by family, aspiration is a concern - if this turns out to be cause, would monitor off abx, monitor resp status and reiterate discussions w/family about aspiration risk  - ID f/u

## 2018-02-12 NOTE — PROGRESS NOTE ADULT - PROBLEM SELECTOR PLAN 8
- incidental 2.6 x 2.2 cm left adrenal nodule noted on CT a/p  - will need outpatient follow up. Spouse aware. - s/p PEG on 2/5  - aspiration precautions

## 2018-02-12 NOTE — PROGRESS NOTE ADULT - SUBJECTIVE AND OBJECTIVE BOX
CC: f/u for leukocytosis, fever    Patient had fever overnight    REVIEW OF SYSTEMS:limited  All other review of systems negative (Comprehensive ROS)    Antimicrobials Day #  none  	    Other Medications Reviewed    Vital Signs Last 24 Hrs  T(C): 36.6 (12 Feb 2018 07:15), Max: 39.2 (12 Feb 2018 01:00)  T(F): 97.9 (12 Feb 2018 07:15), Max: 102.6 (12 Feb 2018 01:00)  HR: 68 (12 Feb 2018 07:15) (59 - 82)  BP: 169/74 (12 Feb 2018 11:37) (167/83 - 175/84)  BP(mean): --  RR: 18 (12 Feb 2018 07:15) (18 - 18)  SpO2: 98% (12 Feb 2018 07:15) (92% - 98%)    PHYSICAL EXAM:  General: alert, no acute distress  Eyes:  anicteric, no conjunctival injection, no discharge  Oropharynx: no lesions or injection 	  Neck: supple, without adenopathy  Lungs: clear to auscultation  Heart: regular rate and rhythm; no murmur, rubs or gallops  Abdomen: soft, nondistended, nontender, without mass or organomegaly  +PEG site clean  Skin: no lesions  Extremities: no clubbing, cyanosis, or edema  Neurologic: alert, R sided weakness, aphasic    LAB RESULTS:                                     12.2   12.3  )-----------( 494      ( 12 Feb 2018 01:07 )             36.6   02-12    134<L>  |  96  |  12  ----------------------------<  97  3.8   |  29  |  0.58    Ca    8.7      12 Feb 2018 09:10                    MICROBIOLOGY REVIEWED:    RADIOLOGY REVIEWED: CC: f/u for leukocytosis, fever    Patient had fever overnight, non-toxic at present and afebrile now     REVIEW OF SYSTEMS:limited  All other review of systems negative (Comprehensive ROS)    Antimicrobials Day #  none  	    Other Medications Reviewed    Vital Signs Last 24 Hrs  T(C): 36.6 (12 Feb 2018 07:15), Max: 39.2 (12 Feb 2018 01:00)  T(F): 97.9 (12 Feb 2018 07:15), Max: 102.6 (12 Feb 2018 01:00)  HR: 68 (12 Feb 2018 07:15) (59 - 82)  BP: 169/74 (12 Feb 2018 11:37) (167/83 - 175/84)  BP(mean): --  RR: 18 (12 Feb 2018 07:15) (18 - 18)  SpO2: 98% (12 Feb 2018 07:15) (92% - 98%)    PHYSICAL EXAM:  General: alert, no acute distress  Eyes:  anicteric, no conjunctival injection, no discharge  Oropharynx: no lesions or injection 	  Neck: supple, without adenopathy  Lungs: clear to auscultation  Heart: regular rate and rhythm; no murmur, rubs or gallops  Abdomen: soft, nondistended, nontender, without mass or organomegaly  +PEG site clean  Skin: no lesions  Extremities: no clubbing, cyanosis, or edema  Neurologic: alert, R sided weakness, aphasic    LAB RESULTS:                                     12.2   12.3  )-----------( 494      ( 12 Feb 2018 01:07 )             36.6   02-12    134<L>  |  96  |  12  ----------------------------<  97  3.8   |  29  |  0.58    Ca    8.7      12 Feb 2018 09:10                    MICROBIOLOGY REVIEWED:    RADIOLOGY REVIEWED:

## 2018-02-12 NOTE — PROGRESS NOTE ADULT - PROBLEM SELECTOR PLAN 2
BP not optimal  Management per cardiology  On enalapril 10mg BID, verapamil 80mg TID, hydralazine 25mg 4X/day recently started on clonidine 0.1mg BID.  None of these medications are at max dose and all can be uptitrated - would start with enalapril to 20mg BID and monitor BMP. BP not optimal however pt NPO w/nonfunctioning PEG - generally more controlled than this weekend  Management per cardiology  On enalapril 10mg BID, verapamil 80mg TID, hydralazine 25mg 4X/day recently started on clonidine 0.1mg BID.  - Hydralazine PRN, add enalaprilat Q6 until PEG works again  BP goal up to 160 for now.

## 2018-02-13 DIAGNOSIS — R74.0 NONSPECIFIC ELEVATION OF LEVELS OF TRANSAMINASE AND LACTIC ACID DEHYDROGENASE [LDH]: ICD-10-CM

## 2018-02-13 LAB
-  COAGULASE NEGATIVE STAPHYLOCOCCUS: SIGNIFICANT CHANGE UP
ALBUMIN SERPL ELPH-MCNC: 2.7 G/DL — LOW (ref 3.3–5)
ALP SERPL-CCNC: 163 U/L — HIGH (ref 40–120)
ALT FLD-CCNC: 192 U/L RC — HIGH (ref 10–45)
ANION GAP SERPL CALC-SCNC: 14 MMOL/L — SIGNIFICANT CHANGE UP (ref 5–17)
APPEARANCE UR: CLEAR — SIGNIFICANT CHANGE UP
AST SERPL-CCNC: 184 U/L — HIGH (ref 10–40)
BASOPHILS # BLD AUTO: 0.1 K/UL — SIGNIFICANT CHANGE UP (ref 0–0.2)
BASOPHILS NFR BLD AUTO: 0.5 % — SIGNIFICANT CHANGE UP (ref 0–2)
BILIRUB SERPL-MCNC: 0.2 MG/DL — SIGNIFICANT CHANGE UP (ref 0.2–1.2)
BILIRUB UR-MCNC: NEGATIVE — SIGNIFICANT CHANGE UP
BUN SERPL-MCNC: 14 MG/DL — SIGNIFICANT CHANGE UP (ref 7–23)
CALCIUM SERPL-MCNC: 8.5 MG/DL — SIGNIFICANT CHANGE UP (ref 8.4–10.5)
CHLORIDE SERPL-SCNC: 93 MMOL/L — LOW (ref 96–108)
CK SERPL-CCNC: 41 U/L — SIGNIFICANT CHANGE UP (ref 25–170)
CO2 SERPL-SCNC: 24 MMOL/L — SIGNIFICANT CHANGE UP (ref 22–31)
COLOR SPEC: YELLOW — SIGNIFICANT CHANGE UP
CREAT SERPL-MCNC: 0.59 MG/DL — SIGNIFICANT CHANGE UP (ref 0.5–1.3)
DIFF PNL FLD: NEGATIVE — SIGNIFICANT CHANGE UP
EOSINOPHIL # BLD AUTO: 0.1 K/UL — SIGNIFICANT CHANGE UP (ref 0–0.5)
EOSINOPHIL NFR BLD AUTO: 0.6 % — SIGNIFICANT CHANGE UP (ref 0–6)
GLUCOSE SERPL-MCNC: 164 MG/DL — HIGH (ref 70–99)
GLUCOSE UR QL: NEGATIVE — SIGNIFICANT CHANGE UP
GRAM STN FLD: SIGNIFICANT CHANGE UP
HCT VFR BLD CALC: 37.7 % — SIGNIFICANT CHANGE UP (ref 34.5–45)
HGB BLD-MCNC: 12.7 G/DL — SIGNIFICANT CHANGE UP (ref 11.5–15.5)
KETONES UR-MCNC: ABNORMAL
LACTATE SERPL-SCNC: 1.8 MMOL/L — SIGNIFICANT CHANGE UP (ref 0.7–2)
LEUKOCYTE ESTERASE UR-ACNC: NEGATIVE — SIGNIFICANT CHANGE UP
LYMPHOCYTES # BLD AUTO: 0.6 K/UL — LOW (ref 1–3.3)
LYMPHOCYTES # BLD AUTO: 4.9 % — LOW (ref 13–44)
MCHC RBC-ENTMCNC: 33.8 GM/DL — SIGNIFICANT CHANGE UP (ref 32–36)
MCHC RBC-ENTMCNC: 34.3 PG — HIGH (ref 27–34)
MCV RBC AUTO: 101 FL — HIGH (ref 80–100)
METHOD TYPE: SIGNIFICANT CHANGE UP
MONOCYTES # BLD AUTO: 0.5 K/UL — SIGNIFICANT CHANGE UP (ref 0–0.9)
MONOCYTES NFR BLD AUTO: 4.6 % — SIGNIFICANT CHANGE UP (ref 2–14)
NEUTROPHILS # BLD AUTO: 10 K/UL — HIGH (ref 1.8–7.4)
NEUTROPHILS NFR BLD AUTO: 89.4 % — HIGH (ref 43–77)
NITRITE UR-MCNC: NEGATIVE — SIGNIFICANT CHANGE UP
PH UR: 5.5 — SIGNIFICANT CHANGE UP (ref 5–8)
PLATELET # BLD AUTO: 436 K/UL — HIGH (ref 150–400)
POTASSIUM SERPL-MCNC: 3.8 MMOL/L — SIGNIFICANT CHANGE UP (ref 3.5–5.3)
POTASSIUM SERPL-SCNC: 3.8 MMOL/L — SIGNIFICANT CHANGE UP (ref 3.5–5.3)
PROCALCITONIN SERPL-MCNC: 0.48 NG/ML — HIGH (ref 0–0.04)
PROT SERPL-MCNC: 5.8 G/DL — LOW (ref 6–8.3)
PROT UR-MCNC: NEGATIVE — SIGNIFICANT CHANGE UP
RAPID RVP RESULT: SIGNIFICANT CHANGE UP
RBC # BLD: 3.71 M/UL — LOW (ref 3.8–5.2)
RBC # FLD: 11.9 % — SIGNIFICANT CHANGE UP (ref 10.3–14.5)
SODIUM SERPL-SCNC: 131 MMOL/L — LOW (ref 135–145)
SP GR SPEC: 1.02 — SIGNIFICANT CHANGE UP (ref 1.01–1.02)
UROBILINOGEN FLD QL: NEGATIVE — SIGNIFICANT CHANGE UP
WBC # BLD: 11.2 K/UL — HIGH (ref 3.8–10.5)
WBC # FLD AUTO: 11.2 K/UL — HIGH (ref 3.8–10.5)

## 2018-02-13 PROCEDURE — 93970 EXTREMITY STUDY: CPT | Mod: 26

## 2018-02-13 PROCEDURE — 99233 SBSQ HOSP IP/OBS HIGH 50: CPT

## 2018-02-13 RX ORDER — SODIUM CHLORIDE 9 MG/ML
500 INJECTION INTRAMUSCULAR; INTRAVENOUS; SUBCUTANEOUS ONCE
Qty: 0 | Refills: 0 | Status: COMPLETED | OUTPATIENT
Start: 2018-02-13 | End: 2018-02-13

## 2018-02-13 RX ORDER — LACOSAMIDE 50 MG/1
200 TABLET ORAL
Qty: 0 | Refills: 0 | Status: DISCONTINUED | OUTPATIENT
Start: 2018-02-13 | End: 2018-02-15

## 2018-02-13 RX ORDER — LACOSAMIDE 50 MG/1
200 TABLET ORAL
Qty: 0 | Refills: 0 | Status: DISCONTINUED | OUTPATIENT
Start: 2018-02-13 | End: 2018-02-13

## 2018-02-13 RX ORDER — LEVETIRACETAM 250 MG/1
1000 TABLET, FILM COATED ORAL
Qty: 0 | Refills: 0 | Status: DISCONTINUED | OUTPATIENT
Start: 2018-02-13 | End: 2018-02-13

## 2018-02-13 RX ORDER — LABETALOL HCL 100 MG
10 TABLET ORAL
Qty: 0 | Refills: 0 | Status: DISCONTINUED | OUTPATIENT
Start: 2018-02-13 | End: 2018-02-13

## 2018-02-13 RX ADMIN — Medication 25 MILLIGRAM(S): at 05:47

## 2018-02-13 RX ADMIN — Medication 80 MILLIGRAM(S): at 14:21

## 2018-02-13 RX ADMIN — Medication 5 MILLIGRAM(S): at 00:15

## 2018-02-13 RX ADMIN — LACOSAMIDE 200 MILLIGRAM(S): 50 TABLET ORAL at 18:39

## 2018-02-13 RX ADMIN — Medication 5 MILLIGRAM(S): at 02:30

## 2018-02-13 RX ADMIN — Medication 0.1 MILLIGRAM(S): at 10:11

## 2018-02-13 RX ADMIN — Medication 650 MILLIGRAM(S): at 09:11

## 2018-02-13 RX ADMIN — Medication 80 MILLIGRAM(S): at 22:21

## 2018-02-13 RX ADMIN — Medication 80 MILLIGRAM(S): at 05:47

## 2018-02-13 RX ADMIN — ENOXAPARIN SODIUM 30 MILLIGRAM(S): 100 INJECTION SUBCUTANEOUS at 18:40

## 2018-02-13 RX ADMIN — LEVETIRACETAM 400 MILLIGRAM(S): 250 TABLET, FILM COATED ORAL at 05:47

## 2018-02-13 RX ADMIN — Medication 10 MILLIGRAM(S): at 10:11

## 2018-02-13 RX ADMIN — Medication 10 MILLIGRAM(S): at 22:21

## 2018-02-13 RX ADMIN — Medication 81 MILLIGRAM(S): at 12:19

## 2018-02-13 RX ADMIN — Medication 1 PATCH: at 12:19

## 2018-02-13 RX ADMIN — Medication 0.1 MILLIGRAM(S): at 22:21

## 2018-02-13 RX ADMIN — Medication 10 MILLIGRAM(S): at 04:17

## 2018-02-13 RX ADMIN — SODIUM CHLORIDE 250 MILLILITER(S): 9 INJECTION INTRAMUSCULAR; INTRAVENOUS; SUBCUTANEOUS at 10:10

## 2018-02-13 NOTE — CONSULT NOTE ADULT - ASSESSMENT
62-year-old female with intracranial hemorrhage after a mechanical fall.  In addition patient also with difficult to control labile hypertension.  In addition patient also had is hyponatremic.  1- htn   2- hyponatremia    She was unable to take her and control antihypertensive medications.  Therefore, this morning her regular medications should be given.  They were given.    Blood pressure with that decreased given she also was quite febrile as well.  Hydralazine is being held as discussed.  Once her blood pressure starts to rise above Systolic blood pressure of 150 Then resume hydralazine as well.  Continue clonidine and his blood pressure improves.  Limit fluid intake.  If sodium decreases further than sodium chloride tablets to be added.    Case discussed with family as well as neurosurgery team.

## 2018-02-13 NOTE — CONSULT NOTE ADULT - SUBJECTIVE AND OBJECTIVE BOX
[facility]  [phone]  NEPHROLOGY      INITIAL CONSULT NOTE  --------------------------------------------------------------------------------  HPI:    61y/o female brought in to [facility] ED s/p fall at home in the bathroom. In trauma bay primary survey significant for GCS of 11 (E=4 V =2 M=5). Secondary survey significant for Right frontal hematoma. (s/p EVD placement and then subsequent removed on 2/1 . Admitted with 4th ventricular IVH on 1/24. pt Also had difficulty to control hypertension.  She also required PEG placement.  Her blood pressure has been quite labile.  In addition yesterday was not able to receive any antihypertensive medications due to PEG issues.   Noticed with blood pressure systolic blood pressure up to 199 millimeters of mercury yesterday.  Consultation for hypertension And hyponatremia        PAST HISTORY  --------------------------------------------------------------------------------  PAST MEDICAL & SURGICAL HISTORY:  No pertinent past medical history  H/O colonoscopy: 2015, normal    FAMILY HISTORY:  Family history of colon cancer in father (Father)    PAST SOCIAL HISTORY: .  Unknown tobacco use or alcohol use    ALLERGIES & MEDICATIONS  --------------------------------------------------------------------------------  Allergies    No Known Drug Allergies  strawberry (Rash)    Intolerances      Standing Inpatient Medications  aspirin  chewable 81 milliGRAM(s) Oral daily  cloNIDine 0.1 milliGRAM(s) Oral <User Schedule>  enalapril 10 milliGRAM(s) Oral <User Schedule>  enoxaparin Injectable 30 milliGRAM(s) SubCutaneous <User Schedule>  lacosamide 200 milliGRAM(s) Oral two times a day  nicotine -  14 mG/24Hr(s) Patch 1 patch Transdermal daily  verapamil 80 milliGRAM(s) Oral three times a day    PRN Inpatient Medications  acetaminophen    Suspension. 650 milliGRAM(s) Oral every 6 hours PRN  ALBUTerol    90 MICROgram(s) HFA Inhaler 1 Puff(s) Inhalation every 4 hours PRN  artificial  tears Solution 1 Drop(s) Both EYES every 4 hours PRN  ipratropium 17 MICROgram(s) HFA Inhaler 1 Puff(s) Inhalation every 6 hours PRN      REVIEW OF SYSTEMS  --------------------------------------------------------------------------------  Patient is unable to give review of systems  VITALS/PHYSICAL EXAM  --------------------------------------------------------------------------------  T(C): 37.2 (02-13-18 @ 21:21), Max: 39.2 (02-13-18 @ 08:23)  HR: 62 (02-13-18 @ 21:21) (59 - 80)  BP: 159/76 (02-13-18 @ 21:21) (110/65 - 197/93)  RR: 18 (02-13-18 @ 21:21) (18 - 20)  SpO2: 97% (02-13-18 @ 21:21) (93% - 97%)  Wt(kg): --        02-13-18 @ 07:01  -  02-13-18 @ 22:56  --------------------------------------------------------  IN: 700 mL / OUT: 0 mL / NET: 700 mL      Physical Exam:  	Gen: When seen mildly restless but comfortable appearing.  thin  female.  	No JVD, supple neck,  	Pulm: No rails occasional rhonchi  	CV: RRR, S1S2; no rub  	Abd: +BS, soft, nontender/nondistended  	: No suprapubic tenderness  	ext: Warm,no clubbing, no edema  	Neuro: Noncommunicating with me.  Not oriented  	  LABS/STUDIES  --------------------------------------------------------------------------------              12.7   11.2  >-----------<  436      [02-13-18 @ 10:12]              37.7     131  |  93  |  14  ----------------------------<  164      [02-13-18 @ 10:15]  3.8   |  24  |  0.59        Ca     8.5     [02-13-18 @ 10:15]    TPro  5.8  /  Alb  2.7  /  TBili  0.2  /  DBili  x   /  AST  184  /  ALT  192  /  AlkPhos  163  [02-13-18 @ 10:15]        CK 41      [02-13-18 @ 10:15]    Creatinine Trend:  SCr 0.59 [02-13 @ 10:15]  SCr 0.58 [02-12 @ 09:10]  SCr 0.54 [02-12 @ 01:07]  SCr 0.44 [02-09 @ 07:45]  SCr 0.54 [02-08 @ 06:46]    Urinalysis - [02-13-18 @ 10:12]      Color Yellow / Appearance Clear / SG 1.019 / pH 5.5      Gluc Negative / Ketone Trace  / Bili Negative / Urobili Negative       Blood Negative / Protein Negative / Leuk Est Negative / Nitrite Negative      RBC  / WBC  / Hyaline  / Gran  / Sq Epi  / Non Sq Epi  / Bacteria       HbA1c 5.4      [01-24-18 @ 22:40]  TSH 0.59      [01-25-18 @ 08:10]  Lipid: chol 256, , HDL 98,       [01-24-18 @ 22:40]    HIV Nonreact      [01-25-18 @ 08:10]    REZA: titer 1:160, pattern Speckled      [01-24-18 @ 22:40]  Rheumatoid Factor 8.0      [01-24-18 @ 22:40]  ANCA: cANCA Negative, pANCA Negative, atypical ANCA Negative      [01-24-18 @ 22:40]  MPO-ANCA <5.0, interpretation: Negative      [01-24-18 @ 22:40]

## 2018-02-13 NOTE — PROGRESS NOTE ADULT - ASSESSMENT
62 F, active smoker, with no sig PMHx, recent URI symptoms admitted on 1/24/18 with AMS, fall with head trauma found to have large right frontal scalp hematoma, acute left MCA territory infarct, IVH, and hydrocephalus on CT head requiring intubation and EVD. CTA head with ? 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 Course c/b subclinical seizures, fevers and leukocytosis with negative infectious work up, also with EKG changes and elevated troponin ? due to demand ischemia, and dysphagia .  Patient was extubated on 1/28, EVD discontinued on 2/1 and transferred out of ICU on 2/2/18, s/p peg placement 2/5/18  leukocytosis  improved , now with  intermittent uncontrolled hypertension and fevers, transaminitis    Plan:    Neuro stable  Fevers/ transaminitis- ?? drug induced. Hepatology consult. F/u fever work up. d/c statin d/w medicine  Uncontrolled hypertension- OFF most po antihypertensives yesterday. Dr Upton renal saw. continue current regimen. On Clonidine 0.1 BID Verapamil 80q8 vasotec 10BID Hydralazine 25qid.   DVT ppx 62 F, active smoker, with no sig PMHx, recent URI symptoms admitted on 1/24/18 with AMS, fall with head trauma found to have large right frontal scalp hematoma, acute left MCA territory infarct, IVH, and hydrocephalus on CT head requiring intubation and EVD. CTA head with ? 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 Course c/b subclinical seizures, fevers and leukocytosis with negative infectious work up, also with EKG changes and elevated troponin ? due to demand ischemia, and dysphagia .  Patient was extubated on 1/28, EVD discontinued on 2/1 and transferred out of ICU on 2/2/18, s/p peg placement 2/5/18  leukocytosis  improved , now with  intermittent uncontrolled hypertension and fevers, transaminitis    Plan:    Neuro stable  Fevers/ transaminitis- ?? drug induced. D/W GI team . F/u fever work up. d/c statin d/w medicine  Uncontrolled hypertension- OFF most po antihypertensives yesterday. Dr Upton renal saw. continue current regimen. On Clonidine 0.1 BID Verapamil 80q8 vasotec 10BID Hydralazine 25qid.   DVT ppx  Transfer to a/c rehab once acute issues resolved

## 2018-02-13 NOTE — CONSULT NOTE ADULT - CONSULT REQUESTED DATE/TIME
02-Feb-2018
02-Feb-2018 17:34
13-Feb-2018 22:56
24-Jan-2018 05:12
24-Jan-2018 05:18
26-Jan-2018 18:19
29-Jan-2018 11:37
29-Jan-2018 13:55
29-Jan-2018 17:10
08-Feb-2018 13:37

## 2018-02-13 NOTE — CONSULT NOTE ADULT - PROVIDER SPECIALTY LIST ADULT
Cardiology
Gastroenterology
Internal Medicine
Nephrology
Neurology
Neurosurgery
Rehab Medicine
SICU
Rheumatology
Infectious Disease

## 2018-02-13 NOTE — PROGRESS NOTE ADULT - SUBJECTIVE AND OBJECTIVE BOX
Subjective: Patient seen and examined. No new events except as noted.     SUBJECTIVE/ROS:  Due to altered mental status, subjective information were not able to be obtained from the patient. History was obtained, to the extent possible, from review of the chart and collateral sources of information.        MEDICATIONS:  MEDICATIONS  (STANDING):  aspirin  chewable 81 milliGRAM(s) Oral daily  cloNIDine 0.1 milliGRAM(s) Oral <User Schedule>  enalapril 10 milliGRAM(s) Oral <User Schedule>  enoxaparin Injectable 30 milliGRAM(s) SubCutaneous <User Schedule>  lacosamide 200 milliGRAM(s) Oral two times a day  nicotine -  14 mG/24Hr(s) Patch 1 patch Transdermal daily  verapamil 80 milliGRAM(s) Oral three times a day      PHYSICAL EXAM:  T(C): 36.4 (02-13-18 @ 15:59), Max: 39.2 (02-13-18 @ 08:23)  HR: 61 (02-13-18 @ 15:59) (59 - 80)  BP: 116/67 (02-13-18 @ 15:59) (110/65 - 197/93)  RR: 18 (02-13-18 @ 15:59) (18 - 20)  SpO2: 93% (02-13-18 @ 15:59) (93% - 97%)  Wt(kg): --  I&O's Summary    JVP: Normal  Neck: supple  Lung: clear   CV: S1 S2 , Murmur:  Abd: soft  Ext: No edema  neuro: Awake   Psych: flat affect  Skin: normal       LABS/DATA:    CARDIAC MARKERS:  CARDIAC MARKERS ( 13 Feb 2018 10:15 )  x     / x     / 41 U/L / x     / x                                    12.7   11.2  )-----------( 436      ( 13 Feb 2018 10:12 )             37.7     02-13    131<L>  |  93<L>  |  14  ----------------------------<  164<H>  3.8   |  24  |  0.59    Ca    8.5      13 Feb 2018 10:15    TPro  5.8<L>  /  Alb  2.7<L>  /  TBili  0.2  /  DBili  x   /  AST  184<H>  /  ALT  192<H>  /  AlkPhos  163<H>  02-13    proBNP:   Lipid Profile:   HgA1c:   TSH:     TELE:  EKG:

## 2018-02-13 NOTE — PROGRESS NOTE ADULT - PROBLEM SELECTOR PLAN 4
- unclear etiology for acute left MCA stroke but neuro considering ? reversible cerebral vasoconstriction syndrome (RCVS or Betty syndrome) as possible etiology and less likely vasculitis (work up unremarkable).   - LP negative for meningitis/infection, per discussion with neurology/stroke attending, started on verapamil given concern for possible RCVS  - continue with statin, aspirin

## 2018-02-13 NOTE — PROGRESS NOTE ADULT - SUBJECTIVE AND OBJECTIVE BOX
Authored by Dr Juan Weaver [phone]    Patient is a 62y old  Female who presents with a chief complaint of Patient was admitted on  as a trauma. Patient had a fall as per  with altered mental status. Patient had an EVD drain in place for Inta-ventricular hemorrhage  and it was removed on  (2018 13:50)    SUBJECTIVE / OVERNIGHT EVENTS: Persistently febrile    MEDICATIONS  (STANDING):  aspirin  chewable 81 milliGRAM(s) Oral daily  atorvastatin 40 milliGRAM(s) Oral at bedtime  cloNIDine 0.1 milliGRAM(s) Oral <User Schedule>  enalapril 10 milliGRAM(s) Oral <User Schedule>  enoxaparin Injectable 30 milliGRAM(s) SubCutaneous <User Schedule>  hydrALAZINE 25 milliGRAM(s) Oral four times a day  levETIRAcetam  Solution 1000 milliGRAM(s) Oral two times a day  nicotine -  14 mG/24Hr(s) Patch 1 patch Transdermal daily  verapamil 80 milliGRAM(s) Oral three times a day    MEDICATIONS  (PRN):  acetaminophen    Suspension. 650 milliGRAM(s) Oral every 6 hours PRN Mild Pain (1 - 3)  ALBUTerol    90 MICROgram(s) HFA Inhaler 1 Puff(s) Inhalation every 4 hours PRN Wheezing  artificial  tears Solution 1 Drop(s) Both EYES every 4 hours PRN dryness  ipratropium 17 MICROgram(s) HFA Inhaler 1 Puff(s) Inhalation every 6 hours PRN wheez      Vital Signs Last 24 Hrs  T(C): 39.2 (2018 08:23), Max: 39.2 (2018 08:23)  T(F): 102.5 (2018 08:23), Max: 102.5 (2018 08:23)  HR: 76 (2018 08:23) (58 - 80)  BP: 173/76 (2018 08:23) (137/72 - 197/93)  BP(mean): --  RR: 20 (2018 08:23) (18 - 20)  SpO2: 94% (2018 08:23) (94% - 97%)  CAPILLARY BLOOD GLUCOSE        I&O's Summary      PHYSICAL EXAM  GEN: NAD  HEENT: + right scalp hematoma resolving  LUNG: Clear to auscultation bilaterally; No wheeze  HEART: RRR no murmur  ABDOMEN: Soft, Nontender, Nondistended; Bowel sounds present, + PEG covered with abdominal binder(site C/D/I)  EXTREMITIES: No leg edema  PSYCH: calm  NEUROLOGY: Alert and awake, aphasic, follows simple commands (i.e. smile, squeezes hand, moves left arm and leg on command), unable to move right arm but slight movement noted in RLE  SKIN: warm, dry    LABS:                        12.7   11.2  )-----------( 436      ( 2018 10:12 )             37.7         131<L>  |  93<L>  |  14  ----------------------------<  164<H>  3.8   |  24  |  0.59    Ca    8.5      2018 10:15    TPro  5.8<L>  /  Alb  2.7<L>  /  TBili  0.2  /  DBili  x   /  AST  184<H>  /  ALT  192<H>  /  AlkPhos  163<H>      Urinalysis Basic - ( 2018 10:12 )    Color: Yellow / Appearance: Clear / S.019 / pH: x  Gluc: x / Ketone: Trace  / Bili: Negative / Urobili: Negative   Blood: x / Protein: Negative / Nitrite: Negative   Leuk Esterase: Negative / RBC: x / WBC x   Sq Epi: x / Non Sq Epi: x / Bacteria: x        Culture - Blood (collected 2018 04:48)  Source: .Blood Blood-Peripheral  Preliminary Report (2018 05:01):    No growth to date.    Culture - Blood (collected 2018 04:48)  Source: .Blood Blood-Peripheral  Preliminary Report (2018 05:01):    No growth to date.        RADIOLOGY & ADDITIONAL TESTS:    Imaging Personally Reviewed: < from: Xray Abdomen 1 View PORTABLE -Urgent (18 @ 16:59) >  PEG tube study, the stomach is opacified with contrast.      Evaluation limited secondary to the lateral projection, however there is   no evidence of any gross extravasation or leak.    < end of copied text >    Consultant(s) Notes Reviewed:  GI, ID  Care Discussed with Consultants/Other Providers: Renal consulted Authored by Dr Juan Weaver [phone] [id number]    Patient is a 62y old  Female who presents with a chief complaint of Patient was admitted on  as a trauma. Patient had a fall as per  with altered mental status. Patient had an EVD drain in place for Inta-ventricular hemorrhage  and it was removed on  (2018 13:50)    SUBJECTIVE / OVERNIGHT EVENTS: Persistently febrile. pt unable to communicate ROS due to aphasia but grossly in no distress,  at bedside - he states he has been feeding her some liquids causing her to cough and is concerned it is the cause of fevers    MEDICATIONS  (STANDING):  aspirin  chewable 81 milliGRAM(s) Oral daily  atorvastatin 40 milliGRAM(s) Oral at bedtime  cloNIDine 0.1 milliGRAM(s) Oral <User Schedule>  enalapril 10 milliGRAM(s) Oral <User Schedule>  enoxaparin Injectable 30 milliGRAM(s) SubCutaneous <User Schedule>  hydrALAZINE 25 milliGRAM(s) Oral four times a day  levETIRAcetam  Solution 1000 milliGRAM(s) Oral two times a day  nicotine -  14 mG/24Hr(s) Patch 1 patch Transdermal daily  verapamil 80 milliGRAM(s) Oral three times a day    MEDICATIONS  (PRN):  acetaminophen    Suspension. 650 milliGRAM(s) Oral every 6 hours PRN Mild Pain (1 - 3)  ALBUTerol    90 MICROgram(s) HFA Inhaler 1 Puff(s) Inhalation every 4 hours PRN Wheezing  artificial  tears Solution 1 Drop(s) Both EYES every 4 hours PRN dryness  ipratropium 17 MICROgram(s) HFA Inhaler 1 Puff(s) Inhalation every 6 hours PRN wheez      Vital Signs Last 24 Hrs  T(C): 39.2 (2018 08:23), Max: 39.2 (2018 08:23)  T(F): 102.5 (2018 08:23), Max: 102.5 (2018 08:23)  HR: 76 (2018 08:23) (58 - 80)  BP: 173/76 (2018 08:23) (137/72 - 197/93)  BP(mean): --  RR: 20 (2018 08:23) (18 - 20)  SpO2: 94% (2018 08:23) (94% - 97%)  CAPILLARY BLOOD GLUCOSE        I&O's Summary      PHYSICAL EXAM  GEN: NAD  HEENT: + right scalp hematoma resolving  LUNG: Clear to auscultation bilaterally; No wheeze  HEART: RRR no murmur  ABDOMEN: Soft, Nontender, Nondistended; Bowel sounds present, + PEG covered with abdominal binder(site C/D/I)  EXTREMITIES: No leg edema  PSYCH: calm  NEUROLOGY: Alert and awake, aphasic, follows simple commands (i.e. smile, squeezes hand, moves left arm and leg on command), unable to move right arm but slight movement noted in RLE  SKIN: warm, dry    LABS:                        12.7   11.2  )-----------( 436      ( 2018 10:12 )             37.7     02-13    131<L>  |  93<L>  |  14  ----------------------------<  164<H>  3.8   |  24  |  0.59    Ca    8.5      2018 10:15    TPro  5.8<L>  /  Alb  2.7<L>  /  TBili  0.2  /  DBili  x   /  AST  184<H>  /  ALT  192<H>  /  AlkPhos  163<H>  02-13    Urinalysis Basic - ( 2018 10:12 )    Color: Yellow / Appearance: Clear / S.019 / pH: x  Gluc: x / Ketone: Trace  / Bili: Negative / Urobili: Negative   Blood: x / Protein: Negative / Nitrite: Negative   Leuk Esterase: Negative / RBC: x / WBC x   Sq Epi: x / Non Sq Epi: x / Bacteria: x        Culture - Blood (collected 2018 04:48)  Source: .Blood Blood-Peripheral  Preliminary Report (2018 05:01):    No growth to date.    Culture - Blood (collected 2018 04:48)  Source: .Blood Blood-Peripheral  Preliminary Report (2018 05:01):    No growth to date.        RADIOLOGY & ADDITIONAL TESTS:    Imaging Personally Reviewed: < from: Xray Abdomen 1 View PORTABLE -Urgent (18 @ 16:59) >  PEG tube study, the stomach is opacified with contrast.      Evaluation limited secondary to the lateral projection, however there is   no evidence of any gross extravasation or leak.    < end of copied text >    Consultant(s) Notes Reviewed:  GI, ID  Care Discussed with Consultants/Other Providers: Renal consulted

## 2018-02-13 NOTE — PROGRESS NOTE ADULT - PROBLEM SELECTOR PLAN 2
Still elevated  Management per renal  On enalapril 10mg BID, verapamil 80mg TID, hydralazine 25mg 4X/day recently started on clonidine 0.1mg BID.  - renal plans to follow up midday and decide on adjusting meds at that time - all of these meds can be uptitrated if needed.   BP goal up to 160 for now.

## 2018-02-13 NOTE — PROGRESS NOTE ADULT - PROBLEM SELECTOR PLAN 1
Persistently febrile though WBC continues to resolve  - another recurrent fever last night, UA/CXR unremarkable BCx prelim NG.   f/u dopplers, RVP.   Pt was being fed a small amount of apple juice by mouth by family, aspiration is a concern - if this turns out to be cause, would monitor off abx, monitor resp status and reiterate discussions w/family about aspiration risk  - ID f/u Persistently febrile though WBC continues to resolve  - another recurrent fever last night, UA/CXR unremarkable BCx prelim NG.   f/u dopplers, RVP.   Pt was being fed a small amount of apple juice by mouth by family, aspiration is a concern - if this turns out to be cause, would monitor off abx, monitor resp status and reiterate discussions w/family about aspiration risk  - ID f/u  - may need CT scanning if fevers continue - likely both chest and a/p if source is not readily identifiable

## 2018-02-13 NOTE — PROGRESS NOTE ADULT - PROBLEM SELECTOR PLAN 10
- incidental 2.6 x 2.2 cm left adrenal nodule noted on CT a/p  - will need outpatient follow up.
noted elevated LFTs - ?DILI - d/c statin, check RUQ us, hepatitis panel, CK. Hepatology eval to guide medication adjustment in possible DILI.

## 2018-02-13 NOTE — SWALLOW VFSS/MBS ASSESSMENT ADULT - DIAGNOSTIC IMPRESSIONS
MBS scheduled for today. However, Pt febrile, on hold for MBS this am. As per d/w NP Lily, Pt pending abdominal US, possible other w/u on 2/14, thus will reschedule exam for 2/15 if medically appropriate.

## 2018-02-13 NOTE — CONSULT NOTE ADULT - CONSULT REASON
Dysphagia
Evaluate Rehabilitation Needs
HTN
ICH
elevated trop
level 1 trauma
level one trauma s/p fall
medical management
ICH
leukocytosis

## 2018-02-13 NOTE — PROGRESS NOTE ADULT - SUBJECTIVE AND OBJECTIVE BOX
SUBJECTIVE:     OVERNIGHT EVENTS: none    Vital Signs Last 24 Hrs  T(C): 36.7 (13 Feb 2018 12:10), Max: 39.2 (13 Feb 2018 08:23)  T(F): 98.1 (13 Feb 2018 12:10), Max: 102.5 (13 Feb 2018 08:23)  HR: 62 (13 Feb 2018 12:10) (58 - 80)  BP: 110/65 (13 Feb 2018 12:10) (110/65 - 197/93)  BP(mean): --  RR: 20 (13 Feb 2018 12:10) (18 - 20)  SpO2: 95% (13 Feb 2018 12:10) (94% - 97%)    PHYSICAL EXAM:    Constitutional: No Acute Distress     Neurological: AOx3, Following Commands, Moving all Extremities     Motor exam:          Upper extremity                         Delt     Bicep     Tricep    HG                                                 R         5/5        5/5        5/5       5/5                                               L          5/5        5/5        5/5       5/5          Lower extremity                        HF         KF        KE       DF         PF                                                  R        5/5        5/5        5/5       5/5         5/5                                               L         5/5        5/5       5/5       5/5          5/5                                                 Sensation: [] intact to light touch  [] decreased:     Pulmonary: Clear to Auscultation, No rales, No rhonchi, No wheezes     Cardiovascular: S1, S2, Regular rate and rhythm     Gastrointestinal: Soft, Non-tender, Non-distended     Extremities: No calf tenderness     Incision:   DRAINS:     LABS:                        12.7   11.2  )-----------( 436      ( 13 Feb 2018 10:12 )             37.7    02-13    131<L>  |  93<L>  |  14  ----------------------------<  164<H>  3.8   |  24  |  0.59    Ca    8.5      13 Feb 2018 10:15    TPro  5.8<L>  /  Alb  2.7<L>  /  TBili  0.2  /  DBili  x   /  AST  184<H>  /  ALT  192<H>  /  AlkPhos  163<H>  02-13      IMAGING:         MEDICATIONS:    levETIRAcetam  Solution 1000 milliGRAM(s) Oral two times a day  cloNIDine 0.1 milliGRAM(s) Oral <User Schedule>  enalapril 10 milliGRAM(s) Oral <User Schedule>  hydrALAZINE 25 milliGRAM(s) Oral four times a day  verapamil 80 milliGRAM(s) Oral three times a day  ALBUTerol    90 MICROgram(s) HFA Inhaler 1 Puff(s) Inhalation every 4 hours PRN Wheezing  ipratropium 17 MICROgram(s) HFA Inhaler 1 Puff(s) Inhalation every 6 hours PRN wheez   artificial  tears Solution 1 Drop(s) Both EYES every 4 hours PRN dryness  aspirin  chewable 81 milliGRAM(s) Oral daily  atorvastatin 40 milliGRAM(s) Oral at bedtime  enoxaparin Injectable 30 milliGRAM(s) SubCutaneous <User Schedule>  nicotine -  14 mG/24Hr(s) Patch 1 patch Transdermal daily      DIET: SUBJECTIVE:   Fever this am. Appears non toxic, flushed. Occasional cough  OVERNIGHT EVENTS: none    Vital Signs Last 24 Hrs  T(C): 36.7 (2018 12:10), Max: 39.2 (2018 08:23)  T(F): 98.1 (2018 12:10), Max: 102.5 (2018 08:23)  HR: 62 (2018 12:10) (58 - 80)  BP: 110/65 (2018 12:10) (110/65 - 197/93)  BP(mean): --  RR: 20 (2018 12:10) (18 - 20)  SpO2: 95% (2018 12:10) (94% - 97%)    PHYSICAL EXAM:    Constitutional: No Acute Distress     Neurological: AOx3, Following Commands, Moving LUE/LLE 5/5. RUE- 0/5 RLE HF 1/5 KF 2/5 KE 3/5 DF/PF 3/5    Pulmonary: Clear to Auscultation,    Cardiovascular: S1, S2, Regular rate and rhythm     Gastrointestinal: Soft, Non-tender, Non-distended . Peg in place    Extremities: No calf tenderness     LABS:                        12.7   11.2  )-----------( 436      ( 2018 10:12 )             37.7    02-13    131<L>  |  93<L>  |  14  ----------------------------<  164<H>  3.8   |  24  |  0.59    Ca    8.5      2018 10:15    TPro  5.8<L>  /  Alb  2.7<L>  /  TBili  0.2  /  DBili  x   /  AST  184<H>  /  ALT  192<H>  /  AlkPhos  163<H>  02-  UA neg  Bld cx  neg      IMAGIN/12 CXR- clear        MEDICATIONS:    levETIRAcetam  Solution 1000 milliGRAM(s) Oral two times a day  cloNIDine 0.1 milliGRAM(s) Oral <User Schedule>  enalapril 10 milliGRAM(s) Oral <User Schedule>  hydrALAZINE 25 milliGRAM(s) Oral four times a day  verapamil 80 milliGRAM(s) Oral three times a day  ALBUTerol    90 MICROgram(s) HFA Inhaler 1 Puff(s) Inhalation every 4 hours PRN Wheezing  ipratropium 17 MICROgram(s) HFA Inhaler 1 Puff(s) Inhalation every 6 hours PRN wheez   artificial  tears Solution 1 Drop(s) Both EYES every 4 hours PRN dryness  aspirin  chewable 81 milliGRAM(s) Oral daily  atorvastatin 40 milliGRAM(s) Oral at bedtime  enoxaparin Injectable 30 milliGRAM(s) SubCutaneous <User Schedule>  nicotine -  14 mG/24Hr(s) Patch 1 patch Transdermal daily      DIET:

## 2018-02-13 NOTE — PROGRESS NOTE ADULT - ASSESSMENT
62 F, active smoker, with no sig PMHx, recent URI symptoms admitted on 1/24/18 with AMS, fall with head trauma found to have large right frontal scalp hematoma, acute left MCA territory infarct, IVH, and hydrocephalus on CT head requiring intubation and EVD. CTA head with ? 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 Vasculitis and infectious work up negative. Course c/b subclinical seizures, fevers and leukocytosis with negative infectious work up, also with EKG changes and elevated troponin ? due to demand ischemia, and dysphagia. Patient was extubated on 1/28, EVD discontinued on 2/1 and transferred out of ICU on 2/2/18, with hypernatremia(resolved), s/p PEG on 2/5 with recurrent fevers    PMD: Dr. Felipe Kathleen [phone]

## 2018-02-13 NOTE — PROGRESS NOTE ADULT - SUBJECTIVE AND OBJECTIVE BOX
INTERVAL HPI/OVERNIGHT EVENTS:  patient positioned in bed for comfort and safety, family member bedside. Pt has been tolwerating PEG tube feeds, no residuals. Last BM 2/10 per RN    MEDICATIONS  (STANDING):  aspirin  chewable 81 milliGRAM(s) Oral daily  atorvastatin 40 milliGRAM(s) Oral at bedtime  cloNIDine 0.1 milliGRAM(s) Oral <User Schedule>  enalapril 10 milliGRAM(s) Oral <User Schedule>  enoxaparin Injectable 30 milliGRAM(s) SubCutaneous <User Schedule>  hydrALAZINE 25 milliGRAM(s) Oral four times a day  levETIRAcetam  Solution 1000 milliGRAM(s) Oral two times a day  nicotine -  14 mG/24Hr(s) Patch 1 patch Transdermal daily  verapamil 80 milliGRAM(s) Oral three times a day    MEDICATIONS  (PRN):  acetaminophen    Suspension. 650 milliGRAM(s) Oral every 6 hours PRN Mild Pain (1 - 3)  ALBUTerol    90 MICROgram(s) HFA Inhaler 1 Puff(s) Inhalation every 4 hours PRN Wheezing  artificial  tears Solution 1 Drop(s) Both EYES every 4 hours PRN dryness  ipratropium 17 MICROgram(s) HFA Inhaler 1 Puff(s) Inhalation every 6 hours PRN wheez      Allergies    No Known Drug Allergies  strawberry (Rash)    Intolerances        Review of Systems:    General:  Max temp past 24 hrs 102.5, no  chills reported    ENT:  No sore throat, pain, runny nose  CV:  No pain, palpitatioins, hypo/hypertension  Resp:  No dyspnea, cough, tachypnea, wheezing  GI: no nausea or vomiting , no abdominal pain  Skin: no jaundice , or visible rash         Vital Signs Last 24 Hrs  T(C): 39.2 (2018 08:23), Max: 39.2 (2018 08:23)  T(F): 102.5 (2018 08:23), Max: 102.5 (2018 08:23)  HR: 76 (2018 08:23) (58 - 80)  BP: 173/76 (2018 08:23) (137/72 - 197/93)  BP(mean): --  RR: 20 (2018 08:23) (18 - 20)  SpO2: 94% (2018 08:23) (94% - 97%)    PHYSICAL EXAM:    Constitutional: NAD, well-developed  Neck: supple  Respiratory: clear to auscultation b/l no rales, rhonchi, wheezing  Cardiovascular: S1 and S2, RRR  Gastrointestinal: +BS  soft, NT/ND, PEG site clean, dry abdominal binder in place   Extremities: No peripheral edema, neg clubbing, cyanosis  Psychiatric: Normal mood, normal affect  Skin: No visible rashes      LABS:                        12.7   11.2  )-----------( 436      ( 2018 10:12 )             37.7     02    134<L>  |  96  |  12  ----------------------------<  97  3.8   |  29  |  0.58    Ca    8.7      2018 09:10        Urinalysis Basic - ( 2018 01:07 )    Color: Yellow / Appearance: Clear / S.022 / pH: x  Gluc: x / Ketone: Negative  / Bili: Negative / Urobili: 2 mg/dL   Blood: x / Protein: Trace / Nitrite: Negative   Leuk Esterase: Negative / RBC: x / WBC x   Sq Epi: x / Non Sq Epi: x / Bacteria: x          RADIOLOGY & ADDITIONAL TESTS:  < from: Xray Abdomen 1 View PORTABLE -Urgent (18 @ 16:59) >  EXAM:  XR ABDOMEN PORTABLE URGENT 1V                            PROCEDURE DATE:  2018            INTERPRETATION:  CLINICAL INFORMATION: Feeding tube check.    EXAM: Single frontal abdominal radiograph.    COMPARISON: None.    FINDINGS:  Contrast is seen entering the pelvic tube and opacifying the stomach.   Evaluate for leak is limited secondary to single frontal projection,   however there is no gross evidence of any abnormal extravasation or leak.   Air-filled, nondistended loops of bowel as well as seen within the   abdomen.  Visualized osseous structures demonstrate mild degenerative changes.  Partially visualized lung fields are unremarkable.    IMPRESSION:  PEG tube study, the stomach is opacified with contrast.      Evaluation limited secondary to the lateral projection, however there is   no evidence of any gross extravasation or leak. INTERVAL HPI/OVERNIGHT EVENTS:  patient positioned in bed for comfort and safety, family member bedside. Pt has been tolwerating PEG tube feeds, no residuals. Last BM 2/10 per RN    MEDICATIONS  (STANDING):  aspirin  chewable 81 milliGRAM(s) Oral daily  atorvastatin 40 milliGRAM(s) Oral at bedtime  cloNIDine 0.1 milliGRAM(s) Oral <User Schedule>  enalapril 10 milliGRAM(s) Oral <User Schedule>  enoxaparin Injectable 30 milliGRAM(s) SubCutaneous <User Schedule>  hydrALAZINE 25 milliGRAM(s) Oral four times a day  levETIRAcetam  Solution 1000 milliGRAM(s) Oral two times a day  nicotine -  14 mG/24Hr(s) Patch 1 patch Transdermal daily  verapamil 80 milliGRAM(s) Oral three times a day    MEDICATIONS  (PRN):  acetaminophen    Suspension. 650 milliGRAM(s) Oral every 6 hours PRN Mild Pain (1 - 3)  ALBUTerol    90 MICROgram(s) HFA Inhaler 1 Puff(s) Inhalation every 4 hours PRN Wheezing  artificial  tears Solution 1 Drop(s) Both EYES every 4 hours PRN dryness  ipratropium 17 MICROgram(s) HFA Inhaler 1 Puff(s) Inhalation every 6 hours PRN wheez      Allergies    No Known Drug Allergies  strawberry (Rash)    Intolerances        Review of Systems:    General:  Max temp past 24 hrs 102.5, no  chills reported    ENT:  No sore throat, pain, runny nose  CV:  No pain, palpitatioins, hypo/hypertension  Resp:  No dyspnea, cough, tachypnea, wheezing  GI: no nausea or vomiting , no abdominal pain  Skin: no jaundice , or visible rash         Vital Signs Last 24 Hrs  T(C): 39.2 (2018 08:23), Max: 39.2 (2018 08:23)  T(F): 102.5 (2018 08:23), Max: 102.5 (2018 08:23)  HR: 76 (2018 08:23) (58 - 80)  BP: 173/76 (2018 08:23) (137/72 - 197/93)  BP(mean): --  RR: 20 (2018 08:23) (18 - 20)  SpO2: 94% (2018 08:23) (94% - 97%)    PHYSICAL EXAM:    Constitutional: NAD, well-developed  Neck: supple  Respiratory: clear to auscultation b/l no rales, rhonchi, wheezing  Cardiovascular: S1 and S2, RRR  Gastrointestinal: +BS  soft, NT/ND, PEG site clean, dry abdominal binder in place   Extremities: No peripheral edema, neg clubbing, cyanosis  Psychiatric: Normal mood, normal affect  Skin: No visible rashes      LABS:                        12.7   11.2  )-----------( 436      ( 2018 10:12 )             37.7         134<L>  |  96  |  12  ----------------------------<  97  3.8   |  29  |  0.58    Ca    8.7      2018 09:10    Hemoglobin: 12.7 g/dL [11.5 - 15.5] ( @ 10:12)  Hemoglobin: 12.2 g/dL [11.5 - 15.5] ( @ 01:07)    Albumin, Serum: 2.7 g/dL <L> [3.3 - 5.0] ( @ 10:15)  Aspartate Aminotransferase (AST/SGOT): 184 U/L <H> [10 - 40] ( @ 10:15)  Alanine Aminotransferase (ALT/SGPT): 192 U/L RC <H> [10 - 45] ( @ 10:15)  Bilirubin: Negative ( @ 10:12)  Bilirubin: Negative ( @ 01:07)      Urinalysis Basic - ( 2018 01:07 )    Color: Yellow / Appearance: Clear / S.022 / pH: x  Gluc: x / Ketone: Negative  / Bili: Negative / Urobili: 2 mg/dL   Blood: x / Protein: Trace / Nitrite: Negative   Leuk Esterase: Negative / RBC: x / WBC x   Sq Epi: x / Non Sq Epi: x / Bacteria: x          RADIOLOGY & ADDITIONAL TESTS:  < from: Xray Abdomen 1 View PORTABLE -Urgent (18 @ 16:59) >  EXAM:  XR ABDOMEN PORTABLE URGENT 1V                            PROCEDURE DATE:  2018            INTERPRETATION:  CLINICAL INFORMATION: Feeding tube check.    EXAM: Single frontal abdominal radiograph.    COMPARISON: None.    FINDINGS:  Contrast is seen entering the pelvic tube and opacifying the stomach.   Evaluate for leak is limited secondary to single frontal projection,   however there is no gross evidence of any abnormal extravasation or leak.   Air-filled, nondistended loops of bowel as well as seen within the   abdomen.  Visualized osseous structures demonstrate mild degenerative changes.  Partially visualized lung fields are unremarkable.    IMPRESSION:  PEG tube study, the stomach is opacified with contrast.      Evaluation limited secondary to the lateral projection, however there is   no evidence of any gross extravasation or leak.  < from: CT Abdomen and Pelvis w/ IV Cont (18 @ 05:32) >  EXAM:  CT ABDOMEN AND PELVIS IC                          EXAM:  CT CHEST IC                            PROCEDURE DATE:  2018            INTERPRETATION:  CLINICAL INFORMATION: Trauma, status post fall.     COMPARISON: None.    PROCEDURE:   CT of the Chest, Abdomen and Pelvis was performed with intravenous   contrast.   Intravenous contrast: 90 ml Omnipaque 350. 10 ml discarded.  Oral contrast: None.  Sagittal and coronal reformats were performed.    FINDINGS:    CHEST:     LUNGS AND LARGE AIRWAYS: Patent central airways. Emphysema. Tiny left   lower lobe calcified granuloma.  PLEURA: No pleural effusion. No pneumothorax.  VESSELS: Atherosclerotic calcification of the thoracic aorta.  HEART: Cardiomegaly. No pericardial effusion. Coronary artery and mitral   annular calcification.  MEDIASTINUM AND ADAN: No lymphadenopathy.  CHEST WALL AND LOWER NECK: Within normal limits.    ABDOMEN AND PELVIS:    LIVER: Within normal limits.  BILE DUCTS: Normal caliber.  GALLBLADDER: Possible cholelithiasis.  SPLEEN: Within normal limits.  PANCREAS: Within normal limits.  ADRENALS: 2.6 x 2.2 cm left adrenal nodule.  KIDNEYS/URETERS: Within normal limits.    BLADDER: Opacified with contrast.  REPRODUCTIVE ORGANS: The uterus and adnexa are withinnormal limits.    BOWEL: Colonic diverticulosis without diverticulitis. No bowel   obstruction. Appendix is not visualized.  PERITONEUM: No ascites. No free air.  VESSELS:  Within normal limits.  RETROPERITONEUM: No lymphadenopathy.    ABDOMINAL WALL: Atherosclerotic calcification of the abdominal aorta and   its branches.  BONES: Suggestion of cortical irregularities of the right 5th and 6th   lateral ribs, however there is some respiratory motion artifact in this   region limiting its evaluation. Degenerative changes of the spine.    IMPRESSION:     No visceral or vascular traumatic injury to the chest, abdomen or pelvis.   Indeterminant left adrenal nodule.

## 2018-02-13 NOTE — PROGRESS NOTE ADULT - ASSESSMENT
Assessment:   62 female with dysphagia, had PEG placed 2/05/18 , The PEG is patent today after the tube clamp was released, and the Feeding tube check reports no extravasation or leak     PLAN::  Continue current PEG tube feeds as tolerated  Maintain aspiration precautions  Monitor labs and trends as ordered   Monitor bowel movement frequency     Alicia Farr [facility]   [phone] Assessment:   62 female with dysphagia, had PEG placed 2/05/18 , The PEG is patent today after the tube clamp was released, and the Feeding tube check reports no extravasation or leak   LFTs noted to be elevated from prior labs Jan 24. 2018 non toxic and in NAD afebrile   prior CT 1/24/18 reported possible cholelithiasis     PLAN:  Continue current PEG tube feeds as tolerated  Maintain aspiration precautions  Monitor labs and trends as ordered   Abdominal sonogram to further evaluate elevated LFTs   Avoid hepato toxic Rx if medically feasible   Monitor bowel movement frequency     Alicia Farr [facility]   [phone]

## 2018-02-14 DIAGNOSIS — I10 ESSENTIAL (PRIMARY) HYPERTENSION: ICD-10-CM

## 2018-02-14 LAB
ALBUMIN SERPL ELPH-MCNC: 2.2 G/DL — LOW (ref 3.3–5)
ALBUMIN SERPL ELPH-MCNC: 2.6 G/DL — LOW (ref 3.3–5)
ALP SERPL-CCNC: 124 U/L — HIGH (ref 40–120)
ALP SERPL-CCNC: 131 U/L — HIGH (ref 40–120)
ALT FLD-CCNC: 155 U/L — HIGH (ref 10–45)
ALT FLD-CCNC: 158 U/L RC — HIGH (ref 10–45)
ANION GAP SERPL CALC-SCNC: 9 MMOL/L — SIGNIFICANT CHANGE UP (ref 5–17)
AST SERPL-CCNC: 105 U/L — HIGH (ref 10–40)
AST SERPL-CCNC: 107 U/L — HIGH (ref 10–40)
BILIRUB DIRECT SERPL-MCNC: 0 MG/DL — SIGNIFICANT CHANGE UP (ref 0–0.2)
BILIRUB INDIRECT FLD-MCNC: SIGNIFICANT CHANGE UP (ref 0.2–1)
BILIRUB SERPL-MCNC: 0.1 MG/DL — LOW (ref 0.2–1.2)
BILIRUB SERPL-MCNC: <0.2 MG/DL — SIGNIFICANT CHANGE UP (ref 0.2–1.2)
BUN SERPL-MCNC: 15 MG/DL — SIGNIFICANT CHANGE UP (ref 7–23)
CALCIUM SERPL-MCNC: 8.3 MG/DL — LOW (ref 8.4–10.5)
CHLORIDE SERPL-SCNC: 94 MMOL/L — LOW (ref 96–108)
CO2 SERPL-SCNC: 31 MMOL/L — SIGNIFICANT CHANGE UP (ref 22–31)
CREAT SERPL-MCNC: 0.54 MG/DL — SIGNIFICANT CHANGE UP (ref 0.5–1.3)
CULTURE RESULTS: SIGNIFICANT CHANGE UP
GLUCOSE SERPL-MCNC: 96 MG/DL — SIGNIFICANT CHANGE UP (ref 70–99)
HAV IGM SER-ACNC: SIGNIFICANT CHANGE UP
HBV CORE IGM SER-ACNC: SIGNIFICANT CHANGE UP
HBV SURFACE AG SER-ACNC: SIGNIFICANT CHANGE UP
HCT VFR BLD CALC: 34.4 % — LOW (ref 34.5–45)
HCV AB S/CO SERPL IA: 0.17 S/CO — SIGNIFICANT CHANGE UP
HCV AB SERPL-IMP: SIGNIFICANT CHANGE UP
HGB BLD-MCNC: 11.6 G/DL — SIGNIFICANT CHANGE UP (ref 11.5–15.5)
MCHC RBC-ENTMCNC: 33.7 GM/DL — SIGNIFICANT CHANGE UP (ref 32–36)
MCHC RBC-ENTMCNC: 34.8 PG — HIGH (ref 27–34)
MCV RBC AUTO: 103 FL — HIGH (ref 80–100)
ORGANISM # SPEC MICROSCOPIC CNT: SIGNIFICANT CHANGE UP
ORGANISM # SPEC MICROSCOPIC CNT: SIGNIFICANT CHANGE UP
PLATELET # BLD AUTO: 414 K/UL — HIGH (ref 150–400)
POTASSIUM SERPL-MCNC: 3.8 MMOL/L — SIGNIFICANT CHANGE UP (ref 3.5–5.3)
POTASSIUM SERPL-SCNC: 3.8 MMOL/L — SIGNIFICANT CHANGE UP (ref 3.5–5.3)
PROT SERPL-MCNC: 5.8 G/DL — LOW (ref 6–8.3)
PROT SERPL-MCNC: 6 G/DL — SIGNIFICANT CHANGE UP (ref 6–8.3)
RBC # BLD: 3.34 M/UL — LOW (ref 3.8–5.2)
RBC # FLD: 11.7 % — SIGNIFICANT CHANGE UP (ref 10.3–14.5)
SODIUM SERPL-SCNC: 134 MMOL/L — LOW (ref 135–145)
SPECIMEN SOURCE: SIGNIFICANT CHANGE UP
WBC # BLD: 7.3 K/UL — SIGNIFICANT CHANGE UP (ref 3.8–10.5)
WBC # FLD AUTO: 7.3 K/UL — SIGNIFICANT CHANGE UP (ref 3.8–10.5)

## 2018-02-14 PROCEDURE — 76700 US EXAM ABDOM COMPLETE: CPT | Mod: 26

## 2018-02-14 PROCEDURE — 99233 SBSQ HOSP IP/OBS HIGH 50: CPT

## 2018-02-14 RX ADMIN — Medication 80 MILLIGRAM(S): at 15:27

## 2018-02-14 RX ADMIN — LACOSAMIDE 200 MILLIGRAM(S): 50 TABLET ORAL at 05:55

## 2018-02-14 RX ADMIN — Medication 10 MILLIGRAM(S): at 12:09

## 2018-02-14 RX ADMIN — Medication 0.1 MILLIGRAM(S): at 12:10

## 2018-02-14 RX ADMIN — Medication 80 MILLIGRAM(S): at 22:25

## 2018-02-14 RX ADMIN — LACOSAMIDE 200 MILLIGRAM(S): 50 TABLET ORAL at 18:03

## 2018-02-14 RX ADMIN — Medication 1 PATCH: at 12:09

## 2018-02-14 RX ADMIN — Medication 1 PATCH: at 12:10

## 2018-02-14 RX ADMIN — Medication 0.1 MILLIGRAM(S): at 22:25

## 2018-02-14 RX ADMIN — Medication 10 MILLIGRAM(S): at 22:25

## 2018-02-14 RX ADMIN — ENOXAPARIN SODIUM 30 MILLIGRAM(S): 100 INJECTION SUBCUTANEOUS at 18:03

## 2018-02-14 RX ADMIN — Medication 81 MILLIGRAM(S): at 12:09

## 2018-02-14 RX ADMIN — Medication 80 MILLIGRAM(S): at 05:55

## 2018-02-14 NOTE — PROGRESS NOTE ADULT - PROBLEM SELECTOR PLAN 4
- unclear etiology for acute left MCA stroke but neuro considering ? reversible cerebral vasoconstriction syndrome (RCVS or Betty syndrome) as possible etiology and less likely vasculitis (work up unremarkable).   - LP negative for meningitis/infection, per discussion with neurology/stroke attending, started on verapamil given concern for possible RCVS  - continue with aspirin

## 2018-02-14 NOTE — PROGRESS NOTE ADULT - SUBJECTIVE AND OBJECTIVE BOX
[facility]   [phone]  RENAL FOLLOW UP NOTE  --------------------------------------------------------------------------------  Chief Complaint:    24 hour events/subjective:    comfortable appearing. non verbal     PAST HISTORY  --------------------------------------------------------------------------------  No significant changes to PMH, PSH, FHx, SHx, unless otherwise noted    ALLERGIES & MEDICATIONS  --------------------------------------------------------------------------------  Allergies    No Known Drug Allergies  strawberry (Rash)    Intolerances      Standing Inpatient Medications  aspirin  chewable 81 milliGRAM(s) Oral daily  cloNIDine 0.1 milliGRAM(s) Oral <User Schedule>  enalapril 10 milliGRAM(s) Oral <User Schedule>  enoxaparin Injectable 30 milliGRAM(s) SubCutaneous <User Schedule>  lacosamide 200 milliGRAM(s) Oral two times a day  nicotine -  14 mG/24Hr(s) Patch 1 patch Transdermal daily  verapamil 80 milliGRAM(s) Oral three times a day    PRN Inpatient Medications  acetaminophen    Suspension. 650 milliGRAM(s) Oral every 6 hours PRN  ALBUTerol    90 MICROgram(s) HFA Inhaler 1 Puff(s) Inhalation every 4 hours PRN  artificial  tears Solution 1 Drop(s) Both EYES every 4 hours PRN  ipratropium 17 MICROgram(s) HFA Inhaler 1 Puff(s) Inhalation every 6 hours PRN      REVIEW OF SYSTEMS  --------------------------------------------------------------------------------  non verbal     VITALS/PHYSICAL EXAM  --------------------------------------------------------------------------------  T(C): 36.6 (02-14-18 @ 15:35), Max: 37.2 (02-13-18 @ 21:21)  HR: 66 (02-14-18 @ 15:35) (55 - 69)  BP: 131/71 (02-14-18 @ 15:35) (128/75 - 159/76)  RR: 18 (02-14-18 @ 15:35) (18 - 18)  SpO2: 93% (02-14-18 @ 15:35) (85% - 97%)  Wt(kg): --        02-13-18 @ 07:01  -  02-14-18 @ 07:00  --------------------------------------------------------  IN: 1640 mL / OUT: 0 mL / NET: 1640 mL          Physical Exam:  	Gen: non verbal   	No JVD,   	Pulm: No rales /occasional rhonchi  	CV: RRR, S1S2; no rub  	Abd: +BS, soft, nontender/nondistended. + peg  	: No suprapubic tenderness  	ext: Warm,no clubbing, no edema  	    LABS/STUDIES  --------------------------------------------------------------------------------              11.6   7.3   >-----------<  414      [02-14-18 @ 09:55]              34.4     134  |  94  |  15  ----------------------------<  96      [02-14-18 @ 09:55]  3.8   |  31  |  0.54        Ca     8.3     [02-14-18 @ 09:55]    TPro  5.8  /  Alb  2.6  /  TBili  0.1  /  DBili  x   /  AST  105  /  ALT  158  /  AlkPhos  131  [02-14-18 @ 09:55]        CK 41      [02-13-18 @ 10:15]    Creatinine Trend:  SCr 0.54 [02-14 @ 09:55]  SCr 0.59 [02-13 @ 10:15]  SCr 0.58 [02-12 @ 09:10]  SCr 0.54 [02-12 @ 01:07]  SCr 0.44 [02-09 @ 07:45]              Urinalysis - [02-13-18 @ 10:12]      Color Yellow / Appearance Clear / SG 1.019 / pH 5.5      Gluc Negative / Ketone Trace  / Bili Negative / Urobili Negative       Blood Negative / Protein Negative / Leuk Est Negative / Nitrite Negative      RBC  / WBC  / Hyaline  / Gran  / Sq Epi  / Non Sq Epi  / Bacteria       HbA1c 5.4      [01-24-18 @ 22:40]  TSH 0.59      [01-25-18 @ 08:10]  Lipid: chol 256, , HDL 98,       [01-24-18 @ 22:40]    REZA: titer 1:160, pattern Speckled      [01-24-18 @ 22:40]  Rheumatoid Factor 8.0      [01-24-18 @ 22:40]  ANCA: cANCA Negative, pANCA Negative, atypical ANCA Negative      [01-24-18 @ 22:40]  MPO-ANCA <5.0, interpretation: Negative      [01-24-18 @ 22:40]

## 2018-02-14 NOTE — SWALLOW VFSS/MBS ASSESSMENT ADULT - DIAGNOSTIC IMPRESSIONS
Case d/w NP Lily to confirm MBS tentatively scheduled for tomorrow, as per d/w team yesterday. However, as of now, Pt has bed at [facility] for acute rehab. Therefore, plan is now for Pt to be discharged to rehab and have objective swallow assessment there. Please notify this service if plan changes.

## 2018-02-14 NOTE — PROGRESS NOTE ADULT - SUBJECTIVE AND OBJECTIVE BOX
Subjective: Patient seen and examined. No new events except as noted.     SUBJECTIVE/ROS:  no new events       MEDICATIONS:  MEDICATIONS  (STANDING):  aspirin  chewable 81 milliGRAM(s) Oral daily  cloNIDine 0.1 milliGRAM(s) Oral <User Schedule>  enalapril 10 milliGRAM(s) Oral <User Schedule>  enoxaparin Injectable 30 milliGRAM(s) SubCutaneous <User Schedule>  lacosamide 200 milliGRAM(s) Oral two times a day  nicotine -  14 mG/24Hr(s) Patch 1 patch Transdermal daily  verapamil 80 milliGRAM(s) Oral three times a day      PHYSICAL EXAM:  T(C): 36.6 (02-14-18 @ 15:35), Max: 37.2 (02-13-18 @ 21:21)  HR: 66 (02-14-18 @ 15:35) (55 - 69)  BP: 131/71 (02-14-18 @ 15:35) (128/75 - 159/76)  RR: 18 (02-14-18 @ 15:35) (18 - 18)  SpO2: 93% (02-14-18 @ 15:35) (85% - 97%)  Wt(kg): --  I&O's Summary    13 Feb 2018 07:01  -  14 Feb 2018 07:00  --------------------------------------------------------  IN: 1640 mL / OUT: 0 mL / NET: 1640 mL        JVP: Normal  Neck: supple  Lung: clear   CV: S1 S2 , Murmur:  Abd: soft  Ext: No edema  neuro: Awake / alert  Psych: flat affect  Skin: normal       LABS/DATA:    CARDIAC MARKERS:  CARDIAC MARKERS ( 13 Feb 2018 10:15 )  x     / x     / 41 U/L / x     / x                                    11.6   7.3   )-----------( 414      ( 14 Feb 2018 09:55 )             34.4     02-14    134<L>  |  94<L>  |  15  ----------------------------<  96  3.8   |  31  |  0.54    Ca    8.3<L>      14 Feb 2018 09:55    TPro  5.8<L>  /  Alb  2.6<L>  /  TBili  0.1<L>  /  DBili  x   /  AST  105<H>  /  ALT  158<H>  /  AlkPhos  131<H>  02-14    proBNP:   Lipid Profile:   HgA1c:   TSH:     TELE:  EKG:

## 2018-02-14 NOTE — PROGRESS NOTE ADULT - ASSESSMENT
#Assessment:   62 female with dysphagia, had PEG placed 2/05/18 , The PEG is patent today after the tube clamp was released, and the Feeding tube check reports no extravasation or leak     prior CT 1/24/18 reported possible cholelithiasis   Elevated LFTs (improving trend) and benign abdominal exam); suspect medication related   Abdominal US - negative    PLAN:  Continue current PEG tube feeds as tolerated  Maintain aspiration precautions  Trend LFTs  Avoid hepato toxic Rx if medically feasible   Monitor bowel movement frequency        Perry Murphy PA-C    [facility]  [phone]

## 2018-02-14 NOTE — PROGRESS NOTE ADULT - ASSESSMENT
ASSESSMENT: This is a 62ywoman active smoker, with no sig PMHx, recent URI symptoms admitted on 1/24/18 with AMS, fall with head trauma found to have large right frontal scalp hematoma, acute left MCA territory infarct, IVH, and hydrocephalus on CT head. Patient required intubation and EVD.  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  Vasculitis and infectious work up negative.    Hospital  Course c/b subclinical seizures, fevers and leukocytosis with negative infectious work up. Patient also with EKG changes and elevated troponin possibly due to demand ischemia defer to Cardiology.  Pt also has dysphagia, s/p Peg Placement.      NEURO: From Neurological perspective, workup is now completed. Patient may be transferred to acute TBI/Rehab pending determination of primary Medical team and Neurosurgery. Medical issues remain  under direction and management of primary medical team . Patient will followup with Dr. Perez for Neurology concerns as outpatient. Please reconsult as needed.     NEURO: Continue close monitoring for neurologic deterioration. Blood pressure management per recommendation of nephrology. Statin d/c by primary team due to increase in LFT, will require reevaluation  by primary medical team in regards to  resumption.. Physical therapy/OT/Speech eval/treatment.     ANTITHROMBOTIC THERAPY: Continue ASA as secondary stroke prevention    PULMONARY: Protecting airway, saturating well     CARDIOVASCULAR:                           SBP goal: Requires blood pressure control, defer to Nephrology for management .    GASTROINTESTINAL: Ulcer prophylaxis, dysphagia screen failed.      Diet: PEG insitu    DISPOSITION: Rehab or home depending on PT eval once stable and workup is complete       CORE MEASURES:        Admission NIHSS: NO IVH     TPA: [] YES [x] NO      LDL/HDL: 122/98     Depression Screen: Aphasic     Statin Therapy: d/c by primary team due to increase in LFT     Dysphagia Screen: [] PASS [x] FAIL Patient has PEG     Smoking [x] YES [] NO      Afib [] YES [x] NO     Stroke Education [x] YES [] NO

## 2018-02-14 NOTE — PROGRESS NOTE ADULT - SUBJECTIVE AND OBJECTIVE BOX
THE PATIENT WAS SEEN AND EXAMINED BY ME WITH THE HOUSESTAFF AND STROKE TEAM DURING MORNING ROUNDS.   HPI:  61y/o female brought in to [facility] ED s/p fall at home in the bathroom. Unable to obtain HPI, history, or ROS from patient due to her altered mental status. As per  patient woke up this morning feeling "unwell," vomited once with some dry heaves at 9am, then had slurred speech during the day starting at 10am. She spent most of the day in bed, and was able to have some tea and crackers. She then had a fall in the bathroom & hit the right side of her head. Level I trauma called as patient's altered mental status was initially concerning for potential need for intubation. In trauma bay primary survey significant for GCS of 11 (E=4 V =2 M=5). Secondary survey significant for Right frontal hematoma. (24 Jan 2018 05:31)      SUBJECTIVE: No events overnight.  No new neurologic complaints.      acetaminophen    Suspension. 650 milliGRAM(s) Oral every 6 hours PRN  ALBUTerol    90 MICROgram(s) HFA Inhaler 1 Puff(s) Inhalation every 4 hours PRN  artificial  tears Solution 1 Drop(s) Both EYES every 4 hours PRN  aspirin  chewable 81 milliGRAM(s) Oral daily  cloNIDine 0.1 milliGRAM(s) Oral <User Schedule>  enalapril 10 milliGRAM(s) Oral <User Schedule>  enoxaparin Injectable 30 milliGRAM(s) SubCutaneous <User Schedule>  ipratropium 17 MICROgram(s) HFA Inhaler 1 Puff(s) Inhalation every 6 hours PRN  lacosamide 200 milliGRAM(s) Oral two times a day  nicotine -  14 mG/24Hr(s) Patch 1 patch Transdermal daily  verapamil 80 milliGRAM(s) Oral three times a day      PHYSICAL EXAM:   Vital Signs Last 24 Hrs  T(C): 36.5 (14 Feb 2018 12:00), Max: 37.2 (13 Feb 2018 21:21)  T(F): 97.7 (14 Feb 2018 12:00), Max: 98.9 (13 Feb 2018 21:21)  HR: 55 (14 Feb 2018 12:00) (55 - 69)  BP: 155/81 (14 Feb 2018 12:00) (116/67 - 159/76)  BP(mean): --  RR: 18 (14 Feb 2018 12:00) (18 - 18)  SpO2: 94% (14 Feb 2018 12:00) (85% - 97%)      General:  Resting in bed. No acute distress.  HEENT: Right scalp EVD removal site intact. EOM intact, visual fields full: able to track.  Abdomen: Soft, nontender, nondistended . PEG Insitu.    NEUROLOGICAL EXAM:  Mental status: Awake, alert. Minimal verbal output, Says "Hello", Not following commands.  Cranial Nerves: No facial asymmetry, no nystagmus. Speech is minimal,  +aphasia.  Motor exam: Normal tone. Right sided hemiplegia. Left sided upper and lower movement 5/5.  Sensation:  Appears Intact to light touch   Coordination/ Gait: deferred. Patient on bedrest.     LABS:                        11.6   7.3   )-----------( 414      ( 14 Feb 2018 09:55 )             34.4    02-14    134<L>  |  94<L>  |  15  ----------------------------<  96  3.8   |  31  |  0.54    Ca    8.3<L>      14 Feb 2018 09:55    TPro  5.8<L>  /  Alb  2.6<L>  /  TBili  0.1<L>  /  DBili  x   /  AST  105<H>  /  ALT  158<H>  /  AlkPhos  131<H>  02-14    Hemoglobin A1C, Whole Blood: 5.4 % (01-24 @ 22:40)      IMAGING: Reviewed by me.      CT Head No Cont (02.02.18 @ 08:31)     CLINICAL INDICATION:  evd removal, follow-up hemorrhagic stroke        COMPARISON: CT brain 2/1/2018    FINDINGS:  Interval removal of right frontal approach ventriculostomy   catheter. Small hemorrhage along the right anterior frontal shunt tract.    Redemonstration of hemorrhagic left frontoparietal infarct, similar in   appearance to the prior examination. Minimal hemorrhage again seen   layering in the occipital horns. No hydrocephalus or midline shift.    IMPRESSION: Interval removal of right frontal approach ventriculostomy   catheter. Small hemorrhage along the right anterior frontal shunt tract.    Redemonstration of hemorrhagic left frontoparietal infarct, similar in   appearance to the prior examination. No midline shift.

## 2018-02-14 NOTE — PROGRESS NOTE ADULT - SUBJECTIVE AND OBJECTIVE BOX
INTERVAL HPI/OVERNIGHT EVENTS:  Tolerating PEG feeds, no GI complaints  Meds adjusted yesterday by Neurosurgery and Medicine for possible contributing factor to elevated LFT's  no further fever  more responsive today as per     MEDICATIONS  (STANDING):  aspirin  chewable 81 milliGRAM(s) Oral daily  cloNIDine 0.1 milliGRAM(s) Oral <User Schedule>  enalapril 10 milliGRAM(s) Oral <User Schedule>  enoxaparin Injectable 30 milliGRAM(s) SubCutaneous <User Schedule>  lacosamide 200 milliGRAM(s) Oral two times a day  nicotine -  14 mG/24Hr(s) Patch 1 patch Transdermal daily  verapamil 80 milliGRAM(s) Oral three times a day    MEDICATIONS  (PRN):  acetaminophen    Suspension. 650 milliGRAM(s) Oral every 6 hours PRN Mild Pain (1 - 3)  ALBUTerol    90 MICROgram(s) HFA Inhaler 1 Puff(s) Inhalation every 4 hours PRN Wheezing  artificial  tears Solution 1 Drop(s) Both EYES every 4 hours PRN dryness  ipratropium 17 MICROgram(s) HFA Inhaler 1 Puff(s) Inhalation every 6 hours PRN wheez      Allergies    No Known Drug Allergies  strawberry (Rash)      Review of Systems:  Gen: no fever or chills  CV: no CP or palpitations  Chest: no cough or dyspnea  Ext: no pain or swelling  Skin: no rash or jaundice    Vital Signs Last 24 Hrs  T(C): 36.5 (2018 12:00), Max: 37.2 (2018 21:21)  T(F): 97.7 (2018 12:00), Max: 98.9 (2018 21:21)  HR: 55 (2018 12:00) (55 - 69)  BP: 155/81 (2018 12:00) (116/67 - 159/76)  BP(mean): --  RR: 18 (2018 12:00) (18 - 18)  SpO2: 94% (2018 12:00) (85% - 97%)    PHYSICAL EXAM:  Constitutional: NAD, well-developed, + expressive aphasia, responsive- some verbal as well as non-verbal communication; spouse at bedside  Neck: supple  Respiratory: clear to auscultation b/l no rales, rhonchi, wheezing  Cardiovascular: S1 and S2, RRR  Gastrointestinal: +BS  soft, NT/ND, PEG site clean, dry   Extremities: No peripheral edema, neg clubbing, cyanosis  Psychiatric: Normal mood, normal affect  Skin: No visible rashes    LABS:                        11.6   7.3   )-----------( 414      ( 2018 09:55 )             34.4     02-14    134<L>  |  94<L>  |  15  ----------------------------<  96  3.8   |  31  |  0.54    Ca    8.3<L>      2018 09:55    TPro  5.8<L>  /  Alb  2.6<L>  /  TBili  0.1<L>  /  DBili  x   /  AST  105<H>  /  ALT  158<H>  /  AlkPhos  131<H>      Aspartate Aminotransferase (AST/SGOT): 184 --> 105  Alanine Aminotransferase (ALT/SGPT): 192 --> 158  Alkaline Phosphatase, Serum: 163 --> 131      Urinalysis Basic - ( 2018 10:12 )    Color: Yellow / Appearance: Clear / S.019 / pH: x  Gluc: x / Ketone: Trace  / Bili: Negative / Urobili: Negative   Blood: x / Protein: Negative / Nitrite: Negative   Leuk Esterase: Negative / RBC: x / WBC x   Sq Epi: x / Non Sq Epi: x / Bacteria: x         RADIOLOGY & ADDITIONAL TESTS:  < from: US Abdomen Complete (18 @ 11:36) >  INTERPRETATION:  CLINICAL INFORMATION: Fever. Elevated LFTs.    COMPARISON: 2018    TECHNIQUE: Sonography of the abdomen.     FINDINGS:    Liver: Within normal limits.    Bile ducts: Normal caliber. Common bile duct measures 2 mm.     Gallbladder: Contracted gallbladder.        Pancreas: Visualized portions are within normal limits.    Spleen: 7 cm. Within normal limits.    Right kidney: 10 cm. No hydronephrosis.        Left kidney: 11 cm.  No hydronephrosis.        Ascites: None.    Aorta and IVC: Visualized portions are within normal limits.    IMPRESSION:     No acute abnormality.

## 2018-02-14 NOTE — PROGRESS NOTE ADULT - PROBLEM SELECTOR PLAN 2
noted elevated LFTs - ?DILI - d/c statin/hydralazine, RUQ us & hepatitis panel wnl CK wnl. GI recs appreciated. LFTs improving off statin - monitor LFTs in rehab.

## 2018-02-14 NOTE — PROGRESS NOTE ADULT - ASSESSMENT
HTN  better controlled   on verapamil as per neuro  cont clonidine  monitor BP     abnormal ekg   likely due to LVH    Fever  infectious work up  fu with Med

## 2018-02-14 NOTE — PROGRESS NOTE ADULT - ASSESSMENT
s/p fall, IVH hemorrhage, s/p EVD drain removed  had leukocytosis since admission 2 weeks ago--now moderating  Intermittent post op fevers with a negative ID w/u to date  coag negative staph in 1/4 bottles is c/w contaminent  Elevated LFT's could reflect a drug effect, possible drug fever as well  She is without any focal signs of infection  PLAN:   1.Monitor off antibiotics  2.fever does not obligate antibiotics, but does imply need for evaluation  3.seizure meds being adjusted by NS given elevated LFT's  4.ID issues reviewed with NS PA and  at bedside, anticipating rehab soon

## 2018-02-14 NOTE — PROGRESS NOTE ADULT - SUBJECTIVE AND OBJECTIVE BOX
CC: f/u for fever    Patient reports: she is aphasic,seizure meds adjusted yesterday due to rise in LFT's.No fever x 24 hours, she remains off antibiotics    REVIEW OF SYSTEMS:  All other review of systems negative (Comprehensive ROS): aphasic,stable right sided weakness    Antimicrobials Day #  :off    Other Medications Reviewed    T(F): 97.7 (18 @ 04:54), Max: 98.9 (18 @ 21:21)  HR: 68 (18 @ 04:54)  BP: 134/77 (18 @ 04:54)  RR: 18 (18 @ 04:54)  SpO2: 96% (18 @ 04:54)  Wt(kg): --    PHYSICAL EXAM:  General: alert, no acute distress  Eyes:  anicteric, no conjunctival injection, no discharge  Oropharynx: no lesions or injection 	  Neck: supple, without adenopathy  Lungs: clear to auscultation  Heart: regular rate and rhythm; no murmur, rubs or gallops  Abdomen: soft, nondistended, nontender, without mass or organomegaly.Peg in place  Skin: no lesions  Extremities: no clubbing, cyanosis, or edema  Neurologic: alert, oriented, aphasic, right sided weakness    LAB RESULTS:                        11.6   7.3   )-----------( 414      ( 2018 09:55 )             34.4         134<L>  |  94<L>  |  15  ----------------------------<  96  3.8   |  31  |  0.54    Ca    8.3<L>      2018 09:55    TPro  5.8<L>  /  Alb  2.6<L>  /  TBili  0.1<L>  /  DBili  x   /  AST  105<H>  /  ALT  158<H>  /  AlkPhos  131<H>      LIVER FUNCTIONS - ( 2018 09:55 )  Alb: 2.6 g/dL / Pro: 5.8 g/dL / ALK PHOS: 131 U/L / ALT: 158 U/L RC / AST: 105 U/L / GGT: x           Urinalysis Basic - ( 2018 10:12 )    Color: Yellow / Appearance: Clear / S.019 / pH: x  Gluc: x / Ketone: Trace  / Bili: Negative / Urobili: Negative   Blood: x / Protein: Negative / Nitrite: Negative   Leuk Esterase: Negative / RBC: x / WBC x   Sq Epi: x / Non Sq Epi: x / Bacteria: x      MICROBIOLOGY:  RECENT CULTURES:   @ 04:48 .Blood Blood-Peripheral Blood Culture PCR    No growth to date.    Growth in anaerobic bottle: Gram Positive Cocci in Clusters, coag negative staph        RADIOLOGY REVIEWED:  LE dopplers negative

## 2018-02-14 NOTE — PROGRESS NOTE ADULT - ASSESSMENT
62-year-old female with intracranial hemorrhage after a mechanical fall.  In addition patient also with difficult to control labile hypertension.  In addition patient also had is hyponatremic.  1- htn   2- hyponatremia  3- ICH    na better.   bp is well controlled. cont   cloNIDine 0.1 milliGRAM(s) Oral bid  enalapril 10 milliGRAM(s) Oral qd  verapamil 80 milliGRAM(s) Oral three times a day  d'/w nscu team and pt

## 2018-02-14 NOTE — PROGRESS NOTE ADULT - SUBJECTIVE AND OBJECTIVE BOX
SUBJECTIVE:     OVERNIGHT EVENTS: none    Vital Signs Last 24 Hrs  T(C): 36.6 (14 Feb 2018 15:35), Max: 37.2 (13 Feb 2018 21:21)  T(F): 97.8 (14 Feb 2018 15:35), Max: 98.9 (13 Feb 2018 21:21)  HR: 66 (14 Feb 2018 15:35) (55 - 69)  BP: 131/71 (14 Feb 2018 15:35) (128/75 - 159/76)  BP(mean): --  RR: 18 (14 Feb 2018 15:35) (18 - 18)  SpO2: 93% (14 Feb 2018 15:35) (85% - 97%)    PHYSICAL EXAM:    Constitutional: No Acute Distress     Neurological: AOx3, Following Commands, Moving all Extremities     Motor exam:          Upper extremity                         Delt     Bicep     Tricep    HG                                                 R         5/5        5/5        5/5       5/5                                               L          5/5        5/5        5/5       5/5          Lower extremity                        HF         KF        KE       DF         PF                                                  R        5/5        5/5        5/5       5/5         5/5                                               L         5/5        5/5       5/5       5/5          5/5                                                 Sensation: [] intact to light touch  [] decreased:     Pulmonary: Clear to Auscultation, No rales, No rhonchi, No wheezes     Cardiovascular: S1, S2, Regular rate and rhythm     Gastrointestinal: Soft, Non-tender, Non-distended     Extremities: No calf tenderness     Incision:   DRAINS:     LABS:                        11.6   7.3   )-----------( 414      ( 14 Feb 2018 09:55 )             34.4    02-14    134<L>  |  94<L>  |  15  ----------------------------<  96  3.8   |  31  |  0.54    Ca    8.3<L>      14 Feb 2018 09:55    TPro  5.8<L>  /  Alb  2.6<L>  /  TBili  0.1<L>  /  DBili  x   /  AST  105<H>  /  ALT  158<H>  /  AlkPhos  131<H>  02-14        IMAGING:         MEDICATIONS:    acetaminophen    Suspension. 650 milliGRAM(s) Oral every 6 hours PRN Mild Pain (1 - 3)  lacosamide 200 milliGRAM(s) Oral two times a day  cloNIDine 0.1 milliGRAM(s) Oral <User Schedule>  enalapril 10 milliGRAM(s) Oral <User Schedule>  verapamil 80 milliGRAM(s) Oral three times a day  ALBUTerol    90 MICROgram(s) HFA Inhaler 1 Puff(s) Inhalation every 4 hours PRN Wheezing  ipratropium 17 MICROgram(s) HFA Inhaler 1 Puff(s) Inhalation every 6 hours PRN wheez  artificial  tears Solution 1 Drop(s) Both EYES every 4 hours PRN dryness  aspirin  chewable 81 milliGRAM(s) Oral daily  enoxaparin Injectable 30 milliGRAM(s) SubCutaneous <User Schedule>  nicotine -  14 mG/24Hr(s) Patch 1 patch Transdermal daily      DIET: SUBJECTIVE:   No fevers for 24 hrs  OVERNIGHT EVENTS: none    Vital Signs Last 24 Hrs  T(C): 36.6 (14 Feb 2018 15:35), Max: 37.2 (13 Feb 2018 21:21)  T(F): 97.8 (14 Feb 2018 15:35), Max: 98.9 (13 Feb 2018 21:21)  HR: 66 (14 Feb 2018 15:35) (55 - 69)  BP: 131/71 (14 Feb 2018 15:35) (128/75 - 159/76)  RR: 18 (14 Feb 2018 15:35) (18 - 18)  SpO2: 93% (14 Feb 2018 15:35) (85% - 97%)    PHYSICAL EXAM:    Constitutional: No Acute Distress     Neurological: AOx3, Following Commands, Moving LUE/LLE 5/5. RUE- 0/5 RLE HF 1/5 KF 2/5 KE 3/5 DF/PF 3/5    Pulmonary: Clear to Auscultation,    Cardiovascular: S1, S2, Regular rate and rhythm     Gastrointestinal: Soft, Non-tender, Non-distended . Peg in place    Extremities: No calf tenderness       LABS:                        11.6   7.3   )-----------( 414      ( 14 Feb 2018 09:55 )             34.4    02-14    134<L>  |  94<L>  |  15  ----------------------------<  96  3.8   |  31  |  0.54    Ca    8.3<L>      14 Feb 2018 09:55    TPro  5.8<L>  /  Alb  2.6<L>  /  TBili  0.1<L>  /  DBili  x   /  AST  105<H>  /  ALT  158<H>  /  AlkPhos  131<H>  02-14        IMAGING:     Abdominal sono- No pathology    MEDICATIONS:    acetaminophen    Suspension. 650 milliGRAM(s) Oral every 6 hours PRN Mild Pain (1 - 3)  lacosamide 200 milliGRAM(s) Oral two times a day  cloNIDine 0.1 milliGRAM(s) Oral <User Schedule>  enalapril 10 milliGRAM(s) Oral <User Schedule>  verapamil 80 milliGRAM(s) Oral three times a day  ALBUTerol    90 MICROgram(s) HFA Inhaler 1 Puff(s) Inhalation every 4 hours PRN Wheezing  ipratropium 17 MICROgram(s) HFA Inhaler 1 Puff(s) Inhalation every 6 hours PRN wheez  artificial  tears Solution 1 Drop(s) Both EYES every 4 hours PRN dryness  aspirin  chewable 81 milliGRAM(s) Oral daily  enoxaparin Injectable 30 milliGRAM(s) SubCutaneous <User Schedule>  nicotine -  14 mG/24Hr(s) Patch 1 patch Transdermal daily      DIET:

## 2018-02-14 NOTE — CHART NOTE - NSCHARTNOTEFT_GEN_A_CORE
Source: Patient [ ]    Family [ ]     other [ x] Medical record, RN    Diet : NPO with tube feed    Hospital course: Per chart, 62 F, active smoker, with no sig PMHx, recent URI symptoms admitted on 1/24/18 with AMS, fall. Patient with L MCA infarct and hemorrhage, s/p PEG 2/5 with recurrent fevers.     Nutrition consult received for assessment. Per RN, pt tolerating enteral nutrition at goal rate. Pt's tube feed currently on hold as pt just came back from ultrasound. RN to restart feeds at goal. No GI distress reported, last BM last night.        Patient reports [ ] nausea  [ ] vomiting [ ] diarrhea [ ] constipation  [ ]chewing problems [ ] swallowing issues  [ ] other:      PO intake:  < 50% [ ] 50-75% [ ]   % [ ]  other :     Source for PO intake [ ] Patient [ ] family [ ] chart [ ] staff [ ] other     Enteral /Parenteral Nutrition: Jevity 1.2 at goal of 70 ml//hr x 18 hours/day as ordered. provides 1512 calories, 70 g protein (30 calories/kg, 1.4 g protein/kg) of dosing weight 49.9 kg        Current Weight:   % Weight Change    Pertinent Medications: MEDICATIONS  (STANDING):  aspirin  chewable 81 milliGRAM(s) Oral daily  cloNIDine 0.1 milliGRAM(s) Oral <User Schedule>  enalapril 10 milliGRAM(s) Oral <User Schedule>  enoxaparin Injectable 30 milliGRAM(s) SubCutaneous <User Schedule>  lacosamide 200 milliGRAM(s) Oral two times a day  nicotine -  14 mG/24Hr(s) Patch 1 patch Transdermal daily  verapamil 80 milliGRAM(s) Oral three times a day    MEDICATIONS  (PRN):  acetaminophen    Suspension. 650 milliGRAM(s) Oral every 6 hours PRN Mild Pain (1 - 3)  ALBUTerol    90 MICROgram(s) HFA Inhaler 1 Puff(s) Inhalation every 4 hours PRN Wheezing  artificial  tears Solution 1 Drop(s) Both EYES every 4 hours PRN dryness  ipratropium 17 MICROgram(s) HFA Inhaler 1 Puff(s) Inhalation every 6 hours PRN wheez    Pertinent Labs:  02-14 Na134 mmol/L<L> Glu 96 mg/dL K+ 3.8 mmol/L Cr  0.54 mg/dL BUN 15 mg/dL Phos n/a   Alb 2.6 g/dL<L> PAB n/a         Skin:     Estimated Needs:   [ ] no change since previous assessment  [ ] recalculated:       Previous Nutrition Diagnosis:     [ ] Inadequate Energy Intake [ ]Inadequate Oral Intake [ ] Excessive Energy Intake     [ ] Underweight [ ] Increased Nutrient Needs [ ] Overweight/Obesity     [ ] Altered GI Function [ ] Unintended Weight Loss [ ] Food & Nutrition Related Knowledge Deficit [ ] Malnutrition          Nutrition Diagnosis is [ ] ongoing  [ ] resolved [ ] not applicable          New Nutrition Diagnosis: [ ] not applicable    [ ] Inadequate Protein Energy Intake [ ]Inadequate Oral Intake [ ] Excessive Energy Intake     [ ] Underweight [ ] Increased Nutrient Needs [ ] Overweight/Obesity     [ ] Altered GI Function [ ] Unintended Weight Loss [ ] Food & Nutrition Related Knowledge Deficit[ ] Limited Adherence to nutrition related recommendations [ ] Malnutrition  [ ] other: Free text       Related to:      As evidenced by:      Interventions:     Recommend    [ ] Change Diet To:    [ ] Nutrition Supplement    [ ] Nutrition Support    [ ] Other:        Monitoring and Evaluation:     [ ] PO intake [ ] Tolerance to diet prescription [ ] weights [ ] follow up per protocol    [ ] other: Source: Patient [ ]    Family [ ]     other [ x] Medical record, RN, pt's  at bedside- Pt aphasic.    Diet : NPO with tube feed    Enteral /Parenteral Nutrition: Jevity 1.2 at goal of 70 ml//hr x 18 hours/day as ordered provides 1512 calories, 70 g protein (30 calories/kg, 1.4 g protein/kg) of dosing weight 49.9 kg    Hospital course: Per chart, 62 F, active smoker, with no sig PMHx, recent URI symptoms admitted on 1/24/18 with AMS, fall. Patient with L MCA infarct and hemorrhage, s/p PEG 2/5 with recurrent fevers.     Nutrition consult received for assessment. Per RN, pt tolerating enteral nutrition at goal rate. Pt's tube feed currently on hold as pt just came back from ultrasound. RN to restart feeds at goal. No GI distress reported, last BM last night. Plan for MBS tomorrow, (2/15). Pt's 's questions regarding different enteral formulas/feeding regimens addressed. Pt's  content with current standardized formula. Pt's  made aware RD remains available.       Current Weight: (1/31) 109.7 pounds - no new weight to assess. Per pt's  weight visually appears stable. Requested to obtain new weight.       Pertinent Medications: MEDICATIONS  (STANDING):  aspirin  chewable 81 milliGRAM(s) Oral daily  cloNIDine 0.1 milliGRAM(s) Oral <User Schedule>  enalapril 10 milliGRAM(s) Oral <User Schedule>  enoxaparin Injectable 30 milliGRAM(s) SubCutaneous <User Schedule>  lacosamide 200 milliGRAM(s) Oral two times a day  nicotine -  14 mG/24Hr(s) Patch 1 patch Transdermal daily  verapamil 80 milliGRAM(s) Oral three times a day    MEDICATIONS  (PRN):  acetaminophen    Suspension. 650 milliGRAM(s) Oral every 6 hours PRN Mild Pain (1 - 3)  ALBUTerol    90 MICROgram(s) HFA Inhaler 1 Puff(s) Inhalation every 4 hours PRN Wheezing  artificial  tears Solution 1 Drop(s) Both EYES every 4 hours PRN dryness  ipratropium 17 MICROgram(s) HFA Inhaler 1 Puff(s) Inhalation every 6 hours PRN wheez    Pertinent Labs:  02-14 Na134 mmol/L<L> Glu 96 mg/dL K+ 3.8 mmol/L Cr  0.54 mg/dL BUN 15 mg/dL Phos n/a   Alb 2.6 g/dL<L>      Skin: No edema. No pressure ulcers documented.     Estimated Needs:   [x ] no change since previous assessment  [ ] recalculated:       Previous Nutrition Diagnosis: Swallowing difficulty being addressed with provision of enteral feeds    New Nutrition Diagnosis: [x ] not applicable     Interventions:     Recommend    [ ] Change Diet To:    [ ] Nutrition Supplement    [x ] Nutrition Support: continue with Jevity 1.2 at goal of 70 ml//hr x 18 hours/day as ordered provides 1512 calories, 70 g protein (30 calories/kg, 1.4 g protein/kg) of dosing weight 49.9 kg    [ ] Other:        Monitoring and Evaluation:     [ ] PO intake [x ] Tolerance to diet prescription [ x] weights [x ] follow up per protocol  RD remains available, January Rachel RD pager #[phone] Source: Patient [ ]    Family [ ]     other [ x] Medical record, RN, pt's  at bedside- Pt aphasic.    Diet : NPO with tube feed    Enteral /Parenteral Nutrition: Jevity 1.2 at goal of 70 ml//hr x 18 hours/day  provides 1512 calories, 70 g protein (30 calories/kg, 1.4 g protein/kg) of dosing weight 49.9 kg    Hospital course: Per chart, 62 F, active smoker, with no sig PMHx, recent URI symptoms admitted on 1/24/18 with AMS, fall. Patient with L MCA infarct and hemorrhage, s/p PEG 2/5 with recurrent fevers.     Nutrition consult received for assessment. Per RN, pt tolerating enteral nutrition at goal rate. Pt's tube feed currently on hold as pt just came back from ultrasound. RN to restart feeds at goal. No GI distress reported, last BM last night. Plan for MBS tomorrow, (2/15). Pt's 's questions regarding different enteral formulas/feeding regimens addressed. Pt's  content with current standardized formula. Pt's  made aware RD remains available.       Current Weight: (1/31) 109.7 pounds - no new weight to assess. Per pt's  weight visually appears stable. Requested to obtain new weight.       Pertinent Medications: MEDICATIONS  (STANDING):  aspirin  chewable 81 milliGRAM(s) Oral daily  cloNIDine 0.1 milliGRAM(s) Oral <User Schedule>  enalapril 10 milliGRAM(s) Oral <User Schedule>  enoxaparin Injectable 30 milliGRAM(s) SubCutaneous <User Schedule>  lacosamide 200 milliGRAM(s) Oral two times a day  nicotine -  14 mG/24Hr(s) Patch 1 patch Transdermal daily  verapamil 80 milliGRAM(s) Oral three times a day    MEDICATIONS  (PRN):  acetaminophen    Suspension. 650 milliGRAM(s) Oral every 6 hours PRN Mild Pain (1 - 3)  ALBUTerol    90 MICROgram(s) HFA Inhaler 1 Puff(s) Inhalation every 4 hours PRN Wheezing  artificial  tears Solution 1 Drop(s) Both EYES every 4 hours PRN dryness  ipratropium 17 MICROgram(s) HFA Inhaler 1 Puff(s) Inhalation every 6 hours PRN wheez    Pertinent Labs:  02-14 Na134 mmol/L<L> Glu 96 mg/dL K+ 3.8 mmol/L Cr  0.54 mg/dL BUN 15 mg/dL Phos n/a   Alb 2.6 g/dL<L>      Skin: No edema. No pressure ulcers documented.     Estimated Needs:   [x ] no change since previous assessment  [ ] recalculated:       Previous Nutrition Diagnosis: Swallowing difficulty being addressed with provision of enteral feeds    New Nutrition Diagnosis: [x ] not applicable     Interventions:     Recommend    [ ] Change Diet To:    [ ] Nutrition Supplement    [x ] Nutrition Support: continue with Jevity 1.2 at goal of 70 ml//hr x 18 hours/day provides 1512 calories, 70 g protein (30 calories/kg, 1.4 g protein/kg) of dosing weight 49.9 kg    [ ] Other:        Monitoring and Evaluation:     [ ] PO intake [x ] Tolerance to diet prescription [ x] weights [x ] follow up per protocol  RD remains available, January Rachel RD pager #[phone]

## 2018-02-14 NOTE — PROGRESS NOTE ADULT - PROBLEM SELECTOR PLAN 1
Afebrile leukocytosis resolved. RVP/dopplers/RUQ US negative.   +BCx - CoNS likely contaminant, repeat cultures pending pt did not receive abx.   - ID f/u

## 2018-02-14 NOTE — PROGRESS NOTE ADULT - ASSESSMENT
62 F, active smoker, with no sig PMHx, recent URI symptoms admitted on 1/24/18 with AMS, fall with head trauma found to have large right frontal scalp hematoma, acute left MCA territory infarct, IVH, and hydrocephalus on CT head requiring intubation and EVD. CTA head with ? 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 Course c/b subclinical seizures, fevers and leukocytosis with negative infectious work up, also with EKG changes and elevated troponin ? due to demand ischemia, and dysphagia .  Patient was extubated on 1/28, EVD discontinued on 2/1 and transferred out of ICU on 2/2/18, s/p peg placement 2/5/18  leukocytosis  improved ,hypertension better controlled and  transaminitis- with LFTs improving    Plan:  Neuro stable. Off Keppra since 2/13. ?? drug fever. On Vimpat 200 BID. On ASA 81  Vitals stable. Bp well controlled . On Clonidine Verapamil and Vasotec  DVT ppx  Transaminitis- Improving LFTs . Trend in rehab. Avoid heptotoxic agents. Off keppra and statin  Dysphagia- Continue peg feeds. MBS in rehab  DVT ppx  D/C to rehab in am

## 2018-02-14 NOTE — PROGRESS NOTE ADULT - SUBJECTIVE AND OBJECTIVE BOX
Authored by Dr Juan Weaver [id number]     Patient is a 62y old  Female who presents with a chief complaint of Patient was admitted on  as a trauma. Patient had a fall as per  with altered mental status. Patient had an EVD drain in place for Inta-ventricular hemorrhage  and it was removed on  (2018 13:50)    SUBJECTIVE / OVERNIGHT EVENTS: Pt seen at bedside,  also present. Pt still aphasic but not in obvious distress, no pain or complaints she is trying to communicate. Is eager for discharge. Afebrile    MEDICATIONS  (STANDING):  aspirin  chewable 81 milliGRAM(s) Oral daily  cloNIDine 0.1 milliGRAM(s) Oral <User Schedule>  enalapril 10 milliGRAM(s) Oral <User Schedule>  enoxaparin Injectable 30 milliGRAM(s) SubCutaneous <User Schedule>  lacosamide 200 milliGRAM(s) Oral two times a day  nicotine -  14 mG/24Hr(s) Patch 1 patch Transdermal daily  verapamil 80 milliGRAM(s) Oral three times a day    MEDICATIONS  (PRN):  acetaminophen    Suspension. 650 milliGRAM(s) Oral every 6 hours PRN Mild Pain (1 - 3)  ALBUTerol    90 MICROgram(s) HFA Inhaler 1 Puff(s) Inhalation every 4 hours PRN Wheezing  artificial  tears Solution 1 Drop(s) Both EYES every 4 hours PRN dryness  ipratropium 17 MICROgram(s) HFA Inhaler 1 Puff(s) Inhalation every 6 hours PRN wheez      Vital Signs Last 24 Hrs  T(C): 36.5 (2018 12:00), Max: 37.2 (2018 21:21)  T(F): 97.7 (2018 12:00), Max: 98.9 (2018 21:21)  HR: 55 (2018 12:00) (55 - 69)  BP: 155/81 (2018 12:00) (116/67 - 159/76)  BP(mean): --  RR: 18 (2018 12:00) (18 - 18)  SpO2: 94% (2018 12:00) (85% - 97%)  CAPILLARY BLOOD GLUCOSE        I&O's Summary    2018 07:01  -  2018 07:00  --------------------------------------------------------  IN: 1640 mL / OUT: 0 mL / NET: 1640 mL        PHYSICAL EXAM  GEN: NAD  HEENT: MMM  LUNG: Clear to auscultation bilaterally; No wheeze  HEART: RRR no murmur  ABDOMEN: Soft, Nontender, Nondistended; Bowel sounds present, + PEG covered with abdominal binder(site C/D/I)  EXTREMITIES: No leg edema  PSYCH: calm  NEUROLOGY: Alert and awake, aphasic, follows simple commands (i.e. smile, squeezes hand, moves left arm and leg on command), unable to move right arm but slight movement noted in RLE  SKIN: warm, dry    LABS:                        11.6   7.3   )-----------( 414      ( 2018 09:55 )             34.4     02-14    134<L>  |  94<L>  |  15  ----------------------------<  96  3.8   |  31  |  0.54    Ca    8.3<L>      2018 09:55    TPro  5.8<L>  /  Alb  2.6<L>  /  TBili  0.1<L>  /  DBili  x   /  AST  105<H>  /  ALT  158<H>  /  AlkPhos  131<H>  02-14      CARDIAC MARKERS ( 2018 10:15 )  x     / x     / 41 U/L / x     / x          Urinalysis Basic - ( 2018 10:12 )    Color: Yellow / Appearance: Clear / S.019 / pH: x  Gluc: x / Ketone: Trace  / Bili: Negative / Urobili: Negative   Blood: x / Protein: Negative / Nitrite: Negative   Leuk Esterase: Negative / RBC: x / WBC x   Sq Epi: x / Non Sq Epi: x / Bacteria: x        Culture - Urine (collected 2018 13:26)  Source: .Urine Catheterized  Preliminary Report (2018 12:40):    10,000 - 49,000 CFU/mL Yeast    Culture - Blood (collected 2018 13:00)  Source: .Blood Blood-Peripheral  Preliminary Report (2018 13:02):    No growth to date.    Culture - Blood (collected 2018 13:00)  Source: .Blood Blood-Peripheral  Preliminary Report (2018 13:02):    No growth to date.    Culture - Blood (collected 2018 04:48)  Source: .Blood Blood-Peripheral  Gram Stain (2018 16:28):    Growth in anaerobic bottle: Gram Positive Cocci in Clusters  Preliminary Report (2018 16:29):    Growth in anaerobic bottle: Gram Positive Cocci in Clusters    "Due to technical problems, Proteus sp. will Not be reported as part of    the BCID panel until further notice"    ***Blood Panel PCR results on this specimen are available    approximately 3 hours after the Gram stain result.***    Gram stain, PCR, and/or culture results may not always    correspond due to difference in methodologies.    ************************************************************    This PCR assay was performed using [employer].    The following targets are tested for: Enterococcus,    vancomycin resistant enterococci, Listeria monocytogenes,    coagulase negative staphylococci, S. aureus,    methicillin resistant S. aureus, Streptococcus agalactiae    (Group B), S. pneumoniae, S. pyogenes (Group A),    Acinetobacter baumannii, Enterobacter cloacae, E. coli,    Klebsiella oxytoca, K. pneumoniae, Proteus sp.,    Serratia marcescens, Haemophilus influenzae,    Neisseria meningitidis, Pseudomonas aeruginosa, Candida    albicans, C. glabrata, C krusei, C parapsilosis,    C. tropicalis and the KPC resistance gene.  Organism: Blood Culture PCR (2018 17:56)  Organism: Blood Culture PCR (2018 17:56)    Culture - Blood (collected 2018 04:48)  Source: .Blood Blood-Peripheral  Preliminary Report (2018 05:01):    No growth to date.    Rapid RVP Result: NotDetec: The [employer] RVP Rapid uses polymerase chain reaction (PCR) and melt  curve analysis to screen for adenovirus; coronavirus HKU1, NL63, 229E,  OC43; human metapneumovirus (hMPV); human enterovirus/rhinovirus  (Entero/RV); influenza A; influenza A/H1;influenza A/H3; influenza  A/H1-2009; influenza B; parainfluenza viruses 1, 2, 3, 4; respiratory  syncytial virus; Bordetella pertussis; Mycoplasma pneumoniae; and  Chlamydophila pneumoniae. (18 @ 12:20)        RADIOLOGY & ADDITIONAL TESTS:    Imaging Personally Reviewed: < from: US Abdomen Complete (18 @ 11:36) >  IMPRESSION:     No acute abnormality.      < end of copied text >  < from: VA Duplex Lower Ext Vein Scan, Bilat (18 @ 17:23) >  No evidence of bilateral lower extremity deep venous thrombosis.    < end of copied text >    Consultant(s) Notes Reviewed:  GI ID  Care Discussed with Consultants/Other Providers: BUTCH Bautista

## 2018-02-15 ENCOUNTER — INPATIENT (INPATIENT)
Facility: HOSPITAL | Age: 63
LOS: 20 days | Discharge: NURSING, REHAB OR LONG-TERM CARE | DRG: 949 | End: 2018-03-08
Attending: PSYCHIATRY & NEUROLOGY | Admitting: PSYCHIATRY & NEUROLOGY
Payer: MEDICAID

## 2018-02-15 VITALS
HEART RATE: 58 BPM | OXYGEN SATURATION: 97 % | TEMPERATURE: 98 F | SYSTOLIC BLOOD PRESSURE: 191 MMHG | DIASTOLIC BLOOD PRESSURE: 87 MMHG | RESPIRATION RATE: 18 BRPM

## 2018-02-15 DIAGNOSIS — I95.1 ORTHOSTATIC HYPOTENSION: ICD-10-CM

## 2018-02-15 DIAGNOSIS — D72.829 ELEVATED WHITE BLOOD CELL COUNT, UNSPECIFIED: ICD-10-CM

## 2018-02-15 DIAGNOSIS — R50.9 FEVER, UNSPECIFIED: ICD-10-CM

## 2018-02-15 DIAGNOSIS — Z51.89 ENCOUNTER FOR OTHER SPECIFIED AFTERCARE: ICD-10-CM

## 2018-02-15 DIAGNOSIS — R42 DIZZINESS AND GIDDINESS: ICD-10-CM

## 2018-02-15 DIAGNOSIS — B96.1 KLEBSIELLA PNEUMONIAE [K. PNEUMONIAE] AS THE CAUSE OF DISEASES CLASSIFIED ELSEWHERE: ICD-10-CM

## 2018-02-15 DIAGNOSIS — Z72.0 TOBACCO USE: ICD-10-CM

## 2018-02-15 DIAGNOSIS — R60.9 EDEMA, UNSPECIFIED: ICD-10-CM

## 2018-02-15 DIAGNOSIS — I10 ESSENTIAL (PRIMARY) HYPERTENSION: ICD-10-CM

## 2018-02-15 DIAGNOSIS — I69.390 APRAXIA FOLLOWING CEREBRAL INFARCTION: ICD-10-CM

## 2018-02-15 DIAGNOSIS — R13.10 DYSPHAGIA, UNSPECIFIED: ICD-10-CM

## 2018-02-15 DIAGNOSIS — Z98.890 OTHER SPECIFIED POSTPROCEDURAL STATES: Chronic | ICD-10-CM

## 2018-02-15 DIAGNOSIS — I69.351 HEMIPLEGIA AND HEMIPARESIS FOLLOWING CEREBRAL INFARCTION AFFECTING RIGHT DOMINANT SIDE: ICD-10-CM

## 2018-02-15 DIAGNOSIS — I69.320 APHASIA FOLLOWING CEREBRAL INFARCTION: ICD-10-CM

## 2018-02-15 DIAGNOSIS — K59.00 CONSTIPATION, UNSPECIFIED: ICD-10-CM

## 2018-02-15 DIAGNOSIS — I69.391 DYSPHAGIA FOLLOWING CEREBRAL INFARCTION: ICD-10-CM

## 2018-02-15 DIAGNOSIS — I63.9 CEREBRAL INFARCTION, UNSPECIFIED: ICD-10-CM

## 2018-02-15 DIAGNOSIS — R26.9 UNSPECIFIED ABNORMALITIES OF GAIT AND MOBILITY: ICD-10-CM

## 2018-02-15 DIAGNOSIS — N39.0 URINARY TRACT INFECTION, SITE NOT SPECIFIED: ICD-10-CM

## 2018-02-15 DIAGNOSIS — F17.200 NICOTINE DEPENDENCE, UNSPECIFIED, UNCOMPLICATED: ICD-10-CM

## 2018-02-15 DIAGNOSIS — R00.1 BRADYCARDIA, UNSPECIFIED: ICD-10-CM

## 2018-02-15 LAB
ALBUMIN SERPL ELPH-MCNC: 2.5 G/DL — LOW (ref 3.3–5)
ALP SERPL-CCNC: 126 U/L — HIGH (ref 40–120)
ALT FLD-CCNC: 153 U/L RC — HIGH (ref 10–45)
AST SERPL-CCNC: 98 U/L — HIGH (ref 10–40)
BILIRUB DIRECT SERPL-MCNC: <0.1 MG/DL — SIGNIFICANT CHANGE UP (ref 0–0.2)
BILIRUB INDIRECT FLD-MCNC: >0 MG/DL — LOW (ref 0.2–1)
BILIRUB SERPL-MCNC: 0.1 MG/DL — LOW (ref 0.2–1.2)
CULTURE RESULTS: SIGNIFICANT CHANGE UP
PROT SERPL-MCNC: 5.8 G/DL — LOW (ref 6–8.3)
SPECIMEN SOURCE: SIGNIFICANT CHANGE UP

## 2018-02-15 PROCEDURE — 70496 CT ANGIOGRAPHY HEAD: CPT

## 2018-02-15 PROCEDURE — 82803 BLOOD GASES ANY COMBINATION: CPT

## 2018-02-15 PROCEDURE — 70450 CT HEAD/BRAIN W/O DYE: CPT

## 2018-02-15 PROCEDURE — 82962 GLUCOSE BLOOD TEST: CPT

## 2018-02-15 PROCEDURE — 99239 HOSP IP/OBS DSCHRG MGMT >30: CPT

## 2018-02-15 PROCEDURE — 97530 THERAPEUTIC ACTIVITIES: CPT

## 2018-02-15 PROCEDURE — 80048 BASIC METABOLIC PNL TOTAL CA: CPT

## 2018-02-15 PROCEDURE — 80307 DRUG TEST PRSMV CHEM ANLYZR: CPT

## 2018-02-15 PROCEDURE — 92610 EVALUATE SWALLOWING FUNCTION: CPT

## 2018-02-15 PROCEDURE — 86036 ANCA SCREEN EACH ANTIBODY: CPT

## 2018-02-15 PROCEDURE — 86592 SYPHILIS TEST NON-TREP QUAL: CPT

## 2018-02-15 PROCEDURE — C1887: CPT

## 2018-02-15 PROCEDURE — 82150 ASSAY OF AMYLASE: CPT

## 2018-02-15 PROCEDURE — G0515: CPT

## 2018-02-15 PROCEDURE — 84484 ASSAY OF TROPONIN QUANT: CPT

## 2018-02-15 PROCEDURE — 87581 M.PNEUMON DNA AMP PROBE: CPT

## 2018-02-15 PROCEDURE — 85303 CLOT INHIBIT PROT C ACTIVITY: CPT

## 2018-02-15 PROCEDURE — 87150 DNA/RNA AMPLIFIED PROBE: CPT

## 2018-02-15 PROCEDURE — A9585: CPT

## 2018-02-15 PROCEDURE — 93005 ELECTROCARDIOGRAM TRACING: CPT

## 2018-02-15 PROCEDURE — 97166 OT EVAL MOD COMPLEX 45 MIN: CPT

## 2018-02-15 PROCEDURE — 93970 EXTREMITY STUDY: CPT

## 2018-02-15 PROCEDURE — 94003 VENT MGMT INPAT SUBQ DAY: CPT

## 2018-02-15 PROCEDURE — 80061 LIPID PANEL: CPT

## 2018-02-15 PROCEDURE — 82947 ASSAY GLUCOSE BLOOD QUANT: CPT

## 2018-02-15 PROCEDURE — 93888 INTRACRANIAL LIMITED STUDY: CPT

## 2018-02-15 PROCEDURE — 86140 C-REACTIVE PROTEIN: CPT

## 2018-02-15 PROCEDURE — 83690 ASSAY OF LIPASE: CPT

## 2018-02-15 PROCEDURE — 36223 PLACE CATH CAROTID/INOM ART: CPT

## 2018-02-15 PROCEDURE — 70544 MR ANGIOGRAPHY HEAD W/O DYE: CPT

## 2018-02-15 PROCEDURE — 84100 ASSAY OF PHOSPHORUS: CPT

## 2018-02-15 PROCEDURE — 84443 ASSAY THYROID STIM HORMONE: CPT

## 2018-02-15 PROCEDURE — 97110 THERAPEUTIC EXERCISES: CPT

## 2018-02-15 PROCEDURE — 89051 BODY FLUID CELL COUNT: CPT

## 2018-02-15 PROCEDURE — C1889: CPT

## 2018-02-15 PROCEDURE — 99233 SBSQ HOSP IP/OBS HIGH 50: CPT

## 2018-02-15 PROCEDURE — 99285 EMERGENCY DEPT VISIT HI MDM: CPT | Mod: 25

## 2018-02-15 PROCEDURE — 36227 PLACE CATH XTRNL CAROTID: CPT

## 2018-02-15 PROCEDURE — 86038 ANTINUCLEAR ANTIBODIES: CPT

## 2018-02-15 PROCEDURE — 97535 SELF CARE MNGMENT TRAINING: CPT

## 2018-02-15 PROCEDURE — 83605 ASSAY OF LACTIC ACID: CPT

## 2018-02-15 PROCEDURE — 74177 CT ABD & PELVIS W/CONTRAST: CPT

## 2018-02-15 PROCEDURE — 87799 DETECT AGENT NOS DNA QUANT: CPT

## 2018-02-15 PROCEDURE — 72141 MRI NECK SPINE W/O DYE: CPT

## 2018-02-15 PROCEDURE — 87389 HIV-1 AG W/HIV-1&-2 AB AG IA: CPT

## 2018-02-15 PROCEDURE — 71045 X-RAY EXAM CHEST 1 VIEW: CPT

## 2018-02-15 PROCEDURE — 84295 ASSAY OF SERUM SODIUM: CPT

## 2018-02-15 PROCEDURE — 71260 CT THORAX DX C+: CPT

## 2018-02-15 PROCEDURE — 84702 CHORIONIC GONADOTROPIN TEST: CPT

## 2018-02-15 PROCEDURE — 97760 ORTHOTIC MGMT&TRAING 1ST ENC: CPT

## 2018-02-15 PROCEDURE — 85014 HEMATOCRIT: CPT

## 2018-02-15 PROCEDURE — 83735 ASSAY OF MAGNESIUM: CPT

## 2018-02-15 PROCEDURE — 84480 ASSAY TRIIODOTHYRONINE (T3): CPT

## 2018-02-15 PROCEDURE — 83036 HEMOGLOBIN GLYCOSYLATED A1C: CPT

## 2018-02-15 PROCEDURE — 86850 RBC ANTIBODY SCREEN: CPT

## 2018-02-15 PROCEDURE — 95819 EEG AWAKE AND ASLEEP: CPT

## 2018-02-15 PROCEDURE — 80053 COMPREHEN METABOLIC PANEL: CPT

## 2018-02-15 PROCEDURE — 97112 NEUROMUSCULAR REEDUCATION: CPT

## 2018-02-15 PROCEDURE — 87040 BLOOD CULTURE FOR BACTERIA: CPT

## 2018-02-15 PROCEDURE — 94002 VENT MGMT INPAT INIT DAY: CPT

## 2018-02-15 PROCEDURE — 85610 PROTHROMBIN TIME: CPT

## 2018-02-15 PROCEDURE — 70553 MRI BRAIN STEM W/O & W/DYE: CPT

## 2018-02-15 PROCEDURE — 82553 CREATINE MB FRACTION: CPT

## 2018-02-15 PROCEDURE — 86431 RHEUMATOID FACTOR QUANT: CPT

## 2018-02-15 PROCEDURE — 36226 PLACE CATH VERTEBRAL ART: CPT

## 2018-02-15 PROCEDURE — 84157 ASSAY OF PROTEIN OTHER: CPT

## 2018-02-15 PROCEDURE — 93886 INTRACRANIAL COMPLETE STUDY: CPT

## 2018-02-15 PROCEDURE — 87070 CULTURE OTHR SPECIMN AEROBIC: CPT

## 2018-02-15 PROCEDURE — 94660 CPAP INITIATION&MGMT: CPT

## 2018-02-15 PROCEDURE — 84132 ASSAY OF SERUM POTASSIUM: CPT

## 2018-02-15 PROCEDURE — 82550 ASSAY OF CK (CPK): CPT

## 2018-02-15 PROCEDURE — 85652 RBC SED RATE AUTOMATED: CPT

## 2018-02-15 PROCEDURE — 81003 URINALYSIS AUTO W/O SCOPE: CPT

## 2018-02-15 PROCEDURE — 82435 ASSAY OF BLOOD CHLORIDE: CPT

## 2018-02-15 PROCEDURE — 72125 CT NECK SPINE W/O DYE: CPT

## 2018-02-15 PROCEDURE — C1769: CPT

## 2018-02-15 PROCEDURE — C1894: CPT

## 2018-02-15 PROCEDURE — 87798 DETECT AGENT NOS DNA AMP: CPT

## 2018-02-15 PROCEDURE — 83516 IMMUNOASSAY NONANTIBODY: CPT

## 2018-02-15 PROCEDURE — 84436 ASSAY OF TOTAL THYROXINE: CPT

## 2018-02-15 PROCEDURE — 85027 COMPLETE CBC AUTOMATED: CPT

## 2018-02-15 PROCEDURE — 94799 UNLISTED PULMONARY SVC/PX: CPT

## 2018-02-15 PROCEDURE — 85306 CLOT INHIBIT PROT S FREE: CPT

## 2018-02-15 PROCEDURE — 97162 PT EVAL MOD COMPLEX 30 MIN: CPT

## 2018-02-15 PROCEDURE — 70498 CT ANGIOGRAPHY NECK: CPT

## 2018-02-15 PROCEDURE — 94640 AIRWAY INHALATION TREATMENT: CPT

## 2018-02-15 PROCEDURE — 80074 ACUTE HEPATITIS PANEL: CPT

## 2018-02-15 PROCEDURE — 87086 URINE CULTURE/COLONY COUNT: CPT

## 2018-02-15 PROCEDURE — 87205 SMEAR GRAM STAIN: CPT

## 2018-02-15 PROCEDURE — 93306 TTE W/DOPPLER COMPLETE: CPT

## 2018-02-15 PROCEDURE — 82945 GLUCOSE OTHER FLUID: CPT

## 2018-02-15 PROCEDURE — 76700 US EXAM ABDOM COMPLETE: CPT

## 2018-02-15 PROCEDURE — 80076 HEPATIC FUNCTION PANEL: CPT

## 2018-02-15 PROCEDURE — 87486 CHLMYD PNEUM DNA AMP PROBE: CPT

## 2018-02-15 PROCEDURE — 74018 RADEX ABDOMEN 1 VIEW: CPT

## 2018-02-15 PROCEDURE — 95951: CPT

## 2018-02-15 PROCEDURE — 92523 SPEECH SOUND LANG COMPREHEN: CPT

## 2018-02-15 PROCEDURE — 93975 VASCULAR STUDY: CPT

## 2018-02-15 PROCEDURE — 86901 BLOOD TYPING SEROLOGIC RH(D): CPT

## 2018-02-15 PROCEDURE — 84145 PROCALCITONIN (PCT): CPT

## 2018-02-15 PROCEDURE — 81001 URINALYSIS AUTO W/SCOPE: CPT

## 2018-02-15 PROCEDURE — 85730 THROMBOPLASTIN TIME PARTIAL: CPT

## 2018-02-15 PROCEDURE — 86900 BLOOD TYPING SEROLOGIC ABO: CPT

## 2018-02-15 PROCEDURE — 82330 ASSAY OF CALCIUM: CPT

## 2018-02-15 PROCEDURE — 87529 HSV DNA AMP PROBE: CPT

## 2018-02-15 PROCEDURE — 87633 RESP VIRUS 12-25 TARGETS: CPT

## 2018-02-15 RX ORDER — ACETAMINOPHEN 500 MG
20.31 TABLET ORAL
Qty: 0 | Refills: 0 | COMMUNITY
Start: 2018-02-15

## 2018-02-15 RX ORDER — ALBUTEROL 90 UG/1
1 AEROSOL, METERED ORAL
Qty: 0 | Refills: 0 | COMMUNITY
Start: 2018-02-15

## 2018-02-15 RX ORDER — ASPIRIN/CALCIUM CARB/MAGNESIUM 324 MG
1 TABLET ORAL
Qty: 0 | Refills: 0 | COMMUNITY
Start: 2018-02-15

## 2018-02-15 RX ORDER — ENOXAPARIN SODIUM 100 MG/ML
30 INJECTION SUBCUTANEOUS
Qty: 0 | Refills: 0 | COMMUNITY
Start: 2018-02-15

## 2018-02-15 RX ORDER — VERAPAMIL HCL 240 MG
1 CAPSULE, EXTENDED RELEASE PELLETS 24 HR ORAL
Qty: 0 | Refills: 0 | COMMUNITY
Start: 2018-02-15

## 2018-02-15 RX ORDER — LACOSAMIDE 50 MG/1
1 TABLET ORAL
Qty: 0 | Refills: 0 | COMMUNITY
Start: 2018-02-15

## 2018-02-15 RX ORDER — IPRATROPIUM BROMIDE 0.2 MG/ML
1 SOLUTION, NON-ORAL INHALATION
Qty: 0 | Refills: 0 | COMMUNITY
Start: 2018-02-15

## 2018-02-15 RX ADMIN — Medication 1 PATCH: at 12:21

## 2018-02-15 RX ADMIN — Medication 80 MILLIGRAM(S): at 05:24

## 2018-02-15 RX ADMIN — Medication 0.1 MILLIGRAM(S): at 09:06

## 2018-02-15 RX ADMIN — Medication 81 MILLIGRAM(S): at 12:21

## 2018-02-15 RX ADMIN — LACOSAMIDE 200 MILLIGRAM(S): 50 TABLET ORAL at 05:24

## 2018-02-15 RX ADMIN — Medication 10 MILLIGRAM(S): at 09:06

## 2018-02-15 NOTE — PROGRESS NOTE ADULT - SUBJECTIVE AND OBJECTIVE BOX
Subjective: Patient seen and examined. No new events except as noted.     SUBJECTIVE/ROS:  Pt seen and examined early this am  resting comfortable       MEDICATIONS:  MEDICATIONS  (STANDING):  aspirin  chewable 81 milliGRAM(s) Oral daily  cloNIDine 0.1 milliGRAM(s) Oral <User Schedule>  enalapril 10 milliGRAM(s) Oral <User Schedule>  enoxaparin Injectable 30 milliGRAM(s) SubCutaneous <User Schedule>  lacosamide 200 milliGRAM(s) Oral two times a day  nicotine -  14 mG/24Hr(s) Patch 1 patch Transdermal daily  verapamil 80 milliGRAM(s) Oral three times a day      PHYSICAL EXAM:  T(C): 36.4 (02-15-18 @ 07:45), Max: 36.6 (02-14-18 @ 15:35)  HR: 58 (02-15-18 @ 07:45) (53 - 66)  BP: 191/87 (02-15-18 @ 07:45) (131/71 - 191/87)  RR: 18 (02-15-18 @ 07:45) (18 - 18)  SpO2: 97% (02-15-18 @ 07:45) (93% - 97%)  Wt(kg): --  I&O's Summary    14 Feb 2018 07:01  -  15 Feb 2018 07:00  --------------------------------------------------------  IN: 840 mL / OUT: 0 mL / NET: 840 mL        JVP: Normal  Neck: supple  Lung: clear   CV: S1 S2 , Murmur:  Abd: soft  Ext: No edema  neuro: Awake / alert  Psych: flat affect  Skin: normal       LABS/DATA:    CARDIAC MARKERS:  CARDIAC MARKERS ( 13 Feb 2018 10:15 )  x     / x     / 41 U/L / x     / x                                    11.6   7.3   )-----------( 414      ( 14 Feb 2018 09:55 )             34.4     02-14    134<L>  |  94<L>  |  15  ----------------------------<  96  3.8   |  31  |  0.54    Ca    8.3<L>      14 Feb 2018 09:55    TPro  5.8<L>  /  Alb  2.5<L>  /  TBili  0.1<L>  /  DBili  <0.1  /  AST  98<H>  /  ALT  153<H>  /  AlkPhos  126<H>  02-15    proBNP:   Lipid Profile:   HgA1c:   TSH:     TELE:  EKG:

## 2018-02-15 NOTE — PROGRESS NOTE ADULT - PROBLEM SELECTOR PROBLEM 2
CVA (cerebral vascular accident)
Transaminitis
Uncontrolled hypertension
CVA (cerebral vascular accident)
Hypertensive crisis
Transaminitis
Hypertensive crisis
CVA (cerebral vascular accident)
CVA (cerebral vascular accident)

## 2018-02-15 NOTE — PROGRESS NOTE ADULT - PROBLEM SELECTOR PLAN 1
Afebrile leukocytosis resolved. RVP/dopplers/RUQ US negative.   +BCx - CoNS likely contaminant, repeat cultures pending pt did not receive abx.

## 2018-02-15 NOTE — PROGRESS NOTE ADULT - SUBJECTIVE AND OBJECTIVE BOX
62 F, active smoker, with no sig PMHx, recent URI symptoms admitted on 1/24/18 with AMS, fall with head trauma found to have large right frontal scalp hematoma, acute left MCA territory infarct, IVH, and hydrocephalus on CT head requiring intubation and EVD. CTA head with ? 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 Vasculitis and infectious work up negative. Course c/b subclinical seizures, fevers and leukocytosis with negative infectious work up, also with EKG changes and elevated troponin ? due to demand ischemia, and dysphagia. Patient was extubated on 1/28, EVD discontinued on 2/1 and transferred out of ICU on 2/2/18, with resolving hypernatremia    Overnight event: none    Vital Signs Last 24 Hrs  T(C): 36.4 (15 Feb 2018 07:45), Max: 36.6 (14 Feb 2018 15:35)  T(F): 97.5 (15 Feb 2018 07:45), Max: 97.9 (14 Feb 2018 19:49)  HR: 58 (15 Feb 2018 07:45) (53 - 66)  BP: 191/87 (15 Feb 2018 07:45) (131/71 - 191/87)  BP(mean): --  RR: 18 (15 Feb 2018 07:45) (18 - 18)  SpO2: 97% (15 Feb 2018 07:45) (93% - 97%)                          11.6   7.3   )-----------( 414      ( 14 Feb 2018 09:55 )             34.4    02-14    134<L>  |  94<L>  |  15  ----------------------------<  96  3.8   |  31  |  0.54    Ca    8.3<L>      14 Feb 2018 09:55    TPro  5.8<L>  /  Alb  2.5<L>  /  TBili  0.1<L>  /  DBili  <0.1  /  AST  98<H>  /  ALT  153<H>  /  AlkPhos  126<H>  02-15     Stroke Core Measures    Hemoglobin A1C, Whole Blood: 5.4 % (01-24 @ 22:40)    DRAIN OUTPUT:     NEUROIMAGING:     PHYSICAL EXAM:    General: No Acute Distress     Neurological: Awake, alert  oriented x2 with choices, Pupils reactive to light, Face symmetrical, follows commands intermitently, moves left side spontaneously, plegic on the right    Pulmonary: Clear to Auscultation, No Rales, No Rhonchi, No Wheezes     Cardiovascular: S1, S2, Regular Rate and Rhythm     Gastrointestinal: Soft, Nontender, Nondistended     Extremities: No calf tenderness     Incision:     MEDICATIONS:   Antibiotics:    Neuro:  acetaminophen    Suspension. 650 milliGRAM(s) Oral every 6 hours PRN Mild Pain (1 - 3)  lacosamide 200 milliGRAM(s) Oral two times a day    Anticoagulation:  aspirin  chewable 81 milliGRAM(s) Oral daily  enoxaparin Injectable 30 milliGRAM(s) SubCutaneous <User Schedule>    Cardiology:  cloNIDine 0.1 milliGRAM(s) Oral <User Schedule>  enalapril 10 milliGRAM(s) Oral <User Schedule>  verapamil 80 milliGRAM(s) Oral three times a day    Endo:     Pulm:  ALBUTerol    90 MICROgram(s) HFA Inhaler 1 Puff(s) Inhalation every 4 hours PRN  ipratropium 17 MICROgram(s) HFA Inhaler 1 Puff(s) Inhalation every 6 hours PRN    GI/:    Other:  artificial  tears Solution 1 Drop(s) Both EYES every 4 hours PRN dryness  nicotine -  14 mG/24Hr(s) Patch 1 patch Transdermal daily

## 2018-02-15 NOTE — PROGRESS NOTE ADULT - ASSESSMENT
HTN  elevated this am  repeat BP later today hopefully will be lower after receiving clonidine     abnormal ekg   likely due to LVH    Fever  infectious work up  fu with Med

## 2018-02-15 NOTE — PROGRESS NOTE ADULT - SUBJECTIVE AND OBJECTIVE BOX
Authored by Dr Juan Weaver [id number]     Patient is a 62y old  Female who presents with a chief complaint of Patient was admitted on 1/24 as a trauma. Patient had a fall as per  with altered mental status. (24 Jan 2018 13:50)    SUBJECTIVE / OVERNIGHT EVENTS:  at bedside. pt smiling and not expressing any complaints. ros limited by aphasia    MEDICATIONS  (STANDING):  aspirin  chewable 81 milliGRAM(s) Oral daily  cloNIDine 0.1 milliGRAM(s) Oral <User Schedule>  enalapril 10 milliGRAM(s) Oral <User Schedule>  enoxaparin Injectable 30 milliGRAM(s) SubCutaneous <User Schedule>  lacosamide 200 milliGRAM(s) Oral two times a day  nicotine -  14 mG/24Hr(s) Patch 1 patch Transdermal daily  verapamil 80 milliGRAM(s) Oral three times a day    MEDICATIONS  (PRN):  acetaminophen    Suspension. 650 milliGRAM(s) Oral every 6 hours PRN Mild Pain (1 - 3)  ALBUTerol    90 MICROgram(s) HFA Inhaler 1 Puff(s) Inhalation every 4 hours PRN Wheezing  artificial  tears Solution 1 Drop(s) Both EYES every 4 hours PRN dryness  ipratropium 17 MICROgram(s) HFA Inhaler 1 Puff(s) Inhalation every 6 hours PRN wheez      Vital Signs Last 24 Hrs  T(C): 36.4 (15 Feb 2018 07:45), Max: 36.6 (14 Feb 2018 15:35)  T(F): 97.5 (15 Feb 2018 07:45), Max: 97.9 (14 Feb 2018 19:49)  HR: 58 (15 Feb 2018 07:45) (53 - 66)  BP: 191/87 (15 Feb 2018 07:45) (131/71 - 191/87)  BP(mean): --  RR: 18 (15 Feb 2018 07:45) (18 - 18)  SpO2: 97% (15 Feb 2018 07:45) (93% - 97%)  CAPILLARY BLOOD GLUCOSE        I&O's Summary    14 Feb 2018 07:01  -  15 Feb 2018 07:00  --------------------------------------------------------  IN: 840 mL / OUT: 0 mL / NET: 840 mL        PHYSICAL EXAM  GEN: NAD  HEENT: MMM  LUNG: Clear to auscultation bilaterally; No wheeze  HEART: RRR no murmur  ABDOMEN: Soft, Nontender, Nondistended; Bowel sounds present, + PEG covered with abdominal binder(site C/D/I)  EXTREMITIES: No leg edema  PSYCH: calm  NEUROLOGY: Alert and awake, aphasic, follows simple commands (i.e. smile, squeezes hand, moves left arm and leg on command), R hemiparesis  SKIN: warm, dry    LABS:                        11.6   7.3   )-----------( 414      ( 14 Feb 2018 09:55 )             34.4     02-14    134<L>  |  94<L>  |  15  ----------------------------<  96  3.8   |  31  |  0.54    Ca    8.3<L>      14 Feb 2018 09:55    TPro  5.8<L>  /  Alb  2.5<L>  /  TBili  0.1<L>  /  DBili  <0.1  /  AST  98<H>  /  ALT  153<H>  /  AlkPhos  126<H>  02-15              Culture - Blood (collected 14 Feb 2018 08:30)  Source: .Blood Blood-Venous  Preliminary Report (15 Feb 2018 09:01):    No growth to date.    Culture - Blood (collected 14 Feb 2018 08:30)  Source: .Blood Blood-Peripheral  Preliminary Report (15 Feb 2018 09:01):    No growth to date.    Culture - Urine (collected 13 Feb 2018 13:26)  Source: .Urine Catheterized  Preliminary Report (14 Feb 2018 12:40):    10,000 - 49,000 CFU/mL Yeast    Culture - Blood (collected 13 Feb 2018 13:00)  Source: .Blood Blood-Peripheral  Preliminary Report (14 Feb 2018 13:02):    No growth to date.    Culture - Blood (collected 13 Feb 2018 13:00)  Source: .Blood Blood-Peripheral  Preliminary Report (14 Feb 2018 13:02):    No growth to date.        RADIOLOGY & ADDITIONAL TESTS:    Imaging Personally Reviewed:  Consultant(s) Notes Reviewed:    Care Discussed with Consultants/Other Providers:

## 2018-02-15 NOTE — PROGRESS NOTE ADULT - PROBLEM SELECTOR PLAN 3
s/p fall, IVH hemorrhage, s/p EVD drain removed  had leukocytosis since admission 2 weeks ago--now moderating  Intermittent post op fevers with a negative ID w/u to date  coag negative staph in 1/4 bottles is c/w contaminent  Elevated LFT's could reflect a drug effect, possible drug fever as well  She is without any focal signs of infection  Antiepileptic change from keppra to vimpat  1.Monitor off antibiotics  2.fever does not obligate antibiotics, but does imply need for evaluation

## 2018-02-15 NOTE — PROGRESS NOTE ADULT - PROVIDER SPECIALTY LIST ADULT
Cardiology
Gastroenterology
Hospitalist
Infectious Disease
Internal Medicine
NSICU
Nephrology
Neurology
Neurology
Neurosurgery
Rehab Medicine
Trauma Surgery
Hospitalist
Neurosurgery
Gastroenterology
Cardiology
Gastroenterology
Hospitalist
Internal Medicine
Hospitalist

## 2018-02-15 NOTE — PROGRESS NOTE ADULT - PROBLEM SELECTOR PLAN 8
- incidental 2.6 x 2.2 cm left adrenal nodule noted on CT a/p  - will need outpatient follow up and interval monitoring.  - reiterated w/

## 2018-02-15 NOTE — PROGRESS NOTE ADULT - PROBLEM SELECTOR PLAN 1
Neurologically stable  Will change keppra to Vimpat due elevated LFT's  Will tx to acute TBI rehab today

## 2018-02-15 NOTE — PROGRESS NOTE ADULT - ATTENDING COMMENTS
PMD to be contacted prior to discharge
Boris Benton MD, FACP, FACG, AGAF  [facility]  [phone]
Agree with resident note. Patient personally seen and examined. All current imaging studies reviewed.
Agree with resident note. Patient personally seen and examined. All current imaging studies reviewed.  EVD patent. No further NS intervention acutely.
Agree with resident note. Patient personally seen and examined. All current imaging studies reviewed.  No acute NS intervention....??VPS
Agree with resident note. Patient personally seen and examined. All current imaging studies reviewed.  Will d/w family
Agree with resident note. Patient personally seen and examined. All current imaging studies reviewed.  evd patent  pt slightly improves, now extubated   f/c
Agree with resident note. Patient personally seen and examined. All current imaging studies reviewed.  no VPS
Boris Benton MD, FACP, FACG, AGAF  [facility]  [phone]
Boris Benton MD, FACP, FACG, AGAF  [facility]  [phone]
Boris Benton MD, FACP, FACG, AGAF  [facility]  [phone]
Boris Benton MD, FACP, FACG, AGAF  [facility]  [phone]
Patient seen and examined. Patient will need aggressive rehab and close outpatient neurologic follow-up. Discussed with family at bedside.
Son Rollins MD, FACG, FACP  [facility]  [phone]
seen and examined, agree with above.  continue TF via PEG
agree with Dr. Thacker assessment and plan no radiographic evidence of strokes in multiple vascular territories or beading / short segment stenosis of cerebral blood vessels on cerebral angiogram which would be suggestive of CNS vasculitis. It appears that she had reversible cerebral vasoconstriction syndrome, diagnosis of exclusion. It is reasonable to start aspirin 81 mg if okay with neurosurgery
Boris Benton MD, FACP, FACG, AGAF  [facility]  [phone]
Son Rollins MD, FACG, FACP  [facility]  [phone]
Discharge to rehab delayed to optimize BP management.
message left for PMD Dr. Kathleen  needs outpatient neuro f/u
Discharge to rehab delayed to optimize hypertension management.
d/c planning to rehab.
left message for PMD to discuss.

## 2018-02-15 NOTE — PROGRESS NOTE ADULT - ASSESSMENT
62 F, active smoker, with no sig PMHx, recent URI symptoms admitted on 1/24/18 with AMS, fall with head trauma found to have large right frontal scalp hematoma, acute left MCA territory infarct, IVH, and hydrocephalus on CT head requiring intubation and EVD. CTA head with ? 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 Vasculitis and infectious work up negative. Course c/b subclinical seizures, fevers and leukocytosis with negative infectious work up, also with EKG changes and elevated troponin ? due to demand ischemia, and dysphagia. Patient was extubated on 1/28, EVD discontinued on 2/1 and transferred out of ICU on 2/2/18, with hypernatremia(resolved), s/p PEG on 2/5 with recurrent fevers    PMD: Dr. Felipe Kathleen [phone]

## 2018-02-15 NOTE — PROGRESS NOTE ADULT - PROBLEM SELECTOR PROBLEM 1
Fever, unspecified fever cause
Hypertensive crisis
IVH (intraventricular hemorrhage)
Fever, unspecified fever cause
Hypertensive crisis
IVH (intraventricular hemorrhage)
Intracranial hemorrhage
Fever, unspecified fever cause
Fever, unspecified fever cause
Hypertensive crisis
Hypertensive crisis

## 2018-02-16 PROCEDURE — 93010 ELECTROCARDIOGRAM REPORT: CPT

## 2018-02-16 PROCEDURE — 99254 IP/OBS CNSLTJ NEW/EST MOD 60: CPT

## 2018-02-16 PROCEDURE — 90837 PSYTX W PT 60 MINUTES: CPT

## 2018-02-16 PROCEDURE — 74230 X-RAY XM SWLNG FUNCJ C+: CPT | Mod: 26

## 2018-02-16 PROCEDURE — 99233 SBSQ HOSP IP/OBS HIGH 50: CPT

## 2018-02-17 LAB
CULTURE RESULTS: SIGNIFICANT CHANGE UP
SPECIMEN SOURCE: SIGNIFICANT CHANGE UP

## 2018-02-17 PROCEDURE — 99233 SBSQ HOSP IP/OBS HIGH 50: CPT

## 2018-02-18 LAB
CULTURE RESULTS: SIGNIFICANT CHANGE UP
CULTURE RESULTS: SIGNIFICANT CHANGE UP
SPECIMEN SOURCE: SIGNIFICANT CHANGE UP
SPECIMEN SOURCE: SIGNIFICANT CHANGE UP

## 2018-02-18 PROCEDURE — 99233 SBSQ HOSP IP/OBS HIGH 50: CPT

## 2018-02-19 PROCEDURE — 93010 ELECTROCARDIOGRAM REPORT: CPT

## 2018-02-19 PROCEDURE — 99232 SBSQ HOSP IP/OBS MODERATE 35: CPT

## 2018-02-19 PROCEDURE — 99233 SBSQ HOSP IP/OBS HIGH 50: CPT

## 2018-02-20 LAB — HISTONE AB SER-ACNC: 0.4 UNITS — SIGNIFICANT CHANGE UP (ref 0–0.9)

## 2018-02-20 PROCEDURE — 96116 NUBHVL XM PHYS/QHP 1ST HR: CPT

## 2018-02-20 PROCEDURE — 93010 ELECTROCARDIOGRAM REPORT: CPT

## 2018-02-20 PROCEDURE — 99232 SBSQ HOSP IP/OBS MODERATE 35: CPT | Mod: GC

## 2018-02-20 PROCEDURE — 99233 SBSQ HOSP IP/OBS HIGH 50: CPT

## 2018-02-21 PROCEDURE — 99232 SBSQ HOSP IP/OBS MODERATE 35: CPT | Mod: GC

## 2018-02-22 PROCEDURE — 76705 ECHO EXAM OF ABDOMEN: CPT | Mod: 26,RT

## 2018-02-22 PROCEDURE — 99232 SBSQ HOSP IP/OBS MODERATE 35: CPT | Mod: GC

## 2018-02-23 PROCEDURE — 99232 SBSQ HOSP IP/OBS MODERATE 35: CPT | Mod: GC

## 2018-02-24 PROCEDURE — 99233 SBSQ HOSP IP/OBS HIGH 50: CPT

## 2018-02-25 PROCEDURE — 99232 SBSQ HOSP IP/OBS MODERATE 35: CPT

## 2018-02-26 PROCEDURE — 99222 1ST HOSP IP/OBS MODERATE 55: CPT

## 2018-02-26 PROCEDURE — 99233 SBSQ HOSP IP/OBS HIGH 50: CPT

## 2018-02-27 PROCEDURE — 93227 XTRNL ECG REC<48 HR R&I: CPT

## 2018-02-27 PROCEDURE — 99232 SBSQ HOSP IP/OBS MODERATE 35: CPT

## 2018-02-28 PROCEDURE — 99233 SBSQ HOSP IP/OBS HIGH 50: CPT

## 2018-02-28 PROCEDURE — 99232 SBSQ HOSP IP/OBS MODERATE 35: CPT

## 2018-03-01 PROCEDURE — 99232 SBSQ HOSP IP/OBS MODERATE 35: CPT

## 2018-03-01 PROCEDURE — 99233 SBSQ HOSP IP/OBS HIGH 50: CPT

## 2018-03-02 PROCEDURE — 99233 SBSQ HOSP IP/OBS HIGH 50: CPT

## 2018-03-02 PROCEDURE — 74176 CT ABD & PELVIS W/O CONTRAST: CPT | Mod: 26

## 2018-03-03 PROCEDURE — 99232 SBSQ HOSP IP/OBS MODERATE 35: CPT

## 2018-03-03 PROCEDURE — 71045 X-RAY EXAM CHEST 1 VIEW: CPT | Mod: 26

## 2018-03-04 PROCEDURE — 99233 SBSQ HOSP IP/OBS HIGH 50: CPT

## 2018-03-05 PROCEDURE — 99233 SBSQ HOSP IP/OBS HIGH 50: CPT

## 2018-03-06 PROCEDURE — 99233 SBSQ HOSP IP/OBS HIGH 50: CPT

## 2018-03-07 PROCEDURE — 99233 SBSQ HOSP IP/OBS HIGH 50: CPT

## 2018-03-07 PROCEDURE — 99232 SBSQ HOSP IP/OBS MODERATE 35: CPT

## 2018-03-08 PROCEDURE — 84244 ASSAY OF RENIN: CPT

## 2018-03-08 PROCEDURE — 97110 THERAPEUTIC EXERCISES: CPT

## 2018-03-08 PROCEDURE — 74230 X-RAY XM SWLNG FUNCJ C+: CPT

## 2018-03-08 PROCEDURE — 85025 COMPLETE CBC W/AUTO DIFF WBC: CPT

## 2018-03-08 PROCEDURE — 99239 HOSP IP/OBS DSCHRG MGMT >30: CPT

## 2018-03-08 PROCEDURE — 83605 ASSAY OF LACTIC ACID: CPT

## 2018-03-08 PROCEDURE — 83735 ASSAY OF MAGNESIUM: CPT

## 2018-03-08 PROCEDURE — 92523 SPEECH SOUND LANG COMPREHEN: CPT

## 2018-03-08 PROCEDURE — 97535 SELF CARE MNGMENT TRAINING: CPT

## 2018-03-08 PROCEDURE — 92610 EVALUATE SWALLOWING FUNCTION: CPT

## 2018-03-08 PROCEDURE — 74176 CT ABD & PELVIS W/O CONTRAST: CPT

## 2018-03-08 PROCEDURE — 92611 MOTION FLUOROSCOPY/SWALLOW: CPT

## 2018-03-08 PROCEDURE — 82088 ASSAY OF ALDOSTERONE: CPT

## 2018-03-08 PROCEDURE — 97112 NEUROMUSCULAR REEDUCATION: CPT

## 2018-03-08 PROCEDURE — 80048 BASIC METABOLIC PNL TOTAL CA: CPT

## 2018-03-08 PROCEDURE — 80061 LIPID PANEL: CPT

## 2018-03-08 PROCEDURE — 97163 PT EVAL HIGH COMPLEX 45 MIN: CPT

## 2018-03-08 PROCEDURE — 84484 ASSAY OF TROPONIN QUANT: CPT

## 2018-03-08 PROCEDURE — 80076 HEPATIC FUNCTION PANEL: CPT

## 2018-03-08 PROCEDURE — 82248 BILIRUBIN DIRECT: CPT

## 2018-03-08 PROCEDURE — 84443 ASSAY THYROID STIM HORMONE: CPT

## 2018-03-08 PROCEDURE — 93225 XTRNL ECG REC<48 HRS REC: CPT

## 2018-03-08 PROCEDURE — 80069 RENAL FUNCTION PANEL: CPT

## 2018-03-08 PROCEDURE — 87086 URINE CULTURE/COLONY COUNT: CPT

## 2018-03-08 PROCEDURE — 83835 ASSAY OF METANEPHRINES: CPT

## 2018-03-08 PROCEDURE — 92507 TX SP LANG VOICE COMM INDIV: CPT

## 2018-03-08 PROCEDURE — 87040 BLOOD CULTURE FOR BACTERIA: CPT

## 2018-03-08 PROCEDURE — 93226 XTRNL ECG REC<48 HR SCAN A/R: CPT

## 2018-03-08 PROCEDURE — 84480 ASSAY TRIIODOTHYRONINE (T3): CPT

## 2018-03-08 PROCEDURE — 76705 ECHO EXAM OF ABDOMEN: CPT

## 2018-03-08 PROCEDURE — 82436 ASSAY OF URINE CHLORIDE: CPT

## 2018-03-08 PROCEDURE — 84439 ASSAY OF FREE THYROXINE: CPT

## 2018-03-08 PROCEDURE — 83935 ASSAY OF URINE OSMOLALITY: CPT

## 2018-03-08 PROCEDURE — 85027 COMPLETE CBC AUTOMATED: CPT

## 2018-03-08 PROCEDURE — 81003 URINALYSIS AUTO W/O SCOPE: CPT

## 2018-03-08 PROCEDURE — 85610 PROTHROMBIN TIME: CPT

## 2018-03-08 PROCEDURE — 84100 ASSAY OF PHOSPHORUS: CPT

## 2018-03-08 PROCEDURE — 80053 COMPREHEN METABOLIC PANEL: CPT

## 2018-03-08 PROCEDURE — 97167 OT EVAL HIGH COMPLEX 60 MIN: CPT

## 2018-03-08 PROCEDURE — 84550 ASSAY OF BLOOD/URIC ACID: CPT

## 2018-03-08 PROCEDURE — 86038 ANTINUCLEAR ANTIBODIES: CPT

## 2018-03-08 PROCEDURE — 86225 DNA ANTIBODY NATIVE: CPT

## 2018-03-08 PROCEDURE — 71045 X-RAY EXAM CHEST 1 VIEW: CPT

## 2018-03-08 PROCEDURE — 97116 GAIT TRAINING THERAPY: CPT

## 2018-03-08 PROCEDURE — 84300 ASSAY OF URINE SODIUM: CPT

## 2018-03-08 PROCEDURE — 86160 COMPLEMENT ANTIGEN: CPT

## 2018-03-08 PROCEDURE — 93005 ELECTROCARDIOGRAM TRACING: CPT

## 2018-03-08 PROCEDURE — 97530 THERAPEUTIC ACTIVITIES: CPT

## 2018-04-02 ENCOUNTER — TRANSCRIPTION ENCOUNTER (OUTPATIENT)
Age: 63
End: 2018-04-02

## 2018-04-12 ENCOUNTER — OUTPATIENT (OUTPATIENT)
Dept: OUTPATIENT SERVICES | Facility: HOSPITAL | Age: 63
LOS: 1 days | Discharge: HOME | End: 2018-04-12

## 2018-04-12 DIAGNOSIS — Z98.890 OTHER SPECIFIED POSTPROCEDURAL STATES: Chronic | ICD-10-CM

## 2018-04-13 DIAGNOSIS — I69.390 APRAXIA FOLLOWING CEREBRAL INFARCTION: ICD-10-CM

## 2018-04-13 DIAGNOSIS — I69.320 APHASIA FOLLOWING CEREBRAL INFARCTION: ICD-10-CM

## 2018-04-13 DIAGNOSIS — F06.8 OTHER SPECIFIED MENTAL DISORDERS DUE TO KNOWN PHYSIOLOGICAL CONDITION: ICD-10-CM

## 2018-04-17 ENCOUNTER — OUTPATIENT (OUTPATIENT)
Dept: OUTPATIENT SERVICES | Facility: HOSPITAL | Age: 63
LOS: 1 days | Discharge: HOME | End: 2018-04-17

## 2018-04-17 DIAGNOSIS — Z98.890 OTHER SPECIFIED POSTPROCEDURAL STATES: Chronic | ICD-10-CM

## 2018-04-20 DIAGNOSIS — I63.412 CEREBRAL INFARCTION DUE TO EMBOLISM OF LEFT MIDDLE CEREBRAL ARTERY: ICD-10-CM

## 2018-05-29 ENCOUNTER — APPOINTMENT (OUTPATIENT)
Dept: PHYSICAL MEDICINE AND REHAB | Facility: CLINIC | Age: 63
End: 2018-05-29

## 2018-07-20 ENCOUNTER — EMERGENCY (EMERGENCY)
Facility: HOSPITAL | Age: 63
LOS: 1 days | Discharge: HOME | End: 2018-07-20
Attending: EMERGENCY MEDICINE
Payer: MEDICAID

## 2018-07-20 VITALS
HEIGHT: 65 IN | SYSTOLIC BLOOD PRESSURE: 106 MMHG | RESPIRATION RATE: 18 BRPM | OXYGEN SATURATION: 95 % | DIASTOLIC BLOOD PRESSURE: 65 MMHG | TEMPERATURE: 98 F | HEART RATE: 75 BPM | WEIGHT: 102.07 LBS

## 2018-07-20 VITALS
OXYGEN SATURATION: 98 % | TEMPERATURE: 98 F | SYSTOLIC BLOOD PRESSURE: 114 MMHG | HEART RATE: 65 BPM | RESPIRATION RATE: 16 BRPM | DIASTOLIC BLOOD PRESSURE: 72 MMHG

## 2018-07-20 DIAGNOSIS — Z98.890 OTHER SPECIFIED POSTPROCEDURAL STATES: Chronic | ICD-10-CM

## 2018-07-20 PROCEDURE — 73030 X-RAY EXAM OF SHOULDER: CPT | Mod: 26,RT

## 2018-07-20 PROCEDURE — 73030 X-RAY EXAM OF SHOULDER: CPT

## 2018-07-20 PROCEDURE — 73070 X-RAY EXAM OF ELBOW: CPT | Mod: 26,RT

## 2018-07-20 PROCEDURE — 73070 X-RAY EXAM OF ELBOW: CPT

## 2018-07-20 PROCEDURE — 99284 EMERGENCY DEPT VISIT MOD MDM: CPT

## 2018-07-20 PROCEDURE — 73060 X-RAY EXAM OF HUMERUS: CPT

## 2018-07-20 PROCEDURE — 73060 X-RAY EXAM OF HUMERUS: CPT | Mod: 26,RT

## 2018-07-20 RX ORDER — ACETAMINOPHEN 500 MG
975 TABLET ORAL ONCE
Qty: 0 | Refills: 0 | Status: COMPLETED | OUTPATIENT
Start: 2018-07-20 | End: 2018-07-20

## 2018-07-20 RX ADMIN — Medication 975 MILLIGRAM(S): at 11:55

## 2018-07-20 NOTE — ED PROVIDER NOTE - CRANIAL NERVE AND PUPILLARY EXAM
cranial nerves 2-12 intact/RUE weakness 1/5 chronic, no bony ttp, decreased rom, chornic RLE weakness

## 2018-07-20 NOTE — ED ADULT NURSE NOTE - PT NEEDS ASSIST
yes
Spencer Larry), Cardiovascular Disease  [address] [place] [address]  Phone: [phone]  Fax: [phone]    Pahlavan, Mohsen (MD), Nephrology  [address] [place] [address]  Phone: [phone]  Fax: [phone]

## 2018-07-20 NOTE — ED PROVIDER NOTE - OBJECTIVE STATEMENT
63 yo f pmh cva with RUE & RLE hemiparesis & apahsia, HTN fall off wheelchair 10days ago. c/o pain R elbow & R forearm. has chronic R shoulder pain. came today to get checked out before enrolling in trial at [facility] 63 yo f pmh cva with RUE & RLE hemiparesis & apahsia, HTN fall off wheelchair 10days ago. c/o pain R elbow & R forearm. has chronic R shoulder pain, unchanged. also has bruise R arm that's improving. came today to get checked out before enrolling in trial at [facility]. ROS negative for: fever, chest pain, SOB, loc, head trauma

## 2018-07-20 NOTE — ED PROVIDER NOTE - ATTENDING CONTRIBUTION TO CARE
Dr. Goodwin (Attending Physician)  I performed a history and physical exam of the patient and discussed their management with the resident. I reviewed the resident's note and agree with the documented findings and plan of care. My medical decision making and observations are found above.

## 2018-07-20 NOTE — ED ADULT NURSE NOTE - OBJECTIVE STATEMENT
0950 62 yr old WF brought to ER by  for further eval and tx of right arm pain. S/P injury 10 days ago. Fell out of wheelchair. No LOC of head injury. Hx of 2 strokes Jan 2018 with residual hemiparesis on right and expressive aphasia

## 2018-07-20 NOTE — ED PROVIDER NOTE - CARE PLAN
Principal Discharge DX:	Humeral head fracture  Secondary Diagnosis:	Elbow pain, chronic, right  Secondary Diagnosis:	Fall

## 2018-07-20 NOTE — ED PROVIDER NOTE - MEDICAL DECISION MAKING DETAILS
RUE weakness 1/5 chronic, no bony ttp, decreased rom. xray shows  mild cortical irregularity/step-off in the lateral aspect of the right humeral head which may represent nondisplaced fracture. pt already has sling. will dc w/ ortho f/u Dr. Goodwin (Attending Physician)  RUE weakness 1/5 chronic, no bony ttp, decreased rom. xray shows  mild cortical irregularity/step-off in the lateral aspect of the right humeral head which may represent nondisplaced fracture. pt already has sling. will dc w/ ortho f/u

## 2018-08-06 ENCOUNTER — OUTPATIENT (OUTPATIENT)
Dept: OUTPATIENT SERVICES | Facility: HOSPITAL | Age: 63
LOS: 1 days | Discharge: HOME | End: 2018-08-06

## 2018-08-06 DIAGNOSIS — Z98.890 OTHER SPECIFIED POSTPROCEDURAL STATES: Chronic | ICD-10-CM

## 2018-08-08 DIAGNOSIS — I63.9 CEREBRAL INFARCTION, UNSPECIFIED: ICD-10-CM

## 2018-08-08 PROBLEM — I10 ESSENTIAL (PRIMARY) HYPERTENSION: Chronic | Status: ACTIVE | Noted: 2018-07-20

## 2019-09-11 NOTE — PROGRESS NOTE ADULT - ASSESSMENT
Elevated trop  likely demand ischemia in setting of acute hemorrhagic cva , IVH and possible sepsis  levels are trending down   asa once deemed safe by nsx  eventual ischemic work up in future    HTN  on verapamil as per neuro  DC labetalol   start hydralazine     abnormal ekg   likely due to LVH Comment: Patient currently has an IUD. I explained importance of continuing birth control methods through spironolactone tx. Detail Level: Simple

## 2020-03-16 NOTE — PROVIDER CONTACT NOTE (OTHER) - NAME OF MD/NP/PA/DO NOTIFIED:
Ina left requesting a call back. Called patient to reschedule his appointment for two months. Robby MARINA

## 2020-12-01 NOTE — PROGRESS NOTE ADULT - ASSESSMENT
62 F, active smoker, with no sig PMHx, recent URI symptoms admitted on 1/24/18 with AMS, fall with head trauma found to have large right frontal scalp hematoma, acute left MCA territory infarct, IVH, and hydrocephalus on CT head requiring intubation and EVD. CTA head with ? 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 Vasculitis and infectious work up negative. Course c/b subclinical seizures, fevers and leukocytosis with negative infectious work up, also with EKG changes and elevated troponin ? due to demand ischemia, and dysphagia. Patient was extubated on 1/28, EVD discontinued on 2/1 and transferred out of ICU on 2/2/18, with hypernatremia(resolved), s/p PEG on 2/5 with recurrent fevers    PMD: Dr. Felipe Kathleen [phone] Detail Level: Detailed

## 2021-03-04 NOTE — PROGRESS NOTE ADULT - ASSESSMENT
62 F, active smoker, with no sig PMHx, recent URI symptoms admitted on 1/24/18 with AMS, fall with head trauma found to have large right frontal scalp hematoma, acute left MCA territory infarct, IVH, and hydrocephalus on CT head requiring intubation and EVD. CTA head with ? 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 Vasculitis and infectious work up negative. Course c/b subclinical seizures, fevers and leukocytosis with negative infectious work up, also with EKG changes and elevated troponin ? due to demand ischemia, and dysphagia on NG tube feeds. Patient was extubated on 1/28, EVD discontinued on 2/1 and transferred out of ICU on 2/2/18, with hypernatremia, leukocytosis now improving. s/p peg placement 2/5/18    Plan:  Neuro stable Continue Keppra  Hypertension- Changed to Labetalol 200 q8 and Enalapril dose increased to 10q 12. Medicine f/u appreciated   Will start feeds today  DVT ppx  am CBC BMP  PT/OT- acute rehab. Possible d/c in am 62 F, active smoker, with no sig PMHx, recent URI symptoms admitted on 1/24/18 with AMS, fall with head trauma found to have large right frontal scalp hematoma, acute left MCA territory infarct, IVH, and hydrocephalus on CT head requiring intubation and EVD. CTA head with ? 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 Course c/b subclinical seizures, fevers and leukocytosis with negative infectious work up, also with EKG changes and elevated troponin ? due to demand ischemia, and dysphagia .  Patient was extubated on 1/28, EVD discontinued on 2/1 and transferred out of ICU on 2/2/18, with hypernatremia, leukocytosis now improving. s/p peg placement 2/5/18    Plan:  Neuro stable Continue Keppra. stroke work up complete  Hypertension- Changed to Labetalol 200 q8 and Enalapril dose increased to 10q 12. Medicine f/u appreciated   Will start feeds today  DVT ppx  PT/OT- acute rehab. Possible d/c in am yes

## 2021-05-26 NOTE — DIETITIAN INITIAL EVALUATION ADULT. - EST PROTEIN NEEDS8
69.72 Mercedes Flap Text: The defect edges were debeveled with a #15 scalpel blade.  Given the location of the defect, shape of the defect and the proximity to free margins a Mercedes flap was deemed most appropriate.  Using a sterile surgical marker, an appropriate advancement flap was drawn incorporating the defect and placing the expected incisions within the relaxed skin tension lines where possible. The area thus outlined was incised deep to adipose tissue with a #15 scalpel blade.  The skin margins were undermined to an appropriate distance in all directions utilizing iris scissors.

## 2021-06-14 NOTE — CONSULT NOTE ADULT - PROBLEM SELECTOR RECOMMENDATION 8
Detail Level: Simple Hide Additional Notes?: No Detail Level: Zone - continue labetalol, hydralazine, amlodipine  - monitor BP

## 2021-06-30 NOTE — PATIENT PROFILE ADULT. - NS PRO MODE OF ARRIVAL
ICU Bed W Plasty Text: The lesion was extirpated to the level of the fat with a #15 scalpel blade.  Given the location of the defect, shape of the defect and the proximity to free margins a W-plasty was deemed most appropriate for repair.  Using a sterile surgical marker, the appropriate transposition arms of the W-plasty were drawn incorporating the defect and placing the expected incisions within the relaxed skin tension lines where possible.    The area thus outlined was incised deep to adipose tissue with a #15 scalpel blade.  The skin margins were undermined to an appropriate distance in all directions utilizing iris scissors.  The opposing transposition arms were then transposed into place in opposite direction and anchored with interrupted buried subcutaneous sutures.

## 2021-07-20 NOTE — DISCHARGE NOTE ADULT - NS AS ACTIVITY OBS
Impression: S/P Cataract Extraction by phacoemulsification with IOL placement; ORA; LRI (Limbal Relaxing Incision) OD - 1 Day. Encounter for surgical aftercare following surgery on a sense organ  Z48.810. Plan: IOP is slightly elevated. 1 qtt of IOP drop was placed today. Recheck in 1 week. --Advised patient to use artificial tears for comfort. Walking-Outdoors allowed/Showering allowed/No Heavy lifting/straining/Walking-Indoors allowed/Do not make important decisions/Stairs allowed/Do not drive or operate machinery

## 2021-12-07 NOTE — PROGRESS NOTE ADULT - PROBLEM SELECTOR PLAN 7
What Type Of Note Output Would You Prefer (Optional)?: Bullet Format Hpi Title: Evaluation of Skin Lesions How Severe Are Your Spot(S)?: moderate Additional History: Declines chaperone \\nPt also wants skin tags removed - continue with Keppra.

## 2022-03-11 NOTE — PRE-OP CHECKLIST - ORDERS/MEDICATION ADMINISTRATION RECORD ON CHART
done
PRINCIPAL PROCEDURE  Procedure: Left heart cardiac cath  Findings and Treatment:  LAD, Patent SVG to diag, cir, RCA, LIMA to LAD atretic

## 2023-07-14 NOTE — PATIENT PROFILE ADULT. - ABILITY TO HEAR (WITH HEARING AID OR HEARING APPLIANCE IF NORMALLY USED):
Adequate: hears normal conversation without difficulty Methotrexate Pregnancy And Lactation Text: This medication is Pregnancy Category X and is known to cause fetal harm. This medication is excreted in breast milk.

## 2023-11-07 NOTE — H&P ADULT - FAMILY HISTORY
Spoke to patient and scheduled an acute appointment with Dr. Marshall Blackman on 11/10. Father  Still living? No  Family history of colon cancer in father, Age at diagnosis: Age Unknown

## 2024-06-11 NOTE — ED ADULT NURSE NOTE - CCCP TRG CHIEF CMPLNT
See note below-she can also go to lab before 10 am and get some blood work drawn.Tell her to make sure she is not pregnant.    fall

## 2025-01-17 NOTE — PATIENT PROFILE ADULT. - FUNCTIONAL SCREEN CURRENT LEVEL: TRANSFERRING, MLM
Caller: Raysa Ellis    Relationship to patient: Self    Best call back number: [phone]    Patient is needing: PATIENT CALLED IN AND SAID HER PHARMACY CALLED AND SAID ANTI INFLAMMATORY MEDICATION SHE WAS PRESCRIBED WAS NOT COVERED UNDER INSURANCE AND PATIENT IS REQUESTING A CALL BACK WITH GUIDANCE PLEASE       n/a